# Patient Record
Sex: FEMALE | Race: BLACK OR AFRICAN AMERICAN | NOT HISPANIC OR LATINO | Employment: OTHER | URBAN - METROPOLITAN AREA
[De-identification: names, ages, dates, MRNs, and addresses within clinical notes are randomized per-mention and may not be internally consistent; named-entity substitution may affect disease eponyms.]

---

## 2020-08-10 ENCOUNTER — TELEPHONE (OUTPATIENT)
Dept: CARDIOLOGY | Facility: CLINIC | Age: 75
End: 2020-08-10

## 2020-08-10 DIAGNOSIS — Z95.810 ICD (IMPLANTABLE CARDIOVERTER-DEFIBRILLATOR), DUAL, IN SITU: Primary | ICD-10-CM

## 2020-08-10 NOTE — TELEPHONE ENCOUNTER
Returned call to Lidia, pt's daughter needs to establish EP care in [place]  S/p ICD implant in [place] with Jacqueline Conroy/ [facility] device        ---- Message from Christie Howard sent at 8/10/2020  1:42 PM CDT -----  Regarding: Appt  Lidia is requesting call back in regards to scheduling appt for AICD follow u pappt              Pls call back at [phone]

## 2020-08-17 ENCOUNTER — HOSPITAL ENCOUNTER (OUTPATIENT)
Dept: CARDIOLOGY | Facility: HOSPITAL | Age: 75
Discharge: HOME | End: 2020-08-17
Attending: INTERNAL MEDICINE
Payer: MEDICARE

## 2020-08-17 DIAGNOSIS — Z95.810 ICD (IMPLANTABLE CARDIOVERTER-DEFIBRILLATOR), DUAL, IN SITU: ICD-10-CM

## 2020-08-17 PROCEDURE — 93282 CARDIAC DEVICE CHECK - IN CLINIC & HOSPITAL: ICD-10-PCS | Mod: 26,,, | Performed by: INTERNAL MEDICINE

## 2020-08-17 PROCEDURE — 93282 PRGRMG EVAL IMPLANTABLE DFB: CPT

## 2020-08-17 PROCEDURE — 93282 PRGRMG EVAL IMPLANTABLE DFB: CPT | Mod: 26,,, | Performed by: INTERNAL MEDICINE

## 2020-08-26 ENCOUNTER — TELEPHONE (OUTPATIENT)
Dept: INTERNAL MEDICINE | Facility: CLINIC | Age: 75
End: 2020-08-26

## 2020-08-26 LAB
CHARGE TIME (SEC): 7.6 SEC
HV IMPEDANCE (OHM): 63 OHM
IMPEDANCE RA LEAD: 560 OHMS
OHS CV DC PP MS1: 0.5 MS
OHS CV DC PP V1: 2.5 V
P/R-WAVE RA LEAD: 11.3 MV
THRESHOLD MS RA LEAD: 0.5 MS
THRESHOLD V RA LEAD: 0.75 V

## 2020-08-26 NOTE — TELEPHONE ENCOUNTER
Daughter requested refill of lyrica. Informed pt daughter that we cannot fill it until she is seen. She verbalized understanding and scheduled with the next available provider.

## 2020-08-28 ENCOUNTER — OFFICE VISIT (OUTPATIENT)
Dept: CARDIOLOGY | Facility: CLINIC | Age: 75
End: 2020-08-28
Payer: MEDICARE

## 2020-08-28 ENCOUNTER — HOSPITAL ENCOUNTER (OUTPATIENT)
Dept: CARDIOLOGY | Facility: HOSPITAL | Age: 75
Discharge: HOME | End: 2020-08-28
Attending: INTERNAL MEDICINE
Payer: MEDICARE

## 2020-08-28 VITALS
BODY MASS INDEX: 30.83 KG/M2 | DIASTOLIC BLOOD PRESSURE: 100 MMHG | SYSTOLIC BLOOD PRESSURE: 192 MMHG | WEIGHT: 163.13 LBS | HEART RATE: 63 BPM | OXYGEN SATURATION: 98 %

## 2020-08-28 DIAGNOSIS — Z95.810 ICD (IMPLANTABLE CARDIOVERTER-DEFIBRILLATOR) IN PLACE: ICD-10-CM

## 2020-08-28 DIAGNOSIS — I46.9 CARDIAC ARREST: ICD-10-CM

## 2020-08-28 DIAGNOSIS — I10 ESSENTIAL HYPERTENSION: Primary | Chronic | ICD-10-CM

## 2020-08-28 DIAGNOSIS — I10 ESSENTIAL HYPERTENSION: Chronic | ICD-10-CM

## 2020-08-28 DIAGNOSIS — I46.9 CARDIAC ARREST: Primary | ICD-10-CM

## 2020-08-28 PROCEDURE — 3080F PR MOST RECENT DIASTOLIC BLOOD PRESSURE >= 90 MM HG: ICD-10-PCS | Mod: CPTII,S$GLB,, | Performed by: INTERNAL MEDICINE

## 2020-08-28 PROCEDURE — 99204 PR OFFICE/OUTPT VISIT, NEW, LEVL IV, 45-59 MIN: ICD-10-PCS | Mod: S$GLB,,, | Performed by: INTERNAL MEDICINE

## 2020-08-28 PROCEDURE — 3077F SYST BP >= 140 MM HG: CPT | Mod: CPTII,S$GLB,, | Performed by: INTERNAL MEDICINE

## 2020-08-28 PROCEDURE — 3080F DIAST BP >= 90 MM HG: CPT | Mod: CPTII,S$GLB,, | Performed by: INTERNAL MEDICINE

## 2020-08-28 PROCEDURE — 1125F AMNT PAIN NOTED PAIN PRSNT: CPT | Mod: S$GLB,,, | Performed by: INTERNAL MEDICINE

## 2020-08-28 PROCEDURE — 93010 ELECTROCARDIOGRAM REPORT: CPT | Mod: ,,, | Performed by: INTERNAL MEDICINE

## 2020-08-28 PROCEDURE — 99999 PR PBB SHADOW E&M-EST. PATIENT-LVL III: CPT | Mod: PBBFAC,,, | Performed by: INTERNAL MEDICINE

## 2020-08-28 PROCEDURE — 1125F PR PAIN SEVERITY QUANTIFIED, PAIN PRESENT: ICD-10-PCS | Mod: S$GLB,,, | Performed by: INTERNAL MEDICINE

## 2020-08-28 PROCEDURE — 99999 PR PBB SHADOW E&M-EST. PATIENT-LVL III: ICD-10-PCS | Mod: PBBFAC,,, | Performed by: INTERNAL MEDICINE

## 2020-08-28 PROCEDURE — 93005 ELECTROCARDIOGRAM TRACING: CPT

## 2020-08-28 PROCEDURE — 3077F PR MOST RECENT SYSTOLIC BLOOD PRESSURE >= 140 MM HG: ICD-10-PCS | Mod: CPTII,S$GLB,, | Performed by: INTERNAL MEDICINE

## 2020-08-28 PROCEDURE — 1159F MED LIST DOCD IN RCRD: CPT | Mod: S$GLB,,, | Performed by: INTERNAL MEDICINE

## 2020-08-28 PROCEDURE — 93010 EKG 12-LEAD: ICD-10-PCS | Mod: ,,, | Performed by: INTERNAL MEDICINE

## 2020-08-28 PROCEDURE — 1159F PR MEDICATION LIST DOCUMENTED IN MEDICAL RECORD: ICD-10-PCS | Mod: S$GLB,,, | Performed by: INTERNAL MEDICINE

## 2020-08-28 PROCEDURE — 99204 OFFICE O/P NEW MOD 45 MIN: CPT | Mod: S$GLB,,, | Performed by: INTERNAL MEDICINE

## 2020-08-28 RX ORDER — CLONIDINE HYDROCHLORIDE 0.1 MG/1
0.1 TABLET ORAL 2 TIMES DAILY PRN
Qty: 60 TABLET | Refills: 11 | Status: SHIPPED | OUTPATIENT
Start: 2020-08-28 | End: 2021-04-05

## 2020-08-28 RX ORDER — PREGABALIN 50 MG/1
50 CAPSULE ORAL 3 TIMES DAILY
COMMUNITY
End: 2021-02-01

## 2020-08-28 RX ORDER — BACLOFEN 10 MG/1
10 TABLET ORAL 3 TIMES DAILY
COMMUNITY
End: 2021-02-01

## 2020-08-28 RX ORDER — LORAZEPAM 0.5 MG/1
0.5 TABLET ORAL EVERY 6 HOURS PRN
COMMUNITY
End: 2020-08-31 | Stop reason: SDUPTHER

## 2020-08-28 RX ORDER — METOPROLOL SUCCINATE 25 MG/1
25 TABLET, EXTENDED RELEASE ORAL DAILY
COMMUNITY
End: 2020-08-28

## 2020-08-28 RX ORDER — EZETIMIBE 10 MG/1
10 TABLET ORAL DAILY
COMMUNITY
End: 2020-11-10 | Stop reason: SDUPTHER

## 2020-08-28 RX ORDER — ASPIRIN 81 MG/1
81 TABLET ORAL DAILY
COMMUNITY
End: 2021-08-17

## 2020-08-28 RX ORDER — LISINOPRIL 20 MG/1
20 TABLET ORAL DAILY
COMMUNITY
End: 2020-08-31 | Stop reason: SDUPTHER

## 2020-08-28 RX ORDER — LEVOFLOXACIN 750 MG/1
750 TABLET ORAL DAILY
COMMUNITY
End: 2020-08-31 | Stop reason: ALTCHOICE

## 2020-08-28 RX ORDER — CARVEDILOL 12.5 MG/1
12.5 TABLET ORAL 2 TIMES DAILY WITH MEALS
Qty: 60 TABLET | Refills: 5 | Status: SHIPPED | OUTPATIENT
Start: 2020-08-28 | End: 2021-01-08 | Stop reason: SDUPTHER

## 2020-08-28 NOTE — PROGRESS NOTES
Subjective:   Patient ID:  Leticia Grimes is a 75 y.o. female who presents for evaluation of No chief complaint on file.      74 yo female, [facility] HTN, OA    H/o cardiac arrest. sent to [facility] at [facility]. H/o cath normal and normal cardiac function per pt. S/P st omid ICD on 2020.  Feels dizziness when BP high  No chest pain, dyspnea, orthopnea  Occasional weakness.   No smoking/drinking/drug  EKG today NSR PAC and fusion beats V pacing  Today BP high and did not take the med in AM. Took Lisiopril 20 mg daily and ToprolXL      Past Medical History:   Diagnosis Date    GERD (gastroesophageal reflux disease)     Hypertension     Vitamin D deficiency disease        Past Surgical History:   Procedure Laterality Date     SECTION, CLASSIC      HYSTERECTOMY      INSERTION OF BIVENTRICULAR IMPLANTABLE CARDIOVERTER-DEFIBRILLATOR (ICD)      TUBAL LIGATION         Social History     Tobacco Use    Smoking status: Never Smoker   Substance Use Topics    Alcohol use: No    Drug use: No       Family History   Problem Relation Age of Onset    Hypertension Mother     Arthritis Mother     Diabetes Father     Heart disease Father     Cancer Sister        Review of Systems   Constitution: Positive for malaise/fatigue. Negative for decreased appetite, diaphoresis, fever and night sweats.   HENT: Negative for nosebleeds.    Eyes: Negative for blurred vision and double vision.   Cardiovascular: Negative for chest pain, claudication, dyspnea on exertion, irregular heartbeat, leg swelling, near-syncope, orthopnea, palpitations, paroxysmal nocturnal dyspnea and syncope.   Respiratory: Negative for cough, shortness of breath, sleep disturbances due to breathing, snoring, sputum production and wheezing.    Endocrine: Negative for cold intolerance and polyuria.   Hematologic/Lymphatic: Does not bruise/bleed easily.   Skin: Negative for rash.   Musculoskeletal: Negative for  back pain, falls, joint pain, joint swelling and neck pain.   Gastrointestinal: Negative for abdominal pain, heartburn, nausea and vomiting.   Genitourinary: Negative for dysuria, frequency and hematuria.   Neurological: Positive for headaches. Negative for difficulty with concentration, dizziness, focal weakness, light-headedness, numbness, seizures and weakness.   Psychiatric/Behavioral: Negative for depression, memory loss and substance abuse. The patient does not have insomnia.    Allergic/Immunologic: Negative for HIV exposure and hives.       Objective:   Physical Exam   Constitutional: She is oriented to person, place, and time. She appears well-nourished.   HENT:   Head: Normocephalic.   Eyes: Pupils are equal, round, and reactive to light.   Neck: Normal carotid pulses and no JVD present. Carotid bruit is not present. No thyromegaly present.   Cardiovascular: Normal rate, regular rhythm, normal heart sounds and normal pulses.  No extrasystoles are present. PMI is not displaced. Exam reveals no gallop and no S3.   No murmur heard.  Pulmonary/Chest: Breath sounds normal. No stridor. No respiratory distress.   Abdominal: Soft. Bowel sounds are normal. There is no abdominal tenderness. There is no rebound.   Musculoskeletal: Normal range of motion.   Neurological: She is alert and oriented to person, place, and time.   Skin: Skin is intact. No rash noted.   Psychiatric: Her behavior is normal.       No results found for: CHOL  No results found for: HDL  No results found for: LDLCALC  No results found for: TRIG  No results found for: CHOLHDL    Chemistry        Component Value Date/Time     10/22/2007 1005    K 3.8 10/22/2007 1005     10/22/2007 1005    CO2 26 10/22/2007 1005    BUN 11 10/22/2007 1005    CREATININE 1.3 04/30/2014 1137    GLU 98 10/22/2007 1005        Component Value Date/Time    CALCIUM 9.4 10/22/2007 1005    ALKPHOS 77 10/22/2007 1005    AST 20 10/22/2007 1005    ALT 24 10/22/2007  1005    BILITOT 0.5 10/22/2007 1005    ESTGFRAFRICA 48.7 (A) 04/30/2014 1137    EGFRNONAA 42.3 (A) 04/30/2014 1137          No results found for: LABA1C, HGBA1C  No results found for: TSH  No results found for: INR, PROTIME  No results found for: WBC, HGB, HCT, MCV, PLT  BNP  @LABRCNTIP(BNP,BNPTRIAGEBLO)@  CrCl cannot be calculated (Patient's most recent lab result is older than the maximum 7 days allowed.).  No results found in the last 24 hours.  No results found in the last 24 hours.  No results found in the last 24 hours.    Assessment:      1. Essential hypertension    2.  aborted cardiac arrest    3. ICD (implantable cardioverter-defibrillator) in place        Plan:   D/c ToprolXL  Resume coreg at 12.5 mg bid  Continue Lisinopril 20 mg  Add clonidien 0.1 mg bid prn  Check BP and pulse. Report   Old records from OSH  Counseled DASH  Check Lipid profile in 6 months  Recommend heart-healthy diet, weight control and regular exercise.  Terrie. Risk modification.   I have reviewed all pertinent labs and cardiac studies independently. Plans and recommendations have been formulated under my direct supervision. All questions answered and patient voiced understanding.   If symptoms persist go to the ED    2 months VV

## 2020-08-31 ENCOUNTER — OFFICE VISIT (OUTPATIENT)
Dept: INTERNAL MEDICINE | Facility: CLINIC | Age: 75
End: 2020-08-31
Payer: MEDICARE

## 2020-08-31 DIAGNOSIS — Z95.810 ICD (IMPLANTABLE CARDIOVERTER-DEFIBRILLATOR) IN PLACE: ICD-10-CM

## 2020-08-31 DIAGNOSIS — Z86.74 HISTORY OF SUDDEN CARDIAC ARREST SUCCESSFULLY RESUSCITATED: Primary | ICD-10-CM

## 2020-08-31 DIAGNOSIS — I10 ESSENTIAL HYPERTENSION: Chronic | ICD-10-CM

## 2020-08-31 DIAGNOSIS — F41.9 ANXIETY: ICD-10-CM

## 2020-08-31 DIAGNOSIS — F51.02 ADJUSTMENT INSOMNIA: ICD-10-CM

## 2020-08-31 DIAGNOSIS — M85.80 OSTEOPENIA, UNSPECIFIED LOCATION: ICD-10-CM

## 2020-08-31 DIAGNOSIS — E55.9 MILD VITAMIN D DEFICIENCY: Chronic | ICD-10-CM

## 2020-08-31 PROBLEM — I46.9 CARDIAC ARREST: Status: RESOLVED | Noted: 2020-08-28 | Resolved: 2020-08-31

## 2020-08-31 PROCEDURE — 1159F PR MEDICATION LIST DOCUMENTED IN MEDICAL RECORD: ICD-10-PCS | Mod: S$GLB,,, | Performed by: FAMILY MEDICINE

## 2020-08-31 PROCEDURE — 99205 OFFICE O/P NEW HI 60 MIN: CPT | Mod: S$GLB,,, | Performed by: FAMILY MEDICINE

## 2020-08-31 PROCEDURE — 1126F PR PAIN SEVERITY QUANTIFIED, NO PAIN PRESENT: ICD-10-PCS | Mod: S$GLB,,, | Performed by: FAMILY MEDICINE

## 2020-08-31 PROCEDURE — 1100F PR PT FALLS ASSESS DOC 2+ FALLS/FALL W/INJURY/YR: ICD-10-PCS | Mod: CPTII,S$GLB,, | Performed by: FAMILY MEDICINE

## 2020-08-31 PROCEDURE — 99999 PR PBB SHADOW E&M-EST. PATIENT-LVL IV: ICD-10-PCS | Mod: PBBFAC,,, | Performed by: FAMILY MEDICINE

## 2020-08-31 PROCEDURE — 3288F FALL RISK ASSESSMENT DOCD: CPT | Mod: CPTII,S$GLB,, | Performed by: FAMILY MEDICINE

## 2020-08-31 PROCEDURE — 1100F PTFALLS ASSESS-DOCD GE2>/YR: CPT | Mod: CPTII,S$GLB,, | Performed by: FAMILY MEDICINE

## 2020-08-31 PROCEDURE — 1126F AMNT PAIN NOTED NONE PRSNT: CPT | Mod: S$GLB,,, | Performed by: FAMILY MEDICINE

## 2020-08-31 PROCEDURE — 99205 PR OFFICE/OUTPT VISIT, NEW, LEVL V, 60-74 MIN: ICD-10-PCS | Mod: S$GLB,,, | Performed by: FAMILY MEDICINE

## 2020-08-31 PROCEDURE — 1159F MED LIST DOCD IN RCRD: CPT | Mod: S$GLB,,, | Performed by: FAMILY MEDICINE

## 2020-08-31 PROCEDURE — 99999 PR PBB SHADOW E&M-EST. PATIENT-LVL IV: CPT | Mod: PBBFAC,,, | Performed by: FAMILY MEDICINE

## 2020-08-31 PROCEDURE — 3288F PR FALLS RISK ASSESSMENT DOCUMENTED: ICD-10-PCS | Mod: CPTII,S$GLB,, | Performed by: FAMILY MEDICINE

## 2020-08-31 RX ORDER — ALENDRONATE SODIUM 70 MG/1
TABLET ORAL
COMMUNITY
Start: 2020-07-21 | End: 2021-01-08

## 2020-08-31 RX ORDER — LORAZEPAM 0.5 MG/1
0.5 TABLET ORAL NIGHTLY PRN
Qty: 30 TABLET | Refills: 0 | Status: SHIPPED | OUTPATIENT
Start: 2020-08-31 | End: 2020-09-22

## 2020-08-31 RX ORDER — LISINOPRIL 20 MG/1
20 TABLET ORAL DAILY
Qty: 30 TABLET | Refills: 11 | Status: SHIPPED | OUTPATIENT
Start: 2020-08-31 | End: 2021-01-08 | Stop reason: SDUPTHER

## 2020-08-31 RX ORDER — CHOLECALCIFEROL (VITAMIN D3) 25 MCG
1000 TABLET ORAL DAILY
COMMUNITY
End: 2022-02-14

## 2020-08-31 NOTE — PROGRESS NOTES
Subjective:   Patient ID:  Leticia Grimes is a 75 y.o. female.    Chief Complaint:  Establish Care    Past Medical History:   Diagnosis Date    Adjustment insomnia 2020    Anxiety 2020    GERD (gastroesophageal reflux disease)     History of sudden cardiac arrest successfully resuscitated 2020    Hypertension     ICD (implantable cardioverter-defibrillator) in place 2020    Mild vitamin D deficiency 2013    Osteopenia 2020    Vitamin D deficiency disease      Past Surgical History:   Procedure Laterality Date     SECTION, CLASSIC      HYSTERECTOMY      INSERTION OF BIVENTRICULAR IMPLANTABLE CARDIOVERTER-DEFIBRILLATOR (ICD)      TUBAL LIGATION       Family History   Problem Relation Age of Onset    Hypertension Mother     Arthritis Mother     Diabetes Father     Heart disease Father     Cancer Sister      Review of patient's allergies indicates:   Allergen Reactions    Codeine        Current Outpatient Medications:     alendronate (FOSAMAX) 70 MG tablet, TAKE 1 TABLET BY MOUTH ONCE A WEEK BEFORE MEAL(S), Disp: , Rfl:     aspirin (ECOTRIN) 81 MG EC tablet, Take 81 mg by mouth once daily., Disp: , Rfl:     baclofen (LIORESAL) 10 MG tablet, Take 10 mg by mouth 3 (three) times daily., Disp: , Rfl:     carvediloL (COREG) 12.5 MG tablet, Take 1 tablet (12.5 mg total) by mouth 2 (two) times daily with meals., Disp: 60 tablet, Rfl: 5    cloNIDine (CATAPRES) 0.1 MG tablet, Take 1 tablet (0.1 mg total) by mouth 2 (two) times daily as needed., Disp: 60 tablet, Rfl: 11    ezetimibe (ZETIA) 10 mg tablet, Take 10 mg by mouth once daily., Disp: , Rfl:     lisinopriL (PRINIVIL,ZESTRIL) 20 MG tablet, Take 1 tablet (20 mg total) by mouth once daily., Disp: 30 tablet, Rfl: 11    LORazepam (ATIVAN) 0.5 MG tablet, Take 1 tablet (0.5 mg total) by mouth nightly as needed (for Anxiety or Insomnia)., Disp: 30 tablet, Rfl: 0    pregabalin (LYRICA) 50 MG capsule, Take 50 mg by  mouth 3 (three) times daily., Disp: , Rfl:     vitamin D (VITAMIN D3) 1000 units Tab, Take 1,000 Units by mouth once daily., Disp: , Rfl:        Patient presents with artery to establish care.    Previous PCP Dr. Howell.    No previous records from provider available.      Most recently admitted and discharged from hospital and [facility]    Underwent cardiac arrest at home and was resuscitated.    Underwent full cardiology evaluation with reported normal heart catheterization and heart function and stable telemetry monitoring.    No specific etiology identified.    Was continued on Coreg 12.5 mg twice a day, lisinopril 20 mg daily, and clonidine  0.1 mg as needed for blood pressure control.    Had internal defibrillator placed with regards to her cardiac arrest.  Now on Zetia regarding lipids.  Previous Crestor and Lipitor not tolerated.  Has done well since discharge without any recurrent episodes.    Additional medical history includes  - Neuropathy with spasticity.  Sees Dr. Mondragon.  On pregabalin 50 mg 3 times a day and baclofen 10 mg 3 times a day.  Needs refills of baclofen.  - Anxiety and resultant insomnia.  Significantly worsened since the vent.  Presently taking Ativan 0.5 mg nightly to help with sleep.  Has appointment to establish with Psychiatry at the [facility].  Would like refill of medication until that evaluation.    Probable osteopenia with vitamin-D insufficiency based on patient report and med list.  States on Fosamax weekly and vitamin-D daily supplement.    - GERD with some esophageal dysmotility.  But is stable on her present PPI.    Overall states she is doing well.    Has no additional specific complaints or concerns today.      Hypertension  This is a chronic problem. The current episode started more than 1 year ago. The problem is controlled. Associated symptoms include anxiety. Pertinent negatives include no blurred vision, chest pain, headaches, malaise/fatigue, neck pain, orthopnea,  palpitations, peripheral edema, PND, shortness of breath or sweats. There are no associated agents to hypertension. Past treatments include central alpha agonists, beta blockers and ACE inhibitors. The current treatment provides significant improvement. There are no compliance problems.  There is no history of angina, kidney disease, CAD/MI, CVA, heart failure, left ventricular hypertrophy, PVD or retinopathy. There is no history of chronic renal disease, coarctation of the aorta, hyperaldosteronism, hypercortisolism, hyperparathyroidism, a hypertension causing med, pheochromocytoma, renovascular disease, sleep apnea or a thyroid problem.   Anxiety  Presents for follow-up visit. Symptoms include excessive worry, insomnia, muscle tension and nervous/anxious behavior. Patient reports no chest pain, compulsions, confusion, decreased concentration, depressed mood, dizziness, dry mouth, feeling of choking, hyperventilation, irritability, malaise, nausea, obsessions, palpitations, panic, restlessness, shortness of breath or suicidal ideas. Symptoms occur most days. The severity of symptoms is causing significant distress and interfering with daily activities. The quality of sleep is poor. Nighttime awakenings: several.     Compliance with medications is %. Treatment side effects: None.     Review of Systems   Constitutional: Negative for activity change, appetite change, fatigue, irritability and malaise/fatigue.   Eyes: Negative for blurred vision and visual disturbance.   Respiratory: Negative for cough, chest tightness, shortness of breath and wheezing.    Cardiovascular: Negative for chest pain, palpitations, orthopnea, leg swelling and PND.   Gastrointestinal: Negative for abdominal pain, constipation, diarrhea, nausea and vomiting.   Genitourinary: Negative for difficulty urinating and pelvic pain.   Musculoskeletal: Positive for myalgias. Negative for arthralgias, back pain, gait problem and neck pain.    Skin: Negative for rash.   Neurological: Positive for numbness. Negative for dizziness, tremors, seizures, syncope, facial asymmetry, speech difficulty, weakness, light-headedness and headaches.   Psychiatric/Behavioral: Positive for dysphoric mood and sleep disturbance. Negative for agitation, behavioral problems, confusion, decreased concentration, hallucinations, self-injury and suicidal ideas. The patient is nervous/anxious and has insomnia. The patient is not hyperactive.        Objective:   There were no vitals taken for this visit.    Physical Exam  Vitals signs and nursing note reviewed.   Constitutional:       Appearance: She is well-developed and normal weight.   Eyes:      General: No scleral icterus.     Conjunctiva/sclera: Conjunctivae normal.   Neck:      Thyroid: No thyroid mass or thyromegaly.      Vascular: No JVD.   Cardiovascular:      Rate and Rhythm: Normal rate and regular rhythm.      Pulses:           Radial pulses are 2+ on the right side and 2+ on the left side.      Heart sounds: Normal heart sounds. No murmur. No friction rub. No gallop.    Pulmonary:      Effort: Pulmonary effort is normal.      Breath sounds: Normal breath sounds. No wheezing, rhonchi or rales.   Abdominal:      General: There is no distension.      Palpations: Abdomen is soft.      Tenderness: There is no abdominal tenderness.   Musculoskeletal:      Right lower leg: No edema.      Left lower leg: No edema.   Skin:     General: Skin is warm and dry.      Findings: No rash.   Neurological:      Mental Status: She is alert.      Coordination: Coordination is intact.      Gait: Gait is intact.   Psychiatric:         Attention and Perception: Attention normal.         Mood and Affect: Mood normal.         Speech: Speech normal.         Behavior: Behavior normal.         Thought Content: Thought content normal.         Cognition and Memory: Cognition normal.         Judgment: Judgment normal.       Assessment:     1.  History of sudden cardiac arrest successfully resuscitated    2. ICD (implantable cardioverter-defibrillator) in place    3. Essential hypertension    4. Anxiety    5. Adjustment insomnia    6. Osteopenia, unspecified location    7. Mild vitamin D deficiency      Plan:   History of sudden cardiac arrest successfully resuscitated  ICD (implantable cardioverter-defibrillator) in place  Essential hypertension  -     lisinopriL (PRINIVIL,ZESTRIL) 20 MG tablet; Take 1 tablet (20 mg total) by mouth once daily.  Dispense: 30 tablet; Refill: 11    Clinically stable.  No recurrence of events since discharge.  Blood pressure at goal.    Continue Coreg 12.5 mg twice a day  Continue lisinopril 20 mg daily   Continue clonidine 0.1 mg daily as needed   Continue Zetia 10 mg daily    Anxiety  Adjustment insomnia  -     LORazepam (ATIVAN) 0.5 MG tablet; Take 1 tablet (0.5 mg total) by mouth nightly as needed (for Anxiety or Insomnia).  Dispense: 30 tablet; Refill: 0  Continue Ativan 0.5 mg nightly as needed.    Establish with psychiatry as scheduled.      Osteopenia, unspecified location  Mild vitamin D deficiency  Old records requested from previous PCP.    Continue Fosamax weekly  Continue Vitamin D supplement over-the-counter.    Follow-up all specialists scheduled.  Return to clinic 6 weeks   Update health maintenance as needed based on review of records.

## 2020-09-01 ENCOUNTER — NURSE TRIAGE (OUTPATIENT)
Dept: ADMINISTRATIVE | Facility: CLINIC | Age: 75
End: 2020-09-01

## 2020-09-01 ENCOUNTER — TELEPHONE (OUTPATIENT)
Dept: CARDIOLOGY | Facility: CLINIC | Age: 75
End: 2020-09-01

## 2020-09-01 PROBLEM — F51.02 ADJUSTMENT INSOMNIA: Status: ACTIVE | Noted: 2020-09-01

## 2020-09-01 PROBLEM — M85.80 OSTEOPENIA: Status: ACTIVE | Noted: 2020-09-01

## 2020-09-01 PROBLEM — M85.80 OSTEOPENIA: Chronic | Status: ACTIVE | Noted: 2020-09-01

## 2020-09-01 PROBLEM — F51.02 ADJUSTMENT INSOMNIA: Status: RESOLVED | Noted: 2020-09-01 | Resolved: 2020-09-01

## 2020-09-01 PROBLEM — F41.9 ANXIETY: Status: ACTIVE | Noted: 2020-09-01

## 2020-09-01 PROBLEM — F51.02 ADJUSTMENT INSOMNIA: Chronic | Status: ACTIVE | Noted: 2020-09-01

## 2020-09-01 PROBLEM — F41.9 ANXIETY: Chronic | Status: ACTIVE | Noted: 2020-09-01

## 2020-09-01 NOTE — TELEPHONE ENCOUNTER
Ms Kelly called saying that she's had an ICD since 8/17/20. She states that she has been having pain in her left arm for the past 3-5 days but she denies any chest pain. She wanted to know if this was normal.

## 2020-09-01 NOTE — TELEPHONE ENCOUNTER
Pt states called earlier today, ICD placed x 1 month ago, now having discomfort to left arm over elbow and shoulder x 3 days worsening yesterday. Denies recent fall or trauma. Denies chest pain or SOB. Admits some fluttering to chest but due to anxiety. Pt was seen by PCP yesterday and informed PCP about symptoms. Denies swelling, numbness/tingling to arm or discoloration or coolness to touch. Current pain is 4/10. Pt reports pain increases with movement or when lifting something. Pain only present when lifting or moving arm.     Sent secure chat to Talya Blair MA of Dr. Arthur. Requesting I ask pt if swelling to ICD site. No swelling or discoloration around ICD insertion. No change to ICD insertion site. Informed Talya, I will triage pt according to our protocol. Pt states swelling was noted to hand when she woke up this morning but that improved and her PCP was aware of that symptoms. Advised per protocol. Advised to call back for worsening s/s. VU.        Reason for Disposition   MODERATE pain (e.g. interferes with normal activities) and present > 3 days    Additional Information   Negative: Shock suspected (e.g., cold/pale/clammy skin, too weak to stand, low BP, rapid pulse)   Negative: Similar pain previously and it was from 'heart attack'   Negative: Similar pain previously from 'angina' and not relieved by nitroglycerin   Negative: Sounds like a life-threatening emergency to the triager   Negative: Difficulty breathing or unusual sweating (e.g., sweating without exertion)   Negative: Chest pain lasting longer than 5 minutes   Negative: Age > 40 and no obvious cause for pain, pain still present even when not moving the arm   Negative: Fever and red area (or area very tender to touch)   Negative: Fever and swollen joint   Negative: Entire arm is swollen   Negative: Patient sounds very sick or weak to the triager   Negative: SEVERE pain (e.g., excruciating, unable to do any normal  activities)   Negative: Red area or streak and large (> 2 in. or 5 cm)   Negative: Cast on wrist or arm and now increasing pain   Negative: Weakness (i.e., loss of strength) in hand or fingers   Negative: Arm pains with exertion (e.g., occurs with walking; goes away on resting)   Negative: Painful rash with multiple small blisters grouped together (i.e., dermatomal distribution or 'band' or 'stripe')   Negative: Looks like a boil, infected sore, deep ulcer, or other infected rash (spreading redness, pus)   Negative: Localized rash is very painful (no fever)   Negative: Numbness (i.e., loss of sensation) in hand or fingers   Negative: Localized pain, redness or hard lump along vein   Negative: Patient wants to be seen    Protocols used: ARM PAIN-A-OH

## 2020-09-02 VITALS
BODY MASS INDEX: 30.96 KG/M2 | HEIGHT: 61 IN | OXYGEN SATURATION: 98 % | TEMPERATURE: 98 F | WEIGHT: 164 LBS | HEART RATE: 66 BPM | DIASTOLIC BLOOD PRESSURE: 78 MMHG | SYSTOLIC BLOOD PRESSURE: 138 MMHG

## 2020-09-03 ENCOUNTER — OFFICE VISIT (OUTPATIENT)
Dept: CARDIOLOGY | Facility: CLINIC | Age: 75
End: 2020-09-03
Payer: MEDICARE

## 2020-09-03 DIAGNOSIS — M79.89 LEFT ARM SWELLING: Primary | ICD-10-CM

## 2020-09-03 DIAGNOSIS — Z86.74 HISTORY OF SUDDEN CARDIAC ARREST SUCCESSFULLY RESUSCITATED: ICD-10-CM

## 2020-09-03 DIAGNOSIS — I10 ESSENTIAL HYPERTENSION: Chronic | ICD-10-CM

## 2020-09-03 DIAGNOSIS — R60.0 LOCALIZED EDEMA: ICD-10-CM

## 2020-09-03 DIAGNOSIS — Z95.810 ICD (IMPLANTABLE CARDIOVERTER-DEFIBRILLATOR) IN PLACE: ICD-10-CM

## 2020-09-03 PROCEDURE — 99214 PR OFFICE/OUTPT VISIT, EST, LEVL IV, 30-39 MIN: ICD-10-PCS | Mod: 95,,, | Performed by: INTERNAL MEDICINE

## 2020-09-03 PROCEDURE — 1100F PR PT FALLS ASSESS DOC 2+ FALLS/FALL W/INJURY/YR: ICD-10-PCS | Mod: CPTII,95,, | Performed by: INTERNAL MEDICINE

## 2020-09-03 PROCEDURE — 3288F FALL RISK ASSESSMENT DOCD: CPT | Mod: CPTII,95,, | Performed by: INTERNAL MEDICINE

## 2020-09-03 PROCEDURE — 1159F MED LIST DOCD IN RCRD: CPT | Mod: 95,,, | Performed by: INTERNAL MEDICINE

## 2020-09-03 PROCEDURE — 99214 OFFICE O/P EST MOD 30 MIN: CPT | Mod: 95,,, | Performed by: INTERNAL MEDICINE

## 2020-09-03 PROCEDURE — 1100F PTFALLS ASSESS-DOCD GE2>/YR: CPT | Mod: CPTII,95,, | Performed by: INTERNAL MEDICINE

## 2020-09-03 PROCEDURE — 1159F PR MEDICATION LIST DOCUMENTED IN MEDICAL RECORD: ICD-10-PCS | Mod: 95,,, | Performed by: INTERNAL MEDICINE

## 2020-09-03 PROCEDURE — 3288F PR FALLS RISK ASSESSMENT DOCUMENTED: ICD-10-PCS | Mod: CPTII,95,, | Performed by: INTERNAL MEDICINE

## 2020-09-03 NOTE — PROGRESS NOTES
Subjective:   Patient ID:  Leticia Grimes is a 75 y.o. female who presents for follow up of No chief complaint on file.      76 yo female, left arm pain  PMH HTN, OA    H/o cardiac arrest. sent to [facility]. H/o cath normal and normal cardiac function per pt.   S/P st omid ICD on 2020.  Feels dizziness when BP high  No chest pain, dyspnea, orthopnea  Occasional weakness.   No smoking/drinking/drug  EKG today NSR PAC and fusion beats V pacing    C/o left arm pain and hand swelling for 3 days and improved today. No erythema and weakness        Past Medical History:   Diagnosis Date    Adjustment insomnia 2020    Anxiety 2020    GERD (gastroesophageal reflux disease)     History of sudden cardiac arrest successfully resuscitated 2020    Hypertension     ICD (implantable cardioverter-defibrillator) in place 2020    Mild vitamin D deficiency 2013    Osteopenia 2020    Vitamin D deficiency disease        Past Surgical History:   Procedure Laterality Date     SECTION, CLASSIC      HYSTERECTOMY      INSERTION OF BIVENTRICULAR IMPLANTABLE CARDIOVERTER-DEFIBRILLATOR (ICD)      TUBAL LIGATION         Social History     Tobacco Use    Smoking status: Never Smoker   Substance Use Topics    Alcohol use: No    Drug use: No       Family History   Problem Relation Age of Onset    Hypertension Mother     Arthritis Mother     Diabetes Father     Heart disease Father     Cancer Sister          Review of Systems   Constitution: Negative for decreased appetite, diaphoresis, fever, malaise/fatigue and night sweats.   HENT: Negative for nosebleeds.    Eyes: Negative for blurred vision and double vision.   Cardiovascular: Negative for chest pain, claudication, dyspnea on exertion, irregular heartbeat, leg swelling, near-syncope, orthopnea, palpitations, paroxysmal nocturnal dyspnea and syncope.   Respiratory: Negative for cough, shortness of  breath, sleep disturbances due to breathing, snoring, sputum production and wheezing.    Endocrine: Negative for cold intolerance and polyuria.   Hematologic/Lymphatic: Does not bruise/bleed easily.   Skin: Negative for rash.   Musculoskeletal: Negative for back pain, falls, joint pain, joint swelling and neck pain.        Left arm pain and swelling   Gastrointestinal: Negative for abdominal pain, heartburn, nausea and vomiting.   Genitourinary: Negative for dysuria, frequency and hematuria.   Neurological: Negative for difficulty with concentration, dizziness, focal weakness, headaches, light-headedness, numbness, seizures and weakness.   Psychiatric/Behavioral: Negative for depression, memory loss and substance abuse. The patient does not have insomnia.    Allergic/Immunologic: Negative for HIV exposure and hives.       Objective:   Physical Exam    No results found for: CHOL  No results found for: HDL  No results found for: LDLCALC  No results found for: TRIG  No results found for: CHOLHDL    Chemistry        Component Value Date/Time     10/22/2007 1005    K 3.8 10/22/2007 1005     10/22/2007 1005    CO2 26 10/22/2007 1005    BUN 11 10/22/2007 1005    CREATININE 1.3 04/30/2014 1137    GLU 98 10/22/2007 1005        Component Value Date/Time    CALCIUM 9.4 10/22/2007 1005    ALKPHOS 77 10/22/2007 1005    AST 20 10/22/2007 1005    ALT 24 10/22/2007 1005    BILITOT 0.5 10/22/2007 1005    ESTGFRAFRICA 48.7 (A) 04/30/2014 1137    EGFRNONAA 42.3 (A) 04/30/2014 1137          No results found for: LABA1C, HGBA1C  No results found for: TSH  No results found for: INR, PROTIME  No results found for: WBC, HGB, HCT, MCV, PLT  BMP  Sodium   Date Value Ref Range Status   10/22/2007 144 136 - 145 mMol/l Final     Potassium   Date Value Ref Range Status   10/22/2007 3.8 3.3 - 5.3 mMol/l Final     Chloride   Date Value Ref Range Status   10/22/2007 108 95 - 110 mMol/l Final     CO2   Date Value Ref Range Status    10/22/2007 26 23.0 - 29.0 mEq/L Final     BUN, Bld   Date Value Ref Range Status   10/22/2007 11 5 - 23 mg/dl Final     Creatinine   Date Value Ref Range Status   04/30/2014 1.3 0.5 - 1.4 mg/dL Final     Calcium   Date Value Ref Range Status   10/22/2007 9.4 8.7 - 10.5 mg/dl Final     eGFR if    Date Value Ref Range Status   04/30/2014 48.7 (A) >60 mL/min/1.73 m^2 Final     eGFR if non    Date Value Ref Range Status   04/30/2014 42.3 (A) >60 mL/min/1.73 m^2 Final     Comment:     Calculation used to obtain the estimated glomerular filtration  rate (eGFR) is the CKD-EPI equation. Since race is unknown   in our information system, the eGFR values for   -American and Non--American patients are given   for each creatinine result.     BNP  @LABRCNTIP(BNP,BNPTRIAGEBLO)@  @LABRCNTIP(troponini)@  CrCl cannot be calculated (Patient's most recent lab result is older than the maximum 7 days allowed.).  No results found in the last 24 hours.  No results found in the last 24 hours.  No results found in the last 24 hours.    Assessment:      1. Left arm swelling    2. ICD (implantable cardioverter-defibrillator) in place    3. Essential hypertension    4. History of sudden cardiac arrest successfully resuscitated    5. Localized edema         Plan:   LUE venous Doppler   Continue ASA coreg, clonidine zetia and Lisinopril  RTC as scheduled        The patient location is: home  The chief complaint leading to consultation is: left arm pain and swelling    Visit type: audiovisual    Face to Face time with patient: 15 minutes of total time spent on the encounter, which includes face to face time and non-face to face time preparing to see the patient (eg, review of tests), Obtaining and/or reviewing separately obtained history, Documenting clinical information in the electronic or other health record, Independently interpreting results (not separately reported) and communicating results  to the patient/family/caregiver, or Care coordination (not separately reported).         Each patient to whom he or she provides medical services by telemedicine is:  (1) informed of the relationship between the physician and patient and the respective role of any other health care provider with respect to management of the patient; and (2) notified that he or she may decline to receive medical services by telemedicine and may withdraw from such care at any time.

## 2020-09-04 ENCOUNTER — TELEPHONE (OUTPATIENT)
Dept: CARDIOLOGY | Facility: CLINIC | Age: 75
End: 2020-09-04

## 2020-09-04 NOTE — TELEPHONE ENCOUNTER
Pt was contacted and confirmed appointment without Questions or concerns.             ----- Message from Luis Arthur MD sent at 9/3/2020  3:34 PM CDT -----  Left arm venous Doppler

## 2020-09-11 DIAGNOSIS — Z12.11 COLON CANCER SCREENING: ICD-10-CM

## 2020-09-22 ENCOUNTER — OFFICE VISIT (OUTPATIENT)
Dept: PSYCHIATRY | Facility: CLINIC | Age: 75
End: 2020-09-22
Payer: COMMERCIAL

## 2020-09-22 DIAGNOSIS — G47.00 INSOMNIA, UNSPECIFIED TYPE: ICD-10-CM

## 2020-09-22 DIAGNOSIS — F44.0 DISSOCIATIVE AMNESIA: Primary | ICD-10-CM

## 2020-09-22 DIAGNOSIS — G31.84 MILD COGNITIVE IMPAIRMENT: ICD-10-CM

## 2020-09-22 PROCEDURE — 90792 PSYCH DIAG EVAL W/MED SRVCS: CPT | Mod: S$GLB,,, | Performed by: PSYCHIATRY & NEUROLOGY

## 2020-09-22 PROCEDURE — 99999 PR PBB SHADOW E&M-EST. PATIENT-LVL I: ICD-10-PCS | Mod: PBBFAC,,, | Performed by: PSYCHIATRY & NEUROLOGY

## 2020-09-22 PROCEDURE — 90792 PR PSYCHIATRIC DIAGNOSTIC EVALUATION W/MEDICAL SERVICES: ICD-10-PCS | Mod: S$GLB,,, | Performed by: PSYCHIATRY & NEUROLOGY

## 2020-09-22 PROCEDURE — 99999 PR PBB SHADOW E&M-EST. PATIENT-LVL I: CPT | Mod: PBBFAC,,, | Performed by: PSYCHIATRY & NEUROLOGY

## 2020-09-22 RX ORDER — TRAZODONE HYDROCHLORIDE 50 MG/1
TABLET ORAL
Qty: 30 TABLET | Refills: 2 | Status: SHIPPED | OUTPATIENT
Start: 2020-09-22 | End: 2020-11-20 | Stop reason: SDUPTHER
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
## 2020-09-23 ENCOUNTER — PATIENT MESSAGE (OUTPATIENT)
Dept: PSYCHIATRY | Facility: CLINIC | Age: 75
End: 2020-09-23

## 2020-09-23 RX ORDER — LORAZEPAM 0.5 MG/1
0.5 TABLET ORAL NIGHTLY PRN
Qty: 30 TABLET | Refills: 2 | Status: SHIPPED | OUTPATIENT
Start: 2020-09-23 | End: 2020-11-20 | Stop reason: SDUPTHER

## 2020-09-24 ENCOUNTER — PATIENT MESSAGE (OUTPATIENT)
Dept: PSYCHIATRY | Facility: CLINIC | Age: 75
End: 2020-09-24

## 2020-09-25 ENCOUNTER — PATIENT MESSAGE (OUTPATIENT)
Dept: CARDIOLOGY | Facility: CLINIC | Age: 75
End: 2020-09-25

## 2020-09-25 ENCOUNTER — HOSPITAL ENCOUNTER (OUTPATIENT)
Dept: CARDIOLOGY | Facility: HOSPITAL | Age: 75
Discharge: HOME | End: 2020-09-25
Attending: INTERNAL MEDICINE
Payer: MEDICARE

## 2020-09-25 DIAGNOSIS — M79.89 LEFT ARM SWELLING: ICD-10-CM

## 2020-09-25 DIAGNOSIS — R60.0 LOCALIZED EDEMA: ICD-10-CM

## 2020-09-25 DIAGNOSIS — Z95.810 ICD (IMPLANTABLE CARDIOVERTER-DEFIBRILLATOR) IN PLACE: ICD-10-CM

## 2020-09-25 PROCEDURE — 93971 EXTREMITY STUDY: CPT | Mod: LT

## 2020-09-25 PROCEDURE — 93971 EXTREMITY STUDY: CPT | Mod: 26,LT,, | Performed by: INTERNAL MEDICINE

## 2020-09-25 PROCEDURE — 93971 CV US DOPPLER VENOUS ARM LEFT (CUPID ONLY): ICD-10-PCS | Mod: 26,LT,, | Performed by: INTERNAL MEDICINE

## 2020-09-29 ENCOUNTER — TELEPHONE (OUTPATIENT)
Dept: CARDIOLOGY | Facility: CLINIC | Age: 75
End: 2020-09-29

## 2020-09-29 NOTE — TELEPHONE ENCOUNTER
Pt contacted about results, pt verbalized understanding.          ----- Message from Luis Arthur MD sent at 9/28/2020 10:00 PM CDT -----  LEFT ARM US SHOWED NO DVT

## 2020-10-02 ENCOUNTER — PATIENT MESSAGE (OUTPATIENT)
Dept: INTERNAL MEDICINE | Facility: CLINIC | Age: 75
End: 2020-10-02

## 2020-10-04 ENCOUNTER — NURSE TRIAGE (OUTPATIENT)
Dept: ADMINISTRATIVE | Facility: CLINIC | Age: 75
End: 2020-10-04

## 2020-10-04 NOTE — TELEPHONE ENCOUNTER
Spoke patient she states she has been having spikes in her B/P this week.  States she is taking B/P medication to include clonidine, lisinopril, and coreg.  States she took all meds this morning.  States over the weekend her blood pressure has not been lower than 160.   B/P was 180/95 @ 9:53 am.  Recheck B/P 177/89 @ 10:30 am. States when she woke up this morning she felt off balanced.  Reports that feeling has went away.   Denies that she feels unsteady at this time. Also denies having any chest pain, difficulty breathing, blurred vision or headache at this time.  Current B/P 151/84.  Reports that she was told to take clonidine if B/P systolic os greater than 200 and if no improvement to go to ER.  States B/P has not gotten that high.   Advised patient to see PCP within 24 hours.  Patient states she has a virtual visit set for tomorrow.  Also advised patient to call back is she develops any symptoms. Patient verbalized understanding.     Reason for Disposition   Systolic BP  >= 160 OR Diastolic >= 100    Additional Information   Negative: Difficult to awaken or acting confused (e.g., disoriented, slurred speech)   Negative: Severe difficulty breathing (e.g., struggling for each breath, speaks in single words)   Negative: [1] Weakness of the face, arm or leg on one side of the body AND [2] new onset   Negative: [1] Numbness (i.e., loss of sensation) of the face, arm or leg on one side of the body AND [2] new onset   Negative: [1] Chest pain lasts > 5 minutes AND [2] history of heart disease  (i.e., heart attack, bypass surgery, angina, angioplasty, CHF)   Negative: [1] Chest pain AND [2] took nitrogylcerin AND [3] pain was not relieved   Negative: Sounds like a life-threatening emergency to the triager   Negative: [1] Systolic BP  >= 160 OR Diastolic >= 100 AND [2] cardiac or neurologic symptoms (e.g., chest pain, difficulty breathing, unsteady gait, blurred vision)   Negative: [1] Pregnant > 20 weeks (or  postpartum < 6 weeks) AND [2] new hand or face swelling   Negative: [1] Pregnant > 20 weeks AND [2] BP Systolic BP  >= 140 OR Diastolic >= 90   Negative: [1] Systolic BP  >= 200 OR Diastolic >= 120  AND [2] having NO cardiac or neurologic symptoms   Negative: [1] Postpartum < 6 weeks AND [2] BP Systolic BP  >= 140 OR Diastolic >= 90   Negative: [1] Systolic BP  >= 180 OR Diastolic >= 110 AND [2] missed most recent dose of blood pressure medication   Negative: Systolic BP  >= 180 OR Diastolic >= 110   Negative: Ran out of BP medications    Protocols used: HIGH BLOOD PRESSURE-A-AH

## 2020-10-05 ENCOUNTER — OFFICE VISIT (OUTPATIENT)
Dept: INTERNAL MEDICINE | Facility: CLINIC | Age: 75
End: 2020-10-05
Payer: MEDICARE

## 2020-10-05 DIAGNOSIS — G89.29 CHRONIC LEFT SHOULDER PAIN: ICD-10-CM

## 2020-10-05 DIAGNOSIS — M25.512 CHRONIC LEFT SHOULDER PAIN: ICD-10-CM

## 2020-10-05 DIAGNOSIS — I10 ESSENTIAL HYPERTENSION: Primary | ICD-10-CM

## 2020-10-05 PROCEDURE — 99214 OFFICE O/P EST MOD 30 MIN: CPT | Mod: 95,,, | Performed by: FAMILY MEDICINE

## 2020-10-05 PROCEDURE — 3288F PR FALLS RISK ASSESSMENT DOCUMENTED: ICD-10-PCS | Mod: CPTII,95,, | Performed by: FAMILY MEDICINE

## 2020-10-05 PROCEDURE — 1159F PR MEDICATION LIST DOCUMENTED IN MEDICAL RECORD: ICD-10-PCS | Mod: 95,,, | Performed by: FAMILY MEDICINE

## 2020-10-05 PROCEDURE — 1100F PR PT FALLS ASSESS DOC 2+ FALLS/FALL W/INJURY/YR: ICD-10-PCS | Mod: CPTII,95,, | Performed by: FAMILY MEDICINE

## 2020-10-05 PROCEDURE — 3288F FALL RISK ASSESSMENT DOCD: CPT | Mod: CPTII,95,, | Performed by: FAMILY MEDICINE

## 2020-10-05 PROCEDURE — 99214 PR OFFICE/OUTPT VISIT, EST, LEVL IV, 30-39 MIN: ICD-10-PCS | Mod: 95,,, | Performed by: FAMILY MEDICINE

## 2020-10-05 PROCEDURE — 1159F MED LIST DOCD IN RCRD: CPT | Mod: 95,,, | Performed by: FAMILY MEDICINE

## 2020-10-05 PROCEDURE — 1100F PTFALLS ASSESS-DOCD GE2>/YR: CPT | Mod: CPTII,95,, | Performed by: FAMILY MEDICINE

## 2020-10-06 NOTE — PROGRESS NOTES
Subjective:   Patient ID: Leticia Grimes is a 75 y.o. female.  Chief Complaint:  No chief complaint on file.    The patient location is: Home  The chief complaint leading to consultation is: Blood Pressure Elveations    Visit type: audiovisual    Face to Face time with patient: 20 minutes  30 minutes of total time spent on the encounter, which includes face to face time and non-face to face time preparing to see the patient (eg, review of tests), Obtaining and/or reviewing separately obtained history, Documenting clinical information in the electronic or other health record, Independently interpreting results (not separately reported) and communicating results to the patient/family/caregiver, or Care coordination (not separately reported).     Each patient to whom he or she provides medical services by telemedicine is:  (1) informed of the relationship between the physician and patient and the respective role of any other health care provider with respect to management of the patient; and (2) notified that he or she may decline to receive medical services by telemedicine and may withdraw from such care at any time.    Patient scheduled visit to discuss recent elevated blood pressures and chronic left shoulder pain    Regarding the shoulder, [facility]    Told rotator cuff issue and eclined any surgical intervention at that time.    Pain has increased.    She already has a scheduled visit with Sports Medicine Department.      Regarding her blood pressure, previously controlled last visit.    She was seen after hospital discharge follow   Previously on maximum dose of Benicar, amlodipine, hydrochlorothiazide, and metoprolol.    During hospital stay was hypotensive and so only discharged on lisinopril 20 mg daily and Coreg 12.5 mg twice a day.    Patient reports compliance, but has blood pressures now at times elevated greater than 160/100.    She takes clonidine 0.1 mg twice a day as  needed and her blood pressure response to treatment.  States her average is 170s/ 70 to 80s.    Concerned that she needs additional medication.    She is asymptomatic with the elevated blood pressures.      She has no additional complaints concerns today      Current Outpatient Medications:     alendronate (FOSAMAX) 70 MG tablet, TAKE 1 TABLET BY MOUTH ONCE A WEEK BEFORE MEAL(S), Disp: , Rfl:     aspirin (ECOTRIN) 81 MG EC tablet, Take 81 mg by mouth once daily., Disp: , Rfl:     baclofen (LIORESAL) 10 MG tablet, Take 10 mg by mouth 3 (three) times daily., Disp: , Rfl:     carvediloL (COREG) 12.5 MG tablet, Take 1 tablet (12.5 mg total) by mouth 2 (two) times daily with meals., Disp: 60 tablet, Rfl: 5    cloNIDine (CATAPRES) 0.1 MG tablet, Take 1 tablet (0.1 mg total) by mouth 2 (two) times daily as needed., Disp: 60 tablet, Rfl: 11    ezetimibe (ZETIA) 10 mg tablet, Take 10 mg by mouth once daily., Disp: , Rfl:     lisinopriL (PRINIVIL,ZESTRIL) 20 MG tablet, Take 1 tablet (20 mg total) by mouth once daily., Disp: 30 tablet, Rfl: 11    LORazepam (ATIVAN) 0.5 MG tablet, Take 1 tablet (0.5 mg total) by mouth nightly as needed for Anxiety., Disp: 30 tablet, Rfl: 2    pregabalin (LYRICA) 50 MG capsule, Take 50 mg by mouth 3 (three) times daily., Disp: , Rfl:     traZODone (DESYREL) 50 MG tablet, Take 1/2 to 1 tablet at bedtime as needed for sleep., Disp: 30 tablet, Rfl: 2    vitamin D (VITAMIN D3) 1000 units Tab, Take 1,000 Units by mouth once daily., Disp: , Rfl:     Review of Systems   Constitutional: Negative for fatigue.   Eyes: Negative for visual disturbance.   Respiratory: Negative for cough, chest tightness, shortness of breath and wheezing.    Cardiovascular: Negative for chest pain, palpitations and leg swelling.   Gastrointestinal: Negative for abdominal pain, constipation, diarrhea, nausea and vomiting.   Genitourinary: Negative for difficulty urinating.   Musculoskeletal: Positive for  arthralgias. Negative for myalgias.   Skin: Negative for rash.   Neurological: Negative for dizziness, syncope, weakness, light-headedness and headaches.   Psychiatric/Behavioral: Negative for sleep disturbance. The patient is not nervous/anxious.      Objective:   Physical Exam  Constitutional:       General: She is not in acute distress.     Appearance: Normal appearance. She is well-developed. She is not ill-appearing or toxic-appearing.   Eyes:      General: No scleral icterus.        Right eye: No discharge.         Left eye: No discharge.      Conjunctiva/sclera: Conjunctivae normal.      Right eye: Right conjunctiva is not injected.      Left eye: Left conjunctiva is not injected.   Pulmonary:      Effort: Pulmonary effort is normal. No tachypnea, accessory muscle usage or respiratory distress.   Skin:     Findings: No rash.   Neurological:      Mental Status: She is alert and oriented to person, place, and time.   Psychiatric:         Attention and Perception: Attention and perception normal.         Mood and Affect: Mood and affect normal.         Speech: Speech normal.         Behavior: Behavior normal. Behavior is cooperative.         Thought Content: Thought content normal.         Cognition and Memory: Cognition and memory normal.         Judgment: Judgment normal.       Assessment:       ICD-10-CM ICD-9-CM   1. Essential hypertension  I10 401.9   2. Chronic left shoulder pain  M25.512 719.41    G89.29 338.29     Plan:   Essential hypertension  Uncontrolled.  BP not at goal based on patient's report.  Primary systolic based elevations.    Continue lisinopril 20 mg daily   Continue Coreg 12.5 mg twice a day  Restart amlodipine 5 mg daily.    Recheck blood pressure twice a day at home  May continue clonidine 0.1 mg twice a day if needed  Reassess during next face-to-face office visit.    Chronic left shoulder pain  Establish / follow-up with orthopedist scheduled.      Patient expresses understanding and  agreement to the above plan.  Return to clinic 3 weeks as scheduled.

## 2020-10-08 ENCOUNTER — TELEPHONE (OUTPATIENT)
Dept: INTERNAL MEDICINE | Facility: CLINIC | Age: 75
End: 2020-10-08

## 2020-10-08 NOTE — TELEPHONE ENCOUNTER
----- Message from hSani Watson sent at 10/8/2020  9:49 AM CDT -----  Contact: pt  Pt requesting a call back regarding her mammo order. Pt has called previously and not gotten a response. Please call pt back at [phone]

## 2020-10-09 ENCOUNTER — TELEPHONE (OUTPATIENT)
Dept: INTERNAL MEDICINE | Facility: CLINIC | Age: 75
End: 2020-10-09

## 2020-10-09 DIAGNOSIS — M25.512 LEFT SHOULDER PAIN, UNSPECIFIED CHRONICITY: Primary | ICD-10-CM

## 2020-10-09 DIAGNOSIS — Z12.31 ENCOUNTER FOR SCREENING MAMMOGRAM FOR MALIGNANT NEOPLASM OF BREAST: Primary | ICD-10-CM

## 2020-10-09 NOTE — TELEPHONE ENCOUNTER
----- Message from Anabel Viera sent at 10/9/2020 11:21 AM CDT -----  Type:  Patient Returning Call    Who Called:Leticia  Who Left Message for Patient:  Does the patient know what this is regarding?:no  Would the patient rather a call back or a response via [facility]? call  Best Call Back Number:[phone]    Additional Information:

## 2020-10-10 ENCOUNTER — PATIENT OUTREACH (OUTPATIENT)
Dept: ADMINISTRATIVE | Facility: OTHER | Age: 75
End: 2020-10-10

## 2020-10-12 ENCOUNTER — PATIENT MESSAGE (OUTPATIENT)
Dept: ORTHOPEDICS | Facility: CLINIC | Age: 75
End: 2020-10-12

## 2020-10-12 ENCOUNTER — HOSPITAL ENCOUNTER (OUTPATIENT)
Dept: RADIOLOGY | Facility: HOSPITAL | Age: 75
Discharge: HOME | End: 2020-10-12
Attending: FAMILY MEDICINE
Payer: MEDICARE

## 2020-10-12 ENCOUNTER — OFFICE VISIT (OUTPATIENT)
Dept: ORTHOPEDICS | Facility: CLINIC | Age: 75
End: 2020-10-12
Payer: MEDICARE

## 2020-10-12 ENCOUNTER — TELEPHONE (OUTPATIENT)
Dept: ORTHOPEDICS | Facility: CLINIC | Age: 75
End: 2020-10-12

## 2020-10-12 VITALS
SYSTOLIC BLOOD PRESSURE: 166 MMHG | DIASTOLIC BLOOD PRESSURE: 84 MMHG | BODY MASS INDEX: 30.96 KG/M2 | HEART RATE: 59 BPM | HEIGHT: 61 IN | WEIGHT: 164 LBS

## 2020-10-12 DIAGNOSIS — M25.512 LEFT SHOULDER PAIN, UNSPECIFIED CHRONICITY: ICD-10-CM

## 2020-10-12 DIAGNOSIS — I10 ESSENTIAL HYPERTENSION: Primary | Chronic | ICD-10-CM

## 2020-10-12 DIAGNOSIS — M19.012 PRIMARY OSTEOARTHRITIS OF LEFT SHOULDER: ICD-10-CM

## 2020-10-12 DIAGNOSIS — S46.012A ROTATOR CUFF STRAIN, LEFT, INITIAL ENCOUNTER: ICD-10-CM

## 2020-10-12 DIAGNOSIS — Z95.810 ICD (IMPLANTABLE CARDIOVERTER-DEFIBRILLATOR) IN PLACE: ICD-10-CM

## 2020-10-12 PROCEDURE — 1101F PR PT FALLS ASSESS DOC 0-1 FALLS W/OUT INJ PAST YR: ICD-10-PCS | Mod: CPTII,S$GLB,, | Performed by: FAMILY MEDICINE

## 2020-10-12 PROCEDURE — 1159F MED LIST DOCD IN RCRD: CPT | Mod: S$GLB,,, | Performed by: FAMILY MEDICINE

## 2020-10-12 PROCEDURE — 73030 X-RAY EXAM OF SHOULDER: CPT | Mod: 26,LT,, | Performed by: RADIOLOGY

## 2020-10-12 PROCEDURE — 73030 X-RAY EXAM OF SHOULDER: CPT | Mod: TC,LT

## 2020-10-12 PROCEDURE — 3079F DIAST BP 80-89 MM HG: CPT | Mod: CPTII,S$GLB,, | Performed by: FAMILY MEDICINE

## 2020-10-12 PROCEDURE — 1159F PR MEDICATION LIST DOCUMENTED IN MEDICAL RECORD: ICD-10-PCS | Mod: S$GLB,,, | Performed by: FAMILY MEDICINE

## 2020-10-12 PROCEDURE — 1125F PR PAIN SEVERITY QUANTIFIED, PAIN PRESENT: ICD-10-PCS | Mod: S$GLB,,, | Performed by: FAMILY MEDICINE

## 2020-10-12 PROCEDURE — 99214 PR OFFICE/OUTPT VISIT, EST, LEVL IV, 30-39 MIN: ICD-10-PCS | Mod: S$GLB,,, | Performed by: FAMILY MEDICINE

## 2020-10-12 PROCEDURE — 3077F PR MOST RECENT SYSTOLIC BLOOD PRESSURE >= 140 MM HG: ICD-10-PCS | Mod: CPTII,S$GLB,, | Performed by: FAMILY MEDICINE

## 2020-10-12 PROCEDURE — 3079F PR MOST RECENT DIASTOLIC BLOOD PRESSURE 80-89 MM HG: ICD-10-PCS | Mod: CPTII,S$GLB,, | Performed by: FAMILY MEDICINE

## 2020-10-12 PROCEDURE — 1125F AMNT PAIN NOTED PAIN PRSNT: CPT | Mod: S$GLB,,, | Performed by: FAMILY MEDICINE

## 2020-10-12 PROCEDURE — 73030 XR SHOULDER COMPLETE 2 OR MORE VIEWS LEFT: ICD-10-PCS | Mod: 26,LT,, | Performed by: RADIOLOGY

## 2020-10-12 PROCEDURE — 99214 OFFICE O/P EST MOD 30 MIN: CPT | Mod: S$GLB,,, | Performed by: FAMILY MEDICINE

## 2020-10-12 PROCEDURE — 99999 PR PBB SHADOW E&M-EST. PATIENT-LVL III: ICD-10-PCS | Mod: PBBFAC,,, | Performed by: FAMILY MEDICINE

## 2020-10-12 PROCEDURE — 99999 PR PBB SHADOW E&M-EST. PATIENT-LVL III: CPT | Mod: PBBFAC,,, | Performed by: FAMILY MEDICINE

## 2020-10-12 PROCEDURE — 3077F SYST BP >= 140 MM HG: CPT | Mod: CPTII,S$GLB,, | Performed by: FAMILY MEDICINE

## 2020-10-12 PROCEDURE — 1101F PT FALLS ASSESS-DOCD LE1/YR: CPT | Mod: CPTII,S$GLB,, | Performed by: FAMILY MEDICINE

## 2020-10-12 NOTE — PATIENT INSTRUCTIONS
Apply ice to affected area three times a day for (15) fifteen minutes for the next 48 hours, and reduce activity during that time.  Voltaren gel three times a day to affected area if recommended.  Oral medication if recommended.  Physical therapy if recommended at home or at clinic.  Keep recheck visit.      Follow-up in a few weeks with shoulder specialist     Have neck and shoulder records from other doctors with her for appointment shoulder specialist

## 2020-10-12 NOTE — TELEPHONE ENCOUNTER
----- Message from Netta Cota sent at 10/12/2020  3:12 PM CDT -----  Regarding: returnina a call  Contact: pt  Type:  Patient Returning Call    Who Called:pt  Who Left Message for Patient:nurse   Does the patient know what this is regarding?:yes  Would the patient rather a call back or a response via [facility]? Call back  Best Call Back Number:[phone] ([place])   Additional Information: none

## 2020-10-15 ENCOUNTER — OFFICE VISIT (OUTPATIENT)
Dept: INTERNAL MEDICINE | Facility: CLINIC | Age: 75
End: 2020-10-15
Payer: MEDICARE

## 2020-10-15 ENCOUNTER — OFFICE VISIT (OUTPATIENT)
Dept: CARDIOLOGY | Facility: CLINIC | Age: 75
End: 2020-10-15
Payer: MEDICARE

## 2020-10-15 VITALS
BODY MASS INDEX: 30.74 KG/M2 | HEART RATE: 65 BPM | OXYGEN SATURATION: 97 % | DIASTOLIC BLOOD PRESSURE: 78 MMHG | SYSTOLIC BLOOD PRESSURE: 138 MMHG | WEIGHT: 162.69 LBS

## 2020-10-15 VITALS
DIASTOLIC BLOOD PRESSURE: 86 MMHG | BODY MASS INDEX: 30.71 KG/M2 | WEIGHT: 162.69 LBS | SYSTOLIC BLOOD PRESSURE: 146 MMHG | OXYGEN SATURATION: 98 % | HEART RATE: 66 BPM | HEIGHT: 61 IN | TEMPERATURE: 98 F

## 2020-10-15 DIAGNOSIS — E78.2 MIXED HYPERLIPIDEMIA: ICD-10-CM

## 2020-10-15 DIAGNOSIS — R07.2 PRECORDIAL PAIN: ICD-10-CM

## 2020-10-15 DIAGNOSIS — I10 ESSENTIAL HYPERTENSION: Primary | Chronic | ICD-10-CM

## 2020-10-15 DIAGNOSIS — Z86.74 HISTORY OF SUDDEN CARDIAC ARREST SUCCESSFULLY RESUSCITATED: ICD-10-CM

## 2020-10-15 DIAGNOSIS — E55.9 MILD VITAMIN D DEFICIENCY: ICD-10-CM

## 2020-10-15 DIAGNOSIS — Z95.810 ICD (IMPLANTABLE CARDIOVERTER-DEFIBRILLATOR) IN PLACE: ICD-10-CM

## 2020-10-15 DIAGNOSIS — E78.5 HYPERLIPIDEMIA, UNSPECIFIED HYPERLIPIDEMIA TYPE: ICD-10-CM

## 2020-10-15 DIAGNOSIS — M85.80 OSTEOPENIA, UNSPECIFIED LOCATION: ICD-10-CM

## 2020-10-15 DIAGNOSIS — I10 ESSENTIAL HYPERTENSION: Primary | ICD-10-CM

## 2020-10-15 DIAGNOSIS — Z78.9 STATIN INTOLERANCE: ICD-10-CM

## 2020-10-15 PROCEDURE — 1125F PR PAIN SEVERITY QUANTIFIED, PAIN PRESENT: ICD-10-PCS | Mod: S$GLB,,, | Performed by: FAMILY MEDICINE

## 2020-10-15 PROCEDURE — 1101F PT FALLS ASSESS-DOCD LE1/YR: CPT | Mod: CPTII,S$GLB,, | Performed by: INTERNAL MEDICINE

## 2020-10-15 PROCEDURE — 99999 PR PBB SHADOW E&M-EST. PATIENT-LVL IV: CPT | Mod: PBBFAC,,, | Performed by: FAMILY MEDICINE

## 2020-10-15 PROCEDURE — 1159F MED LIST DOCD IN RCRD: CPT | Mod: S$GLB,,, | Performed by: INTERNAL MEDICINE

## 2020-10-15 PROCEDURE — 3078F PR MOST RECENT DIASTOLIC BLOOD PRESSURE < 80 MM HG: ICD-10-PCS | Mod: CPTII,S$GLB,, | Performed by: INTERNAL MEDICINE

## 2020-10-15 PROCEDURE — 99499 RISK ADDL DX/OHS AUDIT: ICD-10-PCS | Mod: S$GLB,,, | Performed by: INTERNAL MEDICINE

## 2020-10-15 PROCEDURE — 3079F PR MOST RECENT DIASTOLIC BLOOD PRESSURE 80-89 MM HG: ICD-10-PCS | Mod: CPTII,S$GLB,, | Performed by: FAMILY MEDICINE

## 2020-10-15 PROCEDURE — 99999 PR PBB SHADOW E&M-EST. PATIENT-LVL III: CPT | Mod: PBBFAC,,, | Performed by: INTERNAL MEDICINE

## 2020-10-15 PROCEDURE — 99499 UNLISTED E&M SERVICE: CPT | Mod: S$GLB,,, | Performed by: INTERNAL MEDICINE

## 2020-10-15 PROCEDURE — 3075F PR MOST RECENT SYSTOLIC BLOOD PRESS GE 130-139MM HG: ICD-10-PCS | Mod: CPTII,S$GLB,, | Performed by: INTERNAL MEDICINE

## 2020-10-15 PROCEDURE — 3078F DIAST BP <80 MM HG: CPT | Mod: CPTII,S$GLB,, | Performed by: INTERNAL MEDICINE

## 2020-10-15 PROCEDURE — 99214 OFFICE O/P EST MOD 30 MIN: CPT | Mod: S$GLB,,, | Performed by: INTERNAL MEDICINE

## 2020-10-15 PROCEDURE — 99214 PR OFFICE/OUTPT VISIT, EST, LEVL IV, 30-39 MIN: ICD-10-PCS | Mod: S$GLB,,, | Performed by: FAMILY MEDICINE

## 2020-10-15 PROCEDURE — 1101F PR PT FALLS ASSESS DOC 0-1 FALLS W/OUT INJ PAST YR: ICD-10-PCS | Mod: CPTII,S$GLB,, | Performed by: FAMILY MEDICINE

## 2020-10-15 PROCEDURE — 1159F PR MEDICATION LIST DOCUMENTED IN MEDICAL RECORD: ICD-10-PCS | Mod: S$GLB,,, | Performed by: FAMILY MEDICINE

## 2020-10-15 PROCEDURE — 1159F PR MEDICATION LIST DOCUMENTED IN MEDICAL RECORD: ICD-10-PCS | Mod: S$GLB,,, | Performed by: INTERNAL MEDICINE

## 2020-10-15 PROCEDURE — 3075F SYST BP GE 130 - 139MM HG: CPT | Mod: CPTII,S$GLB,, | Performed by: INTERNAL MEDICINE

## 2020-10-15 PROCEDURE — 1125F AMNT PAIN NOTED PAIN PRSNT: CPT | Mod: S$GLB,,, | Performed by: FAMILY MEDICINE

## 2020-10-15 PROCEDURE — 1101F PT FALLS ASSESS-DOCD LE1/YR: CPT | Mod: CPTII,S$GLB,, | Performed by: FAMILY MEDICINE

## 2020-10-15 PROCEDURE — 99214 PR OFFICE/OUTPT VISIT, EST, LEVL IV, 30-39 MIN: ICD-10-PCS | Mod: S$GLB,,, | Performed by: INTERNAL MEDICINE

## 2020-10-15 PROCEDURE — 99214 OFFICE O/P EST MOD 30 MIN: CPT | Mod: S$GLB,,, | Performed by: FAMILY MEDICINE

## 2020-10-15 PROCEDURE — 1159F MED LIST DOCD IN RCRD: CPT | Mod: S$GLB,,, | Performed by: FAMILY MEDICINE

## 2020-10-15 PROCEDURE — 3079F DIAST BP 80-89 MM HG: CPT | Mod: CPTII,S$GLB,, | Performed by: FAMILY MEDICINE

## 2020-10-15 PROCEDURE — 99999 PR PBB SHADOW E&M-EST. PATIENT-LVL IV: ICD-10-PCS | Mod: PBBFAC,,, | Performed by: FAMILY MEDICINE

## 2020-10-15 PROCEDURE — 3077F SYST BP >= 140 MM HG: CPT | Mod: CPTII,S$GLB,, | Performed by: FAMILY MEDICINE

## 2020-10-15 PROCEDURE — 3077F PR MOST RECENT SYSTOLIC BLOOD PRESSURE >= 140 MM HG: ICD-10-PCS | Mod: CPTII,S$GLB,, | Performed by: FAMILY MEDICINE

## 2020-10-15 PROCEDURE — 99999 PR PBB SHADOW E&M-EST. PATIENT-LVL III: ICD-10-PCS | Mod: PBBFAC,,, | Performed by: INTERNAL MEDICINE

## 2020-10-15 PROCEDURE — 1101F PR PT FALLS ASSESS DOC 0-1 FALLS W/OUT INJ PAST YR: ICD-10-PCS | Mod: CPTII,S$GLB,, | Performed by: INTERNAL MEDICINE

## 2020-10-15 RX ORDER — AMLODIPINE BESYLATE 10 MG/1
10 TABLET ORAL DAILY
Qty: 30 TABLET | Refills: 11 | Status: SHIPPED | OUTPATIENT
Start: 2020-10-15 | End: 2020-11-27

## 2020-10-15 NOTE — PROGRESS NOTES
Subjective:   Patient ID:  Leticia Grimes is a 75 y.o. female who presents for follow up of Numbness (in chest area) and Dizziness      76 yo female, left arm pain  PMH HTN, OA    H/o cardiac arrest. sent to [facility]. H/o cath normal and normal cardiac function per pt.   S/P st omid ICD on 2020.  Feels dizziness when BP high  No chest pain, dyspnea, orthopnea  Occasional weakness.   No smoking/drinking/drug  EKG today NSR PAC and fusion beats V pacing    C/o left arm pain and hand swelling for 3 days and improved today. No erythema and weakness        Past Medical History:   Diagnosis Date    Adjustment insomnia 2020    Anxiety 2020    GERD (gastroesophageal reflux disease)     History of sudden cardiac arrest successfully resuscitated 2020    Hypertension     ICD (implantable cardioverter-defibrillator) in place 2020    Mild vitamin D deficiency 2013    Osteopenia 2020    Vitamin D deficiency disease        Past Surgical History:   Procedure Laterality Date     SECTION, CLASSIC      HYSTERECTOMY      INSERTION OF BIVENTRICULAR IMPLANTABLE CARDIOVERTER-DEFIBRILLATOR (ICD)      TUBAL LIGATION         Social History     Tobacco Use    Smoking status: Never Smoker   Substance Use Topics    Alcohol use: No    Drug use: No       Family History   Problem Relation Age of Onset    Hypertension Mother     Arthritis Mother     Diabetes Father     Heart disease Father     Cancer Sister          Review of Systems   Constitution: Negative for decreased appetite, diaphoresis, fever, malaise/fatigue and night sweats.   HENT: Negative for nosebleeds.    Eyes: Negative for blurred vision and double vision.   Cardiovascular: Negative for chest pain, claudication, dyspnea on exertion, irregular heartbeat, leg swelling, near-syncope, orthopnea, palpitations, paroxysmal nocturnal dyspnea and syncope.   Respiratory: Negative for cough,  shortness of breath, sleep disturbances due to breathing, snoring, sputum production and wheezing.    Endocrine: Negative for cold intolerance and polyuria.   Hematologic/Lymphatic: Does not bruise/bleed easily.   Skin: Negative for rash.   Musculoskeletal: Negative for back pain, falls, joint pain, joint swelling and neck pain.        Left arm pain and swelling   Gastrointestinal: Negative for abdominal pain, heartburn, nausea and vomiting.   Genitourinary: Negative for dysuria, frequency and hematuria.   Neurological: Negative for difficulty with concentration, dizziness, focal weakness, headaches, light-headedness, numbness, seizures and weakness.   Psychiatric/Behavioral: Negative for depression, memory loss and substance abuse. The patient does not have insomnia.    Allergic/Immunologic: Negative for HIV exposure and hives.       Objective:   Physical Exam   Constitutional: She is oriented to person, place, and time. She appears well-nourished.   HENT:   Head: Normocephalic.   Eyes: Pupils are equal, round, and reactive to light.   Neck: Normal carotid pulses and no JVD present. Carotid bruit is not present. No thyromegaly present.   Cardiovascular: Normal rate, regular rhythm, normal heart sounds and normal pulses.  No extrasystoles are present. PMI is not displaced. Exam reveals no gallop and no S3.   No murmur heard.  Pulses:       Carotid pulses are 2+ on the right side and 2+ on the left side.  + left chest tenderness   Pulmonary/Chest: Breath sounds normal. No stridor. No respiratory distress.   Abdominal: Soft. Bowel sounds are normal. There is no abdominal tenderness. There is no rebound.   Musculoskeletal: Normal range of motion.   Neurological: She is alert and oriented to person, place, and time.   Skin: Skin is intact. No rash noted.   Psychiatric: Her behavior is normal.       No results found for: CHOL  No results found for: HDL  No results found for: LDLCALC  No results found for: TRIG  No  results found for: CHOLHDL    Chemistry        Component Value Date/Time     10/22/2007 1005    K 3.8 10/22/2007 1005     10/22/2007 1005    CO2 26 10/22/2007 1005    BUN 11 10/22/2007 1005    CREATININE 1.3 04/30/2014 1137    GLU 98 10/22/2007 1005        Component Value Date/Time    CALCIUM 9.4 10/22/2007 1005    ALKPHOS 77 10/22/2007 1005    AST 20 10/22/2007 1005    ALT 24 10/22/2007 1005    BILITOT 0.5 10/22/2007 1005    ESTGFRAFRICA 48.7 (A) 04/30/2014 1137    EGFRNONAA 42.3 (A) 04/30/2014 1137          No results found for: LABA1C, HGBA1C  No results found for: TSH  No results found for: INR, PROTIME  No results found for: WBC, HGB, HCT, MCV, PLT  BMP  Sodium   Date Value Ref Range Status   10/22/2007 144 136 - 145 mMol/l Final     Potassium   Date Value Ref Range Status   10/22/2007 3.8 3.3 - 5.3 mMol/l Final     Chloride   Date Value Ref Range Status   10/22/2007 108 95 - 110 mMol/l Final     CO2   Date Value Ref Range Status   10/22/2007 26 23.0 - 29.0 mEq/L Final     BUN, Bld   Date Value Ref Range Status   10/22/2007 11 5 - 23 mg/dl Final     Creatinine   Date Value Ref Range Status   04/30/2014 1.3 0.5 - 1.4 mg/dL Final     Calcium   Date Value Ref Range Status   10/22/2007 9.4 8.7 - 10.5 mg/dl Final     eGFR if    Date Value Ref Range Status   04/30/2014 48.7 (A) >60 mL/min/1.73 m^2 Final     eGFR if non    Date Value Ref Range Status   04/30/2014 42.3 (A) >60 mL/min/1.73 m^2 Final     Comment:     Calculation used to obtain the estimated glomerular filtration  rate (eGFR) is the CKD-EPI equation. Since race is unknown   in our information system, the eGFR values for   -American and Non--American patients are given   for each creatinine result.     BNP  @LABRCNTIP(BNP,BNPTRIAGEBLO)@  @LABRCNTIP(troponini)@  CrCl cannot be calculated (Patient's most recent lab result is older than the maximum 7 days allowed.).  No results found in the last 24  hours.  No results found in the last 24 hours.  No results found in the last 24 hours.    Assessment:      1. Essential hypertension    2. History of sudden cardiac arrest successfully resuscitated    3. Mixed hyperlipidemia    4. ICD (implantable cardioverter-defibrillator) in place    5. Precordial pain      Non cardiac chest pain    Plan:     Continue Amlodipine coreg, lisinopril and ASA  Clonidine as prn  Counseled DASH  Check Lipid profile in 6 months  Recommend heart-healthy diet, weight control and regular exercise.  Terrie. Risk modification.   RTC in 12 months    I have reviewed all pertinent labs and cardiac studies independently. Plans and recommendations have been formulated under my direct supervision. All questions answered and patient voiced understanding.   If symptoms persist go to the ED

## 2020-10-15 NOTE — PROGRESS NOTES
Subjective:   Patient ID: Leticia Grimes is a 75 y.o. female.  Chief Complaint:  Follow-up (BP)      Patient presents follow-up on hypertension.    Daughter accompanies her.    10/5/2020 did telemedicine visit due to blood pressures greater than 160 on a regular basis.    Continued on lisinopril 20 mg daily.    Continued on Coreg 12.5 mg twice a day  Restarted amlodipine 5 mg daily.    Reports compliance.  Denies side effects.  No orthostasis.    Blood pressure improved with average systolic less than 160, but greater than 150. Include:      Additional medical history out a date appears to be osteopenia.    On Fosamax 70 mg weekly and vitamin-D 1000 units daily  However no bone density or vitamin-D level on file    Hyperlipidemia.  Intolerant of statin.  On Zetia 10 mg daily.    No active chest pain or claudication.    No recent lipid panel on file.    Health maintenance needs include:  Colorectal screening, was given fit kit.  Did not return it.  Contemplating colonoscopy.    Hepatitis-C screening.    Tetanus, shingles, pneumonia and flu vaccines.  She has refused all in the past.  No active complaints today.          Current Outpatient Medications:     alendronate (FOSAMAX) 70 MG tablet, TAKE 1 TABLET BY MOUTH ONCE A WEEK BEFORE MEAL(S), Disp: , Rfl:     aspirin (ECOTRIN) 81 MG EC tablet, Take 81 mg by mouth once daily., Disp: , Rfl:     baclofen (LIORESAL) 10 MG tablet, Take 10 mg by mouth 3 (three) times daily., Disp: , Rfl:     carvediloL (COREG) 12.5 MG tablet, Take 1 tablet (12.5 mg total) by mouth 2 (two) times daily with meals., Disp: 60 tablet, Rfl: 5    cloNIDine (CATAPRES) 0.1 MG tablet, Take 1 tablet (0.1 mg total) by mouth 2 (two) times daily as needed., Disp: 60 tablet, Rfl: 11    ezetimibe (ZETIA) 10 mg tablet, Take 10 mg by mouth once daily., Disp: , Rfl:     lisinopriL (PRINIVIL,ZESTRIL) 20 MG tablet, Take 1 tablet (20 mg total) by mouth once daily., Disp: 30 tablet, Rfl: 11    LORazepam  "(ATIVAN) 0.5 MG tablet, Take 1 tablet (0.5 mg total) by mouth nightly as needed for Anxiety., Disp: 30 tablet, Rfl: 2    pregabalin (LYRICA) 50 MG capsule, Take 50 mg by mouth 3 (three) times daily., Disp: , Rfl:     traZODone (DESYREL) 50 MG tablet, Take 1/2 to 1 tablet at bedtime as needed for sleep., Disp: 30 tablet, Rfl: 2    vitamin D (VITAMIN D3) 1000 units Tab, Take 1,000 Units by mouth once daily., Disp: , Rfl:     amLODIPine (NORVASC) 10 MG tablet, Take 1 tablet (10 mg total) by mouth once daily., Disp: 30 tablet, Rfl: 11    Review of Systems   Constitutional: Negative for chills, fatigue and fever.   Eyes: Negative for visual disturbance.   Respiratory: Negative for cough, chest tightness, shortness of breath and wheezing.    Cardiovascular: Negative for chest pain, palpitations and leg swelling.   Gastrointestinal: Negative for abdominal distention, abdominal pain, constipation, diarrhea, nausea and vomiting.   Genitourinary: Negative for difficulty urinating.   Musculoskeletal: Positive for gait problem. Negative for myalgias.   Skin: Negative for rash.   Neurological: Negative for dizziness, syncope, weakness, light-headedness and headaches.   Psychiatric/Behavioral: Negative for sleep disturbance. The patient is not nervous/anxious.      Objective:   BP (!) 146/86 (BP Location: Left arm, Patient Position: Sitting, BP Method: Medium (Manual))   Pulse 66   Temp 97.6 °F (36.4 °C) (Tympanic)   Ht 5' 1" (1.549 m)   Wt 73.8 kg (162 lb 11.2 oz)   SpO2 98%   BMI 30.74 kg/m²     Physical Exam  Vitals signs and nursing note reviewed.   Constitutional:       Appearance: Normal appearance. She is well-developed and overweight.      Comments: Blood pressure improved, but remains elevated.   Eyes:      General: No scleral icterus.     Conjunctiva/sclera: Conjunctivae normal.   Neck:      Thyroid: No thyroid mass or thyromegaly.      Vascular: No carotid bruit or JVD.   Cardiovascular:      Rate and Rhythm: " Normal rate and regular rhythm.      Pulses:           Radial pulses are 2+ on the right side and 2+ on the left side.      Heart sounds: Normal heart sounds. No murmur. No friction rub. No gallop.    Pulmonary:      Effort: Pulmonary effort is normal.      Breath sounds: Normal breath sounds. No wheezing, rhonchi or rales.   Abdominal:      General: There is no distension.      Palpations: Abdomen is soft. There is no hepatomegaly.      Tenderness: There is no abdominal tenderness. There is no guarding or rebound.   Musculoskeletal:      Right lower leg: No edema.      Left lower leg: No edema.   Skin:     General: Skin is warm and dry.      Capillary Refill: Capillary refill takes less than 2 seconds.      Findings: No rash.   Neurological:      Mental Status: She is alert.      Coordination: Coordination is intact.      Gait: Gait is intact.   Psychiatric:         Attention and Perception: Attention normal.         Mood and Affect: Mood normal.         Speech: Speech normal.         Behavior: Behavior normal.         Thought Content: Thought content normal.         Cognition and Memory: Cognition normal.         Judgment: Judgment normal.       Assessment:       ICD-10-CM ICD-9-CM   1. Essential hypertension  I10 401.9   2. Osteopenia, unspecified location  M85.80 733.90   3. Mild vitamin D deficiency  E55.9 268.9   4. Hyperlipidemia, unspecified hyperlipidemia type  E78.5 272.4   5. Statin intolerance  Z78.9 995.27     Plan:   Essential hypertension  -     amLODIPine (NORVASC) 10 MG tablet; Take 1 tablet (10 mg total) by mouth once daily.  Dispense: 30 tablet; Refill: 11  Uncontrolled.  BP not at goal.  Systolic average greater than 150.    Continue lisinopril 20 mg daily   Continue Coreg 12.5 mg twice a day  Increase Amlodipine 10 mg daily.    Continue to check blood pressure readings daily   Return to clinic 1 month with record.      Osteopenia, unspecified location  Mild vitamin D deficiency  -     DXA Bone  Density Spine And Hip; Future; Expected date: 10/15/2020  Bone density in vitamin-D level next visit.    For now continue Fosamax 70 mg weekly  Continue vitamin-D 1000 units daily    Hyperlipidemia, unspecified hyperlipidemia type  Statin intolerance   lipid panel with next blood draw.    For now continue Zetia 10 mg daily.    Follow-up with Cardiology today as scheduled.    Declines tetanus, shingles, pneumonia, and flu vaccine.    Declines colorectal screening.  Return to clinic 1 month

## 2020-10-17 ENCOUNTER — PATIENT MESSAGE (OUTPATIENT)
Dept: INTERNAL MEDICINE | Facility: CLINIC | Age: 75
End: 2020-10-17

## 2020-10-19 ENCOUNTER — TELEPHONE (OUTPATIENT)
Dept: INTERNAL MEDICINE | Facility: CLINIC | Age: 75
End: 2020-10-19

## 2020-10-19 NOTE — TELEPHONE ENCOUNTER
----- Message from Yesenia Elliott sent at 10/19/2020 11:04 AM CDT -----  Contact: pt  The pt request a return call, no additional info given and can be reached at [phone]///[id number]

## 2020-10-19 NOTE — TELEPHONE ENCOUNTER
Patient advised message sent to provider through [employer].  Patient advised provider will respond to message.

## 2020-10-21 ENCOUNTER — OFFICE VISIT (OUTPATIENT)
Dept: ORTHOPEDICS | Facility: CLINIC | Age: 75
End: 2020-10-21
Payer: MEDICARE

## 2020-10-21 VITALS
BODY MASS INDEX: 30.58 KG/M2 | HEART RATE: 61 BPM | SYSTOLIC BLOOD PRESSURE: 132 MMHG | DIASTOLIC BLOOD PRESSURE: 71 MMHG | HEIGHT: 61 IN | WEIGHT: 162 LBS

## 2020-10-21 DIAGNOSIS — Z12.11 SPECIAL SCREENING FOR MALIGNANT NEOPLASM OF COLON: Primary | ICD-10-CM

## 2020-10-21 DIAGNOSIS — M19.012 GLENOHUMERAL ARTHRITIS, LEFT: Primary | ICD-10-CM

## 2020-10-21 PROCEDURE — 1101F PR PT FALLS ASSESS DOC 0-1 FALLS W/OUT INJ PAST YR: ICD-10-PCS | Mod: CPTII,S$GLB,, | Performed by: STUDENT IN AN ORGANIZED HEALTH CARE EDUCATION/TRAINING PROGRAM

## 2020-10-21 PROCEDURE — 3078F PR MOST RECENT DIASTOLIC BLOOD PRESSURE < 80 MM HG: ICD-10-PCS | Mod: CPTII,S$GLB,, | Performed by: STUDENT IN AN ORGANIZED HEALTH CARE EDUCATION/TRAINING PROGRAM

## 2020-10-21 PROCEDURE — 99203 OFFICE O/P NEW LOW 30 MIN: CPT | Mod: S$GLB,,, | Performed by: STUDENT IN AN ORGANIZED HEALTH CARE EDUCATION/TRAINING PROGRAM

## 2020-10-21 PROCEDURE — 3075F PR MOST RECENT SYSTOLIC BLOOD PRESS GE 130-139MM HG: ICD-10-PCS | Mod: CPTII,S$GLB,, | Performed by: STUDENT IN AN ORGANIZED HEALTH CARE EDUCATION/TRAINING PROGRAM

## 2020-10-21 PROCEDURE — 99203 PR OFFICE/OUTPT VISIT, NEW, LEVL III, 30-44 MIN: ICD-10-PCS | Mod: S$GLB,,, | Performed by: STUDENT IN AN ORGANIZED HEALTH CARE EDUCATION/TRAINING PROGRAM

## 2020-10-21 PROCEDURE — 99999 PR PBB SHADOW E&M-EST. PATIENT-LVL III: ICD-10-PCS | Mod: PBBFAC,,, | Performed by: STUDENT IN AN ORGANIZED HEALTH CARE EDUCATION/TRAINING PROGRAM

## 2020-10-21 PROCEDURE — 1159F MED LIST DOCD IN RCRD: CPT | Mod: S$GLB,,, | Performed by: STUDENT IN AN ORGANIZED HEALTH CARE EDUCATION/TRAINING PROGRAM

## 2020-10-21 PROCEDURE — 1101F PT FALLS ASSESS-DOCD LE1/YR: CPT | Mod: CPTII,S$GLB,, | Performed by: STUDENT IN AN ORGANIZED HEALTH CARE EDUCATION/TRAINING PROGRAM

## 2020-10-21 PROCEDURE — 99999 PR PBB SHADOW E&M-EST. PATIENT-LVL III: CPT | Mod: PBBFAC,,, | Performed by: STUDENT IN AN ORGANIZED HEALTH CARE EDUCATION/TRAINING PROGRAM

## 2020-10-21 PROCEDURE — 1159F PR MEDICATION LIST DOCUMENTED IN MEDICAL RECORD: ICD-10-PCS | Mod: S$GLB,,, | Performed by: STUDENT IN AN ORGANIZED HEALTH CARE EDUCATION/TRAINING PROGRAM

## 2020-10-21 PROCEDURE — 1125F AMNT PAIN NOTED PAIN PRSNT: CPT | Mod: S$GLB,,, | Performed by: STUDENT IN AN ORGANIZED HEALTH CARE EDUCATION/TRAINING PROGRAM

## 2020-10-21 PROCEDURE — 1125F PR PAIN SEVERITY QUANTIFIED, PAIN PRESENT: ICD-10-PCS | Mod: S$GLB,,, | Performed by: STUDENT IN AN ORGANIZED HEALTH CARE EDUCATION/TRAINING PROGRAM

## 2020-10-21 PROCEDURE — 3078F DIAST BP <80 MM HG: CPT | Mod: CPTII,S$GLB,, | Performed by: STUDENT IN AN ORGANIZED HEALTH CARE EDUCATION/TRAINING PROGRAM

## 2020-10-21 PROCEDURE — 3075F SYST BP GE 130 - 139MM HG: CPT | Mod: CPTII,S$GLB,, | Performed by: STUDENT IN AN ORGANIZED HEALTH CARE EDUCATION/TRAINING PROGRAM

## 2020-10-21 NOTE — PROGRESS NOTES
Orthopaedics Sports Medicine     Shoulder Initial Visit         10/21/2020  1:21 PM    Referring MD: No ref. provider found    Chief Complaint   Patient presents with    Left Shoulder - Pain         History of Present Illness:   Leticia Grimes is a 75 y.o. right-hand dominant female who presents with LEFT shoulder pain and dysfunction that developed about 1 and half years ago.  She does not report any specific injury but has had gradual progression of pain and dysfunction since then.  She reports a constant aching pain with intermittent sharp quality.  She also has reduced range of motion and difficulty with certain activities of daily living.  Pain is worst with movement especially reaching, lifting, pushing, or pulling.  She also has night pain.  Pain is focused over the anterior and lateral shoulder with some radiation down her upper brachium. She has tried rest, activity modification, NSAIDs, ice, physical therapy, and injection.  Injection works the best for her but did not last that long.  She reports that she currently is taking care of her  who has dementia and does not have much social support as her children live over an hour away from her.    Treatments to date: rest, NSAIDs, activity modification, ice, physical therapy, corticosteroid injection    Past Medical History:   Past Medical History:   Diagnosis Date    Adjustment insomnia 2020    Anxiety 2020    GERD (gastroesophageal reflux disease)     History of sudden cardiac arrest successfully resuscitated 2020    Hypertension     ICD (implantable cardioverter-defibrillator) in place 2020    Mild vitamin D deficiency 2013    Osteopenia 2020    Vitamin D deficiency disease        Past Surgical History:   Past Surgical History:   Procedure Laterality Date     SECTION, CLASSIC      HYSTERECTOMY      INSERTION OF BIVENTRICULAR IMPLANTABLE CARDIOVERTER-DEFIBRILLATOR (ICD)      TUBAL LIGATION          Medications:  Patient's Medications   New Prescriptions    No medications on file   Previous Medications    ALENDRONATE (FOSAMAX) 70 MG TABLET    TAKE 1 TABLET BY MOUTH ONCE A WEEK BEFORE MEAL(S)    AMLODIPINE (NORVASC) 10 MG TABLET    Take 1 tablet (10 mg total) by mouth once daily.    ASPIRIN (ECOTRIN) 81 MG EC TABLET    Take 81 mg by mouth once daily.    BACLOFEN (LIORESAL) 10 MG TABLET    Take 10 mg by mouth 3 (three) times daily.    CARVEDILOL (COREG) 12.5 MG TABLET    Take 1 tablet (12.5 mg total) by mouth 2 (two) times daily with meals.    CLONIDINE (CATAPRES) 0.1 MG TABLET    Take 1 tablet (0.1 mg total) by mouth 2 (two) times daily as needed.    EZETIMIBE (ZETIA) 10 MG TABLET    Take 10 mg by mouth once daily.    LISINOPRIL (PRINIVIL,ZESTRIL) 20 MG TABLET    Take 1 tablet (20 mg total) by mouth once daily.    LORAZEPAM (ATIVAN) 0.5 MG TABLET    Take 1 tablet (0.5 mg total) by mouth nightly as needed for Anxiety.    PREGABALIN (LYRICA) 50 MG CAPSULE    Take 50 mg by mouth 3 (three) times daily.    TRAZODONE (DESYREL) 50 MG TABLET    Take 1/2 to 1 tablet at bedtime as needed for sleep.    VITAMIN D (VITAMIN D3) 1000 UNITS TAB    Take 1,000 Units by mouth once daily.   Modified Medications    No medications on file   Discontinued Medications    No medications on file       Allergies:   Review of patient's allergies indicates:   Allergen Reactions    Codeine        Social History:   Home town:  [place]  Occupation:  Retired  Alcohol use: She reports no history of alcohol use.  Tobacco use: She reports that she has never smoked. She does not have any smokeless tobacco history on file.    Review of systems:  History of recent illness, fevers, shakes, or chills: no  History of cardiac problems or chest pain: YES recent ICD placement  History of pulmonary problems or asthma: no  History of diabetes: no  History of prior dvt or clotting problems: no  History of sleep apnea: no      Physical  "Examination:  Estimated body mass index is 30.61 kg/m² as calculated from the following:    Height as of this encounter: 5' 1" (1.549 m).    Weight as of this encounter: 73.5 kg (162 lb).    General  Healthy appearing female in no acute distress  Alert and oriented, normal mood, appropriate affect    Shoulder Examination:  Patient is alert and oriented, no distress. Skin is intact. Neuro is normal with no focal motor or sensory findings.    Cervical exam is unremarkable. Intact cervical ROM. Negative Spurling's test    Physical Exam:  RIGHT    LEFT    Atrophy:   (-)    (-)   Deformity   (-)    (-)  Scap Dyskinesis/Winging (-)    +    Tenderness:          Greater Tuberosity             (-)    +  Bicipital Groove  (-)    +  AC joint   (-)    (-)  Other:     ROM:  Forward Elevation 180    100  Abduction  140    70  ER (at side)  80    30  IR   T8    belt    Strength:   Supraspinatus  5/5    5/5  Infraspinatus  5/5    5/5  Subscap / IR  5/5    5/5     Special Tests:   Neer:    (-)    +   Larson:   (-)    +   SS Stress:   (-)    (-)   Belly Press:   (-)    (-)   Lift Off:    (-)    (-)   [place]'s:   (-)    +   Speeds:   (-)    (-)   Resisted Thrower's:   (-)    +   Cross Arm Abduction:  (-)    (-)    Neurovascular examination  - Motor grossly intact bilaterally to shoulder abduction, elbow flexion and extension, wrist flexion and extension, and intrinsic hand musculature  - Sensation intact to light touch bilaterally in axillary, median, radial, and ulnar distributions  - Symmetrical radial pulses      Imaging:  XR Left shoulder (10/12/2020):  No fracture or dislocation.  Prominent glenohumeral degenerative findings present including complete joint space loss and large inferior osteophyte formation.  Mild AC joint degenerative changes.  Lung parenchyma clear.    Physician Read: I agree with the above impression.      Impression:  75 y.o. female with left shoulder severe glenohumeral arthritis      Plan:  1. Discussed " her diagnosis and treatment options with her and her daughter today  2. She has severe left-sided glenohumeral arthritis.  I think she would be a good candidate for a total shoulder arthroplasty.  She has tried and failed nonoperative treatments and continues to have pain on a daily basis which interferes with her activities and diminishes her quality of life.  3. However, due to her current social situation she does not think it would be a good time to proceed with surgical treatment.  She is currently taking care of her disabled  and her daughters live over an hour away from her.  She thinks she could probably take care of herself but then the or would be around to care for her .  4. She is interested in surgical treatment in the future but would rather proceed with an Interventional Pain approach for now.  5. She was told in the past that RFA may be a good option for her and she is interested in discussing that with 1 of our interventional pain physicians  6. I think this is a reasonable approach, I will place a referral for her to see Dr. Mcgraw for consideration of radiofrequency ablation or fluoroscopy guided corticosteroid injection.  7. If she changes her mind about surgery am happy to see her back at any point for discussion of total shoulder arthroplasty  8. Follow up as needed           Tomas Taylor MD

## 2020-10-23 ENCOUNTER — PATIENT MESSAGE (OUTPATIENT)
Dept: INTERNAL MEDICINE | Facility: CLINIC | Age: 75
End: 2020-10-23

## 2020-10-27 ENCOUNTER — OFFICE VISIT (OUTPATIENT)
Dept: PSYCHIATRY | Facility: CLINIC | Age: 75
End: 2020-10-27
Payer: MEDICARE

## 2020-10-27 DIAGNOSIS — G31.84 MILD COGNITIVE IMPAIRMENT: ICD-10-CM

## 2020-10-27 DIAGNOSIS — F44.0 DISSOCIATIVE AMNESIA: Primary | ICD-10-CM

## 2020-10-27 DIAGNOSIS — G47.00 INSOMNIA, UNSPECIFIED TYPE: ICD-10-CM

## 2020-10-27 PROCEDURE — 90791 PR PSYCHIATRIC DIAGNOSTIC EVALUATION: ICD-10-PCS | Mod: S$GLB,,, | Performed by: SOCIAL WORKER

## 2020-10-27 PROCEDURE — 99999 PR PBB SHADOW E&M-EST. PATIENT-LVL I: ICD-10-PCS | Mod: PBBFAC,,, | Performed by: SOCIAL WORKER

## 2020-10-27 PROCEDURE — 99999 PR PBB SHADOW E&M-EST. PATIENT-LVL I: CPT | Mod: PBBFAC,,, | Performed by: SOCIAL WORKER

## 2020-10-27 PROCEDURE — 90791 PSYCH DIAGNOSTIC EVALUATION: CPT | Mod: S$GLB,,, | Performed by: SOCIAL WORKER

## 2020-10-30 ENCOUNTER — PATIENT MESSAGE (OUTPATIENT)
Dept: ADMINISTRATIVE | Facility: HOSPITAL | Age: 75
End: 2020-10-30

## 2020-11-05 ENCOUNTER — PATIENT MESSAGE (OUTPATIENT)
Dept: INTERNAL MEDICINE | Facility: CLINIC | Age: 75
End: 2020-11-05

## 2020-11-10 ENCOUNTER — PATIENT MESSAGE (OUTPATIENT)
Dept: INTERNAL MEDICINE | Facility: CLINIC | Age: 75
End: 2020-11-10

## 2020-11-10 DIAGNOSIS — E78.5 HYPERLIPIDEMIA, UNSPECIFIED HYPERLIPIDEMIA TYPE: Primary | ICD-10-CM

## 2020-11-10 DIAGNOSIS — Z78.9 STATIN INTOLERANCE: ICD-10-CM

## 2020-11-10 RX ORDER — EZETIMIBE 10 MG/1
10 TABLET ORAL DAILY
Qty: 90 TABLET | Refills: 3 | Status: SHIPPED | OUTPATIENT
Start: 2020-11-10 | End: 2021-11-23

## 2020-11-17 ENCOUNTER — TELEPHONE (OUTPATIENT)
Dept: INTERNAL MEDICINE | Facility: CLINIC | Age: 75
End: 2020-11-17

## 2020-11-17 NOTE — TELEPHONE ENCOUNTER
Okay to keep telemedicine visit for tomorrow.     The blood pressure and pulse rate are fine.      Will need to determine if dizziness, means a spinning sensation, or if more lightheaded like passing out to best determine additional recommendations.  If it is more dizziness, she may take some Benadryl for today to see if it helps.

## 2020-11-17 NOTE — TELEPHONE ENCOUNTER
"Patient called in with c/o dizziness and "off balance".  Patient says BP reading is 111/62 pulse 63.  Patient scheduled virtual appointment tomorrow to discuss.  Patient has taken BP medications this am.  Please advise.  "

## 2020-11-19 ENCOUNTER — TELEPHONE (OUTPATIENT)
Dept: PSYCHIATRY | Facility: CLINIC | Age: 75
End: 2020-11-19

## 2020-11-20 ENCOUNTER — OFFICE VISIT (OUTPATIENT)
Dept: PSYCHIATRY | Facility: CLINIC | Age: 75
End: 2020-11-20
Payer: COMMERCIAL

## 2020-11-20 DIAGNOSIS — F44.0 DISSOCIATIVE AMNESIA: Primary | ICD-10-CM

## 2020-11-20 DIAGNOSIS — G47.00 INSOMNIA, UNSPECIFIED TYPE: ICD-10-CM

## 2020-11-20 PROCEDURE — 99214 OFFICE O/P EST MOD 30 MIN: CPT | Mod: 95,,, | Performed by: PSYCHIATRY & NEUROLOGY

## 2020-11-20 PROCEDURE — 99214 PR OFFICE/OUTPT VISIT, EST, LEVL IV, 30-39 MIN: ICD-10-PCS | Mod: 95,,, | Performed by: PSYCHIATRY & NEUROLOGY

## 2020-11-20 PROCEDURE — 1159F PR MEDICATION LIST DOCUMENTED IN MEDICAL RECORD: ICD-10-PCS | Mod: ,,, | Performed by: PSYCHIATRY & NEUROLOGY

## 2020-11-20 PROCEDURE — 1159F MED LIST DOCD IN RCRD: CPT | Mod: ,,, | Performed by: PSYCHIATRY & NEUROLOGY

## 2020-11-20 PROCEDURE — 99499 UNLISTED E&M SERVICE: CPT | Mod: 95,,, | Performed by: PSYCHIATRY & NEUROLOGY

## 2020-11-20 PROCEDURE — 99499 RISK ADDL DX/OHS AUDIT: ICD-10-PCS | Mod: 95,,, | Performed by: PSYCHIATRY & NEUROLOGY

## 2020-11-20 RX ORDER — TRAZODONE HYDROCHLORIDE 50 MG/1
TABLET ORAL
Qty: 30 TABLET | Refills: 2 | Status: SHIPPED | OUTPATIENT
Start: 2020-11-20 | End: 2021-01-08

## 2020-11-20 RX ORDER — LORAZEPAM 0.5 MG/1
0.5 TABLET ORAL NIGHTLY PRN
Qty: 30 TABLET | Refills: 2 | Status: SHIPPED | OUTPATIENT
Start: 2020-11-20 | End: 2020-12-26 | Stop reason: SDUPTHER
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
## 2020-11-24 ENCOUNTER — PATIENT MESSAGE (OUTPATIENT)
Dept: INTERNAL MEDICINE | Facility: CLINIC | Age: 75
End: 2020-11-24

## 2020-11-24 ENCOUNTER — PATIENT OUTREACH (OUTPATIENT)
Dept: ADMINISTRATIVE | Facility: HOSPITAL | Age: 75
End: 2020-11-24

## 2020-11-24 ENCOUNTER — OFFICE VISIT (OUTPATIENT)
Dept: PSYCHIATRY | Facility: CLINIC | Age: 75
End: 2020-11-24
Payer: COMMERCIAL

## 2020-11-24 DIAGNOSIS — G31.84 MILD COGNITIVE IMPAIRMENT: ICD-10-CM

## 2020-11-24 DIAGNOSIS — G47.00 INSOMNIA, UNSPECIFIED TYPE: ICD-10-CM

## 2020-11-24 DIAGNOSIS — F44.0 DISSOCIATIVE AMNESIA: Primary | ICD-10-CM

## 2020-11-24 PROCEDURE — 99499 UNLISTED E&M SERVICE: CPT | Mod: 95,,, | Performed by: SOCIAL WORKER

## 2020-11-24 PROCEDURE — 90834 PR PSYCHOTHERAPY W/PATIENT, 45 MIN: ICD-10-PCS | Mod: 95,,, | Performed by: SOCIAL WORKER

## 2020-11-24 PROCEDURE — 99499 RISK ADDL DX/OHS AUDIT: ICD-10-PCS | Mod: 95,,, | Performed by: SOCIAL WORKER

## 2020-11-24 PROCEDURE — 90834 PSYTX W PT 45 MINUTES: CPT | Mod: 95,,, | Performed by: SOCIAL WORKER
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
## 2020-11-24 NOTE — TELEPHONE ENCOUNTER
It is probably a side effect from increasing the dose of amlodipine to better control your blood pressure.    We can discuss that face-to-face or virtually your choice.    I will schedule you for 1 a visit this Friday at 11:20 a.m.  Please contact Laura in the morning and let her know if you want telemedicine or not.

## 2020-11-27 ENCOUNTER — OFFICE VISIT (OUTPATIENT)
Dept: INTERNAL MEDICINE | Facility: CLINIC | Age: 75
End: 2020-11-27
Payer: MEDICARE

## 2020-11-27 VITALS — SYSTOLIC BLOOD PRESSURE: 127 MMHG | DIASTOLIC BLOOD PRESSURE: 63 MMHG

## 2020-11-27 DIAGNOSIS — I10 ESSENTIAL HYPERTENSION: Primary | ICD-10-CM

## 2020-11-27 DIAGNOSIS — T88.7XXA SIDE EFFECT OF MEDICATION: ICD-10-CM

## 2020-11-27 PROCEDURE — 3288F PR FALLS RISK ASSESSMENT DOCUMENTED: ICD-10-PCS | Mod: CPTII,95,, | Performed by: FAMILY MEDICINE

## 2020-11-27 PROCEDURE — 1101F PR PT FALLS ASSESS DOC 0-1 FALLS W/OUT INJ PAST YR: ICD-10-PCS | Mod: CPTII,95,, | Performed by: FAMILY MEDICINE

## 2020-11-27 PROCEDURE — 3078F DIAST BP <80 MM HG: CPT | Mod: CPTII,95,, | Performed by: FAMILY MEDICINE

## 2020-11-27 PROCEDURE — 3074F SYST BP LT 130 MM HG: CPT | Mod: CPTII,95,, | Performed by: FAMILY MEDICINE

## 2020-11-27 PROCEDURE — 1101F PT FALLS ASSESS-DOCD LE1/YR: CPT | Mod: CPTII,95,, | Performed by: FAMILY MEDICINE

## 2020-11-27 PROCEDURE — 3078F PR MOST RECENT DIASTOLIC BLOOD PRESSURE < 80 MM HG: ICD-10-PCS | Mod: CPTII,95,, | Performed by: FAMILY MEDICINE

## 2020-11-27 PROCEDURE — 1159F PR MEDICATION LIST DOCUMENTED IN MEDICAL RECORD: ICD-10-PCS | Mod: 95,,, | Performed by: FAMILY MEDICINE

## 2020-11-27 PROCEDURE — 1159F MED LIST DOCD IN RCRD: CPT | Mod: 95,,, | Performed by: FAMILY MEDICINE

## 2020-11-27 PROCEDURE — 99214 PR OFFICE/OUTPT VISIT, EST, LEVL IV, 30-39 MIN: ICD-10-PCS | Mod: 95,,, | Performed by: FAMILY MEDICINE

## 2020-11-27 PROCEDURE — 3288F FALL RISK ASSESSMENT DOCD: CPT | Mod: CPTII,95,, | Performed by: FAMILY MEDICINE

## 2020-11-27 PROCEDURE — 3074F PR MOST RECENT SYSTOLIC BLOOD PRESSURE < 130 MM HG: ICD-10-PCS | Mod: CPTII,95,, | Performed by: FAMILY MEDICINE

## 2020-11-27 PROCEDURE — 99214 OFFICE O/P EST MOD 30 MIN: CPT | Mod: 95,,, | Performed by: FAMILY MEDICINE

## 2020-11-27 NOTE — PROGRESS NOTES
Subjective:   Patient ID: Leticia Grimes is a 75 y.o. female.  Chief Complaint:  Edema    The patient location is: Home  The chief complaint leading to consultation is: Ankle Swelling    Visit type: audiovisual    Face to Face time with patient: 20 minutes  30 minutes of total time spent on the encounter, which includes face to face time and non-face to face time preparing to see the patient (eg, review of tests), Obtaining and/or reviewing separately obtained history, Documenting clinical information in the electronic or other health record, Independently interpreting results (not separately reported) and communicating results to the patient/family/caregiver, or Care coordination (not separately reported).     Each patient to whom he or she provides medical services by telemedicine is:  (1) informed of the relationship between the physician and patient and the respective role of any other health care provider with respect to management of the patient; and (2) notified that he or she may decline to receive medical services by telemedicine and may withdraw from such care at any time.    Patient presents for evaluation of bilateral ankle swelling.    Started with increased dose of amlodipine.    No pain  No redness or warmth  No skin breakdown  Patient discontinued the amlodipine for 3+ days now and swelling has improved/ resolved.    Patient continued on Coreg 12.5 mg twice a day and lisinopril 20 mg d daily for blood pressure.    Reports has remained controlled with all readings less than 140/90   Reading today is 127/63.    She has persistent neck and shoulder pain.  Seeing orthopedist. Being considered for an injection.      She has no additional complaints or concerns today      Current Outpatient Medications:     alendronate (FOSAMAX) 70 MG tablet, TAKE 1 TABLET BY MOUTH ONCE A WEEK BEFORE MEAL(S), Disp: , Rfl:     aspirin (ECOTRIN) 81 MG EC tablet, Take 81 mg by mouth once daily., Disp: , Rfl:     baclofen  (LIORESAL) 10 MG tablet, Take 10 mg by mouth 3 (three) times daily., Disp: , Rfl:     carvediloL (COREG) 12.5 MG tablet, Take 1 tablet (12.5 mg total) by mouth 2 (two) times daily with meals., Disp: 60 tablet, Rfl: 5    cloNIDine (CATAPRES) 0.1 MG tablet, Take 1 tablet (0.1 mg total) by mouth 2 (two) times daily as needed., Disp: 60 tablet, Rfl: 11    ezetimibe (ZETIA) 10 mg tablet, Take 1 tablet (10 mg total) by mouth once daily., Disp: 90 tablet, Rfl: 3    lisinopriL (PRINIVIL,ZESTRIL) 20 MG tablet, Take 1 tablet (20 mg total) by mouth once daily., Disp: 30 tablet, Rfl: 11    LORazepam (ATIVAN) 0.5 MG tablet, Take 1 tablet (0.5 mg total) by mouth nightly as needed for Anxiety., Disp: 30 tablet, Rfl: 2    pregabalin (LYRICA) 50 MG capsule, Take 50 mg by mouth 3 (three) times daily., Disp: , Rfl:     traZODone (DESYREL) 50 MG tablet, Take 1/2 to 1 tablet at bedtime as needed for sleep., Disp: 30 tablet, Rfl: 2    vitamin D (VITAMIN D3) 1000 units Tab, Take 1,000 Units by mouth once daily., Disp: , Rfl:     Review of Systems   Constitutional: Negative for activity change, fatigue and unexpected weight change.   HENT: Positive for hearing loss. Negative for rhinorrhea and trouble swallowing.    Eyes: Negative for discharge and visual disturbance.   Respiratory: Negative for cough, chest tightness, shortness of breath and wheezing.    Cardiovascular: Negative for chest pain, palpitations and leg swelling.   Gastrointestinal: Negative for abdominal pain, blood in stool, constipation, diarrhea, nausea and vomiting.   Endocrine: Negative for polydipsia and polyuria.   Genitourinary: Negative for difficulty urinating, dysuria, hematuria and menstrual problem.   Musculoskeletal: Positive for arthralgias, joint swelling and neck pain. Negative for myalgias.   Skin: Negative for rash.   Neurological: Positive for weakness. Negative for dizziness, syncope, light-headedness and headaches.   Psychiatric/Behavioral:  Positive for dysphoric mood. Negative for confusion and sleep disturbance. The patient is not nervous/anxious.      Objective:   /63     Physical Exam  Constitutional:       General: She is not in acute distress.     Appearance: Normal appearance. She is well-developed. She is not ill-appearing or toxic-appearing.   Eyes:      General: No scleral icterus.        Right eye: No discharge.         Left eye: No discharge.      Conjunctiva/sclera: Conjunctivae normal.      Right eye: Right conjunctiva is not injected.      Left eye: Left conjunctiva is not injected.   Pulmonary:      Effort: Pulmonary effort is normal. No tachypnea, accessory muscle usage or respiratory distress.   Musculoskeletal:      Right lower leg: No edema.      Left lower leg: No edema.   Skin:     Findings: No rash.   Neurological:      Mental Status: She is alert and oriented to person, place, and time.   Psychiatric:         Attention and Perception: Attention and perception normal.         Mood and Affect: Mood and affect normal.         Speech: Speech normal.         Behavior: Behavior normal. Behavior is cooperative.         Thought Content: Thought content normal.         Cognition and Memory: Cognition and memory normal.         Judgment: Judgment normal.       Assessment:       ICD-10-CM ICD-9-CM   1. Essential hypertension  I10 401.9   2. Side effect of medication  T88.7XXA 995.20     Plan:   Essential hypertension  Controlled.  BP at goal despite discontinuation of amlodipine.    Continue Coreg 12.5 mg twice a day  Continue lisinopril 20 mg daily   Continue to check blood pressure daily.    If readings become greater than 140/90 call for additional treatment.    Consider increasing lisinopril dose or adding hydrochlorothiazide.    Side effect of medication    Swelling has resolved/ improved with discontinuation of amlodipine.    Follow up with all specialists as scheduled.  Return to clinic as needed

## 2020-12-09 ENCOUNTER — PATIENT MESSAGE (OUTPATIENT)
Dept: INTERNAL MEDICINE | Facility: CLINIC | Age: 75
End: 2020-12-09

## 2020-12-11 ENCOUNTER — TELEPHONE (OUTPATIENT)
Dept: PAIN MEDICINE | Facility: CLINIC | Age: 75
End: 2020-12-11

## 2020-12-11 NOTE — TELEPHONE ENCOUNTER
----- Message from Apurva Gonsalves sent at 12/11/2020  1:46 PM CST -----  Regarding: NP  Contact: PATIENT  CALLING TO RESCHEDULE HER APPOINTMENT. PLEASE CALL PATIENT @ [phone]. THANKS

## 2020-12-13 ENCOUNTER — PATIENT OUTREACH (OUTPATIENT)
Dept: ADMINISTRATIVE | Facility: OTHER | Age: 75
End: 2020-12-13

## 2020-12-14 ENCOUNTER — OFFICE VISIT (OUTPATIENT)
Dept: CARDIOLOGY | Facility: CLINIC | Age: 75
End: 2020-12-14
Payer: MEDICARE

## 2020-12-14 ENCOUNTER — PATIENT MESSAGE (OUTPATIENT)
Dept: INTERNAL MEDICINE | Facility: CLINIC | Age: 75
End: 2020-12-14

## 2020-12-14 ENCOUNTER — CLINICAL SUPPORT (OUTPATIENT)
Dept: CARDIOLOGY | Facility: HOSPITAL | Age: 75
End: 2020-12-14
Attending: INTERNAL MEDICINE
Payer: MEDICARE

## 2020-12-14 VITALS
OXYGEN SATURATION: 95 % | WEIGHT: 161.38 LBS | BODY MASS INDEX: 30.47 KG/M2 | HEART RATE: 55 BPM | DIASTOLIC BLOOD PRESSURE: 84 MMHG | HEIGHT: 61 IN | RESPIRATION RATE: 14 BRPM | SYSTOLIC BLOOD PRESSURE: 176 MMHG

## 2020-12-14 DIAGNOSIS — K21.9 GASTROESOPHAGEAL REFLUX DISEASE, UNSPECIFIED WHETHER ESOPHAGITIS PRESENT: Chronic | ICD-10-CM

## 2020-12-14 DIAGNOSIS — Z95.810 ICD (IMPLANTABLE CARDIOVERTER-DEFIBRILLATOR) IN PLACE: Primary | ICD-10-CM

## 2020-12-14 DIAGNOSIS — Z86.74 HISTORY OF SUDDEN CARDIAC ARREST SUCCESSFULLY RESUSCITATED: Primary | ICD-10-CM

## 2020-12-14 DIAGNOSIS — Z95.810 ICD (IMPLANTABLE CARDIOVERTER-DEFIBRILLATOR), DUAL, IN SITU: ICD-10-CM

## 2020-12-14 DIAGNOSIS — Z95.810 ICD (IMPLANTABLE CARDIOVERTER-DEFIBRILLATOR) IN PLACE: ICD-10-CM

## 2020-12-14 DIAGNOSIS — E78.2 MIXED HYPERLIPIDEMIA: ICD-10-CM

## 2020-12-14 DIAGNOSIS — I10 ESSENTIAL HYPERTENSION: Chronic | ICD-10-CM

## 2020-12-14 PROCEDURE — 99999 PR PBB SHADOW E&M-EST. PATIENT-LVL III: CPT | Mod: PBBFAC,,, | Performed by: INTERNAL MEDICINE

## 2020-12-14 PROCEDURE — 1126F AMNT PAIN NOTED NONE PRSNT: CPT | Mod: S$GLB,,, | Performed by: INTERNAL MEDICINE

## 2020-12-14 PROCEDURE — 99999 PR PBB SHADOW E&M-EST. PATIENT-LVL III: ICD-10-PCS | Mod: PBBFAC,,, | Performed by: INTERNAL MEDICINE

## 2020-12-14 PROCEDURE — 99205 PR OFFICE/OUTPT VISIT, NEW, LEVL V, 60-74 MIN: ICD-10-PCS | Mod: S$GLB,,, | Performed by: INTERNAL MEDICINE

## 2020-12-14 PROCEDURE — 93282 PRGRMG EVAL IMPLANTABLE DFB: CPT

## 2020-12-14 PROCEDURE — 3077F SYST BP >= 140 MM HG: CPT | Mod: CPTII,S$GLB,, | Performed by: INTERNAL MEDICINE

## 2020-12-14 PROCEDURE — 3079F PR MOST RECENT DIASTOLIC BLOOD PRESSURE 80-89 MM HG: ICD-10-PCS | Mod: CPTII,S$GLB,, | Performed by: INTERNAL MEDICINE

## 2020-12-14 PROCEDURE — 99499 UNLISTED E&M SERVICE: CPT | Mod: S$GLB,,, | Performed by: INTERNAL MEDICINE

## 2020-12-14 PROCEDURE — 1159F MED LIST DOCD IN RCRD: CPT | Mod: S$GLB,,, | Performed by: INTERNAL MEDICINE

## 2020-12-14 PROCEDURE — 99205 OFFICE O/P NEW HI 60 MIN: CPT | Mod: S$GLB,,, | Performed by: INTERNAL MEDICINE

## 2020-12-14 PROCEDURE — 3079F DIAST BP 80-89 MM HG: CPT | Mod: CPTII,S$GLB,, | Performed by: INTERNAL MEDICINE

## 2020-12-14 PROCEDURE — 1126F PR PAIN SEVERITY QUANTIFIED, NO PAIN PRESENT: ICD-10-PCS | Mod: S$GLB,,, | Performed by: INTERNAL MEDICINE

## 2020-12-14 PROCEDURE — 99499 RISK ADDL DX/OHS AUDIT: ICD-10-PCS | Mod: S$GLB,,, | Performed by: INTERNAL MEDICINE

## 2020-12-14 PROCEDURE — 3077F PR MOST RECENT SYSTOLIC BLOOD PRESSURE >= 140 MM HG: ICD-10-PCS | Mod: CPTII,S$GLB,, | Performed by: INTERNAL MEDICINE

## 2020-12-14 PROCEDURE — 1159F PR MEDICATION LIST DOCUMENTED IN MEDICAL RECORD: ICD-10-PCS | Mod: S$GLB,,, | Performed by: INTERNAL MEDICINE

## 2020-12-14 NOTE — PROGRESS NOTES
Subjective:   Patient ID:  Leticia Grimes is a 75 y.o. female     Chief complaint:Follow-up and Edema      HPI   New patient to me - referred by dr Arthur for EP w/up  Background:  76 yo female, left arm pain  PMH HTN, OA    H/o cardiac arrest. sent to [facility]. H/o cath normal and normal cardiac function per pt.   S/P st omid ICD on 08/05/2020.  Feels dizziness when BP high  No chest pain, dyspnea, orthopnea  Occasional weakness.   No smoking/drinking/drug  EKG today NSR PAC and fusion beats V pacing     C/o left arm pain and hand swelling for 3 days and improved today. No erythema and weakness    Reported to have no CAD and a normal heart   ECG here shows normal QT   ICD in good repair      Current Outpatient Medications   Medication Sig    alendronate (FOSAMAX) 70 MG tablet TAKE 1 TABLET BY MOUTH ONCE A WEEK BEFORE MEAL(S)    aspirin (ECOTRIN) 81 MG EC tablet Take 81 mg by mouth once daily.    baclofen (LIORESAL) 10 MG tablet Take 10 mg by mouth 3 (three) times daily.    carvediloL (COREG) 12.5 MG tablet Take 1 tablet (12.5 mg total) by mouth 2 (two) times daily with meals.    cloNIDine (CATAPRES) 0.1 MG tablet Take 1 tablet (0.1 mg total) by mouth 2 (two) times daily as needed.    ezetimibe (ZETIA) 10 mg tablet Take 1 tablet (10 mg total) by mouth once daily.    lisinopriL (PRINIVIL,ZESTRIL) 20 MG tablet Take 1 tablet (20 mg total) by mouth once daily.    LORazepam (ATIVAN) 0.5 MG tablet Take 1 tablet (0.5 mg total) by mouth nightly as needed for Anxiety.    pregabalin (LYRICA) 50 MG capsule Take 50 mg by mouth 3 (three) times daily.    traZODone (DESYREL) 50 MG tablet Take 1/2 to 1 tablet at bedtime as needed for sleep.    vitamin D (VITAMIN D3) 1000 units Tab Take 1,000 Units by mouth once daily.     No current facility-administered medications for this visit.      Review of Systems   Constitution: Positive for malaise/fatigue. Negative for decreased appetite,  weight gain and weight loss.   HENT: Negative for nosebleeds.    Eyes: Negative for blurred vision and visual disturbance.   Cardiovascular: Negative for chest pain, claudication, cyanosis, dyspnea on exertion, irregular heartbeat, leg swelling, near-syncope, orthopnea, palpitations, paroxysmal nocturnal dyspnea and syncope.   Respiratory: Negative for cough, shortness of breath and wheezing.    Endocrine: Negative for heat intolerance.   Skin: Negative for rash.   Musculoskeletal: Negative for muscle weakness and myalgias.   Gastrointestinal: Negative for abdominal pain, anorexia, melena, nausea and vomiting.   Genitourinary: Negative for menorrhagia.   Neurological: Positive for loss of balance, numbness and paresthesias. Negative for excessive daytime sleepiness, dizziness, headaches, seizures, vertigo and weakness.   Psychiatric/Behavioral: Positive for depression. Negative for altered mental status. The patient is nervous/anxious.        Objective:   Physical Exam   Constitutional: She is oriented to person, place, and time. She appears well-developed and well-nourished.   HENT:   Head: Normocephalic and atraumatic.   Right Ear: External ear normal.   Left Ear: External ear normal.   Eyes: Pupils are equal, round, and reactive to light. Conjunctivae are normal. Left eye exhibits no discharge. No scleral icterus.   Neck: Normal range of motion. Neck supple. No thyromegaly present.   Cardiovascular: Normal rate, regular rhythm, normal heart sounds and intact distal pulses. Exam reveals no gallop, no S3, no S4, no friction rub, no midsystolic click and no opening snap.   No murmur heard.  Pulses:       Carotid pulses are 2+ on the right side and 2+ on the left side.       Radial pulses are 2+ on the right side and 2+ on the left side.        Dorsalis pedis pulses are 2+ on the right side and 2+ on the left side.        Posterior tibial pulses are 2+ on the right side and 2+ on the left side.   Pulmonary/Chest:  "Effort normal and breath sounds normal.   Device pocket is in excellent repair   Abdominal: Soft. She exhibits no distension. There is no hepatomegaly. There is no abdominal tenderness. There is no guarding.   Musculoskeletal:      Right ankle: She exhibits no swelling.      Left ankle: She exhibits no swelling.      Right lower leg: She exhibits no swelling.      Left lower leg: She exhibits no swelling.   Neurological: She is alert and oriented to person, place, and time. She has normal strength. No cranial nerve deficit. Gait normal.   Skin: Skin is warm, dry and intact. No rash noted. No cyanosis. Nails show no clubbing.   Psychiatric: She has a normal mood and affect. Her speech is normal and behavior is normal. Thought content normal. Cognition and memory are normal.   Nursing note and vitals reviewed.    BP (!) 176/84 (BP Location: Right arm, Patient Position: Sitting, BP Method: Medium (Manual))   Pulse (!) 55   Resp 14   Ht 5' 1" (1.549 m)   Wt 73.2 kg (161 lb 6 oz)   SpO2 95%   BMI 30.49 kg/m²      Assessment:    BP to o high - working with PCP  1. History of sudden cardiac arrest successfully resuscitated    2. ICD (implantable cardioverter-defibrillator) in place    3. Essential hypertension    4. Mixed hyperlipidemia    5. Gastroesophageal reflux disease, unspecified whether esophagitis present        Plan:    Continue as is   RTC 1 year  CXR PA and lateral  Decrease V pacing rate to 35 bpm  Suggest digital BP clinic - will refer to dr Marie  No orders of the defined types were placed in this encounter.    No follow-ups on file.  There are no discontinued medications.     Medication List with Changes/Refills   Current Medications    ALENDRONATE (FOSAMAX) 70 MG TABLET    TAKE 1 TABLET BY MOUTH ONCE A WEEK BEFORE MEAL(S)    ASPIRIN (ECOTRIN) 81 MG EC TABLET    Take 81 mg by mouth once daily.    BACLOFEN (LIORESAL) 10 MG TABLET    Take 10 mg by mouth 3 (three) times daily.    CARVEDILOL (COREG) " 12.5 MG TABLET    Take 1 tablet (12.5 mg total) by mouth 2 (two) times daily with meals.    CLONIDINE (CATAPRES) 0.1 MG TABLET    Take 1 tablet (0.1 mg total) by mouth 2 (two) times daily as needed.    EZETIMIBE (ZETIA) 10 MG TABLET    Take 1 tablet (10 mg total) by mouth once daily.    LISINOPRIL (PRINIVIL,ZESTRIL) 20 MG TABLET    Take 1 tablet (20 mg total) by mouth once daily.    LORAZEPAM (ATIVAN) 0.5 MG TABLET    Take 1 tablet (0.5 mg total) by mouth nightly as needed for Anxiety.    PREGABALIN (LYRICA) 50 MG CAPSULE    Take 50 mg by mouth 3 (three) times daily.    TRAZODONE (DESYREL) 50 MG TABLET    Take 1/2 to 1 tablet at bedtime as needed for sleep.    VITAMIN D (VITAMIN D3) 1000 UNITS TAB    Take 1,000 Units by mouth once daily.

## 2020-12-15 ENCOUNTER — PATIENT MESSAGE (OUTPATIENT)
Dept: INTERNAL MEDICINE | Facility: CLINIC | Age: 75
End: 2020-12-15

## 2020-12-16 DIAGNOSIS — I10 ESSENTIAL HYPERTENSION: Primary | ICD-10-CM

## 2020-12-21 ENCOUNTER — HOSPITAL ENCOUNTER (OUTPATIENT)
Dept: RADIOLOGY | Facility: HOSPITAL | Age: 75
Discharge: HOME | End: 2020-12-21
Attending: INTERNAL MEDICINE
Payer: MEDICARE

## 2020-12-21 DIAGNOSIS — Z95.810 ICD (IMPLANTABLE CARDIOVERTER-DEFIBRILLATOR) IN PLACE: ICD-10-CM

## 2020-12-21 PROCEDURE — 71046 X-RAY EXAM CHEST 2 VIEWS: CPT | Mod: 26,,, | Performed by: RADIOLOGY

## 2020-12-21 PROCEDURE — 71046 XR CHEST PA AND LATERAL: ICD-10-PCS | Mod: 26,,, | Performed by: RADIOLOGY

## 2020-12-21 PROCEDURE — 71046 X-RAY EXAM CHEST 2 VIEWS: CPT | Mod: TC

## 2020-12-22 ENCOUNTER — CLINICAL SUPPORT (OUTPATIENT)
Dept: CARDIOLOGY | Facility: HOSPITAL | Age: 75
End: 2020-12-22
Payer: MEDICARE

## 2020-12-22 ENCOUNTER — PATIENT MESSAGE (OUTPATIENT)
Dept: CARDIOLOGY | Facility: CLINIC | Age: 75
End: 2020-12-22

## 2020-12-22 DIAGNOSIS — Z95.810 PRESENCE OF AUTOMATIC (IMPLANTABLE) CARDIAC DEFIBRILLATOR: ICD-10-CM

## 2020-12-22 PROCEDURE — 93296 REM INTERROG EVL PM/IDS: CPT | Performed by: INTERNAL MEDICINE

## 2020-12-22 PROCEDURE — 93295 CARDIAC DEVICE CHECK - REMOTE: ICD-10-PCS | Mod: S$GLB,,, | Performed by: INTERNAL MEDICINE

## 2020-12-22 PROCEDURE — 93295 DEV INTERROG REMOTE 1/2/MLT: CPT | Mod: S$GLB,,, | Performed by: INTERNAL MEDICINE

## 2020-12-23 ENCOUNTER — PATIENT MESSAGE (OUTPATIENT)
Dept: INTERNAL MEDICINE | Facility: CLINIC | Age: 75
End: 2020-12-23

## 2020-12-26 ENCOUNTER — PATIENT MESSAGE (OUTPATIENT)
Dept: ADMINISTRATIVE | Facility: OTHER | Age: 75
End: 2020-12-26

## 2020-12-28 ENCOUNTER — PATIENT MESSAGE (OUTPATIENT)
Dept: ADMINISTRATIVE | Facility: OTHER | Age: 75
End: 2020-12-28

## 2020-12-28 RX ORDER — LORAZEPAM 0.5 MG/1
0.5 TABLET ORAL NIGHTLY PRN
Qty: 30 TABLET | Refills: 0 | Status: SHIPPED | OUTPATIENT
Start: 2020-12-28 | End: 2021-02-08 | Stop reason: SDUPTHER

## 2020-12-29 ENCOUNTER — PATIENT MESSAGE (OUTPATIENT)
Dept: ADMINISTRATIVE | Facility: OTHER | Age: 75
End: 2020-12-29

## 2020-12-29 ENCOUNTER — PATIENT MESSAGE (OUTPATIENT)
Dept: PSYCHIATRY | Facility: CLINIC | Age: 75
End: 2020-12-29

## 2020-12-30 ENCOUNTER — TELEPHONE (OUTPATIENT)
Dept: PSYCHIATRY | Facility: CLINIC | Age: 75
End: 2020-12-30

## 2020-12-30 NOTE — TELEPHONE ENCOUNTER
"This nurse spoke with the patient in regards to appointments the patient stated that" I spoke to someone earlier from your clinic and my appointments have been rescheduled thanks for calling".   "

## 2021-01-04 ENCOUNTER — TELEPHONE (OUTPATIENT)
Dept: ADMINISTRATIVE | Facility: HOSPITAL | Age: 76
End: 2021-01-04

## 2021-01-08 ENCOUNTER — OFFICE VISIT (OUTPATIENT)
Dept: INTERNAL MEDICINE | Facility: CLINIC | Age: 76
End: 2021-01-08
Payer: MEDICARE

## 2021-01-08 VITALS — DIASTOLIC BLOOD PRESSURE: 63 MMHG | SYSTOLIC BLOOD PRESSURE: 116 MMHG

## 2021-01-08 DIAGNOSIS — I10 ESSENTIAL HYPERTENSION: Primary | ICD-10-CM

## 2021-01-08 PROCEDURE — 3078F DIAST BP <80 MM HG: CPT | Mod: CPTII,95,, | Performed by: FAMILY MEDICINE

## 2021-01-08 PROCEDURE — 3074F PR MOST RECENT SYSTOLIC BLOOD PRESSURE < 130 MM HG: ICD-10-PCS | Mod: CPTII,95,, | Performed by: FAMILY MEDICINE

## 2021-01-08 PROCEDURE — 3074F SYST BP LT 130 MM HG: CPT | Mod: CPTII,95,, | Performed by: FAMILY MEDICINE

## 2021-01-08 PROCEDURE — 1159F PR MEDICATION LIST DOCUMENTED IN MEDICAL RECORD: ICD-10-PCS | Mod: 95,,, | Performed by: FAMILY MEDICINE

## 2021-01-08 PROCEDURE — 99214 OFFICE O/P EST MOD 30 MIN: CPT | Mod: 95,,, | Performed by: FAMILY MEDICINE

## 2021-01-08 PROCEDURE — 99214 PR OFFICE/OUTPT VISIT, EST, LEVL IV, 30-39 MIN: ICD-10-PCS | Mod: 95,,, | Performed by: FAMILY MEDICINE

## 2021-01-08 PROCEDURE — 3078F PR MOST RECENT DIASTOLIC BLOOD PRESSURE < 80 MM HG: ICD-10-PCS | Mod: CPTII,95,, | Performed by: FAMILY MEDICINE

## 2021-01-08 PROCEDURE — 1159F MED LIST DOCD IN RCRD: CPT | Mod: 95,,, | Performed by: FAMILY MEDICINE

## 2021-01-08 RX ORDER — CARVEDILOL 12.5 MG/1
12.5 TABLET ORAL 2 TIMES DAILY WITH MEALS
Qty: 180 TABLET | Refills: 3 | Status: SHIPPED | OUTPATIENT
Start: 2021-01-08 | End: 2022-02-14

## 2021-01-08 RX ORDER — AMLODIPINE BESYLATE 5 MG/1
5 TABLET ORAL DAILY
COMMUNITY
Start: 2021-01-01 | End: 2021-01-08 | Stop reason: SDUPTHER

## 2021-01-08 RX ORDER — AMLODIPINE BESYLATE 5 MG/1
5 TABLET ORAL DAILY
Qty: 90 TABLET | Refills: 3 | Status: SHIPPED | OUTPATIENT
Start: 2021-01-08 | End: 2021-11-19

## 2021-01-08 RX ORDER — LISINOPRIL 20 MG/1
20 TABLET ORAL DAILY
Qty: 90 TABLET | Refills: 3 | Status: SHIPPED | OUTPATIENT
Start: 2021-01-08 | End: 2021-11-09 | Stop reason: SDUPTHER

## 2021-01-14 ENCOUNTER — PATIENT MESSAGE (OUTPATIENT)
Dept: PSYCHIATRY | Facility: CLINIC | Age: 76
End: 2021-01-14

## 2021-01-16 ENCOUNTER — PATIENT MESSAGE (OUTPATIENT)
Dept: INTERNAL MEDICINE | Facility: CLINIC | Age: 76
End: 2021-01-16

## 2021-01-19 ENCOUNTER — PATIENT MESSAGE (OUTPATIENT)
Dept: PSYCHIATRY | Facility: CLINIC | Age: 76
End: 2021-01-19

## 2021-01-19 ENCOUNTER — PATIENT MESSAGE (OUTPATIENT)
Dept: INTERNAL MEDICINE | Facility: CLINIC | Age: 76
End: 2021-01-19

## 2021-01-22 ENCOUNTER — TELEPHONE (OUTPATIENT)
Dept: CARDIOLOGY | Facility: HOSPITAL | Age: 76
End: 2021-01-22

## 2021-01-25 ENCOUNTER — OFFICE VISIT (OUTPATIENT)
Dept: PSYCHIATRY | Facility: CLINIC | Age: 76
End: 2021-01-25
Payer: COMMERCIAL

## 2021-01-25 DIAGNOSIS — F44.0 DISSOCIATIVE AMNESIA: Primary | ICD-10-CM

## 2021-01-25 DIAGNOSIS — G31.84 MILD COGNITIVE IMPAIRMENT: ICD-10-CM

## 2021-01-25 DIAGNOSIS — G47.00 INSOMNIA, UNSPECIFIED TYPE: ICD-10-CM

## 2021-01-25 PROCEDURE — 90834 PSYTX W PT 45 MINUTES: CPT | Mod: 95,,, | Performed by: SOCIAL WORKER

## 2021-01-25 PROCEDURE — 90834 PR PSYCHOTHERAPY W/PATIENT, 45 MIN: ICD-10-PCS | Mod: 95,,, | Performed by: SOCIAL WORKER

## 2021-01-26 ENCOUNTER — TELEPHONE (OUTPATIENT)
Dept: PAIN MEDICINE | Facility: CLINIC | Age: 76
End: 2021-01-26

## 2021-01-26 ENCOUNTER — NURSE TRIAGE (OUTPATIENT)
Dept: ADMINISTRATIVE | Facility: CLINIC | Age: 76
End: 2021-01-26

## 2021-01-26 ENCOUNTER — TELEPHONE (OUTPATIENT)
Dept: INTERNAL MEDICINE | Facility: CLINIC | Age: 76
End: 2021-01-26

## 2021-01-27 ENCOUNTER — TELEPHONE (OUTPATIENT)
Dept: PAIN MEDICINE | Facility: CLINIC | Age: 76
End: 2021-01-27

## 2021-01-29 ENCOUNTER — TELEPHONE (OUTPATIENT)
Dept: PAIN MEDICINE | Facility: CLINIC | Age: 76
End: 2021-01-29

## 2021-02-01 ENCOUNTER — OFFICE VISIT (OUTPATIENT)
Dept: PAIN MEDICINE | Facility: CLINIC | Age: 76
End: 2021-02-01
Payer: MEDICARE

## 2021-02-01 ENCOUNTER — PATIENT OUTREACH (OUTPATIENT)
Dept: ADMINISTRATIVE | Facility: OTHER | Age: 76
End: 2021-02-01

## 2021-02-01 ENCOUNTER — TELEPHONE (OUTPATIENT)
Dept: PAIN MEDICINE | Facility: CLINIC | Age: 76
End: 2021-02-01

## 2021-02-01 ENCOUNTER — PATIENT MESSAGE (OUTPATIENT)
Dept: INTERNAL MEDICINE | Facility: CLINIC | Age: 76
End: 2021-02-01

## 2021-02-01 VITALS
BODY MASS INDEX: 30.22 KG/M2 | SYSTOLIC BLOOD PRESSURE: 153 MMHG | WEIGHT: 160.06 LBS | HEART RATE: 53 BPM | DIASTOLIC BLOOD PRESSURE: 73 MMHG | HEIGHT: 61 IN

## 2021-02-01 DIAGNOSIS — G89.29 CHRONIC LEFT SHOULDER PAIN: Primary | ICD-10-CM

## 2021-02-01 DIAGNOSIS — M25.512 CHRONIC LEFT SHOULDER PAIN: Primary | ICD-10-CM

## 2021-02-01 DIAGNOSIS — M19.012 GLENOHUMERAL ARTHRITIS, LEFT: ICD-10-CM

## 2021-02-01 PROCEDURE — 1125F AMNT PAIN NOTED PAIN PRSNT: CPT | Mod: S$GLB,,, | Performed by: PHYSICAL MEDICINE & REHABILITATION

## 2021-02-01 PROCEDURE — 3077F PR MOST RECENT SYSTOLIC BLOOD PRESSURE >= 140 MM HG: ICD-10-PCS | Mod: CPTII,S$GLB,, | Performed by: PHYSICAL MEDICINE & REHABILITATION

## 2021-02-01 PROCEDURE — 3078F DIAST BP <80 MM HG: CPT | Mod: CPTII,S$GLB,, | Performed by: PHYSICAL MEDICINE & REHABILITATION

## 2021-02-01 PROCEDURE — 99204 PR OFFICE/OUTPT VISIT, NEW, LEVL IV, 45-59 MIN: ICD-10-PCS | Mod: S$GLB,,, | Performed by: PHYSICAL MEDICINE & REHABILITATION

## 2021-02-01 PROCEDURE — 3077F SYST BP >= 140 MM HG: CPT | Mod: CPTII,S$GLB,, | Performed by: PHYSICAL MEDICINE & REHABILITATION

## 2021-02-01 PROCEDURE — 1159F PR MEDICATION LIST DOCUMENTED IN MEDICAL RECORD: ICD-10-PCS | Mod: S$GLB,,, | Performed by: PHYSICAL MEDICINE & REHABILITATION

## 2021-02-01 PROCEDURE — 99999 PR PBB SHADOW E&M-EST. PATIENT-LVL III: CPT | Mod: PBBFAC,,, | Performed by: PHYSICAL MEDICINE & REHABILITATION

## 2021-02-01 PROCEDURE — 3288F PR FALLS RISK ASSESSMENT DOCUMENTED: ICD-10-PCS | Mod: CPTII,S$GLB,, | Performed by: PHYSICAL MEDICINE & REHABILITATION

## 2021-02-01 PROCEDURE — 1101F PT FALLS ASSESS-DOCD LE1/YR: CPT | Mod: CPTII,S$GLB,, | Performed by: PHYSICAL MEDICINE & REHABILITATION

## 2021-02-01 PROCEDURE — 3078F PR MOST RECENT DIASTOLIC BLOOD PRESSURE < 80 MM HG: ICD-10-PCS | Mod: CPTII,S$GLB,, | Performed by: PHYSICAL MEDICINE & REHABILITATION

## 2021-02-01 PROCEDURE — 1159F MED LIST DOCD IN RCRD: CPT | Mod: S$GLB,,, | Performed by: PHYSICAL MEDICINE & REHABILITATION

## 2021-02-01 PROCEDURE — 99999 PR PBB SHADOW E&M-EST. PATIENT-LVL III: ICD-10-PCS | Mod: PBBFAC,,, | Performed by: PHYSICAL MEDICINE & REHABILITATION

## 2021-02-01 PROCEDURE — 1101F PR PT FALLS ASSESS DOC 0-1 FALLS W/OUT INJ PAST YR: ICD-10-PCS | Mod: CPTII,S$GLB,, | Performed by: PHYSICAL MEDICINE & REHABILITATION

## 2021-02-01 PROCEDURE — 3288F FALL RISK ASSESSMENT DOCD: CPT | Mod: CPTII,S$GLB,, | Performed by: PHYSICAL MEDICINE & REHABILITATION

## 2021-02-01 PROCEDURE — 1125F PR PAIN SEVERITY QUANTIFIED, PAIN PRESENT: ICD-10-PCS | Mod: S$GLB,,, | Performed by: PHYSICAL MEDICINE & REHABILITATION

## 2021-02-01 PROCEDURE — 99204 OFFICE O/P NEW MOD 45 MIN: CPT | Mod: S$GLB,,, | Performed by: PHYSICAL MEDICINE & REHABILITATION

## 2021-02-01 RX ORDER — METHOCARBAMOL 750 MG/1
750 TABLET, FILM COATED ORAL 2 TIMES DAILY PRN
Qty: 60 TABLET | Refills: 0 | Status: SHIPPED | OUTPATIENT
Start: 2021-02-01 | End: 2021-02-02

## 2021-02-02 RX ORDER — BACLOFEN 5 MG/1
5 TABLET ORAL 3 TIMES DAILY PRN
Qty: 90 TABLET | Refills: 2 | Status: SHIPPED | OUTPATIENT
Start: 2021-02-02 | End: 2021-02-03 | Stop reason: ALTCHOICE

## 2021-02-03 ENCOUNTER — TELEPHONE (OUTPATIENT)
Dept: PAIN MEDICINE | Facility: CLINIC | Age: 76
End: 2021-02-03

## 2021-02-03 ENCOUNTER — PATIENT MESSAGE (OUTPATIENT)
Dept: PAIN MEDICINE | Facility: CLINIC | Age: 76
End: 2021-02-03

## 2021-02-03 ENCOUNTER — PATIENT OUTREACH (OUTPATIENT)
Dept: ADMINISTRATIVE | Facility: HOSPITAL | Age: 76
End: 2021-02-03

## 2021-02-03 RX ORDER — TIZANIDINE 2 MG/1
2 TABLET ORAL 2 TIMES DAILY PRN
Qty: 60 TABLET | Refills: 1 | Status: SHIPPED | OUTPATIENT
Start: 2021-02-03 | End: 2021-03-05

## 2021-02-08 ENCOUNTER — OFFICE VISIT (OUTPATIENT)
Dept: PSYCHIATRY | Facility: CLINIC | Age: 76
End: 2021-02-08
Payer: MEDICARE

## 2021-02-08 DIAGNOSIS — F41.9 ANXIETY: Primary | ICD-10-CM

## 2021-02-08 DIAGNOSIS — F44.9 DISSOCIATION: ICD-10-CM

## 2021-02-08 DIAGNOSIS — F51.5 NIGHTMARES: ICD-10-CM

## 2021-02-08 PROCEDURE — 1159F MED LIST DOCD IN RCRD: CPT | Mod: 95,,, | Performed by: PSYCHIATRY & NEUROLOGY

## 2021-02-08 PROCEDURE — 99214 PR OFFICE/OUTPT VISIT, EST, LEVL IV, 30-39 MIN: ICD-10-PCS | Mod: 95,,, | Performed by: PSYCHIATRY & NEUROLOGY

## 2021-02-08 PROCEDURE — 99214 OFFICE O/P EST MOD 30 MIN: CPT | Mod: 95,,, | Performed by: PSYCHIATRY & NEUROLOGY

## 2021-02-08 PROCEDURE — 99499 UNLISTED E&M SERVICE: CPT | Mod: 95,,, | Performed by: PSYCHIATRY & NEUROLOGY

## 2021-02-08 PROCEDURE — 99499 RISK ADDL DX/OHS AUDIT: ICD-10-PCS | Mod: 95,,, | Performed by: PSYCHIATRY & NEUROLOGY

## 2021-02-08 PROCEDURE — 1159F PR MEDICATION LIST DOCUMENTED IN MEDICAL RECORD: ICD-10-PCS | Mod: 95,,, | Performed by: PSYCHIATRY & NEUROLOGY

## 2021-02-08 RX ORDER — LORAZEPAM 0.5 MG/1
0.5 TABLET ORAL NIGHTLY PRN
Qty: 30 TABLET | Refills: 1 | Status: SHIPPED | OUTPATIENT
Start: 2021-02-08 | End: 2021-04-23 | Stop reason: SDUPTHER

## 2021-02-23 ENCOUNTER — PATIENT MESSAGE (OUTPATIENT)
Dept: INTERNAL MEDICINE | Facility: CLINIC | Age: 76
End: 2021-02-23

## 2021-02-23 ENCOUNTER — PATIENT MESSAGE (OUTPATIENT)
Dept: PAIN MEDICINE | Facility: HOSPITAL | Age: 76
End: 2021-02-23

## 2021-02-24 ENCOUNTER — PATIENT MESSAGE (OUTPATIENT)
Dept: PAIN MEDICINE | Facility: HOSPITAL | Age: 76
End: 2021-02-24

## 2021-03-01 ENCOUNTER — TELEPHONE (OUTPATIENT)
Dept: PAIN MEDICINE | Facility: CLINIC | Age: 76
End: 2021-03-01

## 2021-03-02 ENCOUNTER — PATIENT MESSAGE (OUTPATIENT)
Dept: INTERNAL MEDICINE | Facility: CLINIC | Age: 76
End: 2021-03-02

## 2021-03-02 ENCOUNTER — HOSPITAL ENCOUNTER (OUTPATIENT)
Facility: HOSPITAL | Age: 76
Discharge: HOME | End: 2021-03-02
Attending: PHYSICAL MEDICINE & REHABILITATION | Admitting: PHYSICAL MEDICINE & REHABILITATION
Payer: MEDICARE

## 2021-03-02 VITALS
TEMPERATURE: 98 F | SYSTOLIC BLOOD PRESSURE: 154 MMHG | DIASTOLIC BLOOD PRESSURE: 79 MMHG | RESPIRATION RATE: 15 BRPM | HEART RATE: 58 BPM | OXYGEN SATURATION: 99 %

## 2021-03-02 DIAGNOSIS — R25.2 SPASTICITY: ICD-10-CM

## 2021-03-02 DIAGNOSIS — G62.9 NEUROPATHY: Primary | ICD-10-CM

## 2021-03-02 DIAGNOSIS — M25.519 CHRONIC SHOULDER PAIN: ICD-10-CM

## 2021-03-02 DIAGNOSIS — M25.519 CHRONIC SHOULDER PAIN, UNSPECIFIED LATERALITY: Primary | ICD-10-CM

## 2021-03-02 DIAGNOSIS — G89.29 CHRONIC SHOULDER PAIN, UNSPECIFIED LATERALITY: Primary | ICD-10-CM

## 2021-03-02 DIAGNOSIS — G89.29 CHRONIC SHOULDER PAIN: ICD-10-CM

## 2021-03-02 PROCEDURE — 77002 NEEDLE LOCALIZATION BY XRAY: CPT | Mod: 26,,, | Performed by: PHYSICAL MEDICINE & REHABILITATION

## 2021-03-02 PROCEDURE — 77002 PR FLUOROSCOPIC GUIDANCE NEEDLE PLACEMENT: ICD-10-PCS | Mod: 26,,, | Performed by: PHYSICAL MEDICINE & REHABILITATION

## 2021-03-02 PROCEDURE — 25500020 PHARM REV CODE 255: Performed by: PHYSICAL MEDICINE & REHABILITATION

## 2021-03-02 PROCEDURE — 63600175 PHARM REV CODE 636 W HCPCS: Performed by: PHYSICAL MEDICINE & REHABILITATION

## 2021-03-02 PROCEDURE — 20610 DRAIN/INJ JOINT/BURSA W/O US: CPT | Mod: LT,,, | Performed by: PHYSICAL MEDICINE & REHABILITATION

## 2021-03-02 PROCEDURE — 25000003 PHARM REV CODE 250: Performed by: PHYSICAL MEDICINE & REHABILITATION

## 2021-03-02 PROCEDURE — 20610 PR DRAIN/INJECT LARGE JOINT/BURSA: ICD-10-PCS | Mod: LT,,, | Performed by: PHYSICAL MEDICINE & REHABILITATION

## 2021-03-02 PROCEDURE — 20610 DRAIN/INJ JOINT/BURSA W/O US: CPT | Performed by: PHYSICAL MEDICINE & REHABILITATION

## 2021-03-02 RX ORDER — METHYLPREDNISOLONE ACETATE 40 MG/ML
INJECTION, SUSPENSION INTRA-ARTICULAR; INTRALESIONAL; INTRAMUSCULAR; SOFT TISSUE
Status: DISCONTINUED | OUTPATIENT
Start: 2021-03-02 | End: 2021-03-02 | Stop reason: HOSPADM

## 2021-03-02 RX ORDER — ONDANSETRON 2 MG/ML
4 INJECTION INTRAMUSCULAR; INTRAVENOUS ONCE AS NEEDED
Status: DISCONTINUED | OUTPATIENT
Start: 2021-03-02 | End: 2021-03-02 | Stop reason: HOSPADM

## 2021-03-02 RX ORDER — BUPIVACAINE HYDROCHLORIDE 2.5 MG/ML
INJECTION, SOLUTION EPIDURAL; INFILTRATION; INTRACAUDAL
Status: DISCONTINUED | OUTPATIENT
Start: 2021-03-02 | End: 2021-03-02 | Stop reason: HOSPADM

## 2021-03-07 ENCOUNTER — PATIENT MESSAGE (OUTPATIENT)
Dept: PAIN MEDICINE | Facility: CLINIC | Age: 76
End: 2021-03-07

## 2021-03-08 ENCOUNTER — TELEPHONE (OUTPATIENT)
Dept: PAIN MEDICINE | Facility: CLINIC | Age: 76
End: 2021-03-08

## 2021-03-09 RX ORDER — METHOCARBAMOL 500 MG/1
500 TABLET, FILM COATED ORAL 3 TIMES DAILY PRN
Qty: 90 TABLET | Refills: 0 | Status: SHIPPED | OUTPATIENT
Start: 2021-03-09 | End: 2021-03-10 | Stop reason: ALTCHOICE

## 2021-03-10 RX ORDER — CYCLOBENZAPRINE HCL 10 MG
TABLET ORAL
Qty: 60 TABLET | Refills: 0 | Status: SHIPPED | OUTPATIENT
Start: 2021-03-10 | End: 2021-04-15 | Stop reason: SDUPTHER

## 2021-03-22 ENCOUNTER — CLINICAL SUPPORT (OUTPATIENT)
Dept: CARDIOLOGY | Facility: HOSPITAL | Age: 76
End: 2021-03-22
Payer: MEDICARE

## 2021-03-22 DIAGNOSIS — Z95.810 PRESENCE OF AUTOMATIC (IMPLANTABLE) CARDIAC DEFIBRILLATOR: ICD-10-CM

## 2021-03-22 PROCEDURE — 93295 DEV INTERROG REMOTE 1/2/MLT: CPT | Mod: S$GLB,,, | Performed by: INTERNAL MEDICINE

## 2021-03-22 PROCEDURE — 93296 REM INTERROG EVL PM/IDS: CPT | Performed by: INTERNAL MEDICINE

## 2021-03-22 PROCEDURE — 93295 CARDIAC DEVICE CHECK - REMOTE: ICD-10-PCS | Mod: S$GLB,,, | Performed by: INTERNAL MEDICINE

## 2021-03-23 ENCOUNTER — OFFICE VISIT (OUTPATIENT)
Dept: PSYCHIATRY | Facility: CLINIC | Age: 76
End: 2021-03-23
Payer: COMMERCIAL

## 2021-03-23 DIAGNOSIS — F41.9 ANXIETY: ICD-10-CM

## 2021-03-23 DIAGNOSIS — F44.0 DISSOCIATIVE AMNESIA: Primary | ICD-10-CM

## 2021-03-23 DIAGNOSIS — G47.00 INSOMNIA, UNSPECIFIED TYPE: ICD-10-CM

## 2021-03-23 PROCEDURE — 90834 PR PSYCHOTHERAPY W/PATIENT, 45 MIN: ICD-10-PCS | Mod: 95,,, | Performed by: SOCIAL WORKER

## 2021-03-23 PROCEDURE — 99499 RISK ADDL DX/OHS AUDIT: ICD-10-PCS | Mod: 95,,, | Performed by: SOCIAL WORKER

## 2021-03-23 PROCEDURE — 90834 PSYTX W PT 45 MINUTES: CPT | Mod: 95,,, | Performed by: SOCIAL WORKER

## 2021-03-23 PROCEDURE — 99499 UNLISTED E&M SERVICE: CPT | Mod: 95,,, | Performed by: SOCIAL WORKER

## 2021-03-26 ENCOUNTER — OFFICE VISIT (OUTPATIENT)
Dept: NEUROLOGY | Facility: CLINIC | Age: 76
End: 2021-03-26
Payer: MEDICARE

## 2021-03-26 ENCOUNTER — PATIENT MESSAGE (OUTPATIENT)
Dept: INTERNAL MEDICINE | Facility: CLINIC | Age: 76
End: 2021-03-26

## 2021-03-26 DIAGNOSIS — G62.9 NEUROPATHY: ICD-10-CM

## 2021-03-26 DIAGNOSIS — R25.2 SPASTICITY: ICD-10-CM

## 2021-03-26 PROCEDURE — 99499 NO LOS: ICD-10-PCS | Mod: 95,,, | Performed by: PSYCHIATRY & NEUROLOGY

## 2021-03-26 PROCEDURE — 99499 UNLISTED E&M SERVICE: CPT | Mod: 95,,, | Performed by: PSYCHIATRY & NEUROLOGY

## 2021-03-29 ENCOUNTER — TELEPHONE (OUTPATIENT)
Dept: NEUROLOGY | Facility: CLINIC | Age: 76
End: 2021-03-29

## 2021-03-29 DIAGNOSIS — R25.2 SPASTICITY: ICD-10-CM

## 2021-03-29 DIAGNOSIS — G62.9 NEUROPATHY: Primary | ICD-10-CM

## 2021-04-05 ENCOUNTER — OFFICE VISIT (OUTPATIENT)
Dept: INTERNAL MEDICINE | Facility: CLINIC | Age: 76
End: 2021-04-05
Payer: MEDICARE

## 2021-04-05 VITALS — SYSTOLIC BLOOD PRESSURE: 115 MMHG | DIASTOLIC BLOOD PRESSURE: 67 MMHG | HEART RATE: 84 BPM

## 2021-04-05 DIAGNOSIS — I10 ESSENTIAL HYPERTENSION: Primary | ICD-10-CM

## 2021-04-05 DIAGNOSIS — Z12.11 SCREENING FOR COLON CANCER: ICD-10-CM

## 2021-04-05 DIAGNOSIS — Z11.59 NEED FOR HEPATITIS C SCREENING TEST: ICD-10-CM

## 2021-04-05 DIAGNOSIS — Z78.0 ASYMPTOMATIC MENOPAUSAL STATE: ICD-10-CM

## 2021-04-05 DIAGNOSIS — E78.2 MIXED HYPERLIPIDEMIA: ICD-10-CM

## 2021-04-05 DIAGNOSIS — Z13.820 SCREENING FOR OSTEOPOROSIS: ICD-10-CM

## 2021-04-05 DIAGNOSIS — Z78.9 STATIN INTOLERANCE: ICD-10-CM

## 2021-04-05 PROBLEM — E78.5 HYPERLIPIDEMIA: Chronic | Status: ACTIVE | Noted: 2020-10-15

## 2021-04-05 PROCEDURE — 1159F PR MEDICATION LIST DOCUMENTED IN MEDICAL RECORD: ICD-10-PCS | Mod: 95,,, | Performed by: FAMILY MEDICINE

## 2021-04-05 PROCEDURE — 3078F PR MOST RECENT DIASTOLIC BLOOD PRESSURE < 80 MM HG: ICD-10-PCS | Mod: CPTII,95,, | Performed by: FAMILY MEDICINE

## 2021-04-05 PROCEDURE — 3074F SYST BP LT 130 MM HG: CPT | Mod: CPTII,95,, | Performed by: FAMILY MEDICINE

## 2021-04-05 PROCEDURE — 99214 OFFICE O/P EST MOD 30 MIN: CPT | Mod: 95,,, | Performed by: FAMILY MEDICINE

## 2021-04-05 PROCEDURE — 99214 PR OFFICE/OUTPT VISIT, EST, LEVL IV, 30-39 MIN: ICD-10-PCS | Mod: 95,,, | Performed by: FAMILY MEDICINE

## 2021-04-05 PROCEDURE — 3078F DIAST BP <80 MM HG: CPT | Mod: CPTII,95,, | Performed by: FAMILY MEDICINE

## 2021-04-05 PROCEDURE — 1159F MED LIST DOCD IN RCRD: CPT | Mod: 95,,, | Performed by: FAMILY MEDICINE

## 2021-04-05 PROCEDURE — 3074F PR MOST RECENT SYSTOLIC BLOOD PRESSURE < 130 MM HG: ICD-10-PCS | Mod: CPTII,95,, | Performed by: FAMILY MEDICINE

## 2021-04-07 ENCOUNTER — PATIENT OUTREACH (OUTPATIENT)
Dept: ADMINISTRATIVE | Facility: OTHER | Age: 76
End: 2021-04-07

## 2021-04-09 ENCOUNTER — OFFICE VISIT (OUTPATIENT)
Dept: PAIN MEDICINE | Facility: CLINIC | Age: 76
End: 2021-04-09
Payer: MEDICARE

## 2021-04-09 DIAGNOSIS — M19.012 GLENOHUMERAL ARTHRITIS, LEFT: ICD-10-CM

## 2021-04-09 DIAGNOSIS — G89.29 CHRONIC LEFT SHOULDER PAIN: Primary | ICD-10-CM

## 2021-04-09 DIAGNOSIS — M25.512 CHRONIC LEFT SHOULDER PAIN: Primary | ICD-10-CM

## 2021-04-09 PROCEDURE — 1159F PR MEDICATION LIST DOCUMENTED IN MEDICAL RECORD: ICD-10-PCS | Mod: 95,,, | Performed by: PHYSICAL MEDICINE & REHABILITATION

## 2021-04-09 PROCEDURE — 99212 PR OFFICE/OUTPT VISIT, EST, LEVL II, 10-19 MIN: ICD-10-PCS | Mod: 95,,, | Performed by: PHYSICAL MEDICINE & REHABILITATION

## 2021-04-09 PROCEDURE — 1159F MED LIST DOCD IN RCRD: CPT | Mod: 95,,, | Performed by: PHYSICAL MEDICINE & REHABILITATION

## 2021-04-09 PROCEDURE — 99212 OFFICE O/P EST SF 10 MIN: CPT | Mod: 95,,, | Performed by: PHYSICAL MEDICINE & REHABILITATION

## 2021-04-15 ENCOUNTER — TELEPHONE (OUTPATIENT)
Dept: PAIN MEDICINE | Facility: CLINIC | Age: 76
End: 2021-04-15

## 2021-04-15 ENCOUNTER — OFFICE VISIT (OUTPATIENT)
Dept: PAIN MEDICINE | Facility: CLINIC | Age: 76
End: 2021-04-15
Payer: MEDICARE

## 2021-04-15 DIAGNOSIS — M54.12 CERVICAL RADICULOPATHY: Primary | ICD-10-CM

## 2021-04-15 PROCEDURE — 1159F MED LIST DOCD IN RCRD: CPT | Mod: 95,,, | Performed by: PHYSICAL MEDICINE & REHABILITATION

## 2021-04-15 PROCEDURE — 99213 PR OFFICE/OUTPT VISIT, EST, LEVL III, 20-29 MIN: ICD-10-PCS | Mod: 95,,, | Performed by: PHYSICAL MEDICINE & REHABILITATION

## 2021-04-15 PROCEDURE — 99213 OFFICE O/P EST LOW 20 MIN: CPT | Mod: 95,,, | Performed by: PHYSICAL MEDICINE & REHABILITATION

## 2021-04-15 PROCEDURE — 1159F PR MEDICATION LIST DOCUMENTED IN MEDICAL RECORD: ICD-10-PCS | Mod: 95,,, | Performed by: PHYSICAL MEDICINE & REHABILITATION

## 2021-04-15 RX ORDER — CYCLOBENZAPRINE HCL 10 MG
TABLET ORAL
Qty: 60 TABLET | Refills: 0 | Status: SHIPPED | OUTPATIENT
Start: 2021-04-15 | End: 2021-05-13

## 2021-04-17 LAB — NONINV COLON CA DNA+OCC BLD SCRN STL QL: NEGATIVE

## 2021-04-23 ENCOUNTER — OFFICE VISIT (OUTPATIENT)
Dept: PSYCHIATRY | Facility: CLINIC | Age: 76
End: 2021-04-23
Payer: MEDICARE

## 2021-04-23 DIAGNOSIS — F41.9 ANXIETY: Primary | ICD-10-CM

## 2021-04-23 PROCEDURE — 1159F MED LIST DOCD IN RCRD: CPT | Mod: 95,,, | Performed by: PSYCHIATRY & NEUROLOGY

## 2021-04-23 PROCEDURE — 99213 PR OFFICE/OUTPT VISIT, EST, LEVL III, 20-29 MIN: ICD-10-PCS | Mod: 95,,, | Performed by: PSYCHIATRY & NEUROLOGY

## 2021-04-23 PROCEDURE — 99499 UNLISTED E&M SERVICE: CPT | Mod: 95,,, | Performed by: PSYCHIATRY & NEUROLOGY

## 2021-04-23 PROCEDURE — 99499 RISK ADDL DX/OHS AUDIT: ICD-10-PCS | Mod: 95,,, | Performed by: PSYCHIATRY & NEUROLOGY

## 2021-04-23 PROCEDURE — 99213 OFFICE O/P EST LOW 20 MIN: CPT | Mod: 95,,, | Performed by: PSYCHIATRY & NEUROLOGY

## 2021-04-23 PROCEDURE — 1159F PR MEDICATION LIST DOCUMENTED IN MEDICAL RECORD: ICD-10-PCS | Mod: 95,,, | Performed by: PSYCHIATRY & NEUROLOGY

## 2021-04-23 RX ORDER — LORAZEPAM 0.5 MG/1
0.5 TABLET ORAL NIGHTLY PRN
Qty: 30 TABLET | Refills: 2 | Status: SHIPPED | OUTPATIENT
Start: 2021-04-23 | End: 2021-09-27

## 2021-04-26 ENCOUNTER — PATIENT MESSAGE (OUTPATIENT)
Dept: INTERNAL MEDICINE | Facility: CLINIC | Age: 76
End: 2021-04-26

## 2021-04-30 ENCOUNTER — OFFICE VISIT (OUTPATIENT)
Dept: PSYCHIATRY | Facility: CLINIC | Age: 76
End: 2021-04-30
Payer: MEDICARE

## 2021-04-30 DIAGNOSIS — F41.1 ANXIETY STATES: Primary | ICD-10-CM

## 2021-04-30 DIAGNOSIS — G47.00 INSOMNIA, UNSPECIFIED TYPE: ICD-10-CM

## 2021-04-30 DIAGNOSIS — F44.0 DISSOCIATIVE AMNESIA: ICD-10-CM

## 2021-04-30 PROCEDURE — 90834 PR PSYCHOTHERAPY W/PATIENT, 45 MIN: ICD-10-PCS | Mod: 95,,, | Performed by: SOCIAL WORKER

## 2021-04-30 PROCEDURE — 99499 RISK ADDL DX/OHS AUDIT: ICD-10-PCS | Mod: 95,,, | Performed by: SOCIAL WORKER

## 2021-04-30 PROCEDURE — 99499 UNLISTED E&M SERVICE: CPT | Mod: 95,,, | Performed by: SOCIAL WORKER

## 2021-04-30 PROCEDURE — 90834 PSYTX W PT 45 MINUTES: CPT | Mod: 95,,, | Performed by: SOCIAL WORKER

## 2021-05-13 ENCOUNTER — PATIENT MESSAGE (OUTPATIENT)
Dept: PAIN MEDICINE | Facility: CLINIC | Age: 76
End: 2021-05-13

## 2021-05-25 ENCOUNTER — PATIENT MESSAGE (OUTPATIENT)
Dept: PAIN MEDICINE | Facility: CLINIC | Age: 76
End: 2021-05-25

## 2021-06-10 ENCOUNTER — OFFICE VISIT (OUTPATIENT)
Dept: PSYCHIATRY | Facility: CLINIC | Age: 76
End: 2021-06-10
Payer: COMMERCIAL

## 2021-06-10 DIAGNOSIS — F44.0 DISSOCIATIVE AMNESIA: ICD-10-CM

## 2021-06-10 DIAGNOSIS — F51.5 NIGHTMARES: ICD-10-CM

## 2021-06-10 DIAGNOSIS — F41.1 ANXIETY STATES: Primary | ICD-10-CM

## 2021-06-10 PROCEDURE — 90834 PR PSYCHOTHERAPY W/PATIENT, 45 MIN: ICD-10-PCS | Mod: 95,,, | Performed by: SOCIAL WORKER

## 2021-06-10 PROCEDURE — 90834 PSYTX W PT 45 MINUTES: CPT | Mod: 95,,, | Performed by: SOCIAL WORKER

## 2021-06-11 NOTE — PROGRESS NOTES
Subjective:     Patient ID: Leticia Grimes is a 75 y.o. female.    Chief Complaint: Pain of the Left Shoulder      HPI:  Long history of chronic neck and left shoulder pain and arthritis.  Not a good candidate for surgical procedure.  Recently had implantation cardiac device.  Says she had an MRI of her shoulder or neck recently but we do not have the report yet.  Problem - left shoulder pain and movement restriction  Duration - chronic, many months    Severity -    Pain - 7/10   Limitations - adversely affects ADLs and causes pain and disruption of sleep    Previous Treatment - no recent physical therapy.  Was scheduled to have some type of nerve burn for the neck or shoulder with Dr. Delong neurology at [facility].  However patient and her family member states that she has decided to have all of her medical visits and treatments here in the [facility].    Other Information - patient is on Lyrica Tylenol and other medication which she says helps with her pain.  She will sign medical release is today so that when she sees a orthopedic shoulder specialist they will have her records available.  She complains of some secondary pains down her left hip and leg which she calls sciatica but consistently says that her left shoulder problems are the most important issue at this time.      Past Medical History:   Diagnosis Date    Adjustment insomnia 2020    Anxiety 2020    GERD (gastroesophageal reflux disease)     History of sudden cardiac arrest successfully resuscitated 2020    Hypertension     ICD (implantable cardioverter-defibrillator) in place 2020    Mild vitamin D deficiency 2013    Osteopenia 2020    Vitamin D deficiency disease      Past Surgical History:   Procedure Laterality Date     SECTION, CLASSIC      HYSTERECTOMY      INSERTION OF BIVENTRICULAR IMPLANTABLE CARDIOVERTER-DEFIBRILLATOR (ICD)      TUBAL LIGATION       Family History   Problem  Relation Age of Onset    Hypertension Mother     Arthritis Mother     Diabetes Father     Heart disease Father     Cancer Sister      Social History     Socioeconomic History    Marital status:      Spouse name: Not on file    Number of children: Not on file    Years of education: Not on file    Highest education level: Not on file   Occupational History    Not on file   Social Needs    Financial resource strain: Not on file    Food insecurity     Worry: Not on file     Inability: Not on file    Transportation needs     Medical: Not on file     Non-medical: Not on file   Tobacco Use    Smoking status: Never Smoker   Substance and Sexual Activity    Alcohol use: No    Drug use: No    Sexual activity: Yes   Lifestyle    Physical activity     Days per week: Not on file     Minutes per session: Not on file    Stress: Not on file   Relationships    Social connections     Talks on phone: Not on file     Gets together: Not on file     Attends Judaism service: Not on file     Active member of club or organization: Not on file     Attends meetings of clubs or organizations: Not on file     Relationship status: Not on file   Other Topics Concern    Not on file   Social History Narrative    Not on file       Current Outpatient Medications:     alendronate (FOSAMAX) 70 MG tablet, TAKE 1 TABLET BY MOUTH ONCE A WEEK BEFORE MEAL(S), Disp: , Rfl:     aspirin (ECOTRIN) 81 MG EC tablet, Take 81 mg by mouth once daily., Disp: , Rfl:     baclofen (LIORESAL) 10 MG tablet, Take 10 mg by mouth 3 (three) times daily., Disp: , Rfl:     carvediloL (COREG) 12.5 MG tablet, Take 1 tablet (12.5 mg total) by mouth 2 (two) times daily with meals., Disp: 60 tablet, Rfl: 5    cloNIDine (CATAPRES) 0.1 MG tablet, Take 1 tablet (0.1 mg total) by mouth 2 (two) times daily as needed., Disp: 60 tablet, Rfl: 11    ezetimibe (ZETIA) 10 mg tablet, Take 10 mg by mouth once daily., Disp: , Rfl:     lisinopriL  (PRINIVIL,ZESTRIL) 20 MG tablet, Take 1 tablet (20 mg total) by mouth once daily., Disp: 30 tablet, Rfl: 11    LORazepam (ATIVAN) 0.5 MG tablet, Take 1 tablet (0.5 mg total) by mouth nightly as needed for Anxiety., Disp: 30 tablet, Rfl: 2    pregabalin (LYRICA) 50 MG capsule, Take 50 mg by mouth 3 (three) times daily., Disp: , Rfl:     traZODone (DESYREL) 50 MG tablet, Take 1/2 to 1 tablet at bedtime as needed for sleep., Disp: 30 tablet, Rfl: 2    vitamin D (VITAMIN D3) 1000 units Tab, Take 1,000 Units by mouth once daily., Disp: , Rfl:   Review of patient's allergies indicates:   Allergen Reactions    Codeine      Review of Systems   Constitutional: Negative for chills, fever and weight loss.   Respiratory: Negative for shortness of breath.    Cardiovascular: Negative for chest pain and palpitations.       Objective:   Body mass index is 30.99 kg/m².  Vitals:    10/12/20 1021   BP: (!) 166/84   Pulse: (!) 59           Ortho/SPM Exam  General - A&O, NAD.     Respiratory Effort - Normal    Extremity (Body Part) -      -No acute deformity/swelling    ROM - abduction and flexion to 80° and patient states she was told not to exceed these range of motion because of her recent implantation device so we cannot do a full for evaluation of her rotator cuff today.    TTP - unable to assess today    Stability -unable to assess    Strength - unable to assess    Neurovascular Intact - the patient has motor movement of the left shoulder and feels normal sensory to palpation of the shoulder and upper arm.    Other -     Psychiatric - Affect & Cognition WNL      Plan for Improvement - will refer to shoulder specialist since this is a somewhat complex shoulder case and the patient is not able to have atypical procedure at this time but may need special injections and other treatments.    I recommended to her that after her shoulder is improving that further evaluation can be done for her left leg or her neck if needed and  referral to pain management her spine specialist can be done as required.  She and her family member agree with this plan.    I decided to forego cortisone injection today so that we would be sure and not interfere with  any treatment plan which Dr. Taylor may wish to proceed with...      IMAGING: X-Ray Shoulder 2 or More Views Left  Narrative: EXAMINATION:  XR SHOULDER COMPLETE 2 OR MORE VIEWS LEFT    CLINICAL HISTORY:  Pain in left shoulder    TECHNIQUE:  Two or three views of the left shoulder were performed.    COMPARISON:  None    FINDINGS:  No fracture or dislocation.  Prominent glenohumeral degenerative findings present including complete joint space loss and large inferior osteophyte formation.  Mild AC joint degenerative changes.  Lung parenchyma clear.  Impression: As above    Electronically signed by: Luke Funes MD  Date:    10/12/2020  Time:    10:07       Radiographs / Imaging : Relevant imaging results reviewed by me and interpreted by me, discussed with the patient and / or family -I reviewed the radiographs today and showed the patient and her family member and reviewed the severe degenerative changes in osteophyte formation.  No acute fracture dislocation      Assessment:     Encounter Diagnoses   Name Primary?    Rotator cuff strain, left, initial encounter     Primary osteoarthritis of left shoulder     Essential hypertension Yes    ICD (implantable cardioverter-defibrillator) in place         Plan:   Essential hypertension    Rotator cuff strain, left, initial encounter    Primary osteoarthritis of left shoulder    ICD (implantable cardioverter-defibrillator) in place        The patient verbalized good understanding of the medical issues discussed today and expressed appreciation for the care provided.  Patient was given the opportunity to ask questions and be an active participant in their medical care. Patient had no further questions or concerns at this time.     The patient was  encouraged to participate in appropriate physical activity.      Adolfo Phoenix M.D.  [facility]        This note was dictated using voice recognition software and may have sound a like errors.     Negative

## 2021-06-20 ENCOUNTER — CLINICAL SUPPORT (OUTPATIENT)
Dept: CARDIOLOGY | Facility: HOSPITAL | Age: 76
End: 2021-06-20
Payer: MEDICARE

## 2021-06-20 DIAGNOSIS — Z95.810 PRESENCE OF AUTOMATIC (IMPLANTABLE) CARDIAC DEFIBRILLATOR: ICD-10-CM

## 2021-06-20 PROCEDURE — 93295 DEV INTERROG REMOTE 1/2/MLT: CPT | Mod: S$GLB,,, | Performed by: INTERNAL MEDICINE

## 2021-06-20 PROCEDURE — 93296 REM INTERROG EVL PM/IDS: CPT | Performed by: INTERNAL MEDICINE

## 2021-06-20 PROCEDURE — 93295 CARDIAC DEVICE CHECK - REMOTE: ICD-10-PCS | Mod: S$GLB,,, | Performed by: INTERNAL MEDICINE

## 2021-06-29 ENCOUNTER — TELEPHONE (OUTPATIENT)
Dept: CARDIOLOGY | Facility: HOSPITAL | Age: 76
End: 2021-06-29

## 2021-07-01 ENCOUNTER — OFFICE VISIT (OUTPATIENT)
Dept: PSYCHIATRY | Facility: CLINIC | Age: 76
End: 2021-07-01
Payer: MEDICARE

## 2021-07-01 ENCOUNTER — PATIENT MESSAGE (OUTPATIENT)
Dept: PSYCHIATRY | Facility: CLINIC | Age: 76
End: 2021-07-01

## 2021-07-01 DIAGNOSIS — F44.0 DISSOCIATIVE AMNESIA: ICD-10-CM

## 2021-07-01 DIAGNOSIS — F41.1 ANXIETY STATES: Primary | ICD-10-CM

## 2021-07-01 DIAGNOSIS — F51.5 NIGHTMARES: ICD-10-CM

## 2021-07-01 PROCEDURE — 99499 UNLISTED E&M SERVICE: CPT | Mod: 95,,, | Performed by: SOCIAL WORKER

## 2021-07-01 PROCEDURE — 99499 RISK ADDL DX/OHS AUDIT: ICD-10-PCS | Mod: 95,,, | Performed by: SOCIAL WORKER

## 2021-07-01 PROCEDURE — 90834 PSYTX W PT 45 MINUTES: CPT | Mod: 95,,, | Performed by: SOCIAL WORKER

## 2021-07-01 PROCEDURE — 90834 PR PSYCHOTHERAPY W/PATIENT, 45 MIN: ICD-10-PCS | Mod: 95,,, | Performed by: SOCIAL WORKER

## 2021-07-08 ENCOUNTER — PATIENT MESSAGE (OUTPATIENT)
Dept: CARDIOLOGY | Facility: CLINIC | Age: 76
End: 2021-07-08

## 2021-07-09 DIAGNOSIS — Z86.74 HISTORY OF SUDDEN CARDIAC ARREST SUCCESSFULLY RESUSCITATED: ICD-10-CM

## 2021-07-09 DIAGNOSIS — Z95.810 PRESENCE OF AUTOMATIC (IMPLANTABLE) CARDIAC DEFIBRILLATOR: Primary | ICD-10-CM

## 2021-07-09 DIAGNOSIS — Z95.810 ICD (IMPLANTABLE CARDIOVERTER-DEFIBRILLATOR) IN PLACE: ICD-10-CM

## 2021-07-25 ENCOUNTER — PATIENT MESSAGE (OUTPATIENT)
Dept: CARDIOLOGY | Facility: CLINIC | Age: 76
End: 2021-07-25

## 2021-07-27 ENCOUNTER — OFFICE VISIT (OUTPATIENT)
Dept: INTERNAL MEDICINE | Facility: CLINIC | Age: 76
End: 2021-07-27
Payer: MEDICARE

## 2021-07-27 VITALS — SYSTOLIC BLOOD PRESSURE: 137 MMHG | DIASTOLIC BLOOD PRESSURE: 82 MMHG | HEART RATE: 77 BPM

## 2021-07-27 DIAGNOSIS — Z78.9 STATIN INTOLERANCE: ICD-10-CM

## 2021-07-27 DIAGNOSIS — E78.2 MIXED HYPERLIPIDEMIA: ICD-10-CM

## 2021-07-27 DIAGNOSIS — I10 ESSENTIAL HYPERTENSION: Primary | ICD-10-CM

## 2021-07-27 PROCEDURE — 3075F PR MOST RECENT SYSTOLIC BLOOD PRESS GE 130-139MM HG: ICD-10-PCS | Mod: CPTII,95,, | Performed by: FAMILY MEDICINE

## 2021-07-27 PROCEDURE — 3079F PR MOST RECENT DIASTOLIC BLOOD PRESSURE 80-89 MM HG: ICD-10-PCS | Mod: CPTII,95,, | Performed by: FAMILY MEDICINE

## 2021-07-27 PROCEDURE — 1160F RVW MEDS BY RX/DR IN RCRD: CPT | Mod: CPTII,95,, | Performed by: FAMILY MEDICINE

## 2021-07-27 PROCEDURE — 1159F MED LIST DOCD IN RCRD: CPT | Mod: CPTII,95,, | Performed by: FAMILY MEDICINE

## 2021-07-27 PROCEDURE — 99214 OFFICE O/P EST MOD 30 MIN: CPT | Mod: 95,,, | Performed by: FAMILY MEDICINE

## 2021-07-27 PROCEDURE — 3075F SYST BP GE 130 - 139MM HG: CPT | Mod: CPTII,95,, | Performed by: FAMILY MEDICINE

## 2021-07-27 PROCEDURE — 3079F DIAST BP 80-89 MM HG: CPT | Mod: CPTII,95,, | Performed by: FAMILY MEDICINE

## 2021-07-27 PROCEDURE — 1160F PR REVIEW ALL MEDS BY PRESCRIBER/CLIN PHARMACIST DOCUMENTED: ICD-10-PCS | Mod: CPTII,95,, | Performed by: FAMILY MEDICINE

## 2021-07-27 PROCEDURE — 99214 PR OFFICE/OUTPT VISIT, EST, LEVL IV, 30-39 MIN: ICD-10-PCS | Mod: 95,,, | Performed by: FAMILY MEDICINE

## 2021-07-27 PROCEDURE — 1159F PR MEDICATION LIST DOCUMENTED IN MEDICAL RECORD: ICD-10-PCS | Mod: CPTII,95,, | Performed by: FAMILY MEDICINE

## 2021-08-04 ENCOUNTER — TELEPHONE (OUTPATIENT)
Dept: INTERNAL MEDICINE | Facility: CLINIC | Age: 76
End: 2021-08-04

## 2021-08-05 ENCOUNTER — TELEPHONE (OUTPATIENT)
Dept: PSYCHIATRY | Facility: CLINIC | Age: 76
End: 2021-08-05

## 2021-08-11 ENCOUNTER — TELEPHONE (OUTPATIENT)
Dept: INTERNAL MEDICINE | Facility: CLINIC | Age: 76
End: 2021-08-11

## 2021-08-13 ENCOUNTER — PATIENT MESSAGE (OUTPATIENT)
Dept: INTERNAL MEDICINE | Facility: CLINIC | Age: 76
End: 2021-08-13

## 2021-08-17 ENCOUNTER — OFFICE VISIT (OUTPATIENT)
Dept: INTERNAL MEDICINE | Facility: CLINIC | Age: 76
End: 2021-08-17
Payer: MEDICARE

## 2021-08-17 DIAGNOSIS — R73.9 ELEVATED SERUM GLUCOSE: Primary | ICD-10-CM

## 2021-08-17 DIAGNOSIS — F43.21 GRIEF REACTION: ICD-10-CM

## 2021-08-17 PROCEDURE — 99214 PR OFFICE/OUTPT VISIT, EST, LEVL IV, 30-39 MIN: ICD-10-PCS | Mod: 95,,, | Performed by: FAMILY MEDICINE

## 2021-08-17 PROCEDURE — 1159F PR MEDICATION LIST DOCUMENTED IN MEDICAL RECORD: ICD-10-PCS | Mod: CPTII,95,, | Performed by: FAMILY MEDICINE

## 2021-08-17 PROCEDURE — 1160F RVW MEDS BY RX/DR IN RCRD: CPT | Mod: CPTII,95,, | Performed by: FAMILY MEDICINE

## 2021-08-17 PROCEDURE — 1159F MED LIST DOCD IN RCRD: CPT | Mod: CPTII,95,, | Performed by: FAMILY MEDICINE

## 2021-08-17 PROCEDURE — 99214 OFFICE O/P EST MOD 30 MIN: CPT | Mod: 95,,, | Performed by: FAMILY MEDICINE

## 2021-08-17 PROCEDURE — 3074F PR MOST RECENT SYSTOLIC BLOOD PRESSURE < 130 MM HG: ICD-10-PCS | Mod: CPTII,95,, | Performed by: FAMILY MEDICINE

## 2021-08-17 PROCEDURE — 3078F DIAST BP <80 MM HG: CPT | Mod: CPTII,95,, | Performed by: FAMILY MEDICINE

## 2021-08-17 PROCEDURE — 3074F SYST BP LT 130 MM HG: CPT | Mod: CPTII,95,, | Performed by: FAMILY MEDICINE

## 2021-08-17 PROCEDURE — 3078F PR MOST RECENT DIASTOLIC BLOOD PRESSURE < 80 MM HG: ICD-10-PCS | Mod: CPTII,95,, | Performed by: FAMILY MEDICINE

## 2021-08-17 PROCEDURE — 1160F PR REVIEW ALL MEDS BY PRESCRIBER/CLIN PHARMACIST DOCUMENTED: ICD-10-PCS | Mod: CPTII,95,, | Performed by: FAMILY MEDICINE

## 2021-08-18 VITALS — DIASTOLIC BLOOD PRESSURE: 60 MMHG | HEART RATE: 70 BPM | SYSTOLIC BLOOD PRESSURE: 108 MMHG

## 2021-08-31 ENCOUNTER — TELEPHONE (OUTPATIENT)
Dept: PSYCHIATRY | Facility: CLINIC | Age: 76
End: 2021-08-31

## 2021-09-03 ENCOUNTER — PATIENT MESSAGE (OUTPATIENT)
Dept: NEUROLOGY | Facility: CLINIC | Age: 76
End: 2021-09-03

## 2021-09-06 ENCOUNTER — PATIENT MESSAGE (OUTPATIENT)
Dept: INTERNAL MEDICINE | Facility: CLINIC | Age: 76
End: 2021-09-06

## 2021-09-06 DIAGNOSIS — G62.9 NEUROPATHY: Primary | ICD-10-CM

## 2021-09-10 ENCOUNTER — TELEPHONE (OUTPATIENT)
Dept: NEUROLOGY | Facility: CLINIC | Age: 76
End: 2021-09-10

## 2021-09-10 ENCOUNTER — PATIENT MESSAGE (OUTPATIENT)
Dept: PAIN MEDICINE | Facility: CLINIC | Age: 76
End: 2021-09-10

## 2021-09-14 RX ORDER — PERPHENAZINE 16 MG
600 TABLET ORAL DAILY
Qty: 90 EACH | Refills: 0 | Status: SHIPPED | OUTPATIENT
Start: 2021-09-14 | End: 2021-12-13

## 2021-09-16 ENCOUNTER — PATIENT MESSAGE (OUTPATIENT)
Dept: PSYCHIATRY | Facility: CLINIC | Age: 76
End: 2021-09-16

## 2021-09-17 ENCOUNTER — OFFICE VISIT (OUTPATIENT)
Dept: PSYCHIATRY | Facility: CLINIC | Age: 76
End: 2021-09-17
Payer: MEDICARE

## 2021-09-17 DIAGNOSIS — F51.5 NIGHTMARES: ICD-10-CM

## 2021-09-17 DIAGNOSIS — F44.0 DISSOCIATIVE AMNESIA: ICD-10-CM

## 2021-09-17 DIAGNOSIS — G47.00 INSOMNIA, UNSPECIFIED TYPE: ICD-10-CM

## 2021-09-17 DIAGNOSIS — F41.1 ANXIETY STATES: Primary | ICD-10-CM

## 2021-09-17 PROCEDURE — 90834 PR PSYCHOTHERAPY W/PATIENT, 45 MIN: ICD-10-PCS | Mod: 95,,, | Performed by: SOCIAL WORKER

## 2021-09-17 PROCEDURE — 90834 PSYTX W PT 45 MINUTES: CPT | Mod: 95,,, | Performed by: SOCIAL WORKER

## 2021-09-18 ENCOUNTER — CLINICAL SUPPORT (OUTPATIENT)
Dept: CARDIOLOGY | Facility: HOSPITAL | Age: 76
End: 2021-09-18
Payer: MEDICARE

## 2021-09-18 DIAGNOSIS — Z95.810 PRESENCE OF AUTOMATIC (IMPLANTABLE) CARDIAC DEFIBRILLATOR: ICD-10-CM

## 2021-09-18 PROCEDURE — 93295 CARDIAC DEVICE CHECK - REMOTE: ICD-10-PCS | Mod: S$GLB,,, | Performed by: INTERNAL MEDICINE

## 2021-09-18 PROCEDURE — 93296 REM INTERROG EVL PM/IDS: CPT | Performed by: INTERNAL MEDICINE

## 2021-09-18 PROCEDURE — 93295 DEV INTERROG REMOTE 1/2/MLT: CPT | Mod: S$GLB,,, | Performed by: INTERNAL MEDICINE

## 2021-09-28 ENCOUNTER — TELEPHONE (OUTPATIENT)
Dept: CARDIOLOGY | Facility: CLINIC | Age: 76
End: 2021-09-28

## 2021-09-28 ENCOUNTER — PATIENT MESSAGE (OUTPATIENT)
Dept: CARDIOLOGY | Facility: CLINIC | Age: 76
End: 2021-09-28

## 2021-10-05 ENCOUNTER — TELEPHONE (OUTPATIENT)
Dept: CARDIOLOGY | Facility: CLINIC | Age: 76
End: 2021-10-05

## 2021-10-05 ENCOUNTER — TELEPHONE (OUTPATIENT)
Dept: INTERNAL MEDICINE | Facility: CLINIC | Age: 76
End: 2021-10-05

## 2021-10-05 ENCOUNTER — OFFICE VISIT (OUTPATIENT)
Dept: CARDIOLOGY | Facility: CLINIC | Age: 76
End: 2021-10-05
Payer: MEDICARE

## 2021-10-05 ENCOUNTER — HOSPITAL ENCOUNTER (OUTPATIENT)
Dept: CARDIOLOGY | Facility: HOSPITAL | Age: 76
Discharge: HOME | End: 2021-10-05
Attending: INTERNAL MEDICINE
Payer: MEDICARE

## 2021-10-05 VITALS
WEIGHT: 170.44 LBS | HEART RATE: 79 BPM | SYSTOLIC BLOOD PRESSURE: 178 MMHG | HEIGHT: 61 IN | DIASTOLIC BLOOD PRESSURE: 68 MMHG | BODY MASS INDEX: 32.18 KG/M2

## 2021-10-05 DIAGNOSIS — E78.2 MIXED HYPERLIPIDEMIA: Chronic | ICD-10-CM

## 2021-10-05 DIAGNOSIS — F41.9 ANXIETY: Chronic | ICD-10-CM

## 2021-10-05 DIAGNOSIS — Z86.74 HISTORY OF SUDDEN CARDIAC ARREST SUCCESSFULLY RESUSCITATED: Primary | ICD-10-CM

## 2021-10-05 DIAGNOSIS — I10 ESSENTIAL HYPERTENSION: Chronic | ICD-10-CM

## 2021-10-05 DIAGNOSIS — Z78.9 STATIN INTOLERANCE: Chronic | ICD-10-CM

## 2021-10-05 DIAGNOSIS — Z86.74 HISTORY OF SUDDEN CARDIAC ARREST SUCCESSFULLY RESUSCITATED: ICD-10-CM

## 2021-10-05 DIAGNOSIS — Z95.810 PRESENCE OF AUTOMATIC (IMPLANTABLE) CARDIAC DEFIBRILLATOR: ICD-10-CM

## 2021-10-05 DIAGNOSIS — M25.512 CHRONIC LEFT SHOULDER PAIN: ICD-10-CM

## 2021-10-05 DIAGNOSIS — G89.29 CHRONIC LEFT SHOULDER PAIN: ICD-10-CM

## 2021-10-05 DIAGNOSIS — Z95.810 ICD (IMPLANTABLE CARDIOVERTER-DEFIBRILLATOR) IN PLACE: ICD-10-CM

## 2021-10-05 DIAGNOSIS — K21.9 GASTROESOPHAGEAL REFLUX DISEASE, UNSPECIFIED WHETHER ESOPHAGITIS PRESENT: Chronic | ICD-10-CM

## 2021-10-05 PROCEDURE — 1160F RVW MEDS BY RX/DR IN RCRD: CPT | Mod: CPTII,95,, | Performed by: INTERNAL MEDICINE

## 2021-10-05 PROCEDURE — 3078F DIAST BP <80 MM HG: CPT | Mod: CPTII,95,, | Performed by: INTERNAL MEDICINE

## 2021-10-05 PROCEDURE — 1159F MED LIST DOCD IN RCRD: CPT | Mod: CPTII,95,, | Performed by: INTERNAL MEDICINE

## 2021-10-05 PROCEDURE — 99499 UNLISTED E&M SERVICE: CPT | Mod: 95,,, | Performed by: INTERNAL MEDICINE

## 2021-10-05 PROCEDURE — 3078F PR MOST RECENT DIASTOLIC BLOOD PRESSURE < 80 MM HG: ICD-10-PCS | Mod: CPTII,95,, | Performed by: INTERNAL MEDICINE

## 2021-10-05 PROCEDURE — 99215 OFFICE O/P EST HI 40 MIN: CPT | Mod: 95,,, | Performed by: INTERNAL MEDICINE

## 2021-10-05 PROCEDURE — 3077F PR MOST RECENT SYSTOLIC BLOOD PRESSURE >= 140 MM HG: ICD-10-PCS | Mod: CPTII,95,, | Performed by: INTERNAL MEDICINE

## 2021-10-05 PROCEDURE — 99215 PR OFFICE/OUTPT VISIT, EST, LEVL V, 40-54 MIN: ICD-10-PCS | Mod: 95,,, | Performed by: INTERNAL MEDICINE

## 2021-10-05 PROCEDURE — 99499 RISK ADDL DX/OHS AUDIT: ICD-10-PCS | Mod: 95,,, | Performed by: INTERNAL MEDICINE

## 2021-10-05 PROCEDURE — 3077F SYST BP >= 140 MM HG: CPT | Mod: CPTII,95,, | Performed by: INTERNAL MEDICINE

## 2021-10-05 PROCEDURE — 93282 CARDIAC DEVICE CHECK - IN CLINIC & HOSPITAL: ICD-10-PCS | Mod: 26,,, | Performed by: INTERNAL MEDICINE

## 2021-10-05 PROCEDURE — 93282 PRGRMG EVAL IMPLANTABLE DFB: CPT | Mod: 26,,, | Performed by: INTERNAL MEDICINE

## 2021-10-05 PROCEDURE — 93282 PRGRMG EVAL IMPLANTABLE DFB: CPT

## 2021-10-05 PROCEDURE — 1160F PR REVIEW ALL MEDS BY PRESCRIBER/CLIN PHARMACIST DOCUMENTED: ICD-10-PCS | Mod: CPTII,95,, | Performed by: INTERNAL MEDICINE

## 2021-10-05 PROCEDURE — 1159F PR MEDICATION LIST DOCUMENTED IN MEDICAL RECORD: ICD-10-PCS | Mod: CPTII,95,, | Performed by: INTERNAL MEDICINE

## 2021-10-06 LAB
CHARGE TIME (SEC): 8 SEC
HV IMPEDANCE (OHM): 86 OHM
IMPEDANCE RA LEAD: 700 OHMS
OHS CV DC PP MS1: 0.5 MS
OHS CV DC PP V1: 2 V
P/R-WAVE RA LEAD: 11.3 MV
THRESHOLD MS RA LEAD: 0.5 MS
THRESHOLD V RA LEAD: 0.75 V

## 2021-10-18 ENCOUNTER — NURSE TRIAGE (OUTPATIENT)
Dept: ADMINISTRATIVE | Facility: CLINIC | Age: 76
End: 2021-10-18

## 2021-10-18 ENCOUNTER — OFFICE VISIT (OUTPATIENT)
Dept: FAMILY MEDICINE | Facility: CLINIC | Age: 76
End: 2021-10-18
Payer: MEDICARE

## 2021-10-18 ENCOUNTER — TELEPHONE (OUTPATIENT)
Dept: PAIN MEDICINE | Facility: CLINIC | Age: 76
End: 2021-10-18

## 2021-10-18 DIAGNOSIS — M19.212 SECONDARY OSTEOARTHRITIS OF LEFT SHOULDER DUE TO ROTATOR CUFF ARTHROPATHY: Primary | ICD-10-CM

## 2021-10-18 DIAGNOSIS — F32.A DEPRESSION, UNSPECIFIED DEPRESSION TYPE: ICD-10-CM

## 2021-10-18 DIAGNOSIS — F41.9 ANXIETY: ICD-10-CM

## 2021-10-18 DIAGNOSIS — M54.12 CERVICAL RADICULAR PAIN: ICD-10-CM

## 2021-10-18 PROCEDURE — 1159F PR MEDICATION LIST DOCUMENTED IN MEDICAL RECORD: ICD-10-PCS | Mod: CPTII,95,, | Performed by: FAMILY MEDICINE

## 2021-10-18 PROCEDURE — 99214 OFFICE O/P EST MOD 30 MIN: CPT | Mod: 95,,, | Performed by: FAMILY MEDICINE

## 2021-10-18 PROCEDURE — 1160F PR REVIEW ALL MEDS BY PRESCRIBER/CLIN PHARMACIST DOCUMENTED: ICD-10-PCS | Mod: CPTII,95,, | Performed by: FAMILY MEDICINE

## 2021-10-18 PROCEDURE — 99214 PR OFFICE/OUTPT VISIT, EST, LEVL IV, 30-39 MIN: ICD-10-PCS | Mod: 95,,, | Performed by: FAMILY MEDICINE

## 2021-10-18 PROCEDURE — 1159F MED LIST DOCD IN RCRD: CPT | Mod: CPTII,95,, | Performed by: FAMILY MEDICINE

## 2021-10-18 PROCEDURE — 1160F RVW MEDS BY RX/DR IN RCRD: CPT | Mod: CPTII,95,, | Performed by: FAMILY MEDICINE

## 2021-10-18 RX ORDER — DULOXETIN HYDROCHLORIDE 30 MG/1
30 CAPSULE, DELAYED RELEASE ORAL DAILY
Qty: 90 CAPSULE | Refills: 0 | Status: SHIPPED | OUTPATIENT
Start: 2021-10-18 | End: 2021-11-19

## 2021-10-23 ENCOUNTER — PATIENT MESSAGE (OUTPATIENT)
Dept: FAMILY MEDICINE | Facility: CLINIC | Age: 76
End: 2021-10-23
Payer: MEDICARE

## 2021-10-23 DIAGNOSIS — I10 ESSENTIAL HYPERTENSION: ICD-10-CM

## 2021-10-25 RX ORDER — DULOXETIN HYDROCHLORIDE 20 MG/1
20 CAPSULE, DELAYED RELEASE ORAL DAILY
Qty: 30 CAPSULE | Refills: 0 | Status: SHIPPED | OUTPATIENT
Start: 2021-10-25 | End: 2022-02-14

## 2021-10-26 ENCOUNTER — TELEPHONE (OUTPATIENT)
Dept: ADMINISTRATIVE | Facility: HOSPITAL | Age: 76
End: 2021-10-26
Payer: MEDICARE

## 2021-10-27 ENCOUNTER — OFFICE VISIT (OUTPATIENT)
Dept: PSYCHIATRY | Facility: CLINIC | Age: 76
End: 2021-10-27
Payer: COMMERCIAL

## 2021-10-27 ENCOUNTER — PATIENT MESSAGE (OUTPATIENT)
Dept: INTERNAL MEDICINE | Facility: CLINIC | Age: 76
End: 2021-10-27
Payer: MEDICARE

## 2021-10-27 DIAGNOSIS — F41.9 ANXIETY: Primary | ICD-10-CM

## 2021-10-27 DIAGNOSIS — F43.21 GRIEF: ICD-10-CM

## 2021-10-27 PROCEDURE — 99214 OFFICE O/P EST MOD 30 MIN: CPT | Mod: 95,,, | Performed by: PSYCHIATRY & NEUROLOGY

## 2021-10-27 PROCEDURE — 99214 PR OFFICE/OUTPT VISIT, EST, LEVL IV, 30-39 MIN: ICD-10-PCS | Mod: 95,,, | Performed by: PSYCHIATRY & NEUROLOGY

## 2021-10-27 PROCEDURE — 99499 RISK ADDL DX/OHS AUDIT: ICD-10-PCS | Mod: 95,,, | Performed by: PSYCHIATRY & NEUROLOGY

## 2021-10-27 PROCEDURE — 99499 UNLISTED E&M SERVICE: CPT | Mod: 95,,, | Performed by: PSYCHIATRY & NEUROLOGY

## 2021-10-27 RX ORDER — LORAZEPAM 0.5 MG/1
TABLET ORAL
Qty: 30 TABLET | Refills: 3 | Status: SHIPPED | OUTPATIENT
Start: 2021-10-27 | End: 2022-02-22 | Stop reason: SDUPTHER

## 2021-10-28 RX ORDER — DULOXETIN HYDROCHLORIDE 20 MG/1
20 CAPSULE, DELAYED RELEASE ORAL DAILY
COMMUNITY
End: 2021-10-28 | Stop reason: SDUPTHER

## 2021-11-03 ENCOUNTER — PATIENT MESSAGE (OUTPATIENT)
Dept: PSYCHIATRY | Facility: CLINIC | Age: 76
End: 2021-11-03
Payer: MEDICARE

## 2021-11-06 ENCOUNTER — PATIENT MESSAGE (OUTPATIENT)
Dept: CARDIOLOGY | Facility: CLINIC | Age: 76
End: 2021-11-06
Payer: MEDICARE

## 2021-11-08 ENCOUNTER — PATIENT MESSAGE (OUTPATIENT)
Dept: INTERNAL MEDICINE | Facility: CLINIC | Age: 76
End: 2021-11-08
Payer: MEDICARE

## 2021-11-09 DIAGNOSIS — I10 ESSENTIAL HYPERTENSION: ICD-10-CM

## 2021-11-10 RX ORDER — LISINOPRIL 20 MG/1
20 TABLET ORAL DAILY
Qty: 90 TABLET | Refills: 3 | Status: SHIPPED | OUTPATIENT
Start: 2021-11-10 | End: 2022-02-14

## 2021-11-18 ENCOUNTER — OFFICE VISIT (OUTPATIENT)
Dept: PSYCHIATRY | Facility: CLINIC | Age: 76
End: 2021-11-18
Payer: MEDICARE

## 2021-11-18 ENCOUNTER — PATIENT MESSAGE (OUTPATIENT)
Dept: PSYCHIATRY | Facility: CLINIC | Age: 76
End: 2021-11-18
Payer: MEDICARE

## 2021-11-18 DIAGNOSIS — F43.21 GRIEF: ICD-10-CM

## 2021-11-18 DIAGNOSIS — F41.1 ANXIETY STATES: Primary | ICD-10-CM

## 2021-11-18 DIAGNOSIS — F44.0 DISSOCIATIVE AMNESIA: ICD-10-CM

## 2021-11-18 PROCEDURE — 90834 PSYTX W PT 45 MINUTES: CPT | Mod: 95,,, | Performed by: SOCIAL WORKER

## 2021-11-18 PROCEDURE — 90834 PR PSYCHOTHERAPY W/PATIENT, 45 MIN: ICD-10-PCS | Mod: 95,,, | Performed by: SOCIAL WORKER

## 2021-11-19 ENCOUNTER — PATIENT MESSAGE (OUTPATIENT)
Dept: INTERNAL MEDICINE | Facility: CLINIC | Age: 76
End: 2021-11-19
Payer: MEDICARE

## 2021-11-22 DIAGNOSIS — Z12.31 ENCOUNTER FOR SCREENING MAMMOGRAM FOR MALIGNANT NEOPLASM OF BREAST: Primary | ICD-10-CM

## 2021-11-24 ENCOUNTER — PES CALL (OUTPATIENT)
Dept: ADMINISTRATIVE | Facility: CLINIC | Age: 76
End: 2021-11-24
Payer: MEDICARE

## 2021-11-30 ENCOUNTER — TELEPHONE (OUTPATIENT)
Dept: RHEUMATOLOGY | Facility: CLINIC | Age: 76
End: 2021-11-30
Payer: MEDICARE

## 2021-12-02 ENCOUNTER — PATIENT MESSAGE (OUTPATIENT)
Dept: PAIN MEDICINE | Facility: CLINIC | Age: 76
End: 2021-12-02
Payer: MEDICARE

## 2021-12-06 ENCOUNTER — PATIENT MESSAGE (OUTPATIENT)
Dept: INTERNAL MEDICINE | Facility: CLINIC | Age: 76
End: 2021-12-06
Payer: MEDICARE

## 2021-12-07 DIAGNOSIS — H91.90 HEARING LOSS, UNSPECIFIED HEARING LOSS TYPE, UNSPECIFIED LATERALITY: Primary | ICD-10-CM

## 2021-12-17 ENCOUNTER — CLINICAL SUPPORT (OUTPATIENT)
Dept: CARDIOLOGY | Facility: HOSPITAL | Age: 76
End: 2021-12-17
Payer: MEDICARE

## 2021-12-17 DIAGNOSIS — Z95.810 PRESENCE OF AUTOMATIC (IMPLANTABLE) CARDIAC DEFIBRILLATOR: ICD-10-CM

## 2021-12-17 PROCEDURE — 93295 DEV INTERROG REMOTE 1/2/MLT: CPT | Mod: S$GLB,,, | Performed by: INTERNAL MEDICINE

## 2021-12-17 PROCEDURE — 93295 CARDIAC DEVICE CHECK - REMOTE: ICD-10-PCS | Mod: S$GLB,,, | Performed by: INTERNAL MEDICINE

## 2021-12-17 PROCEDURE — 93296 REM INTERROG EVL PM/IDS: CPT | Performed by: INTERNAL MEDICINE

## 2021-12-23 DIAGNOSIS — M25.512 LEFT SHOULDER PAIN: ICD-10-CM

## 2021-12-23 DIAGNOSIS — M19.012 ARTHRITIS OF LEFT SHOULDER REGION: Primary | ICD-10-CM

## 2022-01-05 ENCOUNTER — PATIENT MESSAGE (OUTPATIENT)
Dept: INTERNAL MEDICINE | Facility: CLINIC | Age: 77
End: 2022-01-05
Payer: MEDICARE

## 2022-01-05 DIAGNOSIS — I10 ESSENTIAL HYPERTENSION: ICD-10-CM

## 2022-01-05 RX ORDER — AMLODIPINE BESYLATE 5 MG/1
5 TABLET ORAL DAILY
Qty: 90 TABLET | Refills: 3 | Status: CANCELLED | OUTPATIENT
Start: 2022-01-05 | End: 2023-01-05

## 2022-01-05 NOTE — TELEPHONE ENCOUNTER
This Rx Request does not qualify for assessment with the OR.    Please review protocol details and the Care Due Message for extra clinical information    Reasons Rx Request may be deferred:  Labs/Vitals overdue  Labs/Vitals abnormal  Patient has been seen in the ED/Hospital since the last PCP visit  Medication is non-delegated  Provider is a non-participating provider    Detail Level: Zone Otc Regimen: zyrtec  1-2 by mouth daily

## 2022-01-05 NOTE — TELEPHONE ENCOUNTER
----- Message from Chetna Owens sent at 1/5/2022  1:23 PM CST -----  Pt is calling in regards to amLODIPine (NORVASC) 5 MG tablet. Pt stated that this medication has not been sent in to the pharmacy yet and she is currently out of medicine. Please call [phone] (home)       [facility] [address], [place] - [address]  [address] [address]  Phone: [phone] Fax: [phone]

## 2022-01-05 NOTE — TELEPHONE ENCOUNTER
No new care gaps identified.  Powered by [employer]. Reference number: [id number].   1/05/2022 1:14:45 PM CST

## 2022-01-05 NOTE — TELEPHONE ENCOUNTER
No new care gaps identified.  Powered by [employer]. Reference number: [id number].   1/05/2022 1:19:35 PM CST

## 2022-01-10 RX ORDER — AMLODIPINE BESYLATE 5 MG/1
TABLET ORAL
Qty: 90 TABLET | Refills: 0 | OUTPATIENT
Start: 2022-01-10

## 2022-01-10 NOTE — TELEPHONE ENCOUNTER
Provider Staff:     Action required for this patient.    Please note Refusal of medication.            Requested Prescriptions     Refused Prescriptions Disp Refills    amLODIPine (NORVASC) 5 MG tablet [Pharmacy Med Name: amLODIPine Besylate 5 MG Oral Tablet] 90 tablet 0     Sig: Take 1 tablet by mouth once daily     Refused By: ZAYNAB NAILS     Reason for Refusal: Duplicate      Thanks!  [facility]   Note composed: 01/10/2022 10:16 AM

## 2022-01-12 ENCOUNTER — CLINICAL SUPPORT (OUTPATIENT)
Dept: AUDIOLOGY | Facility: CLINIC | Age: 77
End: 2022-01-12
Payer: MEDICARE

## 2022-01-12 DIAGNOSIS — H90.5 HEARING LOSS, SENSORINEURAL, COMBINED TYPES: ICD-10-CM

## 2022-01-12 PROCEDURE — 92567 PR TYMPA2METRY: ICD-10-PCS | Mod: S$GLB,,, | Performed by: AUDIOLOGIST

## 2022-01-12 PROCEDURE — 92557 PR COMPREHENSIVE HEARING TEST: ICD-10-PCS | Mod: S$GLB,,, | Performed by: AUDIOLOGIST

## 2022-01-12 PROCEDURE — 92567 TYMPANOMETRY: CPT | Mod: S$GLB,,, | Performed by: AUDIOLOGIST

## 2022-01-12 PROCEDURE — 99999 PR PBB SHADOW E&M-EST. PATIENT-LVL I: ICD-10-PCS | Mod: PBBFAC,,, | Performed by: AUDIOLOGIST

## 2022-01-12 PROCEDURE — 99999 PR PBB SHADOW E&M-EST. PATIENT-LVL I: CPT | Mod: PBBFAC,,, | Performed by: AUDIOLOGIST

## 2022-01-12 PROCEDURE — 92557 COMPREHENSIVE HEARING TEST: CPT | Mod: S$GLB,,, | Performed by: AUDIOLOGIST

## 2022-01-19 ENCOUNTER — OFFICE VISIT (OUTPATIENT)
Dept: DERMATOLOGY | Facility: CLINIC | Age: 77
End: 2022-01-19
Payer: MEDICARE

## 2022-01-19 DIAGNOSIS — L91.8 SKIN TAG: Primary | ICD-10-CM

## 2022-01-19 PROCEDURE — 99999 PR PBB SHADOW E&M-EST. PATIENT-LVL III: CPT | Mod: PBBFAC,,, | Performed by: STUDENT IN AN ORGANIZED HEALTH CARE EDUCATION/TRAINING PROGRAM

## 2022-01-19 PROCEDURE — 99999 PR PBB SHADOW E&M-EST. PATIENT-LVL III: ICD-10-PCS | Mod: PBBFAC,,, | Performed by: STUDENT IN AN ORGANIZED HEALTH CARE EDUCATION/TRAINING PROGRAM

## 2022-01-19 PROCEDURE — 99202 PR OFFICE/OUTPT VISIT, NEW, LEVL II, 15-29 MIN: ICD-10-PCS | Mod: S$GLB,,, | Performed by: STUDENT IN AN ORGANIZED HEALTH CARE EDUCATION/TRAINING PROGRAM

## 2022-01-19 PROCEDURE — 99202 OFFICE O/P NEW SF 15 MIN: CPT | Mod: S$GLB,,, | Performed by: STUDENT IN AN ORGANIZED HEALTH CARE EDUCATION/TRAINING PROGRAM

## 2022-01-19 NOTE — PROGRESS NOTES
Subjective:       Patient ID:  Leticia Grimes is a 76 y.o. female who presents for   Chief Complaint   Patient presents with    Skin Tags     Back     History of Present Illness: The patient presents with chief complaint of a skin lesion.  Location: mid back  Duration: noticed several weeks ago  Signs/Symptoms: hanging flesh colored lesion. Denies any growth, pain, or other symptoms.   Prior treatments: none      Skin Tags        Review of Systems   Constitutional: Negative for fever and chills.   Skin: Negative for itching, rash and dry skin.        Objective:    Physical Exam   Constitutional: She appears well-developed and well-nourished. No distress.   Neurological: She is alert and oriented to person, place, and time. She is not disoriented.   Psychiatric: She has a normal mood and affect.   Skin:   Areas Examined (abnormalities noted in diagram):   Back Inspection Performed              Diagram Legend     Erythematous scaling macule/papule c/w actinic keratosis       Vascular papule c/w angioma      Pigmented verrucoid papule/plaque c/w seborrheic keratosis      Yellow umbilicated papule c/w sebaceous hyperplasia      Irregularly shaped tan macule c/w lentigo     1-2 mm smooth white papules consistent with Milia      Movable subcutaneous cyst with punctum c/w epidermal inclusion cyst      Subcutaneous movable cyst c/w pilar cyst      Firm pink to brown papule c/w dermatofibroma      Pedunculated fleshy papule(s) c/w skin tag(s)      Evenly pigmented macule c/w junctional nevus     Mildly variegated pigmented, slightly irregular-bordered macule c/w mildly atypical nevus      Flesh colored to evenly pigmented papule c/w intradermal nevus       Pink pearly papule/plaque c/w basal cell carcinoma      Erythematous hyperkeratotic cursted plaque c/w SCC      Surgical scar with no sign of skin cancer recurrence      Open and closed comedones      Inflammatory papules and pustules      Verrucoid papule consistent  consistent with wart     Erythematous eczematous patches and plaques     Dystrophic onycholytic nail with subungual debris c/w onychomycosis     Umbilicated papule    Erythematous-base heme-crusted tan verrucoid plaque consistent with inflamed seborrheic keratosis     Erythematous Silvery Scaling Plaque c/w Psoriasis     See annotation      Assessment / Plan:        Skin tag  These are benign inherited growths without a malignant potential. Reassurance given to patient. No treatment is necessary.              Follow up if symptoms worsen or fail to improve.

## 2022-01-20 ENCOUNTER — TELEPHONE (OUTPATIENT)
Dept: INTERNAL MEDICINE | Facility: CLINIC | Age: 77
End: 2022-01-20
Payer: MEDICARE

## 2022-01-20 ENCOUNTER — PATIENT MESSAGE (OUTPATIENT)
Dept: ADMINISTRATIVE | Facility: HOSPITAL | Age: 77
End: 2022-01-20
Payer: MEDICARE

## 2022-01-20 ENCOUNTER — PATIENT OUTREACH (OUTPATIENT)
Dept: ADMINISTRATIVE | Facility: HOSPITAL | Age: 77
End: 2022-01-20
Payer: MEDICARE

## 2022-01-20 ENCOUNTER — OFFICE VISIT (OUTPATIENT)
Dept: PSYCHIATRY | Facility: CLINIC | Age: 77
End: 2022-01-20
Payer: MEDICARE

## 2022-01-20 DIAGNOSIS — F41.1 ANXIETY STATES: Primary | ICD-10-CM

## 2022-01-20 DIAGNOSIS — E78.2 MIXED HYPERLIPIDEMIA: ICD-10-CM

## 2022-01-20 DIAGNOSIS — I10 ESSENTIAL HYPERTENSION: Primary | ICD-10-CM

## 2022-01-20 DIAGNOSIS — F43.21 GRIEF: ICD-10-CM

## 2022-01-20 DIAGNOSIS — F44.0 DISSOCIATIVE AMNESIA: ICD-10-CM

## 2022-01-20 PROCEDURE — 90834 PR PSYCHOTHERAPY W/PATIENT, 45 MIN: ICD-10-PCS | Mod: 95,,, | Performed by: SOCIAL WORKER

## 2022-01-20 PROCEDURE — 90834 PSYTX W PT 45 MINUTES: CPT | Mod: 95,,, | Performed by: SOCIAL WORKER

## 2022-01-20 NOTE — PROGRESS NOTES
Individual Psychotherapy (PhD/LCSW)    1/20/2022    Site:  [facility]          --Via virtual visit with synchronous audio and video.  Patient presented at home, in the state [facility].  (Telephone audio-only connection was used to complete a portion of the session, due to technical difficulties with the virtual visit audio link.)  Each patient to whom medical services by telemedicine is provided is:  (1) informed of the relationship between the physician and patient and the respective role of any other health care provider with respect to management of the patient; and (2) notified that he or she may decline to receive medical services by telemedicine and may withdraw from such care at any time.    Therapeutic Intervention: Met with patient.  Outpatient - Insight oriented psychotherapy 45 min - CPT code 31678 and Outpatient - Supportive psychotherapy 45 min - CPT Code 26827    Chief complaint/reason for encounter: anxiety, sleep, interpersonal and memory gap from July 27th.      Interval history and content of current session: 75 year old female patient returned for follow up via virtual visit at home.  Previously seen virtually 11/18/21.  Patient log-in encountered difficulty with poor audio.  We were able to supplement the video with telephone audio, but the video connection repeatedly was interrupted, and audio along with it.  After twenty minutes we gave up and resorted to telephone contact only.  She reported grief continues, but she is tolerating it.  Went to the grandson's for Rhett and spent the night at her daughter's home.  She endorsed awareness of an expected progression of the grief experience, mellowing somewhat over time, though still present.  She indicated she does spend much of her time alone, as some social connections are simply no longer accessible to her and a couple have become undesirable, negative in some way.  Talked about options to enhance her social options without having to risk  much.  Stated she enjoys walks but is impacted by changeable weather.  She also talked about possibly getting a dog, as she enjoyed that in the past and would be further motivated to walk the dog and thereby obtain exercise.  She stated no further memories from her traumatic precipitating event.  Stated family has talked of the prospect of initiating some family therapy.  Stated her daughter, Lidia, is going to check through her work-based therapy first, and they will go from there.  Patient denied any si/hi, mood swings, psychosis, or substance abuse.  Supportive therapy provided.  Plan is to follow up in approximately one month, --2/18/22.      Treatment plan:  · Target symptoms: anxiety , adjustment, dissociative amnesia memory gap.  · Why chosen therapy is appropriate versus another modality: relevant to diagnosis, patient responds to this modality  · Outcome monitoring methods: self-report, observation  · Therapeutic intervention type: insight oriented psychotherapy, supportive psychotherapy    Risk parameters:  Patient reports no suicidal ideation  Patient reports no homicidal ideation  Patient reports no self-injurious behavior  Patient reports no violent behavior    Verbal deficits: None    Patient's response to intervention:  The patient's response to intervention is accepting.    Progress toward goals and other mental status changes:  The patient's progress toward goals is mixed.    Diagnosis:     ICD-10-CM ICD-9-CM   1. Anxiety states  F41.1 300.00   2. Grief  F43.21 309.0   3. Dissociative amnesia  F44.0 300.12       Plan:  individual psychotherapy and medication management by physician    Return to clinic:  As scheduled  Length of Service (minutes): 45

## 2022-01-20 NOTE — PROGRESS NOTES
Bulk Order sent for HTN. Pt responded requesting HDMP d/t elevated home readings. Sent to PCP to review.

## 2022-01-20 NOTE — TELEPHONE ENCOUNTER
Remote /72 entered. Pt took home reading. She states she has been getting elevated readings and interested in the HDMP. Request sent to PCP and staff to review.

## 2022-01-23 ENCOUNTER — PATIENT MESSAGE (OUTPATIENT)
Dept: PSYCHIATRY | Facility: CLINIC | Age: 77
End: 2022-01-23
Payer: MEDICARE

## 2022-01-26 ENCOUNTER — CLINICAL SUPPORT (OUTPATIENT)
Dept: REHABILITATION | Facility: HOSPITAL | Age: 77
End: 2022-01-26
Payer: MEDICARE

## 2022-01-26 DIAGNOSIS — R53.1 DECREASED STRENGTH, ENDURANCE, AND MOBILITY: ICD-10-CM

## 2022-01-26 DIAGNOSIS — Z74.09 DECREASED STRENGTH, ENDURANCE, AND MOBILITY: ICD-10-CM

## 2022-01-26 DIAGNOSIS — R68.89 DECREASED STRENGTH, ENDURANCE, AND MOBILITY: ICD-10-CM

## 2022-01-26 PROCEDURE — 97161 PT EVAL LOW COMPLEX 20 MIN: CPT

## 2022-01-26 PROCEDURE — 97110 THERAPEUTIC EXERCISES: CPT

## 2022-01-26 NOTE — PLAN OF CARE
[facility]  Physical Therapy Initial Evaluation    Name: Leticia Grimes  Clinic Number: [id number]    Therapy Diagnosis:  Encounter Diagnosis   Name Primary?    Decreased strength, endurance, and mobility       Physician: Madan Montano MD    Physician Orders: PT Eval and Treat  Medical Diagnosis from Referral: Left shoulder arthritis   Evaluation Date: 1/26/2022  Authorization Period Expiration:   Plan of Care Expiration: 03/23/2022    Progress Update: 02/24//2022    Visit # / Visits authorized: 1 / 1    FOTO: Visit #1 - Scored : 1 / 3     PRECAUTIONS: Standard Precautions         Time In: 1200  Time Out: 1240  Total Appointment Time (timed & untimed codes): 30 minutes    SUBJECTIVE     Date of onset:     History of current condition - Leticia is a 76 y.o. female whom reports shoulder pain in left shoulder. The patient believes its arthritis. 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   Leticia's currently does not have an exercise regiment.     SANTHOSH: Unknown   Falls: No   Imaging: No updated imaging for this episode of care     Prior Therapy: N/A  Social History: Pt lives with their family   Living Environment: House    ADLs unable to complete: Dressing, cleaning, self hygiene    Gym/Home Equipment: No   Occupation: Pt is retired   Prior Level of Function: Independent with all ADLs  Current Level of Function: 70% of PLOF    Pain:  Current 4 /10, worst 8 /10, best 3 /10   Location: Right shoulder   Description: Aching, Burning, Throbbing and Tight  Aggravating Factors: Overhead activities and dressing   Easing Factors: Resting on armchair     Dominant Extremity: Right    Pts goals: Pt reported goals are to be able to perform ADLs without pain.     _______________________________________________________  Medical History:   Past Medical History:   Diagnosis Date    Adjustment insomnia 9/1/2020    Anxiety 9/1/2020    GERD (gastroesophageal reflux  disease)     History of sudden cardiac arrest successfully resuscitated 2020    Hypertension     ICD (implantable cardioverter-defibrillator) in place 2020    Mild vitamin D deficiency 2013    Osteopenia 2020    Vitamin D deficiency disease        Surgical History:    has a past surgical history that includes Hysterectomy; Tubal ligation;  section, classic; Insertion of biventricular implantable cardioverter-defibrillator (ICD); and Injection of joint (Left, 3/2/2021).    Medications:   has a current medication list which includes the following prescription(s): amlodipine, carvedilol, cyclobenzaprine, duloxetine, ezetimibe, lisinopril, lorazepam, and vitamin d.    Allergies:   Review of patient's allergies indicates:   Allergen Reactions    Codeine     Morphine Other (See Comments)        OBJECTIVE     RANGE OF MOTION:    * Cervical region cleared*     Shoulder AROM/PROM Right   Left   Goal   Forward Flexion (180) 170 90 180  Initial:    ER at 90 degrees (90) 90 70 90  Initial:    ER at 0 degrees (45)  40 30 45  Initial:    Functional ER (C7) C7 Earloop C7  Initial:    Functional IR (T10) T11 S1 T10  Initial:      STRENGTH:    U/E MMT Right   Goal   Shoulder Flexion 5/5 5/5 B   Shoulder Abduction 5/5 5/5 B   Shoulder IR 4/5 5/5 B   Shoulder ER 4/5 5/5 B   Elbow Flexion  5/5 5/5 B   Elbow Extension 5/5 5/5 B     U/E MMT Left   Goal   Shoulder Flexion 3+/5 5/5 B   Shoulder Abduction 3+/5 5/5 B   Shoulder IR 3-/5 5/5 B   Shoulder ER 3/5 5/5 B   Elbow Flexion  4/5 5/5 B   Elbow Extension 4/5 5/5 B       SPECIAL TESTS:     Right   Left    Goal   Larson Lonnie Negative Positive Negative B    Neers Negative Positive Negative B    Empty Can Negative Positive Negative B    Full Can Negative Positive Negative B    Sulcus Sign   Negative B        Sensation:  Sensation is intact to light touch    Posture:  Pt presents with postural abnormalities which include: forward head and rounded  shoulders    Gait: N/A    Movement Analysis: N/A    Balance: N/A      FUNCTION:     CMS Impairment/Limitation/Restriction for FOTO Shoulder Survey    Therapist reviewed FOTO scores for Leticia Grimes on 1/26/2022.   FOTO documents entered into EPIC - see Media section.    Limitation Score: N/A%         TREATMENT     Total Treatment time separate from Evaluation: (20) minutes    Leticia received therapeutic exercises to develop strength, endurance, ROM, flexibility, and posture for (10) minutes including: x = exercises performed     TherEx 1/26/2022    UBE                      Plan for Next Visit: Address shoulder mobility        PATIENT EDUCATION AND HOME EXERCISES       Education/Self-Care provided: (included in treatment) minutes    Patient educated on the impairments noted above and the effects of physical therapy intervention to improve overall condition and QOL.    Patient was educated on all the above exercise prior/during/after for proper posture, positioning, and execution for safe performance with home exercise program.   Exercise Prescription:   o 1/26/2022: EVAL: Low     Written Home Exercises Provided: yes. Prefers: Electronic Copies  Exercises were reviewed and Leticia was able to demonstrate them prior to the end of the session.  Leticia demonstrated good understanding of the education provided.     See EMR under Patient Instructions for exercises provided during therapy sessions.    ASSESSMENT     Leticia is a 76 y.o. female referred to outpatient Physical Therapy with a medical diagnosis of shoulder arthritis.  Leticia presents with clinical signs and symptoms that support this diagnosis with decreased shoulder girdle ROM, decreased scapular and shoulder strength, Glenohumeral joint hypomobility, and impaired functional mobility.    The above impairments will be addressed through manual therapy techniques, therapeutic exercises, functional training, and modalities as necessary. Patient was treated and  "educated on exercises for home program, progression of therapy, and benefits of therapy to achieve full functional mobility.     Pt prognosis is Good.   Pt will benefit from skilled outpatient Physical Therapy to address the deficits stated above and in the chart below, provide pt/family education, and to maximize pt's level of independence.     Plan of care discussed with patient: Yes  Pt's spiritual, cultural and educational needs considered and patient is agreeable to the plan of care and goals as stated below:     Anticipated Barriers for therapy: co-morbidities and sedentary lifestyle    Medical Necessity is demonstrated by the following  History  Co-morbidities and personal factors that may impact the plan of care Co-morbidities:   anxiety, diabetes and HTN  Past Medical History:   Diagnosis Date    Adjustment insomnia 9/1/2020    Anxiety 9/1/2020    GERD (gastroesophageal reflux disease)     History of sudden cardiac arrest successfully resuscitated 8/31/2020    Hypertension     ICD (implantable cardioverter-defibrillator) in place 8/28/2020    Mild vitamin D deficiency 6/14/2013    Osteopenia 9/1/2020    Vitamin D deficiency disease        Personal Factors:   lifestyle     low   Examination  Body Structures and Functions, activity limitations and participation restrictions that may impact the plan of care Body Regions:   upper extremities    Body Systems:    ROM  strength  gross coordinated movement  motor control    Participation Restrictions:   See above in "Current Level of Function"     Activity limitations:   Learning and applying knowledge  no deficits    General Tasks and Commands  no deficits    Communication  no deficits    Mobility  lifting and carrying objects    Self care  washing oneself (bathing, drying, washing hands)  dressing  looking after one's health    Domestic Life  shopping  doing house work (cleaning house, washing dishes, laundry)  assisting " others    Interactions/Relationships  no deficits    Life Areas  no deficits    Community and Social Life  community life  recreation and leisure         low   Clinical Presentation stable and uncomplicated low   Decision Making/ Complexity Score: low       GOALS:  SHORT TERM GOALS: 4 weeks, (02/23/22) Progress   1. Recent signs and systems trend is improving in order to progress towards LTG's.    2. Patient will be independent with HEP in order to further progress and return to maximal function.    3. Pain rating at Worst: 5/10 in order to progress towards increased independence with activity.    4. Patient will be able to correct postural deviations in sitting and standing, to decrease pain and promote postural awareness for injury prevention.       LONG TERM GOALS: 8 weeks, (03/23/22) Progress   1. Patient will return to normal ADL, recreational, and work related activities with less pain and limitation.     2. Patient will improve AROM to stated goals in order to return to maximal functional potential.     3. Patient will improve Strength to stated goals of appropriate musculature in order to improve functional independence.     4. Pain Rating at Best: 1/10 to improve Quality of Life.     5. Patient will meet predicted functional outcome (FOTO) score: N/A% to increase self-worth & perceived functional ability.    6. Patient will have met/partially met personal goal of: being able to perform overhead activities > 2hrs without pain.         PLAN   Plan of care Certification: 1/26/2022 to 03/23/2022    Outpatient Physical Therapy 1 times weekly for 8 weeks to include any combination of the following interventions: virtual visits, dry needling, modalities, electrical stimulation (IFC, Pre-Mod, Attended with Functional Dry Needling), Manual Therapy, Moist Heat/ Ice, Neuromuscular Re-ed, Patient Education, Self Care, Therapeutic Exercise, Functional Training and Therapeutic Activites     Thank you for this  referral.    Nick Da Silva, PT DPT      I CERTIFY THE NEED FOR THESE SERVICES FURNISHED UNDER THIS PLAN OF TREATMENT AND WHILE UNDER MY CARE   Physician's comments:     Physician's Signature: ___________________________________________________

## 2022-01-27 ENCOUNTER — PATIENT MESSAGE (OUTPATIENT)
Dept: PSYCHIATRY | Facility: CLINIC | Age: 77
End: 2022-01-27
Payer: MEDICARE

## 2022-01-28 ENCOUNTER — PATIENT MESSAGE (OUTPATIENT)
Dept: ADMINISTRATIVE | Facility: OTHER | Age: 77
End: 2022-01-28
Payer: MEDICARE

## 2022-02-02 ENCOUNTER — TELEPHONE (OUTPATIENT)
Dept: SPORTS MEDICINE | Facility: CLINIC | Age: 77
End: 2022-02-02
Payer: MEDICARE

## 2022-02-02 DIAGNOSIS — M25.512 LEFT SHOULDER PAIN, UNSPECIFIED CHRONICITY: Primary | ICD-10-CM

## 2022-02-02 NOTE — TELEPHONE ENCOUNTER
I explained to the pt that her appt on 2/7 is the soonest available among all of our providers. Also explained that Dr. Lofton does not treat neck/back pain, and will focus on one issue per visit. We also discussed the reasoning behind getting new x-rays every year and that other clinics may not get the same views that our providers prefer. Pt communicated verbal understanding and stated that she would go to urgent care for the pain in the meantime.     ----- Message from Nichole Alarcon sent at 2/2/2022  8:56 AM CST -----  Contact: self  Type:  Same Day Appointment Request    Caller is requesting a same day appointment.  Caller declined first available appointment listed below.    Name of Caller: Leticia Grimes   When is the first available appointment? 02/07/22  Symptoms: shoulder, leg, and neck pain   Best Call Back Number: [phone]   Additional Information:

## 2022-02-04 ENCOUNTER — TELEPHONE (OUTPATIENT)
Dept: SPORTS MEDICINE | Facility: CLINIC | Age: 77
End: 2022-02-04
Payer: MEDICARE

## 2022-02-04 NOTE — TELEPHONE ENCOUNTER
Contacted patient and offered her a 9:30 appointment on 2/9.  Patient declined, didn't think it would be enough time between appts.  Elected to remain on 2/7  ----- Message from Yaneli Perdomo sent at 2/3/2022  2:52 PM CST -----  Contact: Leticia burdick [phone]  Patient would like to get medical advice.  Symptoms (please be specific):    How long have you had these symptoms:   Would you like a call back, or a response through your [facility] portal?:call back  Pharmacy name and phone # (copy from chart):    Comments:  Pt is requesting a call back from the nurse to see if she can reschedule her appt on 2/7 to 2/9 so that she can make one trip because she has an appt schedule on that day also

## 2022-02-08 DIAGNOSIS — M54.2 CERVICALGIA: ICD-10-CM

## 2022-02-08 DIAGNOSIS — M51.36 LUMBAR DEGENERATIVE DISC DISEASE: ICD-10-CM

## 2022-02-08 DIAGNOSIS — M19.012 OSTEOARTHRITIS OF LEFT SHOULDER, UNSPECIFIED OSTEOARTHRITIS TYPE: Primary | ICD-10-CM

## 2022-02-09 ENCOUNTER — CLINICAL SUPPORT (OUTPATIENT)
Dept: REHABILITATION | Facility: HOSPITAL | Age: 77
End: 2022-02-09
Payer: MEDICARE

## 2022-02-09 DIAGNOSIS — M25.612 DECREASED RANGE OF MOTION OF LEFT SHOULDER: ICD-10-CM

## 2022-02-09 DIAGNOSIS — R53.1 DECREASED STRENGTH, ENDURANCE, AND MOBILITY: ICD-10-CM

## 2022-02-09 DIAGNOSIS — R68.89 DECREASED STRENGTH, ENDURANCE, AND MOBILITY: ICD-10-CM

## 2022-02-09 DIAGNOSIS — M25.512 PAIN IN JOINT OF LEFT SHOULDER: ICD-10-CM

## 2022-02-09 DIAGNOSIS — Z74.09 DECREASED STRENGTH, ENDURANCE, AND MOBILITY: ICD-10-CM

## 2022-02-09 PROBLEM — M25.619 DECREASED RANGE OF MOTION (ROM) OF SHOULDER: Status: ACTIVE | Noted: 2022-02-09

## 2022-02-09 PROCEDURE — 97112 NEUROMUSCULAR REEDUCATION: CPT

## 2022-02-09 PROCEDURE — 97140 MANUAL THERAPY 1/> REGIONS: CPT

## 2022-02-09 NOTE — PROGRESS NOTES
[facility]  Physical Therapy Treatment Note     Name: Leticia Grimes  Clinic Number: [id number]    Therapy Diagnosis:   Encounter Diagnoses   Name Primary?    Decreased strength, endurance, and mobility     Pain in joint of left shoulder     Decreased range of motion of left shoulder      Physician: Madan Montano MD    Visit Date: 2/9/2022  Medical Diagnosis from Referral: Left shoulder arthritis   Evaluation Date: 1/26/2022  Authorization Period Expiration:   Plan of Care Expiration: 03/23/2022                          Progress Update: 02/24//2022                       Visit # / Visits authorized: 1 / 20 (1+ eval)                     FOTO: Visit #1 - Scored : 1 / 3     Time In: 1000  Time Out: 1045  Total Billable Time: 40 minutes    SUBJECTIVE     Pt reports: increase pain with left shoulder AROM. Patient attended Urgent Care due to low back pain throughout the weekend. Patient was given a steroid shot in the left buttock.   Compliance with Hep: Not performed  Response to previous treatment: no adverse reactions to treatment/updated HEP  Functional change: No Change    Pain: 8/10   Worst: 8/10  Location: Left shoulder and low back.   Pain Control: oral medication and lying on her back.   Aggravating factors: overhead activities and standing > 15 minutes.       OBJECTIVE     Objective Measures updated at progress report unless specified otherwise.    Treatment     Gym/Equipment Access: No   Time to Complete Exercises: increase due to verbal/tactile cue requirements       Leticia received therapeutic exercises to develop strength, endurance, ROM, flexibility, posture and core stabilization for 3 minutes including:  Interventions  Today  Comment    UBE     Serratus Punch  x 2x10 MMR   Standing Rows                  Leticia received the following manual therapy techniques: Joint mobilizations, Manual traction, Soft tissue Mobilization and Friction Massage were applied to the: Left  "shoulder for 25 minutes, including:  Interventions  Today  Comment   Soft Tissue  x Upper trap, scalenes, supraspinatus, infraspinatus, and rhomboids    Joint mobs  x Posterior, inferior and anterior directions    Mulligan's technique  X  Shoulder ER/flexion/ ABD               Leticia participated in neuromuscular re-education activities to improve: Balance, Coordination, Kinesthetic, Proprioception and Posture for 12 minutes. The following activities were included:  Interventions  Today  Comment    Codman's Wheel  X  2 mins clockwise/ 2 mins counter clockwise    Supine scapula stabilization  X  3x30"   Pulley's  X  5 minutes                  Education/Self-Care provided: (included in treatment) minutes   · Patient educated on the impairments noted above and the effects of physical therapy intervention to improve overall condition and QOL.   · Patient was educated on all the above exercise prior/during/after for proper posture, positioning, and execution for safe performance with home exercise program.  · Exercise Prescription:   ? 1/26/2022: EVAL: Low      Written Home Exercises Provided: yes. Prefers: Electronic Copies  Exercises were reviewed and Leticia was able to demonstrate them prior to the end of the session.  Leticia demonstrated good understanding of the education provided.      See EMR under Patient Instructions for exercises provided during therapy sessions.    ASSESSMENT     Leticia Grimes tolerated PT session well with moderate  complaints of pain or discomfort, secondary to poor motor control and muscle guarding. Objective findings show no change with measurements of ROM and functional mobility.  Therapy exercises were reviewed by revisiting exercises given from previous home exercise program while adding Codmans.  Handouts were not issued during today's visit. Leticia demonstrated good understanding of new exercises and will continue to progress at home until next follow-up.       Leticia Is progressing " well towards her goals.   Pt prognosis is Good.     Pt will continue to benefit from skilled outpatient physical therapy to address the deficits listed in the problem list box on initial evaluation, provide pt/family education and to maximize pt's level of independence in the home and community environment.     Pt's spiritual, cultural and educational needs considered and pt agreeable to plan of care and goals.    Anticipated barriers to physical therapy: Lifestyle, chronic condition     GOALS:  SHORT TERM GOALS: 4 weeks, (02/23/22) Progress   1. Recent signs and systems trend is improving in order to progress towards LTG's.     2. Patient will be independent with HEP in order to further progress and return to maximal function.     3. Pain rating at Worst: 5/10 in order to progress towards increased independence with activity.     4. Patient will be able to correct postural deviations in sitting and standing, to decrease pain and promote postural awareness for injury prevention.         LONG TERM GOALS: 8 weeks, (03/23/22) Progress   1. Patient will return to normal ADL, recreational, and work related activities with less pain and limitation.      2. Patient will improve AROM to stated goals in order to return to maximal functional potential.      3. Patient will improve Strength to stated goals of appropriate musculature in order to improve functional independence.      4. Pain Rating at Best: 1/10 to improve Quality of Life.      5. Patient will meet predicted functional outcome (FOTO) score: N/A% to increase self-worth & perceived functional ability.     6. Patient will have met/partially met personal goal of: being able to perform overhead activities > 2hrs without pain.           PC = progressing/continue  PM= partially met  DC= discontinue    PLAN     Continue Plan of Care (POC) and progress per patient tolerance. See Treatment section for exercise progression.    Nick Da Silva, PT, DPT

## 2022-02-10 ENCOUNTER — PATIENT MESSAGE (OUTPATIENT)
Dept: PSYCHIATRY | Facility: CLINIC | Age: 77
End: 2022-02-10
Payer: MEDICARE

## 2022-02-10 ENCOUNTER — TELEPHONE (OUTPATIENT)
Dept: ADMINISTRATIVE | Facility: HOSPITAL | Age: 77
End: 2022-02-10
Payer: MEDICARE

## 2022-02-14 DIAGNOSIS — I10 ESSENTIAL HYPERTENSION: ICD-10-CM

## 2022-02-14 RX ORDER — CARVEDILOL 12.5 MG/1
12.5 TABLET ORAL 2 TIMES DAILY WITH MEALS
Qty: 180 TABLET | Refills: 3 | Status: SHIPPED | OUTPATIENT
Start: 2022-02-14 | End: 2023-02-06

## 2022-02-14 RX ORDER — LISINOPRIL 20 MG/1
20 TABLET ORAL DAILY
Qty: 90 TABLET | Refills: 3 | Status: SHIPPED | OUTPATIENT
Start: 2022-02-14 | End: 2023-01-18

## 2022-02-14 NOTE — TELEPHONE ENCOUNTER
No new care gaps identified.  Powered by [employer]. Reference number: [id number].   2/14/2022 9:41:46 AM CST

## 2022-02-16 ENCOUNTER — CLINICAL SUPPORT (OUTPATIENT)
Dept: REHABILITATION | Facility: HOSPITAL | Age: 77
End: 2022-02-16
Payer: MEDICARE

## 2022-02-16 DIAGNOSIS — R53.1 DECREASED STRENGTH, ENDURANCE, AND MOBILITY: ICD-10-CM

## 2022-02-16 DIAGNOSIS — M25.619 DECREASED RANGE OF MOTION OF SHOULDER, UNSPECIFIED LATERALITY: ICD-10-CM

## 2022-02-16 DIAGNOSIS — Z74.09 DECREASED STRENGTH, ENDURANCE, AND MOBILITY: ICD-10-CM

## 2022-02-16 DIAGNOSIS — R68.89 DECREASED STRENGTH, ENDURANCE, AND MOBILITY: ICD-10-CM

## 2022-02-16 DIAGNOSIS — M25.512 PAIN IN JOINT OF LEFT SHOULDER: ICD-10-CM

## 2022-02-16 DIAGNOSIS — M54.2 CERVICALGIA: ICD-10-CM

## 2022-02-16 DIAGNOSIS — I10 ESSENTIAL HYPERTENSION: ICD-10-CM

## 2022-02-16 DIAGNOSIS — M19.012 OSTEOARTHRITIS OF LEFT SHOULDER, UNSPECIFIED OSTEOARTHRITIS TYPE: ICD-10-CM

## 2022-02-16 DIAGNOSIS — M51.36 LUMBAR DEGENERATIVE DISC DISEASE: ICD-10-CM

## 2022-02-16 PROCEDURE — 97110 THERAPEUTIC EXERCISES: CPT

## 2022-02-16 PROCEDURE — 97112 NEUROMUSCULAR REEDUCATION: CPT

## 2022-02-16 PROCEDURE — 97140 MANUAL THERAPY 1/> REGIONS: CPT

## 2022-02-16 RX ORDER — LISINOPRIL 20 MG/1
TABLET ORAL
Qty: 90 TABLET | Refills: 0 | OUTPATIENT
Start: 2022-02-16

## 2022-02-16 NOTE — PROGRESS NOTES
[facility]  Physical Therapy Treatment Note     Name: Leticia Grimes  Clinic Number: [id number]    Therapy Diagnosis:   Encounter Diagnoses   Name Primary?    Osteoarthritis of left shoulder, unspecified osteoarthritis type     Cervicalgia     Lumbar degenerative disc disease     Pain in joint of left shoulder     Decreased range of motion of shoulder, unspecified laterality     Decreased strength, endurance, and mobility      Physician: Madan Montano MD    Visit Date: 2/16/2022  Medical Diagnosis from Referral: Left shoulder arthritis   Evaluation Date: 1/26/2022  Authorization Period Expiration:   Plan of Care Expiration: 03/23/2022                          Progress Update: 02/24//2022                       Visit # / Visits authorized: 2/ 20 (1+ eval)                     FOTO: Visit #1 - Scored : 1 / 3     Time In: 1000  Time Out: 1045  Total Billable Time: 45 minutes    SUBJECTIVE     Pt reports: decrease pain in shoulder secondary to HEP. Still having extreme difficulty with overhead movements.   Compliance with Hep: Not performed  Response to previous treatment: no adverse reactions to treatment/updated HEP  Functional change: No Change    Pain: 8/10   Worst: 8/10  Location: Left shoulder and low back.   Pain Control: oral medication and lying on her back.   Aggravating factors: overhead activities and standing > 15 minutes.       OBJECTIVE     Objective Measures updated at progress report unless specified otherwise.    Treatment     Gym/Equipment Access: No   Time to Complete Exercises: increase due to verbal/tactile cue requirements       Leticia received therapeutic exercises to develop strength, endurance, ROM, flexibility, posture and core stabilization for 10 minutes including:  Interventions  Today  Comment    UBE x 3 min forward/ 3 min backward   Serratus Punch  x 2x10 MMR   Standing Rows  x 2x10 yellow band *increase 4th visit*               Leticia received the  "following manual therapy techniques: Joint mobilizations, Manual traction, Soft tissue Mobilization and Friction Massage were applied to the: Left shoulder for 25 minutes, including:  Interventions  Today  Comment   Soft Tissue  x Upper trap, scalenes, supraspinatus, infraspinatus, and rhomboids    Joint mobs  x Posterior, inferior and anterior directions    Mulligan's technique  X  Shoulder ER/flexion/ ABD               Leticia participated in neuromuscular re-education activities to improve: Balance, Coordination, Kinesthetic, Proprioception and Posture for 10 minutes. The following activities were included:  Interventions  Today  Comment    Codman's Wheel  X  2 mins clockwise/ 2 mins counter clockwise    Supine scapula stabilization  X  3x30"   Pulley's  X  5 minutes                  Education/Self-Care provided: (included in treatment) minutes   · Patient educated on the impairments noted above and the effects of physical therapy intervention to improve overall condition and QOL.   · Patient was educated on all the above exercise prior/during/after for proper posture, positioning, and execution for safe performance with home exercise program.  · Exercise Prescription:   ? 1/26/2022: EVAL: Low      Written Home Exercises Provided: yes. Prefers: Electronic Copies  Exercises were reviewed and Leticia was able to demonstrate them prior to the end of the session.  Leticia demonstrated good understanding of the education provided.      See EMR under Patient Instructions for exercises provided during therapy sessions.    ASSESSMENT     Leticia Grimes tolerated PT session well with moderate complaints of pain or discomfort, secondary to poor motor control and muscle guarding. Objective findings show no change with measurements of ROM and functional mobility.  Therapy exercises were reviewed by revisiting exercises given from previous home exercise program while adding Codmans.  Handouts were not issued during today's visit. " Leticia demonstrated good understanding of new exercises and will continue to progress at home until next follow-up.       Leticia Is progressing well towards her goals.   Pt prognosis is Good.     Pt will continue to benefit from skilled outpatient physical therapy to address the deficits listed in the problem list box on initial evaluation, provide pt/family education and to maximize pt's level of independence in the home and community environment.     Pt's spiritual, cultural and educational needs considered and pt agreeable to plan of care and goals.    Anticipated barriers to physical therapy: Lifestyle, chronic condition     GOALS:  SHORT TERM GOALS: 4 weeks, (02/23/22) Progress   1. Recent signs and systems trend is improving in order to progress towards LTG's.     2. Patient will be independent with HEP in order to further progress and return to maximal function.     3. Pain rating at Worst: 5/10 in order to progress towards increased independence with activity.     4. Patient will be able to correct postural deviations in sitting and standing, to decrease pain and promote postural awareness for injury prevention.         LONG TERM GOALS: 8 weeks, (03/23/22) Progress   1. Patient will return to normal ADL, recreational, and work related activities with less pain and limitation.      2. Patient will improve AROM to stated goals in order to return to maximal functional potential.      3. Patient will improve Strength to stated goals of appropriate musculature in order to improve functional independence.      4. Pain Rating at Best: 1/10 to improve Quality of Life.      5. Patient will meet predicted functional outcome (FOTO) score: N/A% to increase self-worth & perceived functional ability.     6. Patient will have met/partially met personal goal of: being able to perform overhead activities > 2hrs without pain.           PC = progressing/continue  PM= partially met  DC= discontinue    PLAN     Continue Plan of  Care (POC) and progress per patient tolerance. See Treatment section for exercise progression.    Nick Da Silva, PT, DPT

## 2022-02-16 NOTE — TELEPHONE ENCOUNTER
Terri DC. Request already responded to by other means (e.g. phone or fax)   Refill Authorization Note   Leticia Grimes  is requesting a refill authorization.  Brief Assessment and Rationale for Refill:  Quick Discontinue  Medication Therapy Plan:       Medication Reconciliation Completed:  No      Comments:   Pended Medication(s)       Requested Prescriptions     Refused Prescriptions Disp Refills    lisinopriL (PRINIVIL,ZESTRIL) 20 MG tablet [Pharmacy Med Name: Lisinopril 20 MG Oral Tablet] 90 tablet 0     Sig: Take 1 tablet by mouth once daily     Refused By: JEFFERSON ELMORE     Reason for Refusal: Request already responded to by other means (e.g. phone or fax)        Duplicate Pended Encounter(s)/ Last Prescribed Details: (includes pharmacy & prescriber details)   Ordering Encounter Report    Associated Reports   View Encounter                Note composed:1:28 PM 02/16/2022

## 2022-02-16 NOTE — TELEPHONE ENCOUNTER
No new care gaps identified.  Powered by [employer]. Reference number: [id number].   2/16/2022 12:58:25 PM CST

## 2022-02-18 ENCOUNTER — PATIENT MESSAGE (OUTPATIENT)
Dept: PSYCHIATRY | Facility: CLINIC | Age: 77
End: 2022-02-18

## 2022-02-18 ENCOUNTER — OFFICE VISIT (OUTPATIENT)
Dept: PSYCHIATRY | Facility: CLINIC | Age: 77
End: 2022-02-18
Payer: MEDICARE

## 2022-02-18 DIAGNOSIS — F43.21 GRIEF: ICD-10-CM

## 2022-02-18 DIAGNOSIS — F44.0 DISSOCIATIVE AMNESIA: ICD-10-CM

## 2022-02-18 DIAGNOSIS — F41.1 ANXIETY STATES: Primary | ICD-10-CM

## 2022-02-18 DIAGNOSIS — F51.5 NIGHTMARES: ICD-10-CM

## 2022-02-18 DIAGNOSIS — Z63.8 RELATIONSHIP PROBLEM WITH FAMILY MEMBER: ICD-10-CM

## 2022-02-18 PROCEDURE — 90834 PR PSYCHOTHERAPY W/PATIENT, 45 MIN: ICD-10-PCS | Mod: 95,,, | Performed by: SOCIAL WORKER

## 2022-02-18 PROCEDURE — 90834 PSYTX W PT 45 MINUTES: CPT | Mod: 95,,, | Performed by: SOCIAL WORKER

## 2022-02-18 SDOH — SOCIAL DETERMINANTS OF HEALTH (SDOH): OTHER SPECIFIED PROBLEMS RELATED TO PRIMARY SUPPORT GROUP: Z63.8

## 2022-02-18 NOTE — PROGRESS NOTES
Individual Psychotherapy (PhD/LCSW)    2/18/2022    Site:  [place]          --Via virtual visit with synchronous audio and video.  Patient presented at home, in the state of [place].  (Telephone audio-only connection was used to complete a portion of the session, due to technical difficulties with the virtual visit audio link.)  Each patient to whom medical services by telemedicine is provided is:  (1) informed of the relationship between the physician and patient and the respective role of any other health care provider with respect to management of the patient; and (2) notified that he or she may decline to receive medical services by telemedicine and may withdraw from such care at any time.    Therapeutic Intervention: Met with patient.  Outpatient - Insight oriented psychotherapy 45 min - CPT code 57346 and Outpatient - Supportive psychotherapy 45 min - CPT Code 82761    Chief complaint/reason for encounter: anxiety, sleep, interpersonal and memory gap from July 27th.      Interval history and content of current session: 75 year old female patient returned for follow up via virtual visit at home.  The patient received a call from the prosecutor, informing her her grandson has a court appointment March 15th and asking the patient for her preferences on some of the details.  She stated she mostly wants to ensure he receive counseling, which she does not know if he is complying with, as he is in [place], [place] with his mother.  She is feeling somewhat anxious about planned visit with her grandson mid-March.  Patient is inquiring as to the following session appointment of 3/21/22, in which she wants her grandson to participate.  He, however, will be in [place], so the question is whether he can join via virtual visit or if she needs to come in and call him on speaker phone. She continues to grieve the death of her .  She said Dias's Day was a painful reminder of his absence.  She expressed steady and  thoughtful steps at processing her grief; still endorsing some up and down days emotionally.    She stated her daughter appears to be calming down, now that it does not appear the legal system is trying to send her young son to do hard time.  He has started college classes.  The patient does not want him punished.  She very much does want him to have some treatment.  Stated she   Patient denied any si/hi, mood swings, psychosis, or substance abuse.  Supportive therapy provided.  Plan is to follow up on 3/21/22.      Treatment plan:  · Target symptoms: anxiety , adjustment, dissociative amnesia memory gap.  · Why chosen therapy is appropriate versus another modality: relevant to diagnosis, patient responds to this modality  · Outcome monitoring methods: self-report, observation  · Therapeutic intervention type: insight oriented psychotherapy, supportive psychotherapy    Risk parameters:  Patient reports no suicidal ideation  Patient reports no homicidal ideation  Patient reports no self-injurious behavior  Patient reports no violent behavior    Verbal deficits: None    Patient's response to intervention:  The patient's response to intervention is accepting.    Progress toward goals and other mental status changes:  The patient's progress toward goals is fair.    Diagnosis:     ICD-10-CM ICD-9-CM   1. Anxiety states  F41.1 300.00   2. Grief  F43.21 309.0   3. 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 Nightmares  F51.5 307.47   5. Relationship problem with family member  Z63.8 V61.8       Plan:  individual psychotherapy and medication management by physician    Return to clinic:  As scheduled  Length of Service (minutes): 45

## 2022-02-21 ENCOUNTER — PATIENT MESSAGE (OUTPATIENT)
Dept: ORTHOPEDICS | Facility: CLINIC | Age: 77
End: 2022-02-21
Payer: MEDICARE

## 2022-02-21 ENCOUNTER — PATIENT MESSAGE (OUTPATIENT)
Dept: NEUROLOGY | Facility: CLINIC | Age: 77
End: 2022-02-21
Payer: MEDICARE

## 2022-02-21 ENCOUNTER — TELEPHONE (OUTPATIENT)
Dept: SPORTS MEDICINE | Facility: CLINIC | Age: 77
End: 2022-02-21
Payer: MEDICARE

## 2022-02-21 DIAGNOSIS — M25.512 LEFT SHOULDER PAIN, UNSPECIFIED CHRONICITY: Primary | ICD-10-CM

## 2022-02-21 NOTE — TELEPHONE ENCOUNTER
Spoke w patient and informed her that due to us not taking care of insurance concerns she would need to contact her ins company to find out if we are network with her particular ins-DD      ----- Message from Vilma Liu sent at 2/21/2022  1:25 PM CST -----  Contact: pt [phone]  Requesting to speak with you regarding an appt.    Please call and advise.    Thank You

## 2022-02-22 ENCOUNTER — OFFICE VISIT (OUTPATIENT)
Dept: PSYCHIATRY | Facility: CLINIC | Age: 77
End: 2022-02-22
Payer: MEDICARE

## 2022-02-22 ENCOUNTER — PATIENT OUTREACH (OUTPATIENT)
Dept: ADMINISTRATIVE | Facility: OTHER | Age: 77
End: 2022-02-22
Payer: MEDICARE

## 2022-02-22 DIAGNOSIS — Z63.8 RELATIONSHIP PROBLEM WITH FAMILY MEMBER: ICD-10-CM

## 2022-02-22 DIAGNOSIS — F44.0 DISSOCIATIVE AMNESIA: ICD-10-CM

## 2022-02-22 DIAGNOSIS — F43.21 GRIEF: Primary | ICD-10-CM

## 2022-02-22 PROCEDURE — 90833 PSYTX W PT W E/M 30 MIN: CPT | Mod: 95,,, | Performed by: PSYCHIATRY & NEUROLOGY

## 2022-02-22 PROCEDURE — 90833 PR PSYCHOTHERAPY W/PATIENT W/E&M, 30 MIN (ADD ON): ICD-10-PCS | Mod: 95,,, | Performed by: PSYCHIATRY & NEUROLOGY

## 2022-02-22 PROCEDURE — 99213 PR OFFICE/OUTPT VISIT, EST, LEVL III, 20-29 MIN: ICD-10-PCS | Mod: 95,,, | Performed by: PSYCHIATRY & NEUROLOGY

## 2022-02-22 PROCEDURE — 99213 OFFICE O/P EST LOW 20 MIN: CPT | Mod: 95,,, | Performed by: PSYCHIATRY & NEUROLOGY

## 2022-02-22 RX ORDER — LORAZEPAM 0.5 MG/1
TABLET ORAL
Qty: 30 TABLET | Refills: 3 | Status: SHIPPED | OUTPATIENT
Start: 2022-02-22 | End: 2022-09-08

## 2022-02-22 SDOH — SOCIAL DETERMINANTS OF HEALTH (SDOH): OTHER SPECIFIED PROBLEMS RELATED TO PRIMARY SUPPORT GROUP: Z63.8
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
## 2022-02-23 ENCOUNTER — OFFICE VISIT (OUTPATIENT)
Dept: OTOLARYNGOLOGY | Facility: CLINIC | Age: 77
End: 2022-02-23
Payer: MEDICARE

## 2022-02-23 ENCOUNTER — TELEPHONE (OUTPATIENT)
Dept: INTERNAL MEDICINE | Facility: CLINIC | Age: 77
End: 2022-02-23
Payer: MEDICARE

## 2022-02-23 ENCOUNTER — CLINICAL SUPPORT (OUTPATIENT)
Dept: REHABILITATION | Facility: HOSPITAL | Age: 77
End: 2022-02-23
Payer: MEDICARE

## 2022-02-23 VITALS
TEMPERATURE: 98 F | HEART RATE: 63 BPM | DIASTOLIC BLOOD PRESSURE: 76 MMHG | BODY MASS INDEX: 30.99 KG/M2 | SYSTOLIC BLOOD PRESSURE: 160 MMHG | WEIGHT: 164 LBS

## 2022-02-23 DIAGNOSIS — M54.2 CERVICALGIA: Primary | ICD-10-CM

## 2022-02-23 DIAGNOSIS — R68.89 DECREASED STRENGTH, ENDURANCE, AND MOBILITY: ICD-10-CM

## 2022-02-23 DIAGNOSIS — M25.619 DECREASED RANGE OF MOTION OF SHOULDER, UNSPECIFIED LATERALITY: ICD-10-CM

## 2022-02-23 DIAGNOSIS — Z74.09 DECREASED STRENGTH, ENDURANCE, AND MOBILITY: ICD-10-CM

## 2022-02-23 DIAGNOSIS — M25.512 PAIN IN JOINT OF LEFT SHOULDER: Primary | ICD-10-CM

## 2022-02-23 DIAGNOSIS — Z78.9 STATIN INTOLERANCE: ICD-10-CM

## 2022-02-23 DIAGNOSIS — E78.5 HYPERLIPIDEMIA, UNSPECIFIED HYPERLIPIDEMIA TYPE: ICD-10-CM

## 2022-02-23 DIAGNOSIS — R59.0 LEFT CERVICAL LYMPHADENOPATHY: ICD-10-CM

## 2022-02-23 DIAGNOSIS — R53.1 DECREASED STRENGTH, ENDURANCE, AND MOBILITY: ICD-10-CM

## 2022-02-23 PROCEDURE — 97110 THERAPEUTIC EXERCISES: CPT

## 2022-02-23 PROCEDURE — 99999 PR PBB SHADOW E&M-EST. PATIENT-LVL III: CPT | Mod: PBBFAC,,, | Performed by: OTOLARYNGOLOGY

## 2022-02-23 PROCEDURE — 3078F DIAST BP <80 MM HG: CPT | Mod: CPTII,S$GLB,, | Performed by: OTOLARYNGOLOGY

## 2022-02-23 PROCEDURE — 1160F PR REVIEW ALL MEDS BY PRESCRIBER/CLIN PHARMACIST DOCUMENTED: ICD-10-PCS | Mod: CPTII,S$GLB,, | Performed by: OTOLARYNGOLOGY

## 2022-02-23 PROCEDURE — 99999 PR PBB SHADOW E&M-EST. PATIENT-LVL III: ICD-10-PCS | Mod: PBBFAC,,, | Performed by: OTOLARYNGOLOGY

## 2022-02-23 PROCEDURE — 3288F PR FALLS RISK ASSESSMENT DOCUMENTED: ICD-10-PCS | Mod: CPTII,S$GLB,, | Performed by: OTOLARYNGOLOGY

## 2022-02-23 PROCEDURE — 3077F PR MOST RECENT SYSTOLIC BLOOD PRESSURE >= 140 MM HG: ICD-10-PCS | Mod: CPTII,S$GLB,, | Performed by: OTOLARYNGOLOGY

## 2022-02-23 PROCEDURE — 3288F FALL RISK ASSESSMENT DOCD: CPT | Mod: CPTII,S$GLB,, | Performed by: OTOLARYNGOLOGY

## 2022-02-23 PROCEDURE — 1160F RVW MEDS BY RX/DR IN RCRD: CPT | Mod: CPTII,S$GLB,, | Performed by: OTOLARYNGOLOGY

## 2022-02-23 PROCEDURE — 1159F MED LIST DOCD IN RCRD: CPT | Mod: CPTII,S$GLB,, | Performed by: OTOLARYNGOLOGY

## 2022-02-23 PROCEDURE — 99204 OFFICE O/P NEW MOD 45 MIN: CPT | Mod: S$GLB,,, | Performed by: OTOLARYNGOLOGY

## 2022-02-23 PROCEDURE — 97140 MANUAL THERAPY 1/> REGIONS: CPT

## 2022-02-23 PROCEDURE — 99204 PR OFFICE/OUTPT VISIT, NEW, LEVL IV, 45-59 MIN: ICD-10-PCS | Mod: S$GLB,,, | Performed by: OTOLARYNGOLOGY

## 2022-02-23 PROCEDURE — 1101F PT FALLS ASSESS-DOCD LE1/YR: CPT | Mod: CPTII,S$GLB,, | Performed by: OTOLARYNGOLOGY

## 2022-02-23 PROCEDURE — 1159F PR MEDICATION LIST DOCUMENTED IN MEDICAL RECORD: ICD-10-PCS | Mod: CPTII,S$GLB,, | Performed by: OTOLARYNGOLOGY

## 2022-02-23 PROCEDURE — 3078F PR MOST RECENT DIASTOLIC BLOOD PRESSURE < 80 MM HG: ICD-10-PCS | Mod: CPTII,S$GLB,, | Performed by: OTOLARYNGOLOGY

## 2022-02-23 PROCEDURE — 1101F PR PT FALLS ASSESS DOC 0-1 FALLS W/OUT INJ PAST YR: ICD-10-PCS | Mod: CPTII,S$GLB,, | Performed by: OTOLARYNGOLOGY

## 2022-02-23 PROCEDURE — 3077F SYST BP >= 140 MM HG: CPT | Mod: CPTII,S$GLB,, | Performed by: OTOLARYNGOLOGY

## 2022-02-23 PROCEDURE — 97112 NEUROMUSCULAR REEDUCATION: CPT

## 2022-02-23 RX ORDER — AMOXICILLIN AND CLAVULANATE POTASSIUM 875; 125 MG/1; MG/1
1 TABLET, FILM COATED ORAL 2 TIMES DAILY
Qty: 20 TABLET | Refills: 0 | Status: SHIPPED | OUTPATIENT
Start: 2022-02-23 | End: 2022-03-05

## 2022-02-23 RX ORDER — EZETIMIBE 10 MG/1
10 TABLET ORAL DAILY
Qty: 90 TABLET | Refills: 3 | Status: SHIPPED | OUTPATIENT
Start: 2022-02-23 | End: 2022-03-28

## 2022-02-23 NOTE — PROGRESS NOTES
Health Maintenance Due   Topic Date Due    TETANUS VACCINE  Never done    DEXA Scan  Never done    Shingles Vaccine (1 of 2) Never done    Pneumococcal Vaccines (Age 65+) (1 of 1 - PPSV23) Never done    Influenza Vaccine (1) 09/01/2021     Updates were requested from care everywhere.  Chart was reviewed for overdue Proactive [facility] Encounters ([facility]) topics (CRS, Breast Cancer Screening, Eye exam)  Health Maintenance has been updated.  LINKS immunization registry triggered.  Immunizations were reconciled.

## 2022-02-23 NOTE — TELEPHONE ENCOUNTER
----- Message from Connie Evans sent at 2/23/2022  4:10 PM CST -----  Contact: Leticia Kelly called regarding getting a 30 day supply for her ezetimibe (ZETIA) 10 mg tablet to be sent to   [facility] [address] [place] [address]  [address] [address]  Phone: [phone] Fax: [phone]    She indicated that Jimtrinityliza has to be called for a pre authorization.  She is out of her medication and has called before and not received a reply.  pLease send her a message on [employer].      Thanks  kb

## 2022-02-23 NOTE — PROGRESS NOTES
[facility]  Physical Therapy Treatment Note     Name: Leticia Grimes  Clinic Number: [id number]    Therapy Diagnosis:   No diagnosis found.  Physician: Madan Montano MD    Visit Date: 2/23/2022  Medical Diagnosis from Referral: Left shoulder arthritis   Evaluation Date: 1/26/2022  Authorization Period Expiration:   Plan of Care Expiration: 03/23/2022                          Progress Update: 02/24//2022                       Visit # / Visits authorized: 3/ 20 (1+ eval)                     FOTO: Visit #1 - Scored : 1 / 3     Time In: 1000  Time Out: 1045  Total Billable Time: 45 minutes    SUBJECTIVE     Pt reports: decrease pain in shoulder secondary to HEP. Still having extreme difficulty with overhead movements.   Compliance with Hep: Not performed  Response to previous treatment: no adverse reactions to treatment/updated HEP  Functional change: No Change    Pain: 5/10   Worst: 8/10  Location: Left shoulder and low back.   Pain Control: oral medication and lying on her back.   Aggravating factors: overhead activities and standing > 15 minutes.       OBJECTIVE     Objective Measures updated at progress report unless specified otherwise.    Treatment     Gym/Equipment Access: No   Time to Complete Exercises: increase due to verbal/tactile cue requirements       Leticia received therapeutic exercises to develop strength, endurance, ROM, flexibility, posture and core stabilization for 10 minutes including:  Interventions  Today  Comment    UBE x 3 min forward/ 3 min backward   Serratus Punch  x 2x10 MMR   Standing Rows  x 2x10 yellow band *increase 4th visit*               Leticia received the following manual therapy techniques: Joint mobilizations, Manual traction, Soft tissue Mobilization and Friction Massage were applied to the: Left shoulder for 25 minutes, including:  Interventions  Today  Comment   Soft Tissue  x Upper trap, scalenes, supraspinatus, infraspinatus, and rhomboids   "  Joint mobs  x Posterior, inferior and anterior directions    Carol's technique  X  Shoulder ER/flexion/ ABD on wall with overpressure                Leticia participated in neuromuscular re-education activities to improve: Balance, Coordination, Kinesthetic, Proprioception and Posture for 10 minutes. The following activities were included:  Interventions  Today  Comment    Codman's Wheel  X  2 mins clockwise/ 2 mins counter clockwise    Supine scapula stabilization  X  3x30"   Pulley's  X  5 minutes                  Education/Self-Care provided: (included in treatment) minutes   · Patient educated on the impairments noted above and the effects of physical therapy intervention to improve overall condition and QOL.   · Patient was educated on all the above exercise prior/during/after for proper posture, positioning, and execution for safe performance with home exercise program.  · Exercise Prescription:   ? 1/26/2022: EVAL: Low      Written Home Exercises Provided: yes. Prefers: Electronic Copies  Exercises were reviewed and Leticia was able to demonstrate them prior to the end of the session.  Leticia demonstrated good understanding of the education provided.      See EMR under Patient Instructions for exercises provided during therapy sessions.    ASSESSMENT     Leticia Grimes tolerated PT session well with minimal complaints of pain or discomfort, secondary to poor motor control and muscle guarding. Objective findings show  Slight increase in change with measurements of ROM and functional mobility.  Therapy exercises were reviewed by revisiting exercises given from previous home exercise program while adding arm flexion up wall.  Handouts were not issued during today's visit. Leticia demonstrated good understanding of new exercises and will continue to progress at home until next follow-up.       Leticia Is progressing well towards her goals.   Pt prognosis is Good.     Pt will continue to benefit from skilled " outpatient physical therapy to address the deficits listed in the problem list box on initial evaluation, provide pt/family education and to maximize pt's level of independence in the home and community environment.     Pt's spiritual, cultural and educational needs considered and pt agreeable to plan of care and goals.    Anticipated barriers to physical therapy: Lifestyle, chronic condition     GOALS:  SHORT TERM GOALS: 4 weeks, (02/23/22) Progress   1. Recent signs and systems trend is improving in order to progress towards LTG's.     2. Patient will be independent with HEP in order to further progress and return to maximal function.     3. Pain rating at Worst: 5/10 in order to progress towards increased independence with activity.     4. Patient will be able to correct postural deviations in sitting and standing, to decrease pain and promote postural awareness for injury prevention.         LONG TERM GOALS: 8 weeks, (03/23/22) Progress   1. Patient will return to normal ADL, recreational, and work related activities with less pain and limitation.      2. Patient will improve AROM to stated goals in order to return to maximal functional potential.      3. Patient will improve Strength to stated goals of appropriate musculature in order to improve functional independence.      4. Pain Rating at Best: 1/10 to improve Quality of Life.      5. Patient will meet predicted functional outcome (FOTO) score: N/A% to increase self-worth & perceived functional ability.     6. Patient will have met/partially met personal goal of: being able to perform overhead activities > 2hrs without pain.           PC = progressing/continue  PM= partially met  DC= discontinue    PLAN     Continue Plan of Care (POC) and progress per patient tolerance. See Treatment section for exercise progression.    Nick Da Silva, PT, DPT

## 2022-02-23 NOTE — TELEPHONE ENCOUNTER
Regarding any prior approval, patient with statin intolerance and continuation of current effective therapy

## 2022-02-23 NOTE — PROGRESS NOTES
"Referring Provider:    No referring provider defined for this encounter.  Subjective:   Patient: Leticia Grimes [id number], :1945   Visit date:2022 2:33 PM    Chief Complaint:  Consult (Left side of neck hurting.  Had swollen gland, is not as swollen or as painful today)    HPI:    Prior notes reviewed by myself.  Clinical documentation obtained by nursing staff reviewed.     77 y/o female with complaints of 1 week of left sided neck/shoulder pain as well as an enlarged "gland" on the left side.  She has a long history of severe neck and left shoulder arthritis.  Initially, she attributed this pain to her arthritis but then she noticed that this neck mass had enlarged in size and was tender.  She has had no fever, unintentional weight loss, night sweats.  She did have a workup for this neck mass in  as well as a fine-needle aspiration biopsy which was read as benign.      Objective:     Physical Exam:  Vitals:  BP (!) 160/76   Pulse 63   Temp 97.9 °F (36.6 °C) (Temporal)   Wt 74.4 kg (164 lb 0.4 oz)   BMI 30.99 kg/m²   General appearance:  Well developed, well nourished    Ears:  Otoscopy of external auditory canals and tympanic membranes was normal, clinical speech reception thresholds grossly intact, no mass/lesion of auricle.    Nose:  No masses/lesions of external nose, nasal mucosa, septum, and turbinates were within normal limits.    Mouth:  No mass/lesion of lips, teeth, gums, hard/soft palate, tongue, tonsils, or oropharynx.    Neck & Lymphatics:  No cervical lymphadenopathy, trachea is midline, no thyroid enlargement/tenderness/mass. Enlarged lymph node/mass anterior to left submandibular gland as well as a separate enlarged lymph node submental area - tender to palpation.          [x]  Data Reviewed:    Lab Results   Component Value Date    WBC 5.91 10/05/2021    HGB 13.3 10/05/2021    HCT 38.9 10/05/2021    MCV 83 10/05/2021       CT Soft Tissue Neck With Contrast  Order: " [id number]   Status: Final result     Visible to patient: Yes (seen)     Next appt: 03/01/2022 at 03:00 PM in Internal Medicine (Sylvester Mercedes NP)     Dx: Mass of left side of neck     0 Result Notes    Details    Reading Physician Reading Date Result Priority   Lissette Ramos MD  [phone] 5/21/2014      Narrative & Impression  Findings: No prior CT scans for comparison. 60 cc of Visipaque 270 was used for IV contrast. Limited evaluation of the lung apices is unremarkable.  There are a couple of subcentimeter hypodense nodules within the lower pole of the left lobe of the   thyroid gland.  No abnormalities seen pertaining to the vocal cords.  Mild asymmetry in the left supraglottic space.  Correlate with laryngoscopic findings.  There is some shotty sub-centimeter bilateral supraclavicular adenopathy.  Additional bilateral   level 2 subcentimeter nodes are present.  The submandibular glands are normal in appearance.  There is a 20 x 8 millimeter right submandibular node.  Additional 18-mm left submandibular node and a 12-mm right submandibular nodes are present.  The parotid   glands are normal in appearance.  No abnormality seen in the oropharynx or nasopharynx.  Visualized paranasal sinuses and master air cells appear to be normally aerated.  IMPRESSION:    Hypodense nodules in the left lobe of the thyroid gland.  Cervical, supraclavicular and submandibular adenopathy as detailed above.  Mild asymmetry in the left supraglottic region.  Please correlate clinically.        Electronically signed by: LISSETTE RAMOS MD  Date:                                            05/21/14  Time:                                           10:10              Assessment & Plan:   Cervicalgia    Left cervical lymphadenopathy  -     amoxicillin-clavulanate 875-125mg (AUGMENTIN) 875-125 mg per tablet; Take 1 tablet by mouth 2 (two) times daily. for 10 days  Dispense: 20 tablet; Refill: 0        We reviewed her symptoms and exam in  detail.  She does have a a left submandibular and a submental enlarged lymph node/neck mass.  These are tender to palpation.  She also has tenderness along her posterior cervical musculature and her shoulder area the left side.  I explained that her pain very well may be coming from her existing arthritis and muscular inflammation, but, given the enlargement in her left submandibular lymph node, I think we should treat it with antibiotics.  She did undergo workup for this enlarged lymph node in 2014 including a needle biopsy which was benign.  There is no mention of this submental lymph node at that time.  We will re-evaluate in 2 weeks and discuss further management at that point.

## 2022-02-28 ENCOUNTER — TELEPHONE (OUTPATIENT)
Dept: ADMINISTRATIVE | Facility: CLINIC | Age: 77
End: 2022-02-28
Payer: MEDICARE

## 2022-03-01 ENCOUNTER — OFFICE VISIT (OUTPATIENT)
Dept: INTERNAL MEDICINE | Facility: CLINIC | Age: 77
End: 2022-03-01
Payer: MEDICARE

## 2022-03-01 VITALS
BODY MASS INDEX: 29.44 KG/M2 | WEIGHT: 160 LBS | DIASTOLIC BLOOD PRESSURE: 80 MMHG | SYSTOLIC BLOOD PRESSURE: 114 MMHG | HEIGHT: 62 IN

## 2022-03-01 DIAGNOSIS — F44.0 DISSOCIATIVE AMNESIA: ICD-10-CM

## 2022-03-01 DIAGNOSIS — F41.9 ANXIETY: Chronic | ICD-10-CM

## 2022-03-01 DIAGNOSIS — R53.1 DECREASED STRENGTH, ENDURANCE, AND MOBILITY: ICD-10-CM

## 2022-03-01 DIAGNOSIS — K21.9 GASTROESOPHAGEAL REFLUX DISEASE, UNSPECIFIED WHETHER ESOPHAGITIS PRESENT: Chronic | ICD-10-CM

## 2022-03-01 DIAGNOSIS — E78.2 MIXED HYPERLIPIDEMIA: Chronic | ICD-10-CM

## 2022-03-01 DIAGNOSIS — Z00.00 ENCOUNTER FOR PREVENTIVE HEALTH EXAMINATION: Primary | ICD-10-CM

## 2022-03-01 DIAGNOSIS — Z95.810 ICD (IMPLANTABLE CARDIOVERTER-DEFIBRILLATOR) IN PLACE: ICD-10-CM

## 2022-03-01 DIAGNOSIS — M85.80 OSTEOPENIA, UNSPECIFIED LOCATION: Chronic | ICD-10-CM

## 2022-03-01 DIAGNOSIS — Z74.09 DECREASED STRENGTH, ENDURANCE, AND MOBILITY: ICD-10-CM

## 2022-03-01 DIAGNOSIS — R68.89 DECREASED STRENGTH, ENDURANCE, AND MOBILITY: ICD-10-CM

## 2022-03-01 DIAGNOSIS — E55.9 MILD VITAMIN D DEFICIENCY: Chronic | ICD-10-CM

## 2022-03-01 DIAGNOSIS — I10 ESSENTIAL HYPERTENSION: Chronic | ICD-10-CM

## 2022-03-01 PROCEDURE — 3079F DIAST BP 80-89 MM HG: CPT | Mod: CPTII,S$GLB,, | Performed by: NURSE PRACTITIONER

## 2022-03-01 PROCEDURE — 1159F PR MEDICATION LIST DOCUMENTED IN MEDICAL RECORD: ICD-10-PCS | Mod: CPTII,S$GLB,, | Performed by: NURSE PRACTITIONER

## 2022-03-01 PROCEDURE — 1170F PR FUNCTIONAL STATUS ASSESSED: ICD-10-PCS | Mod: CPTII,S$GLB,, | Performed by: NURSE PRACTITIONER

## 2022-03-01 PROCEDURE — 3288F PR FALLS RISK ASSESSMENT DOCUMENTED: ICD-10-PCS | Mod: CPTII,S$GLB,, | Performed by: NURSE PRACTITIONER

## 2022-03-01 PROCEDURE — 1125F PR PAIN SEVERITY QUANTIFIED, PAIN PRESENT: ICD-10-PCS | Mod: CPTII,S$GLB,, | Performed by: NURSE PRACTITIONER

## 2022-03-01 PROCEDURE — 1101F PR PT FALLS ASSESS DOC 0-1 FALLS W/OUT INJ PAST YR: ICD-10-PCS | Mod: CPTII,S$GLB,, | Performed by: NURSE PRACTITIONER

## 2022-03-01 PROCEDURE — 3288F FALL RISK ASSESSMENT DOCD: CPT | Mod: CPTII,S$GLB,, | Performed by: NURSE PRACTITIONER

## 2022-03-01 PROCEDURE — 1160F RVW MEDS BY RX/DR IN RCRD: CPT | Mod: CPTII,S$GLB,, | Performed by: NURSE PRACTITIONER

## 2022-03-01 PROCEDURE — 1101F PT FALLS ASSESS-DOCD LE1/YR: CPT | Mod: CPTII,S$GLB,, | Performed by: NURSE PRACTITIONER

## 2022-03-01 PROCEDURE — 3074F SYST BP LT 130 MM HG: CPT | Mod: CPTII,S$GLB,, | Performed by: NURSE PRACTITIONER

## 2022-03-01 PROCEDURE — 1170F FXNL STATUS ASSESSED: CPT | Mod: CPTII,S$GLB,, | Performed by: NURSE PRACTITIONER

## 2022-03-01 PROCEDURE — G0439 PR MEDICARE ANNUAL WELLNESS SUBSEQUENT VISIT: ICD-10-PCS | Mod: 95,,, | Performed by: NURSE PRACTITIONER

## 2022-03-01 PROCEDURE — 1125F AMNT PAIN NOTED PAIN PRSNT: CPT | Mod: CPTII,S$GLB,, | Performed by: NURSE PRACTITIONER

## 2022-03-01 PROCEDURE — 1159F MED LIST DOCD IN RCRD: CPT | Mod: CPTII,S$GLB,, | Performed by: NURSE PRACTITIONER

## 2022-03-01 PROCEDURE — G0439 PPPS, SUBSEQ VISIT: HCPCS | Mod: 95,,, | Performed by: NURSE PRACTITIONER

## 2022-03-01 PROCEDURE — 3079F PR MOST RECENT DIASTOLIC BLOOD PRESSURE 80-89 MM HG: ICD-10-PCS | Mod: CPTII,S$GLB,, | Performed by: NURSE PRACTITIONER

## 2022-03-01 PROCEDURE — 1160F PR REVIEW ALL MEDS BY PRESCRIBER/CLIN PHARMACIST DOCUMENTED: ICD-10-PCS | Mod: CPTII,S$GLB,, | Performed by: NURSE PRACTITIONER

## 2022-03-01 PROCEDURE — 3074F PR MOST RECENT SYSTOLIC BLOOD PRESSURE < 130 MM HG: ICD-10-PCS | Mod: CPTII,S$GLB,, | Performed by: NURSE PRACTITIONER

## 2022-03-02 ENCOUNTER — LAB VISIT (OUTPATIENT)
Dept: LAB | Facility: HOSPITAL | Age: 77
End: 2022-03-02
Attending: FAMILY MEDICINE
Payer: MEDICARE

## 2022-03-02 ENCOUNTER — CLINICAL SUPPORT (OUTPATIENT)
Dept: REHABILITATION | Facility: HOSPITAL | Age: 77
End: 2022-03-02
Payer: MEDICARE

## 2022-03-02 DIAGNOSIS — E78.2 MIXED HYPERLIPIDEMIA: ICD-10-CM

## 2022-03-02 DIAGNOSIS — R68.89 DECREASED STRENGTH, ENDURANCE, AND MOBILITY: ICD-10-CM

## 2022-03-02 DIAGNOSIS — Z74.09 DECREASED STRENGTH, ENDURANCE, AND MOBILITY: ICD-10-CM

## 2022-03-02 DIAGNOSIS — M25.619 DECREASED RANGE OF MOTION OF SHOULDER, UNSPECIFIED LATERALITY: ICD-10-CM

## 2022-03-02 DIAGNOSIS — M25.512 PAIN IN JOINT OF LEFT SHOULDER: Primary | ICD-10-CM

## 2022-03-02 DIAGNOSIS — R53.1 DECREASED STRENGTH, ENDURANCE, AND MOBILITY: ICD-10-CM

## 2022-03-02 PROBLEM — F44.0 DISSOCIATIVE AMNESIA: Status: ACTIVE | Noted: 2022-03-02

## 2022-03-02 PROBLEM — M79.89 LEFT ARM SWELLING: Status: RESOLVED | Noted: 2020-09-03 | Resolved: 2022-03-02

## 2022-03-02 PROBLEM — Z86.74 HISTORY OF SUDDEN CARDIAC ARREST SUCCESSFULLY RESUSCITATED: Status: RESOLVED | Noted: 2020-08-31 | Resolved: 2022-03-02

## 2022-03-02 LAB
CHOLEST SERPL-MCNC: 212 MG/DL (ref 120–199)
CHOLEST/HDLC SERPL: 4.3 {RATIO} (ref 2–5)
HDLC SERPL-MCNC: 49 MG/DL (ref 40–75)
HDLC SERPL: 23.1 % (ref 20–50)
LDLC SERPL CALC-MCNC: 126.2 MG/DL (ref 63–159)
NONHDLC SERPL-MCNC: 163 MG/DL
TRIGL SERPL-MCNC: 184 MG/DL (ref 30–150)

## 2022-03-02 PROCEDURE — 97140 MANUAL THERAPY 1/> REGIONS: CPT

## 2022-03-02 PROCEDURE — 36415 COLL VENOUS BLD VENIPUNCTURE: CPT | Performed by: FAMILY MEDICINE

## 2022-03-02 PROCEDURE — 80061 LIPID PANEL: CPT | Performed by: FAMILY MEDICINE

## 2022-03-02 PROCEDURE — 97112 NEUROMUSCULAR REEDUCATION: CPT

## 2022-03-02 NOTE — PROGRESS NOTES
[facility]  Physical Therapy Treatment Note     Name: Leticia Grimes  Clinic Number: [id number]    Therapy Diagnosis:   Encounter Diagnoses   Name Primary?    Pain in joint of left shoulder Yes    Decreased strength, endurance, and mobility     Decreased range of motion of shoulder, unspecified laterality      Physician: Madan Montano MD    Visit Date: 3/2/2022  Medical Diagnosis from Referral: Left shoulder arthritis   Evaluation Date: 1/26/2022  Authorization Period Expiration:   Plan of Care Expiration: 03/23/2022                          Progress Update: 02/24//2022                       Visit # / Visits authorized: 4/ 20 (1+ eval)                     FOTO: Visit #1 - Scored : 1 / 3     Time In: 1245  Time Out: 1330  Total Billable Time: 45 minutes    SUBJECTIVE     Pt reports: decrease pain in shoulder secondary to HEP. Still having extreme difficulty with overhead movements.   Compliance with Hep: Not performed  Response to previous treatment: no adverse reactions to treatment/updated HEP  Functional change: No Change    Pain: 5/10   Worst: 8/10  Location: Left shoulder and low back.   Pain Control: oral medication and lying on her back.   Aggravating factors: overhead activities and standing > 15 minutes.       OBJECTIVE     Objective Measures updated at progress report unless specified otherwise.    Treatment     Gym/Equipment Access: No   Time to Complete Exercises: increase due to verbal/tactile cue requirements       Leticia received therapeutic exercises to develop strength, endurance, ROM, flexibility, posture and core stabilization for 10 minutes including:  Interventions  Today  Comment    UBE x 3 min forward/ 3 min backward   Serratus Punch  x 2x10 MMR   Standing Rows   2x10 yellow band *increase 4th visit*               Leticia received the following manual therapy techniques: Joint mobilizations, Manual traction, Soft tissue Mobilization and Friction Massage were  "applied to the: Left shoulder for 25 minutes, including:  Interventions  Today  Comment   Soft Tissue  x Upper trap, scalenes, supraspinatus, infraspinatus, and rhomboids    Joint mobs  x Posterior, inferior and anterior directions    Cecelialipam's technique  X  Shoulder ER/flexion/ ABD on wall with overpressure                Leticia participated in neuromuscular re-education activities to improve: Balance, Coordination, Kinesthetic, Proprioception and Posture for 10 minutes. The following activities were included:  Interventions  Today  Comment    Codman's Wheel  X  2 mins clockwise/ 2 mins counter clockwise    Supine scapula stabilization  X  3x30"   Lexie's   5 minutes                  Education/Self-Care provided: (included in treatment) minutes   · Patient educated on the impairments noted above and the effects of physical therapy intervention to improve overall condition and QOL.   · Patient was educated on all the above exercise prior/during/after for proper posture, positioning, and execution for safe performance with home exercise program.  · Exercise Prescription:   ? 1/26/2022: EVAL: Low      Written Home Exercises Provided: yes. Prefers: Electronic Copies  Exercises were reviewed and Leticia was able to demonstrate them prior to the end of the session.  Leticia demonstrated good understanding of the education provided.      See EMR under Patient Instructions for exercises provided during therapy sessions.    ASSESSMENT     Leticia Grimes tolerated PT session well with moderate complaints of pain or discomfort, secondary to poor motor control and muscle guarding. Objective findings show  Slight increase in change with measurements of ROM and functional mobility.  Therapy exercises were reviewed by revisiting exercises given from previous home exercise program while adding arm flexion up wall.  Handouts were not issued during today's visit. Leticia demonstrated good understanding of new exercises and will " continue to progress at home until next follow-up.       Leticia Is progressing well towards her goals.   Pt prognosis is Good.     Pt will continue to benefit from skilled outpatient physical therapy to address the deficits listed in the problem list box on initial evaluation, provide pt/family education and to maximize pt's level of independence in the home and community environment.     Pt's spiritual, cultural and educational needs considered and pt agreeable to plan of care and goals.    Anticipated barriers to physical therapy: Lifestyle, chronic condition     GOALS:  SHORT TERM GOALS: 4 weeks, (02/23/22) Progress   1. Recent signs and systems trend is improving in order to progress towards LTG's.     2. Patient will be independent with HEP in order to further progress and return to maximal function.     3. Pain rating at Worst: 5/10 in order to progress towards increased independence with activity.     4. Patient will be able to correct postural deviations in sitting and standing, to decrease pain and promote postural awareness for injury prevention.         LONG TERM GOALS: 8 weeks, (03/23/22) Progress   1. Patient will return to normal ADL, recreational, and work related activities with less pain and limitation.      2. Patient will improve AROM to stated goals in order to return to maximal functional potential.      3. Patient will improve Strength to stated goals of appropriate musculature in order to improve functional independence.      4. Pain Rating at Best: 1/10 to improve Quality of Life.      5. Patient will meet predicted functional outcome (FOTO) score: N/A% to increase self-worth & perceived functional ability.     6. Patient will have met/partially met personal goal of: being able to perform overhead activities > 2hrs without pain.           PC = progressing/continue  PM= partially met  DC= discontinue    PLAN     Continue Plan of Care (POC) and progress per patient tolerance. See Treatment  section for exercise progression.    Nick Da Silva, PT, DPT

## 2022-03-02 NOTE — PATIENT INSTRUCTIONS
Counseling and Referral of Other Preventative  (Italic type indicates deductible and co-insurance are waived)    Patient Name: Leticia Grimes  Today's Date: 3/2/2022    Health Maintenance       Date Due Completion Date    TETANUS VACCINE Never done ---    DEXA Scan Never done ---    Shingles Vaccine (1 of 2) Never done ---    Pneumococcal Vaccines (Age 65+) (1 of 1 - PPSV23) Never done ---    Influenza Vaccine (1) 09/01/2021 10/21/2020    Lipid Panel 10/05/2026 10/5/2021        No orders of the defined types were placed in this encounter.    The following information is provided to all patients.  This information is to help you find resources for any of the problems found today that may be affecting your health:                Living healthy guide: www.[facility].gov      Understanding Diabetes: www.diabetes.org      Eating healthy: www.cdc.gov/healthyweight      CDC home safety checklist: www.cdc.gov/steadi/patient.html      Agency on Aging: www.goea.[place].[facility]      Alcoholics anonymous (AA): www.aa.org      Physical Activity: www.saeid.nih.gov/xi4clrw      Tobacco use: www.quitwithusla.org

## 2022-03-02 NOTE — PROGRESS NOTES
"The patient location is: [place]  The chief complaint leading to consultation is:  Medicare AWV    Visit type: audiovisual    Face to Face time with patient:  40 min  60  minutes of total time spent on the encounter, which includes face to face time and non-face to face time preparing to see the patient (eg, review of tests), Obtaining and/or reviewing separately obtained history, Documenting clinical information in the electronic or other health record, Independently interpreting results (not separately reported) and communicating results to the patient/family/caregiver, or Care coordination (not separately reported).         Each patient to whom he or she provides medical services by telemedicine is:  (1) informed of the relationship between the physician and patient and the respective role of any other health care provider with respect to management of the patient; and (2) notified that he or she may decline to receive medical services by telemedicine and may withdraw from such care at any time.    Notes:       Leticia Grimes presented for a  Medicare AWV and comprehensive Health Risk Assessment today. The following components were reviewed and updated:    · Medical history  · Family History  · Social history  · Allergies and Current Medications  · Health Risk Assessment  · Health Maintenance  · Care Team         ** See Completed Assessments for Annual Wellness Visit within the encounter summary.**         The following assessments were completed:  · Living Situation  · CAGE  · Depression Screening  · Fall Risk Assessment (MACH 10)  · Hearing Assessment(HHI)  · Cognitive Function Screening  · Nutrition Screening  · ADL Screening  · PAQ Screening      Vitals:    03/01/22 1502   BP: 114/80   Weight: 72.6 kg (160 lb)   Height: 5' 1.5" (1.562 m)     Body mass index is 29.74 kg/m².  Physical Exam  Constitutional:       General: She is not in acute distress.     Appearance: Normal appearance. She is not ill-appearing, " toxic-appearing or diaphoretic.   Pulmonary:      Effort: Pulmonary effort is normal.   Neurological:      Mental Status: She is alert and oriented to person, place, and time.   Psychiatric:         Mood and Affect: Mood normal.         Behavior: Behavior normal.               Diagnoses and health risks identified today and associated recommendations/orders:    1. Encounter for preventive health examination  Screenings performed, as noted above.  Personal preventative testing needs reviewed.     2. Dissociative amnesia  Monitored/treated on meds, continue the same tx, stable, follow up with pcp, in counseling    3. Essential hypertension  Monitored/treated on meds, continue the same tx, stable, follow up with pcp    4. Mixed hyperlipidemia  Monitored/treated on meds, continue the same tx, stable, follow up with pcp    5. ICD (implantable cardioverter-defibrillator) in place  Monitored/treated on meds, continue the same tx, stable, follow up with pcp, and cardiology    6. Anxiety  Monitored/treated on meds, continue the same tx, stable, follow up with pcp, in counseling    7. Gastroesophageal reflux disease, unspecified whether esophagitis present  Monitored/treated on meds, continue the same tx, stable, follow up with pcp    8. Mild vitamin D deficiency  Monitored/treated on meds, continue the same tx, stable, follow up with pcp    9. Osteopenia, unspecified location  Monitored/treated on meds, continue the same tx, stable, follow up with pcp, will discuss DEXA with her pcp next visit    10. Decreased strength, endurance, and mobility  Monitored/treated on meds, continue the same tx, stable, follow up with pcp, going to PT      Provided Leticia with a 5-10 year written screening schedule and personal prevention plan. Recommendations were developed using the USPSTF age appropriate recommendations. Education, counseling, and referrals were provided as needed. After Visit Summary printed and given to patient which  includes a list of additional screenings\tests needed.    No follow-ups on file.    Sylvester Mercedes, NP  I offered to discuss advanced care planning, including how to pick a person who would make decisions for you if you were unable to make them for yourself, called a health care power of , and what kind of decisions you might make such as use of life sustaining treatments such as ventilators and tube feeding when faced with a life limiting illness recorded on a living will that they will need to know. (How you want to be cared for as you near the end of your natural life)     X Patient is interested in learning more about how to make advanced directives.  I provided them paperwork and offered to discuss this with them.

## 2022-03-03 ENCOUNTER — TELEPHONE (OUTPATIENT)
Dept: INTERNAL MEDICINE | Facility: CLINIC | Age: 77
End: 2022-03-03
Payer: MEDICARE

## 2022-03-03 NOTE — TELEPHONE ENCOUNTER
----- Message from Pilar Ricketts sent at 3/3/2022  9:42 AM CST -----  Contact: [phone]  Patient would like to get medical advice.  Symptoms (please be specific):  back and shoulder pain   How long have you had these symptoms: few days   Would you like a call back, or a response through your [facility] portal?:  Call back   Pharmacy name and phone # (copy from chart):    [facility] [address] [facility] [address] [address]  Phone: [phone] Fax: [phone]  [place]:   Pt states her pain in severe and would like to get advise on what specialist she should see. Please call and advise

## 2022-03-03 NOTE — TELEPHONE ENCOUNTER
Patient is calling with back and neck pain for a few days.  Patient goes to therapy every Wednesday.  She has an appointment with ortho on 03/08/2022.  She states the pain has been unbearable.  She wants to know what can she do until her appointment with the ortho.

## 2022-03-08 ENCOUNTER — OFFICE VISIT (OUTPATIENT)
Dept: ORTHOPEDICS | Facility: CLINIC | Age: 77
End: 2022-03-08
Payer: MEDICARE

## 2022-03-08 ENCOUNTER — HOSPITAL ENCOUNTER (OUTPATIENT)
Dept: RADIOLOGY | Facility: HOSPITAL | Age: 77
Discharge: HOME | End: 2022-03-08
Attending: STUDENT IN AN ORGANIZED HEALTH CARE EDUCATION/TRAINING PROGRAM
Payer: MEDICARE

## 2022-03-08 VITALS — HEIGHT: 62 IN | BODY MASS INDEX: 29.44 KG/M2 | WEIGHT: 160 LBS

## 2022-03-08 DIAGNOSIS — M19.012 GLENOHUMERAL ARTHRITIS, LEFT: Primary | ICD-10-CM

## 2022-03-08 DIAGNOSIS — M25.512 LEFT SHOULDER PAIN, UNSPECIFIED CHRONICITY: ICD-10-CM

## 2022-03-08 PROCEDURE — 73030 X-RAY EXAM OF SHOULDER: CPT | Mod: 26,LT,, | Performed by: RADIOLOGY

## 2022-03-08 PROCEDURE — 1160F RVW MEDS BY RX/DR IN RCRD: CPT | Mod: CPTII,S$GLB,, | Performed by: STUDENT IN AN ORGANIZED HEALTH CARE EDUCATION/TRAINING PROGRAM

## 2022-03-08 PROCEDURE — 99213 OFFICE O/P EST LOW 20 MIN: CPT | Mod: S$GLB,,, | Performed by: STUDENT IN AN ORGANIZED HEALTH CARE EDUCATION/TRAINING PROGRAM

## 2022-03-08 PROCEDURE — 1101F PR PT FALLS ASSESS DOC 0-1 FALLS W/OUT INJ PAST YR: ICD-10-PCS | Mod: CPTII,S$GLB,, | Performed by: STUDENT IN AN ORGANIZED HEALTH CARE EDUCATION/TRAINING PROGRAM

## 2022-03-08 PROCEDURE — 1159F MED LIST DOCD IN RCRD: CPT | Mod: CPTII,S$GLB,, | Performed by: STUDENT IN AN ORGANIZED HEALTH CARE EDUCATION/TRAINING PROGRAM

## 2022-03-08 PROCEDURE — 99213 PR OFFICE/OUTPT VISIT, EST, LEVL III, 20-29 MIN: ICD-10-PCS | Mod: S$GLB,,, | Performed by: STUDENT IN AN ORGANIZED HEALTH CARE EDUCATION/TRAINING PROGRAM

## 2022-03-08 PROCEDURE — 1125F PR PAIN SEVERITY QUANTIFIED, PAIN PRESENT: ICD-10-PCS | Mod: CPTII,S$GLB,, | Performed by: STUDENT IN AN ORGANIZED HEALTH CARE EDUCATION/TRAINING PROGRAM

## 2022-03-08 PROCEDURE — 73030 X-RAY EXAM OF SHOULDER: CPT | Mod: TC,LT

## 2022-03-08 PROCEDURE — 1159F PR MEDICATION LIST DOCUMENTED IN MEDICAL RECORD: ICD-10-PCS | Mod: CPTII,S$GLB,, | Performed by: STUDENT IN AN ORGANIZED HEALTH CARE EDUCATION/TRAINING PROGRAM

## 2022-03-08 PROCEDURE — 99999 PR PBB SHADOW E&M-EST. PATIENT-LVL III: ICD-10-PCS | Mod: PBBFAC,,, | Performed by: STUDENT IN AN ORGANIZED HEALTH CARE EDUCATION/TRAINING PROGRAM

## 2022-03-08 PROCEDURE — 99999 PR PBB SHADOW E&M-EST. PATIENT-LVL III: CPT | Mod: PBBFAC,,, | Performed by: STUDENT IN AN ORGANIZED HEALTH CARE EDUCATION/TRAINING PROGRAM

## 2022-03-08 PROCEDURE — 3288F FALL RISK ASSESSMENT DOCD: CPT | Mod: CPTII,S$GLB,, | Performed by: STUDENT IN AN ORGANIZED HEALTH CARE EDUCATION/TRAINING PROGRAM

## 2022-03-08 PROCEDURE — 1101F PT FALLS ASSESS-DOCD LE1/YR: CPT | Mod: CPTII,S$GLB,, | Performed by: STUDENT IN AN ORGANIZED HEALTH CARE EDUCATION/TRAINING PROGRAM

## 2022-03-08 PROCEDURE — 3288F PR FALLS RISK ASSESSMENT DOCUMENTED: ICD-10-PCS | Mod: CPTII,S$GLB,, | Performed by: STUDENT IN AN ORGANIZED HEALTH CARE EDUCATION/TRAINING PROGRAM

## 2022-03-08 PROCEDURE — 1160F PR REVIEW ALL MEDS BY PRESCRIBER/CLIN PHARMACIST DOCUMENTED: ICD-10-PCS | Mod: CPTII,S$GLB,, | Performed by: STUDENT IN AN ORGANIZED HEALTH CARE EDUCATION/TRAINING PROGRAM

## 2022-03-08 PROCEDURE — 1125F AMNT PAIN NOTED PAIN PRSNT: CPT | Mod: CPTII,S$GLB,, | Performed by: STUDENT IN AN ORGANIZED HEALTH CARE EDUCATION/TRAINING PROGRAM

## 2022-03-08 PROCEDURE — 73030 XR SHOULDER COMPLETE 2 OR MORE VIEWS LEFT: ICD-10-PCS | Mod: 26,LT,, | Performed by: RADIOLOGY

## 2022-03-08 NOTE — PROGRESS NOTES
Orthopaedics Sports Medicine     Shoulder Initial Visit         3/8/2022    Referring MD: No ref. provider found    Chief Complaint   Patient presents with    Left Shoulder - Pain         History of Present Illness:   Leticia Grimes is a 76 y.o. right-hand dominant female who presents with LEFT shoulder pain and dysfunction.    Onset of the symptoms was several years ago, around      Inciting event: caretaking for her  who needed assistance     Current symptoms include pain in the shoulder, localized laterally and throughout upper arm     Pain is aggravated by activity, especially lifting, reaching      Evaluation to date: X-Ray     Treatment to date: Rest, activity modification, OTC medications     Past Medical History:   Past Medical History:   Diagnosis Date    Adjustment insomnia 2020    Anxiety 2020    GERD (gastroesophageal reflux disease)     History of sudden cardiac arrest successfully resuscitated 2020    Hypertension     ICD (implantable cardioverter-defibrillator) in place 2020    Left arm swelling 9/3/2020    Mild vitamin D deficiency 2013    Osteopenia 2020    Vitamin D deficiency disease        Past Surgical History:   Past Surgical History:   Procedure Laterality Date     SECTION, CLASSIC      HYSTERECTOMY      INJECTION OF JOINT Left 3/2/2021    Procedure: Left shoulder Joint injection;  Surgeon: Main Mcgraw MD;  [facility];  Service: Pain Management;  Laterality: Left;    INSERTION OF BIVENTRICULAR IMPLANTABLE CARDIOVERTER-DEFIBRILLATOR (ICD)      TUBAL LIGATION         Medications:  Patient's Medications   New Prescriptions    No medications on file   Previous Medications    ALPHA LIPOIC ACID 200 MG CAP    Take 1 capsule by mouth once daily.    AMLODIPINE (NORVASC) 5 MG TABLET    Take 1 tablet (5 mg total) by mouth once daily.    CARVEDILOL (COREG) 12.5 MG TABLET    Take 1 tablet (12.5 mg total) by mouth 2 (two)  "times daily with meals.    CLONIDINE (CATAPRES) 0.1 MG TABLET    Take 0.1 mg by mouth as needed. If SBP > 200 mmHg    EZETIMIBE (ZETIA) 10 MG TABLET    Take 1 tablet (10 mg total) by mouth once daily.    LISINOPRIL (PRINIVIL,ZESTRIL) 20 MG TABLET    Take 1 tablet (20 mg total) by mouth once daily.    LORAZEPAM (ATIVAN) 0.5 MG TABLET    TAKE 1 TABLET BY MOUTH AT BEDTIME FOR ANXIETY    MV-MN/FOLIC/LUTEIN/HERBAL 293 (ALIVE WOMEN'S 50 PLUS ORAL)    Take 1 tablet by mouth once daily.   Modified Medications    No medications on file   Discontinued Medications    No medications on file       Allergies:   Review of patient's allergies indicates:   Allergen Reactions    Codeine     Morphine Other (See Comments)       Social History:   Home town: [place]  Occupation: retired  Alcohol use: She reports no history of alcohol use.  Tobacco use: She reports that she has never smoked. She has never used smokeless tobacco.    Review of systems:  History of recent illness, fevers, shakes, or chills: no  History of cardiac problems or chest pain: HTN  History of pulmonary problems or asthma: no  History of diabetes: no  History of prior dvt or clotting problems: no  History of sleep apnea: no      Physical Examination:  Estimated body mass index is 29.74 kg/m² as calculated from the following:    Height as of this encounter: 5' 1.5" (1.562 m).    Weight as of this encounter: 72.6 kg (160 lb).    General  Healthy appearing female in no acute distress  Alert and oriented, normal mood, appropriate affect    Shoulder Examination:  Patient is alert and oriented, no distress. Skin is intact. Neuro is normal with no focal motor or sensory findings.    Cervical exam is unremarkable. Intact cervical ROM. Negative Spurling's test    Physical Exam:  RIGHT    LEFT    Scap Dyskinesis/Winging (-)    (-)    Tenderness:          Greater Tuberosity             (-)    (-)  Bicipital Groove  (-)    +  AC joint   (-)    (-)  Other:     ROM:  Forward " Elevation 160    70  Abduction  120    50  ER (at side)  80    60  IR   T8    L5    Strength:   Supraspinatus  5/5    5/5  Infraspinatus  5/5    5/5  Subscap / IR  5/5    5/5     Special Tests:   Neer:    (-)    +   Larson:   (-)    +   SS Stress:   (-)    (-)   Bear Hug:   (-)    (-)   Desha's:   (-)    +   Resisted Thrower's:   (-)    +   Cross Arm Abduction:  (-)    (-)    Neurovascular examination  - Motor grossly intact bilaterally to shoulder abduction, elbow flexion and extension, wrist flexion and extension, and intrinsic hand musculature  - Sensation intact to light touch bilaterally in axillary, median, radial, and ulnar distributions  - Symmetrical radial pulses      Imaging:  X-Ray Shoulder 2 or More Views Left  Narrative: EXAMINATION:  XR SHOULDER COMPLETE 2 OR MORE VIEWS LEFT    CLINICAL HISTORY:  Pain in left shoulder    TECHNIQUE:  Two or three views of the left shoulder were performed.    COMPARISON:  10/12/2020    FINDINGS:  No acute osseous abnormality or significant change with severe glenohumeral joint degenerative findings.  AC joint stable with mild degenerative changes.  Lung parenchyma clear.  Impression: As above    Electronically signed by: Luke Funes MD  Date:    03/08/2022  Time:    10:45       Physician Read: I agree with the above impression.      Impression:  76 y.o. female with left shoulder glenohumeral arthritis      Plan:  1. Discussed diagnosis and treatment options with patient and her daughter today.  She has advanced left shoulder glenohumeral arthritis.  2. She has tried multiple nonoperative treatments including rest, activity modification, oral medications, and corticosteroid injection into the joint.  Despite these interventions, she continues to have symptoms that limit her activities.  3. I think she would benefit from left total shoulder arthroplasty.  We discussed the surgery today as well as postoperative expectations.  4. She is interested in proceeding  with surgery but has a trip planned in May of this year and would like to wait until after that.  5. In the meantime, I would like her to schedule appointment with her primary care physician, Dr. Marie, for preop evaluation and clearance.  6. Follow up with me in late April for preop visit.  We will sign consents at this time.  We will also place an order for CT scan for preoperative planning.           Tomas Taylor MD

## 2022-03-09 ENCOUNTER — CLINICAL SUPPORT (OUTPATIENT)
Dept: AUDIOLOGY | Facility: CLINIC | Age: 77
End: 2022-03-09
Payer: MEDICARE

## 2022-03-09 DIAGNOSIS — Z71.89 HEARING AID CONSULTATION: Primary | ICD-10-CM

## 2022-03-09 NOTE — PROGRESS NOTES
Leticia Grimes was seen 03/09/2022 for a hearing aid consult to discuss hearing aids.     The following aids will be ordered:    A pair of Phonak Aude[employer] P Tech TBD R  Color: P8, black  Speaker Units:  0 (M) R/L  Domes:  Small    She has [employer] and they use [employer] for their customers. We called [employer] and they were unsure what her exact benefit is or where she can go to use it. The rep told her that she would call her back. She can also go outside of [employer] and obtain $500.00 total.     Mary or Ms. Kelly will call me if she decides for me to order her hearing aids and she will pay deposit of $250.00 over the phone at that time.    Pricing was given for advanced and performance technology and she will investigate [employer] and let me know.

## 2022-03-11 ENCOUNTER — PATIENT MESSAGE (OUTPATIENT)
Dept: ORTHOPEDICS | Facility: CLINIC | Age: 77
End: 2022-03-11
Payer: MEDICARE

## 2022-03-15 ENCOUNTER — PATIENT MESSAGE (OUTPATIENT)
Dept: PSYCHIATRY | Facility: CLINIC | Age: 77
End: 2022-03-15
Payer: MEDICARE

## 2022-03-16 ENCOUNTER — CLINICAL SUPPORT (OUTPATIENT)
Dept: REHABILITATION | Facility: HOSPITAL | Age: 77
End: 2022-03-16
Payer: MEDICARE

## 2022-03-16 ENCOUNTER — CLINICAL SUPPORT (OUTPATIENT)
Dept: AUDIOLOGY | Facility: CLINIC | Age: 77
End: 2022-03-16
Payer: MEDICARE

## 2022-03-16 ENCOUNTER — OFFICE VISIT (OUTPATIENT)
Dept: OTOLARYNGOLOGY | Facility: CLINIC | Age: 77
End: 2022-03-16
Payer: MEDICARE

## 2022-03-16 VITALS
WEIGHT: 163.81 LBS | HEART RATE: 68 BPM | BODY MASS INDEX: 30.45 KG/M2 | DIASTOLIC BLOOD PRESSURE: 80 MMHG | TEMPERATURE: 98 F | SYSTOLIC BLOOD PRESSURE: 150 MMHG

## 2022-03-16 DIAGNOSIS — R59.0 LEFT CERVICAL LYMPHADENOPATHY: Primary | ICD-10-CM

## 2022-03-16 DIAGNOSIS — Z74.09 DECREASED STRENGTH, ENDURANCE, AND MOBILITY: ICD-10-CM

## 2022-03-16 DIAGNOSIS — M25.612 DECREASED RANGE OF MOTION OF LEFT SHOULDER: ICD-10-CM

## 2022-03-16 DIAGNOSIS — R68.89 DECREASED STRENGTH, ENDURANCE, AND MOBILITY: ICD-10-CM

## 2022-03-16 DIAGNOSIS — R53.1 DECREASED STRENGTH, ENDURANCE, AND MOBILITY: ICD-10-CM

## 2022-03-16 DIAGNOSIS — M25.512 PAIN IN JOINT OF LEFT SHOULDER: Primary | ICD-10-CM

## 2022-03-16 DIAGNOSIS — Z71.89 HEARING AID CONSULTATION: Primary | ICD-10-CM

## 2022-03-16 DIAGNOSIS — M54.2 CERVICALGIA: ICD-10-CM

## 2022-03-16 PROCEDURE — 1101F PT FALLS ASSESS-DOCD LE1/YR: CPT | Mod: CPTII,S$GLB,, | Performed by: OTOLARYNGOLOGY

## 2022-03-16 PROCEDURE — 99999 PR PBB SHADOW E&M-EST. PATIENT-LVL III: ICD-10-PCS | Mod: PBBFAC,,, | Performed by: OTOLARYNGOLOGY

## 2022-03-16 PROCEDURE — 1101F PR PT FALLS ASSESS DOC 0-1 FALLS W/OUT INJ PAST YR: ICD-10-PCS | Mod: CPTII,S$GLB,, | Performed by: OTOLARYNGOLOGY

## 2022-03-16 PROCEDURE — 99999 PR PBB SHADOW E&M-EST. PATIENT-LVL III: CPT | Mod: PBBFAC,,, | Performed by: OTOLARYNGOLOGY

## 2022-03-16 PROCEDURE — 3079F PR MOST RECENT DIASTOLIC BLOOD PRESSURE 80-89 MM HG: ICD-10-PCS | Mod: CPTII,S$GLB,, | Performed by: OTOLARYNGOLOGY

## 2022-03-16 PROCEDURE — 1160F PR REVIEW ALL MEDS BY PRESCRIBER/CLIN PHARMACIST DOCUMENTED: ICD-10-PCS | Mod: CPTII,S$GLB,, | Performed by: OTOLARYNGOLOGY

## 2022-03-16 PROCEDURE — 92590 PR HEARING AID EXAM, ONE EAR: CPT | Mod: S$GLB,,, | Performed by: AUDIOLOGIST

## 2022-03-16 PROCEDURE — 99214 PR OFFICE/OUTPT VISIT, EST, LEVL IV, 30-39 MIN: ICD-10-PCS | Mod: S$GLB,,, | Performed by: OTOLARYNGOLOGY

## 2022-03-16 PROCEDURE — 92590 PR HEARING AID EXAM, ONE EAR: ICD-10-PCS | Mod: S$GLB,,, | Performed by: AUDIOLOGIST

## 2022-03-16 PROCEDURE — 99214 OFFICE O/P EST MOD 30 MIN: CPT | Mod: S$GLB,,, | Performed by: OTOLARYNGOLOGY

## 2022-03-16 PROCEDURE — 1160F RVW MEDS BY RX/DR IN RCRD: CPT | Mod: CPTII,S$GLB,, | Performed by: OTOLARYNGOLOGY

## 2022-03-16 PROCEDURE — 1159F PR MEDICATION LIST DOCUMENTED IN MEDICAL RECORD: ICD-10-PCS | Mod: CPTII,S$GLB,, | Performed by: OTOLARYNGOLOGY

## 2022-03-16 PROCEDURE — 3288F FALL RISK ASSESSMENT DOCD: CPT | Mod: CPTII,S$GLB,, | Performed by: OTOLARYNGOLOGY

## 2022-03-16 PROCEDURE — 3288F PR FALLS RISK ASSESSMENT DOCUMENTED: ICD-10-PCS | Mod: CPTII,S$GLB,, | Performed by: OTOLARYNGOLOGY

## 2022-03-16 PROCEDURE — 97112 NEUROMUSCULAR REEDUCATION: CPT

## 2022-03-16 PROCEDURE — 97140 MANUAL THERAPY 1/> REGIONS: CPT

## 2022-03-16 PROCEDURE — 3077F SYST BP >= 140 MM HG: CPT | Mod: CPTII,S$GLB,, | Performed by: OTOLARYNGOLOGY

## 2022-03-16 PROCEDURE — 97110 THERAPEUTIC EXERCISES: CPT

## 2022-03-16 PROCEDURE — 1159F MED LIST DOCD IN RCRD: CPT | Mod: CPTII,S$GLB,, | Performed by: OTOLARYNGOLOGY

## 2022-03-16 PROCEDURE — 1126F PR PAIN SEVERITY QUANTIFIED, NO PAIN PRESENT: ICD-10-PCS | Mod: CPTII,S$GLB,, | Performed by: OTOLARYNGOLOGY

## 2022-03-16 PROCEDURE — 1126F AMNT PAIN NOTED NONE PRSNT: CPT | Mod: CPTII,S$GLB,, | Performed by: OTOLARYNGOLOGY

## 2022-03-16 PROCEDURE — 3077F PR MOST RECENT SYSTOLIC BLOOD PRESSURE >= 140 MM HG: ICD-10-PCS | Mod: CPTII,S$GLB,, | Performed by: OTOLARYNGOLOGY

## 2022-03-16 PROCEDURE — 3079F DIAST BP 80-89 MM HG: CPT | Mod: CPTII,S$GLB,, | Performed by: OTOLARYNGOLOGY

## 2022-03-16 NOTE — PROGRESS NOTES
[facility]  Physical Therapy Treatment Note     Name: Leticia Grimes  Clinic Number: [id number]    Therapy Diagnosis:   Encounter Diagnoses   Name Primary?    Pain in joint of left shoulder Yes    Decreased strength, endurance, and mobility     Decreased range of motion of left shoulder      Physician: Madan Montano MD    Visit Date: 3/16/2022  Medical Diagnosis from Referral: Left shoulder arthritis   Evaluation Date: 1/26/2022  Authorization Period Expiration:   Plan of Care Expiration: 03/23/2022                          Progress Update: 02/24//2022                       Visit # / Visits authorized: 5/ 20 (1+ eval)                     FOTO: Visit #1 - Scored : 1 / 3     Time In: 1245  Time Out: 1330  Total Billable Time: 45 minutes    SUBJECTIVE     Pt reports: decrease pain in shoulder secondary to HEP. Still having extreme difficulty with overhead movements.   Compliance with Hep: Not performed  Response to previous treatment: no adverse reactions to treatment/updated HEP  Functional change: No Change    Pain: 5/10   Worst: 8/10  Location: Left shoulder and low back.   Pain Control: oral medication and lying on her back.   Aggravating factors: overhead activities and standing > 15 minutes.       OBJECTIVE     Objective Measures updated at progress report unless specified otherwise.    Treatment     Gym/Equipment Access: No   Time to Complete Exercises: increase due to verbal/tactile cue requirements       Leticia received therapeutic exercises to develop strength, endurance, ROM, flexibility, posture and core stabilization for 10 minutes including:  Interventions  Today  Comment    UBE x 3 min forward/ 3 min backward   Serratus Punch  x 2x10 MMR   Standing Rows   2x10 yellow band *increase 4th visit*   Sidelying ER X  3x10 1lb           Leticia received the following manual therapy techniques: Joint mobilizations, Manual traction, Soft tissue Mobilization and Friction Massage  "were applied to the: Left shoulder for 25 minutes, including:  Interventions  Today  Comment   Soft Tissue  x Upper trap, scalenes, supraspinatus, infraspinatus, and rhomboids    Joint mobs  x Posterior, inferior and anterior directions    Mulligan's technique  X  Shoulder ER/flexion/ ABD on wall with overpressure                Leticia participated in neuromuscular re-education activities to improve: Balance, Coordination, Kinesthetic, Proprioception and Posture for 10 minutes. The following activities were included:  Interventions  Today  Comment    Codman's Wheel  X  2 mins clockwise/ 2 mins counter clockwise    Supine scapula stabilization  X  3x30"   Pulley's  x 5 minutes                  Education/Self-Care provided: (included in treatment) minutes   · Patient educated on the impairments noted above and the effects of physical therapy intervention to improve overall condition and QOL.   · Patient was educated on all the above exercise prior/during/after for proper posture, positioning, and execution for safe performance with home exercise program.  · Exercise Prescription:   ? 1/26/2022: EVAL: Low      Written Home Exercises Provided: yes. Prefers: Electronic Copies  Exercises were reviewed and Leticia was able to demonstrate them prior to the end of the session.  Leticia demonstrated good understanding of the education provided.      See EMR under Patient Instructions for exercises provided during therapy sessions.    ASSESSMENT     Leticia Grimes tolerated PT session well with moderate complaints of pain or discomfort, secondary to poor motor control and muscle guarding. Objective findings show  Slight increase in change with measurements of ROM and functional mobility.  Therapy exercises were reviewed by revisiting exercises given from previous home exercise program while adding arm flexion up wall.  Handouts were not issued during today's visit. Leticia demonstrated good understanding of new exercises and will " continue to progress at home until next follow-up.       Leticia Is progressing well towards her goals.   Pt prognosis is Good.     Pt will continue to benefit from skilled outpatient physical therapy to address the deficits listed in the problem list box on initial evaluation, provide pt/family education and to maximize pt's level of independence in the home and community environment.     Pt's spiritual, cultural and educational needs considered and pt agreeable to plan of care and goals.    Anticipated barriers to physical therapy: Lifestyle, chronic condition     GOALS:  SHORT TERM GOALS: 4 weeks, (02/23/22) Progress   1. Recent signs and systems trend is improving in order to progress towards LTG's.     2. Patient will be independent with HEP in order to further progress and return to maximal function.     3. Pain rating at Worst: 5/10 in order to progress towards increased independence with activity.     4. Patient will be able to correct postural deviations in sitting and standing, to decrease pain and promote postural awareness for injury prevention.         LONG TERM GOALS: 8 weeks, (03/23/22) Progress   1. Patient will return to normal ADL, recreational, and work related activities with less pain and limitation.      2. Patient will improve AROM to stated goals in order to return to maximal functional potential.      3. Patient will improve Strength to stated goals of appropriate musculature in order to improve functional independence.      4. Pain Rating at Best: 1/10 to improve Quality of Life.      5. Patient will meet predicted functional outcome (FOTO) score: N/A% to increase self-worth & perceived functional ability.     6. Patient will have met/partially met personal goal of: being able to perform overhead activities > 2hrs without pain.           PC = progressing/continue  PM= partially met  DC= discontinue    PLAN     Continue Plan of Care (POC) and progress per patient tolerance. See Treatment  section for exercise progression.    Nick Da Silva, PT, DPT

## 2022-03-16 NOTE — PROGRESS NOTES
"Referring Provider:    No referring provider defined for this encounter.  Subjective:   Patient: Leticia Grimes [id number], :1945   Visit date:3/16/2022 2:33 PM    Chief Complaint:  Follow-up    HPI:    Prior notes reviewed by myself.  Clinical documentation obtained by nursing staff reviewed.     77 y/o female with complaints of 1 week of left sided neck/shoulder pain as well as an enlarged "gland" on the left side.  She has a long history of severe neck and left shoulder arthritis.  Initially, she attributed this pain to her arthritis but then she noticed that this neck mass had enlarged in size and was tender.  She has had no fever, unintentional weight loss, night sweats.  She did have a workup for this neck mass in  as well as a fine-needle aspiration biopsy which was read as benign.    3/16/22 update:  Possible decrease in size, no other changes.        Objective:     Physical Exam:  Vitals:  BP (!) 150/80   Pulse 68   Temp 97.9 °F (36.6 °C) (Temporal)   Wt 74.3 kg (163 lb 12.8 oz)   BMI 30.45 kg/m²   General appearance:  Well developed, well nourished    Ears:  Otoscopy of external auditory canals and tympanic membranes was normal, clinical speech reception thresholds grossly intact, no mass/lesion of auricle.    Nose:  No masses/lesions of external nose, nasal mucosa, septum, and turbinates were within normal limits.    Mouth:  No mass/lesion of lips, teeth, gums, hard/soft palate, tongue, tonsils, or oropharynx.    Neck & Lymphatics:  No cervical lymphadenopathy, trachea is midline, no thyroid enlargement/tenderness/mass. Enlarged lymph node/mass anterior to left submandibular gland as well as a separate enlarged lymph node submental area - tender to palpation.          [x]  Data Reviewed:    Lab Results   Component Value Date    WBC 5.91 10/05/2021    HGB 13.3 10/05/2021    HCT 38.9 10/05/2021    MCV 83 10/05/2021       CT Soft Tissue Neck With Contrast  Order: [id number]   Status: Final " result     Visible to patient: Yes (seen)     Next appt: 03/01/2022 at 03:00 PM in Internal Medicine (Sylvester Mercedes NP)     Dx: Mass of left side of neck     0 Result Notes    Details    Reading Physician Reading Date Result Priority   Lissette Ramos MD  [phone] 5/21/2014      Narrative & Impression  Findings: No prior CT scans for comparison. 60 cc of Visipaque 270 was used for IV contrast. Limited evaluation of the lung apices is unremarkable.  There are a couple of subcentimeter hypodense nodules within the lower pole of the left lobe of the   thyroid gland.  No abnormalities seen pertaining to the vocal cords.  Mild asymmetry in the left supraglottic space.  Correlate with laryngoscopic findings.  There is some shotty sub-centimeter bilateral supraclavicular adenopathy.  Additional bilateral   level 2 subcentimeter nodes are present.  The submandibular glands are normal in appearance.  There is a 20 x 8 millimeter right submandibular node.  Additional 18-mm left submandibular node and a 12-mm right submandibular nodes are present.  The parotid   glands are normal in appearance.  No abnormality seen in the oropharynx or nasopharynx.  Visualized paranasal sinuses and master air cells appear to be normally aerated.  IMPRESSION:    Hypodense nodules in the left lobe of the thyroid gland.  Cervical, supraclavicular and submandibular adenopathy as detailed above.  Mild asymmetry in the left supraglottic region.  Please correlate clinically.        Electronically signed by: LISSETTE RAMOS MD  Date:                                            05/21/14  Time:                                           10:10              Assessment & Plan:   Left cervical lymphadenopathy    Cervicalgia        We reviewed her symptoms and exam in detail.  She does have a a left submandibular and a submental enlarged lymph node/neck mass.  These are tender to palpation.  She also has tenderness along her posterior cervical  musculature and her shoulder area the left side.  I explained that her pain very well may be coming from her existing arthritis and muscular inflammation, but, given the enlargement in her left submandibular lymph node, I think we should treat it with antibiotics.  She did undergo workup for this enlarged lymph node in 2014 including a needle biopsy which was benign.  There is no mention of this submental lymph node at that time.  We will re-evaluate in 2 weeks and discuss further management at that point.    3/16/22 update:  Possible interval decrease in size but both lymph nodes/neck masses are still palpable.  Discussed options including excisional biopsy versus FNA versus continued observation.  We agreed to observe for another 4 weeks and revisit this again at that point.  She understands that there is always a risk of missed malignancy with an enlarged lymph node that does not resolve with antibiotics

## 2022-03-16 NOTE — PROGRESS NOTES
Leticia Carolina Grimes was seen 03/16/2022 for a hearing aid follow-up appointment.  She will pay $250.00 deposit for hearing aid order. I will place order today for Performance level technology per her request.       Patient will be called to schedule hearing aid fitting as soon as the aids arrive.

## 2022-03-17 ENCOUNTER — CLINICAL SUPPORT (OUTPATIENT)
Dept: CARDIOLOGY | Facility: HOSPITAL | Age: 77
End: 2022-03-17
Payer: MEDICARE

## 2022-03-17 DIAGNOSIS — Z95.810 PRESENCE OF AUTOMATIC (IMPLANTABLE) CARDIAC DEFIBRILLATOR: ICD-10-CM

## 2022-03-17 PROCEDURE — 93296 REM INTERROG EVL PM/IDS: CPT | Performed by: INTERNAL MEDICINE

## 2022-03-21 ENCOUNTER — OFFICE VISIT (OUTPATIENT)
Dept: PSYCHIATRY | Facility: CLINIC | Age: 77
End: 2022-03-21
Payer: MEDICARE

## 2022-03-21 DIAGNOSIS — F43.21 GRIEF: ICD-10-CM

## 2022-03-21 DIAGNOSIS — Z63.8 RELATIONSHIP PROBLEM WITH FAMILY MEMBER: ICD-10-CM

## 2022-03-21 DIAGNOSIS — F44.0 DISSOCIATIVE AMNESIA: ICD-10-CM

## 2022-03-21 DIAGNOSIS — F41.1 ANXIETY STATES: Primary | ICD-10-CM

## 2022-03-21 PROCEDURE — 99999 PR PBB SHADOW E&M-EST. PATIENT-LVL II: CPT | Mod: PBBFAC,,, | Performed by: SOCIAL WORKER

## 2022-03-21 PROCEDURE — 99999 PR PBB SHADOW E&M-EST. PATIENT-LVL II: ICD-10-PCS | Mod: PBBFAC,,, | Performed by: SOCIAL WORKER

## 2022-03-21 PROCEDURE — 90834 PR PSYCHOTHERAPY W/PATIENT, 45 MIN: ICD-10-PCS | Mod: S$GLB,,, | Performed by: SOCIAL WORKER

## 2022-03-21 PROCEDURE — 90834 PSYTX W PT 45 MINUTES: CPT | Mod: S$GLB,,, | Performed by: SOCIAL WORKER

## 2022-03-21 SDOH — SOCIAL DETERMINANTS OF HEALTH (SDOH): OTHER SPECIFIED PROBLEMS RELATED TO PRIMARY SUPPORT GROUP: Z63.8

## 2022-03-23 ENCOUNTER — CLINICAL SUPPORT (OUTPATIENT)
Dept: REHABILITATION | Facility: HOSPITAL | Age: 77
End: 2022-03-23
Payer: MEDICARE

## 2022-03-23 DIAGNOSIS — M25.612 DECREASED RANGE OF MOTION OF LEFT SHOULDER: ICD-10-CM

## 2022-03-23 DIAGNOSIS — R68.89 DECREASED STRENGTH, ENDURANCE, AND MOBILITY: ICD-10-CM

## 2022-03-23 DIAGNOSIS — Z74.09 DECREASED STRENGTH, ENDURANCE, AND MOBILITY: ICD-10-CM

## 2022-03-23 DIAGNOSIS — M25.512 PAIN IN JOINT OF LEFT SHOULDER: Primary | ICD-10-CM

## 2022-03-23 DIAGNOSIS — R53.1 DECREASED STRENGTH, ENDURANCE, AND MOBILITY: ICD-10-CM

## 2022-03-23 PROCEDURE — 97110 THERAPEUTIC EXERCISES: CPT

## 2022-03-23 PROCEDURE — 97140 MANUAL THERAPY 1/> REGIONS: CPT

## 2022-03-23 PROCEDURE — 97112 NEUROMUSCULAR REEDUCATION: CPT

## 2022-03-23 NOTE — PROGRESS NOTES
[facility]  Physical Therapy Treatment Note     Name: Leticia Grimes  Clinic Number: [id number]    Therapy Diagnosis:   Encounter Diagnoses   Name Primary?    Pain in joint of left shoulder Yes    Decreased strength, endurance, and mobility     Decreased range of motion of left shoulder      Physician: Madan Montano MD    Visit Date: 3/23/2022  Medical Diagnosis from Referral: Left shoulder arthritis   Evaluation Date: 1/26/2022  Authorization Period Expiration:   Plan of Care Expiration: 03/23/2022                          Progress Update: 02/24//2022                       Visit # / Visits authorized: 6/ 20 (1+ eval)                     FOTO: Visit #1 - Scored : 1 / 3     Time In: 1200  Time Out: 1245  Total Billable Time: 45 minutes    SUBJECTIVE     Pt reports: decrease pain in shoulder secondary to HEP. Still having extreme difficulty with overhead movements.   Compliance with Hep: Not performed  Response to previous treatment: no adverse reactions to treatment/updated HEP  Functional change: No Change    Pain: 5/10   Worst: 8/10  Location: Left shoulder and low back.   Pain Control: oral medication and lying on her back.   Aggravating factors: overhead activities and standing > 15 minutes.       OBJECTIVE     Objective Measures updated at progress report unless specified otherwise.    Treatment     Gym/Equipment Access: No   Time to Complete Exercises: increase due to verbal/tactile cue requirements       Leticia received therapeutic exercises to develop strength, endurance, ROM, flexibility, posture and core stabilization for 15 minutes including:  Interventions  Today  Comment    UBE x 3 min forward/ 3 min backward   Serratus Punch  x 2x10 MMR   Standing Rows  x 2x10 red band    Sidelying ER X  3x10 1lb    Standing ER x 3x10 red band      Leticia received the following manual therapy techniques: Joint mobilizations, Manual traction, Soft tissue Mobilization and Friction  "Massage were applied to the: Left shoulder for 15 minutes, including:  Interventions  Today  Comment   Soft Tissue  x Upper trap, scalenes, supraspinatus, infraspinatus, and rhomboids    Joint mobs  x Posterior, inferior and anterior directions    Mulligan's technique  X  Shoulder ER/flexion/ ABD on wall with overpressure                Leticia participated in neuromuscular re-education activities to improve: Balance, Coordination, Kinesthetic, Proprioception and Posture for 10 minutes. The following activities were included:  Interventions  Today  Comment    Codman's Wheel  X  2 mins clockwise/ 2 mins counter clockwise    Supine scapula stabilization  X  3x30"   Pulley's  x 5 minutes    Arm flexion on wall x 25x (B)            Education/Self-Care provided: (included in treatment) minutes   · Patient educated on the impairments noted above and the effects of physical therapy intervention to improve overall condition and QOL.   · Patient was educated on all the above exercise prior/during/after for proper posture, positioning, and execution for safe performance with home exercise program.  · Exercise Prescription:   ? 1/26/2022: EVAL: Low      Written Home Exercises Provided: yes. Prefers: Electronic Copies  Exercises were reviewed and Leticia was able to demonstrate them prior to the end of the session.  Leticia demonstrated good understanding of the education provided.      See EMR under Patient Instructions for exercises provided during therapy sessions.    ASSESSMENT     Leticia Grimes tolerated PT session well with moderate complaints of pain or discomfort, secondary to poor motor control and muscle guarding. Objective findings show  Slight increase in change with measurements of ROM and functional mobility.  Therapy exercises were reviewed by revisiting exercises given from previous home exercise program while adding arm flexion up wall.  Handouts were not issued during today's visit. Leticia demonstrated good " understanding of new exercises and will continue to progress at home until next follow-up.       Leticia Is progressing well towards her goals.   Pt prognosis is Good.     Pt will continue to benefit from skilled outpatient physical therapy to address the deficits listed in the problem list box on initial evaluation, provide pt/family education and to maximize pt's level of independence in the home and community environment.     Pt's spiritual, cultural and educational needs considered and pt agreeable to plan of care and goals.    Anticipated barriers to physical therapy: Lifestyle, chronic condition     GOALS:  SHORT TERM GOALS: 4 weeks, (02/23/22) Progress   1. Recent signs and systems trend is improving in order to progress towards LTG's.     2. Patient will be independent with HEP in order to further progress and return to maximal function.     3. Pain rating at Worst: 5/10 in order to progress towards increased independence with activity.     4. Patient will be able to correct postural deviations in sitting and standing, to decrease pain and promote postural awareness for injury prevention.         LONG TERM GOALS: 8 weeks, (03/23/22) Progress   1. Patient will return to normal ADL, recreational, and work related activities with less pain and limitation.      2. Patient will improve AROM to stated goals in order to return to maximal functional potential.      3. Patient will improve Strength to stated goals of appropriate musculature in order to improve functional independence.      4. Pain Rating at Best: 1/10 to improve Quality of Life.      5. Patient will meet predicted functional outcome (FOTO) score: N/A% to increase self-worth & perceived functional ability.     6. Patient will have met/partially met personal goal of: being able to perform overhead activities > 2hrs without pain.           PC = progressing/continue  PM= partially met  DC= discontinue    PLAN     Continue Plan of Care (POC) and progress  per patient tolerance. See Treatment section for exercise progression.    Nick Da Silva, PT, DPT

## 2022-03-31 ENCOUNTER — OFFICE VISIT (OUTPATIENT)
Dept: FAMILY MEDICINE | Facility: CLINIC | Age: 77
End: 2022-03-31
Payer: MEDICARE

## 2022-03-31 DIAGNOSIS — R59.0 CERVICAL LYMPHADENOPATHY: Primary | ICD-10-CM

## 2022-03-31 DIAGNOSIS — R09.82 POST-NASAL DRIP: ICD-10-CM

## 2022-03-31 PROCEDURE — 1160F PR REVIEW ALL MEDS BY PRESCRIBER/CLIN PHARMACIST DOCUMENTED: ICD-10-PCS | Mod: CPTII,95,, | Performed by: FAMILY MEDICINE

## 2022-03-31 PROCEDURE — 1159F PR MEDICATION LIST DOCUMENTED IN MEDICAL RECORD: ICD-10-PCS | Mod: CPTII,95,, | Performed by: FAMILY MEDICINE

## 2022-03-31 PROCEDURE — 1160F RVW MEDS BY RX/DR IN RCRD: CPT | Mod: CPTII,95,, | Performed by: FAMILY MEDICINE

## 2022-03-31 PROCEDURE — 1159F MED LIST DOCD IN RCRD: CPT | Mod: CPTII,95,, | Performed by: FAMILY MEDICINE

## 2022-03-31 PROCEDURE — 99213 OFFICE O/P EST LOW 20 MIN: CPT | Mod: 95,,, | Performed by: FAMILY MEDICINE

## 2022-03-31 PROCEDURE — 99213 PR OFFICE/OUTPT VISIT, EST, LEVL III, 20-29 MIN: ICD-10-PCS | Mod: 95,,, | Performed by: FAMILY MEDICINE

## 2022-03-31 RX ORDER — METHYLPREDNISOLONE 4 MG/1
TABLET ORAL
Qty: 1 EACH | Refills: 0 | Status: SHIPPED | OUTPATIENT
Start: 2022-03-31 | End: 2022-04-20

## 2022-03-31 NOTE — PROGRESS NOTES
The patient location is: At home  The chief complaint leading to consultation is:  Swollen glands    Visit type:  Audiovisual    Face to Face time with patient:  12 minutes  20 minutes of total time spent on the encounter, which includes face to face time and non-face to face time preparing to see the patient (eg, review of tests), Obtaining and/or reviewing separately obtained history, Documenting clinical information in the electronic or other health record, Independently interpreting results (not separately reported) and communicating results to the patient/family/caregiver, or Care coordination (not separately reported).     Each patient to whom he or she provides medical services by telemedicine is:  (1) informed of the relationship between the physician and patient and the respective role of any other health care provider with respect to management of the patient; and (2) notified that he or she may decline to receive medical services by telemedicine and may withdraw from such care at any time.      CHIEF COMPLAINT:  This is a 76-year-old female complaining of swollen glands in neck.    SUBJECTIVE:  Patient was seen by ENT specialist on March 16 complaining of enlarged gland of left side of neck for 1 week.  She was given a round of antibiotics with slight decrease in size but no other changes.  She was told by ENT not to be concerned and to follow-up in 2 weeks.  Patient has the long history of severe neck and left shoulder arthritis.  She had biopsy of nodule in neck in 2014 which was benign.    Patient complains today of swollen nodules on both sides of her neck associated with postnasal drip.  She states that she is uncomfortable with the swelling.  She rubs her hand over her neck and feels the nodules.  She denies fever, chills, sore throat, ear pain, nasal congestion, cough or chest congestion.    ROS:  GENERAL: Patient denies fever, chills, night sweats.  Patient denies weight gain or loss. Patient  denies anorexia, fatigue, weakness or swollen glands.  SKIN: Patient denies rash.  HEENT: Patient denies sore throat, ear pain, nasal congestion, or runny nose. Patient denies visual disturbance, eye irritation or discharge.  Positive for  postnasal drip and hearing loss.  LUNGS: Patient denies cough, wheeze or hemoptysis.  CARDIOVASCULAR: Patient denies chest pain, shortness of breath, palpitations, syncope or lower extremity edema.  GI: Patient denies abdominal pain, nausea, vomiting, diarrhea, constipation, blood in stool or melena.  GENITOURINARY: Patient denies pelvic pain, vaginal discharge, itch or odor. Patient denies irregular vaginal bleeding.  Patient denies dysuria, frequency, hematuria, nocturia, urgency or incontinence.  MUSCULOSKELETAL: Patient denies joint swelling, redness or warmth.  Positive for joint pain and neck pain.  NEUROLOGIC: Patient denies headache, vertigo, paresthesias, weakness in limb, dysarthria, dysphagia or abnormality of gait.  PSYCHIATRIC: Patient denies depression, anxiety or memory loss.    OBJECTIVE:   GENERAL: Well-developed well-nourished, black female alert and oriented x3 in no acute distress. Memory, judgment and cognition without deficit.  Normal affect.  SKIN: Clear without rash. Normal color and tone.  HEENT: Eyes: Clear conjunctivae. No scleral icterus.  Nose:  Not congested-sounding.    NECK: Supple, normal range of motion.  Patient attempts to show me nodules in neck but no swelling is noted visually.  LUNGS:  Normal respiratory effort.  No audible wheezing.  EXTREMITIES:  Normal range of motion in all extremities.  NEUROLOGIC:  Gait without abnormality.  No tremor.    ASSESSMENT:  1. Cervical lymphadenopathy    2. Post-nasal drip      PLAN:   1. Reassurance.  2. Medrol Dosepak.  3. Follow-up with ENT.  4. Seek medical attention for worsening condition.      This note is generated with speech recognition software and is subject to transcription error and sound alike  phrases that may be missed by proofreading.

## 2022-04-01 ENCOUNTER — NURSE TRIAGE (OUTPATIENT)
Dept: ADMINISTRATIVE | Facility: CLINIC | Age: 77
End: 2022-04-01
Payer: MEDICARE

## 2022-04-01 NOTE — TELEPHONE ENCOUNTER
Reason for Disposition   Systolic BP >= 180 OR Diastolic >= 110    Additional Information   Negative: Sounds like a life-threatening emergency to the triager   Negative: Pregnant > 20 weeks or postpartum (< 6 weeks after delivery) and new hand or face swelling   Negative: Pregnant > 20 weeks and BP > 140/90   Negative: Systolic BP >= 160 OR Diastolic >= 100, and any cardiac or neurologic symptoms (e.g., chest pain, difficulty breathing, unsteady gait, blurred vision)   Negative: Patient sounds very sick or weak to the triager   Negative: BP Systolic BP >= 140 OR Diastolic >= 90 and postpartum (from 0 to 6 weeks after delivery)   Negative: Systolic BP >= 180 OR Diastolic >= 110, and missed most recent dose of blood pressure medication    Protocols used: HIGH BLOOD PRESSURE-A-OH    Pt stated her blood pressure has been high. Stated she takes her blood pressure medication as prescribed. BP now is 144/132. Per triage protocol advised to see a MD in the office today. Instructed to go to Urgent Care/ED if no appointments are available. Pt verbalized understanding. Karley transferred to Sam in scheduling.

## 2022-04-06 ENCOUNTER — CLINICAL SUPPORT (OUTPATIENT)
Dept: AUDIOLOGY | Facility: CLINIC | Age: 77
End: 2022-04-06
Payer: MEDICARE

## 2022-04-06 DIAGNOSIS — Z46.1 HEARING AID FITTING OR ADJUSTMENT: Primary | ICD-10-CM

## 2022-04-06 PROCEDURE — V5261 HEARING AID, DIGIT, BIN, BTE: HCPCS | Mod: S$GLB,,, | Performed by: AUDIOLOGIST

## 2022-04-06 PROCEDURE — EBRTAX-P BATON ROUGE PRESCRIBED 3.0%: ICD-10-PCS | Mod: S$GLB,,, | Performed by: AUDIOLOGIST

## 2022-04-06 PROCEDURE — EBRTAX-P BATON ROUGE PRESCRIBED 3.0%: Mod: S$GLB,,, | Performed by: AUDIOLOGIST

## 2022-04-06 PROCEDURE — V5261 PR HEARING AID, DIGIT, BIN, BTE: ICD-10-PCS | Mod: S$GLB,,, | Performed by: AUDIOLOGIST

## 2022-04-06 NOTE — PROGRESS NOTES
Leticia Caity Grimes was seen on 04/06/2022 for a hearing aid fitting.  Settings were set to prescribed level 80% based on current audiogram.  Patient was counseled on insertion, cleaning and maintenance. Patient was given a copy of the hearing aid purchase agreement and will pay in full today.  Patient's one month trail period will begin today. Patient has been scheduled for a hearing aid follow up in two weeks.      :  [employer]  Model:  [employer] In[employer] P50-R  Color: black  Speaker link: 0 M L/R  Right aid sn#: [id number]  Left aid sn#: [id number]  Dome size: small vented  Micromold sn#:na  Repair warranty; 6/14/25  L/D warranty:6/14/25

## 2022-04-07 ENCOUNTER — PATIENT OUTREACH (OUTPATIENT)
Dept: ADMINISTRATIVE | Facility: OTHER | Age: 77
End: 2022-04-07
Payer: MEDICARE

## 2022-04-08 ENCOUNTER — OFFICE VISIT (OUTPATIENT)
Dept: ORTHOPEDICS | Facility: CLINIC | Age: 77
End: 2022-04-08
Payer: MEDICARE

## 2022-04-08 VITALS — WEIGHT: 163 LBS | HEIGHT: 62 IN | BODY MASS INDEX: 30 KG/M2

## 2022-04-08 DIAGNOSIS — M19.012 GLENOHUMERAL ARTHRITIS, LEFT: Primary | ICD-10-CM

## 2022-04-08 PROCEDURE — 1159F MED LIST DOCD IN RCRD: CPT | Mod: CPTII,S$GLB,, | Performed by: STUDENT IN AN ORGANIZED HEALTH CARE EDUCATION/TRAINING PROGRAM

## 2022-04-08 PROCEDURE — 1160F RVW MEDS BY RX/DR IN RCRD: CPT | Mod: CPTII,S$GLB,, | Performed by: STUDENT IN AN ORGANIZED HEALTH CARE EDUCATION/TRAINING PROGRAM

## 2022-04-08 PROCEDURE — 1101F PR PT FALLS ASSESS DOC 0-1 FALLS W/OUT INJ PAST YR: ICD-10-PCS | Mod: CPTII,S$GLB,, | Performed by: STUDENT IN AN ORGANIZED HEALTH CARE EDUCATION/TRAINING PROGRAM

## 2022-04-08 PROCEDURE — 99999 PR PBB SHADOW E&M-EST. PATIENT-LVL IV: ICD-10-PCS | Mod: PBBFAC,,, | Performed by: STUDENT IN AN ORGANIZED HEALTH CARE EDUCATION/TRAINING PROGRAM

## 2022-04-08 PROCEDURE — 3288F PR FALLS RISK ASSESSMENT DOCUMENTED: ICD-10-PCS | Mod: CPTII,S$GLB,, | Performed by: STUDENT IN AN ORGANIZED HEALTH CARE EDUCATION/TRAINING PROGRAM

## 2022-04-08 PROCEDURE — 99999 PR PBB SHADOW E&M-EST. PATIENT-LVL IV: CPT | Mod: PBBFAC,,, | Performed by: STUDENT IN AN ORGANIZED HEALTH CARE EDUCATION/TRAINING PROGRAM

## 2022-04-08 PROCEDURE — 1160F PR REVIEW ALL MEDS BY PRESCRIBER/CLIN PHARMACIST DOCUMENTED: ICD-10-PCS | Mod: CPTII,S$GLB,, | Performed by: STUDENT IN AN ORGANIZED HEALTH CARE EDUCATION/TRAINING PROGRAM

## 2022-04-08 PROCEDURE — 1159F PR MEDICATION LIST DOCUMENTED IN MEDICAL RECORD: ICD-10-PCS | Mod: CPTII,S$GLB,, | Performed by: STUDENT IN AN ORGANIZED HEALTH CARE EDUCATION/TRAINING PROGRAM

## 2022-04-08 PROCEDURE — 1101F PT FALLS ASSESS-DOCD LE1/YR: CPT | Mod: CPTII,S$GLB,, | Performed by: STUDENT IN AN ORGANIZED HEALTH CARE EDUCATION/TRAINING PROGRAM

## 2022-04-08 PROCEDURE — 3288F FALL RISK ASSESSMENT DOCD: CPT | Mod: CPTII,S$GLB,, | Performed by: STUDENT IN AN ORGANIZED HEALTH CARE EDUCATION/TRAINING PROGRAM

## 2022-04-08 PROCEDURE — 1125F PR PAIN SEVERITY QUANTIFIED, PAIN PRESENT: ICD-10-PCS | Mod: CPTII,S$GLB,, | Performed by: STUDENT IN AN ORGANIZED HEALTH CARE EDUCATION/TRAINING PROGRAM

## 2022-04-08 PROCEDURE — 99214 PR OFFICE/OUTPT VISIT, EST, LEVL IV, 30-39 MIN: ICD-10-PCS | Mod: S$GLB,,, | Performed by: STUDENT IN AN ORGANIZED HEALTH CARE EDUCATION/TRAINING PROGRAM

## 2022-04-08 PROCEDURE — 1125F AMNT PAIN NOTED PAIN PRSNT: CPT | Mod: CPTII,S$GLB,, | Performed by: STUDENT IN AN ORGANIZED HEALTH CARE EDUCATION/TRAINING PROGRAM

## 2022-04-08 PROCEDURE — 99214 OFFICE O/P EST MOD 30 MIN: CPT | Mod: S$GLB,,, | Performed by: STUDENT IN AN ORGANIZED HEALTH CARE EDUCATION/TRAINING PROGRAM

## 2022-04-08 NOTE — PROGRESS NOTES
Orthopaedic Follow-Up Visit    Last Appointment: 2022  Diagnosis: Glenohumeral arthritis, left shoulder  Prior Procedure: CSI    Leticia Grimes is a 76 y.o. female who is here for f/u evaluation of the left shoulder. The patient was last seen here by me on 2022 at which point we decided to wait until the summer for operative treatment. The patient returns today reporting that the symptoms are unchanged and is interested in proceeding with further options.     To review her history, Leticia Grimes is a 76 y.o. right-hand dominant female who presents with LEFT shoulder pain and dysfunction. Onset of the symptoms was several years ago, around . Inciting event: caretaking for her  who needed assistance. Current symptoms include pain in the shoulder, localized laterally and throughout upper arm. Pain is aggravated by activity, especially lifting, reaching      Patient's medications, allergies, past medical, surgical, social and family histories were reviewed and updated as appropriate.    Review of Systems   All systems reviewed were negative.  Specifically, the patient denies fever, chills, weight loss, chest pain, shortness of breath, or dyspnea on exertion.      Past Medical History:   Diagnosis Date    Adjustment insomnia 2020    Anxiety 2020    GERD (gastroesophageal reflux disease)     History of sudden cardiac arrest successfully resuscitated 2020    Hypertension     ICD (implantable cardioverter-defibrillator) in place 2020    Left arm swelling 9/3/2020    Mild vitamin D deficiency 2013    Osteopenia 2020    Vitamin D deficiency disease        Past Surgical History:   Procedure Laterality Date     SECTION, CLASSIC      HYSTERECTOMY      INJECTION OF JOINT Left 3/2/2021    Procedure: Left shoulder Joint injection;  Surgeon: Main Mcgraw MD;  Location: [facility];  Service: Pain Management;  Laterality: Left;    INSERTION OF  BIVENTRICULAR IMPLANTABLE CARDIOVERTER-DEFIBRILLATOR (ICD)      TUBAL LIGATION         Patient's Medications   New Prescriptions    No medications on file   Previous Medications    ALPHA LIPOIC ACID 200 MG CAP    Take 1 capsule by mouth once daily.    AMLODIPINE (NORVASC) 5 MG TABLET    Take 1 tablet (5 mg total) by mouth once daily.    CARVEDILOL (COREG) 12.5 MG TABLET    Take 1 tablet (12.5 mg total) by mouth 2 (two) times daily with meals.    CLONIDINE (CATAPRES) 0.1 MG TABLET    Take 0.1 mg by mouth as needed. If SBP > 200 mmHg    LISINOPRIL (PRINIVIL,ZESTRIL) 20 MG TABLET    Take 1 tablet (20 mg total) by mouth once daily.    LORAZEPAM (ATIVAN) 0.5 MG TABLET    TAKE 1 TABLET BY MOUTH AT BEDTIME FOR ANXIETY    METHYLPREDNISOLONE (MEDROL DOSEPACK) 4 MG TABLET    Use as directed.    MV-MN/FOLIC/LUTEIN/HERBAL 293 (ALIVE WOMEN'S 50 PLUS ORAL)    Take 1 tablet by mouth once daily.    OMEGA 3-DHA-EPA-FISH OIL (FISH OIL) 1,000 MG (120 MG-180 MG) CAP    Take 1 capsule by mouth 2 (two) times daily with meals.   Modified Medications    No medications on file   Discontinued Medications    No medications on file       Family History   Problem Relation Age of Onset    Hypertension Mother     Arthritis Mother     Diabetes Father     Heart disease Father     Cancer Sister        Review of patient's allergies indicates:   Allergen Reactions    Codeine     Morphine Other (See Comments)         Objective:      Physical Exam  Patient is alert and oriented, no distress. Skin is intact. Neuro is normal with no focal motor or sensory findings.    Cervical exam is unremarkable. Intact cervical ROM. Negative Spurling's test    Physical Exam:  RIGHT    LEFT    Scap Dyskinesis/Winging (-)    (-)    Tenderness:          Greater Tuberosity             (-)    (-)  Bicipital Groove  (-)    +  AC joint   (-)    (-)  Other:     ROM:  Forward Elevation 160    70  Abduction  120    50  ER (at side)  80    60  IR   T8    L5    Strength:    Supraspinatus  5/5    5/5  Infraspinatus  5/5    5/5  Subscap / IR  5/5    5/5     Special Tests:   Neer:    (-)    +   [place]arson:   (-)    +   SS Stress:   (-)    (-)   Bear Hug:   (-)    (-)   [place]'s:   (-)    +   Resisted Thrower's:   (-)    +   Cross Arm Abduction:  (-)    (-)      Imaging:    XR SHOULDER COMPLETE 2 OR MORE VIEWS LEFT     CLINICAL HISTORY:  Pain in left shoulder     TECHNIQUE:  Two or three views of the left shoulder were performed.     COMPARISON:  10/12/2020     FINDINGS:  No acute osseous abnormality or significant change with severe glenohumeral joint degenerative findings.  AC joint stable with mild degenerative changes.  Lung parenchyma clear.     Impression:     As above        Electronically signed by: Luke Funes MD  Date:                                            03/08/2022     Physician read: I agree with the above impression.    Assessment/Plan:   Leticia Grimes is a 76 y.o. female with left shoulder severe glenohumeral arthritis    Plan:    1. Discussed diagnosis and treatment options with her and her daughter today.  She has severe left shoulder glenohumeral arthritis.  2. She has exhausted nonoperative treatments including rest, activity modification, oral medications, and physical therapy.  Despite these interventions, she continues to have symptoms on a daily basis that limit her activities and diminished her quality of life.  3. I recommend a left reverse total shoulder arthroplasty.  4. We discussed the risks, benefits, limitations, and alternatives of surgery today.  We discussed the postop rehab and recovery protocol in detail.  Despite the risks, she elected to proceed for with the surgery the consent was freely signed.  5. I would like her to get a CT scan of left shoulder for preoperative planning prior to the surgical date.  6. Follow up with me 10-14 days after surgery          Tomas Taylor MD

## 2022-04-08 NOTE — H&P (VIEW-ONLY)
Orthopaedic Follow-Up Visit    Last Appointment: 2022  Diagnosis: Glenohumeral arthritis, left shoulder  Prior Procedure: CSI    Leticia Grimes is a 76 y.o. female who is here for f/u evaluation of the left shoulder. The patient was last seen here by me on 2022 at which point we decided to wait until the summer for operative treatment. The patient returns today reporting that the symptoms are unchanged and is interested in proceeding with further options.     To review her history, Leticia Grimes is a 76 y.o. right-hand dominant female who presents with LEFT shoulder pain and dysfunction. Onset of the symptoms was several years ago, around . Inciting event: caretaking for her  who needed assistance. Current symptoms include pain in the shoulder, localized laterally and throughout upper arm. Pain is aggravated by activity, especially lifting, reaching      Patient's medications, allergies, past medical, surgical, social and family histories were reviewed and updated as appropriate.    Review of Systems   All systems reviewed were negative.  Specifically, the patient denies fever, chills, weight loss, chest pain, shortness of breath, or dyspnea on exertion.      Past Medical History:   Diagnosis Date    Adjustment insomnia 2020    Anxiety 2020    GERD (gastroesophageal reflux disease)     History of sudden cardiac arrest successfully resuscitated 2020    Hypertension     ICD (implantable cardioverter-defibrillator) in place 2020    Left arm swelling 9/3/2020    Mild vitamin D deficiency 2013    Osteopenia 2020    Vitamin D deficiency disease        Past Surgical History:   Procedure Laterality Date     SECTION, CLASSIC      HYSTERECTOMY      INJECTION OF JOINT Left 3/2/2021    Procedure: Left shoulder Joint injection;  Surgeon: Main Mcgraw MD;  Location: [facility];  Service: Pain Management;  Laterality: Left;    INSERTION OF  BIVENTRICULAR IMPLANTABLE CARDIOVERTER-DEFIBRILLATOR (ICD)      TUBAL LIGATION         Patient's Medications   New Prescriptions    No medications on file   Previous Medications    ALPHA LIPOIC ACID 200 MG CAP    Take 1 capsule by mouth once daily.    AMLODIPINE (NORVASC) 5 MG TABLET    Take 1 tablet (5 mg total) by mouth once daily.    CARVEDILOL (COREG) 12.5 MG TABLET    Take 1 tablet (12.5 mg total) by mouth 2 (two) times daily with meals.    CLONIDINE (CATAPRES) 0.1 MG TABLET    Take 0.1 mg by mouth as needed. If SBP > 200 mmHg    LISINOPRIL (PRINIVIL,ZESTRIL) 20 MG TABLET    Take 1 tablet (20 mg total) by mouth once daily.    LORAZEPAM (ATIVAN) 0.5 MG TABLET    TAKE 1 TABLET BY MOUTH AT BEDTIME FOR ANXIETY    METHYLPREDNISOLONE (MEDROL DOSEPACK) 4 MG TABLET    Use as directed.    MV-MN/FOLIC/LUTEIN/HERBAL 293 (ALIVE WOMEN'S 50 PLUS ORAL)    Take 1 tablet by mouth once daily.    OMEGA 3-DHA-EPA-FISH OIL (FISH OIL) 1,000 MG (120 MG-180 MG) CAP    Take 1 capsule by mouth 2 (two) times daily with meals.   Modified Medications    No medications on file   Discontinued Medications    No medications on file       Family History   Problem Relation Age of Onset    Hypertension Mother     Arthritis Mother     Diabetes Father     Heart disease Father     Cancer Sister        Review of patient's allergies indicates:   Allergen Reactions    Codeine     Morphine Other (See Comments)         Objective:      Physical Exam  Patient is alert and oriented, no distress. Skin is intact. Neuro is normal with no focal motor or sensory findings.    Cervical exam is unremarkable. Intact cervical ROM. Negative Spurling's test    Physical Exam:  RIGHT    LEFT    Scap Dyskinesis/Winging (-)    (-)    Tenderness:          Greater Tuberosity             (-)    (-)  Bicipital Groove  (-)    +  AC joint   (-)    (-)  Other:     ROM:  Forward Elevation 160    70  Abduction  120    50  ER (at side)  80    60  IR   T8    L5    Strength:    Supraspinatus  5/5    5/5  Infraspinatus  5/5    5/5  Subscap / IR  5/5    5/5     Special Tests:   Neer:    (-)    +   Larson:   (-)    +   SS Stress:   (-)    (-)   Bear Hug:   (-)    (-)   [place]'s:   (-)    +   Resisted Thrower's:   (-)    +   Cross Arm Abduction:  (-)    (-)      Imaging:    XR SHOULDER COMPLETE 2 OR MORE VIEWS LEFT     CLINICAL HISTORY:  Pain in left shoulder     TECHNIQUE:  Two or three views of the left shoulder were performed.     COMPARISON:  10/12/2020     FINDINGS:  No acute osseous abnormality or significant change with severe glenohumeral joint degenerative findings.  AC joint stable with mild degenerative changes.  Lung parenchyma clear.     Impression:     As above        Electronically signed by: Luke Funes MD  Date:                                            03/08/2022     Physician read: I agree with the above impression.    Assessment/Plan:   Leticia Grimes is a 76 y.o. female with left shoulder severe glenohumeral arthritis    Plan:    1. Discussed diagnosis and treatment options with her and her daughter today.  She has severe left shoulder glenohumeral arthritis.  2. She has exhausted nonoperative treatments including rest, activity modification, oral medications, and physical therapy.  Despite these interventions, she continues to have symptoms on a daily basis that limit her activities and diminished her quality of life.  3. I recommend a left reverse total shoulder arthroplasty.  4. We discussed the risks, benefits, limitations, and alternatives of surgery today.  We discussed the postop rehab and recovery protocol in detail.  Despite the risks, she elected to proceed for with the surgery the consent was freely signed.  5. I would like her to get a CT scan of left shoulder for preoperative planning prior to the surgical date.  6. Follow up with me 10-14 days after surgery          Tomas Taylor MD

## 2022-04-13 ENCOUNTER — CLINICAL SUPPORT (OUTPATIENT)
Dept: AUDIOLOGY | Facility: CLINIC | Age: 77
End: 2022-04-13
Payer: MEDICARE

## 2022-04-13 ENCOUNTER — OFFICE VISIT (OUTPATIENT)
Dept: OPHTHALMOLOGY | Facility: CLINIC | Age: 77
End: 2022-04-13
Payer: MEDICARE

## 2022-04-13 DIAGNOSIS — H52.4 HYPEROPIA OF BOTH EYES WITH REGULAR ASTIGMATISM AND PRESBYOPIA: ICD-10-CM

## 2022-04-13 DIAGNOSIS — I10 ESSENTIAL HYPERTENSION: ICD-10-CM

## 2022-04-13 DIAGNOSIS — H04.123 DRY EYE SYNDROME, BILATERAL: ICD-10-CM

## 2022-04-13 DIAGNOSIS — H52.223 HYPEROPIA OF BOTH EYES WITH REGULAR ASTIGMATISM AND PRESBYOPIA: ICD-10-CM

## 2022-04-13 DIAGNOSIS — Z46.1 HEARING AID FITTING OR ADJUSTMENT: Primary | ICD-10-CM

## 2022-04-13 DIAGNOSIS — H52.03 HYPEROPIA OF BOTH EYES WITH REGULAR ASTIGMATISM AND PRESBYOPIA: ICD-10-CM

## 2022-04-13 DIAGNOSIS — H25.13 NUCLEAR SCLEROTIC CATARACT OF BOTH EYES: Primary | ICD-10-CM

## 2022-04-13 PROCEDURE — 99999 PR PBB SHADOW E&M-EST. PATIENT-LVL II: ICD-10-PCS | Mod: PBBFAC,,, | Performed by: OPTOMETRIST

## 2022-04-13 PROCEDURE — 1126F PR PAIN SEVERITY QUANTIFIED, NO PAIN PRESENT: ICD-10-PCS | Mod: CPTII,S$GLB,, | Performed by: OPTOMETRIST

## 2022-04-13 PROCEDURE — 92015 PR REFRACTION: ICD-10-PCS | Mod: S$GLB,,, | Performed by: OPTOMETRIST

## 2022-04-13 PROCEDURE — 1126F AMNT PAIN NOTED NONE PRSNT: CPT | Mod: CPTII,S$GLB,, | Performed by: OPTOMETRIST

## 2022-04-13 PROCEDURE — 99999 PR PBB SHADOW E&M-EST. PATIENT-LVL II: CPT | Mod: PBBFAC,,, | Performed by: OPTOMETRIST

## 2022-04-13 PROCEDURE — 92015 DETERMINE REFRACTIVE STATE: CPT | Mod: S$GLB,,, | Performed by: OPTOMETRIST

## 2022-04-13 PROCEDURE — 92004 PR EYE EXAM, NEW PATIENT,COMPREHESV: ICD-10-PCS | Mod: S$GLB,,, | Performed by: OPTOMETRIST

## 2022-04-13 PROCEDURE — 1159F PR MEDICATION LIST DOCUMENTED IN MEDICAL RECORD: ICD-10-PCS | Mod: CPTII,S$GLB,, | Performed by: OPTOMETRIST

## 2022-04-13 PROCEDURE — 1159F MED LIST DOCD IN RCRD: CPT | Mod: CPTII,S$GLB,, | Performed by: OPTOMETRIST

## 2022-04-13 PROCEDURE — 92004 COMPRE OPH EXAM NEW PT 1/>: CPT | Mod: S$GLB,,, | Performed by: OPTOMETRIST

## 2022-04-13 NOTE — PROGRESS NOTES
Leticia Grimes was seen 04/13/2022 for a hearing aid follow-up appointment.  She is doing great with her new hearing aids. Aids set to 90% of target. Retention wire for the left aid was replaced. It was not staying in her mala bowl very well, possibly because the aid was not inserted into the ear canal fully.     We worked with ear level switch for accepting phone calls and also for adjusting volume. She did great with practice.     Patient will return next week for follow-up.

## 2022-04-14 NOTE — PROGRESS NOTES
HPI     New patient to [facility]   Last eye exam was 1 year ago    Vision changes since last eye exam?: No     Any eye pain today: No    Other ocular symptoms: No    Interested in contact lens fitting today? No       Last edited by Farhana Arce MA on 4/13/2022  1:52 PM. (History)            Assessment /Plan     For exam results, see Encounter Report.    Nuclear sclerotic cataract of both eyes  Cataracts not significantly affecting activities of daily living and therefore surgery is not indicated at this time.   Will continue to monitor over the next 12 months. Pt to call or RTC with any significant change in vision prior to next visit.     Hyperopia of both eyes with regular astigmatism and presbyopia  Eyeglass Final Rx     Eyeglass Final Rx       Sphere Cylinder Axis Add    Right +2.00 +1.00 165 +2.50    Left +2.00 +0.75 006 +2.50    Type: PAL                Dry eye syndrome, bilateral  Conservative measures for now, discussed warm compress/lid hygiene, preservative free artificial tears four times a day. RTC if no improvement.     Essential hypertension  No retinopathy, continue per Dr Marie.     RTC 1 year for CEE with DFE sooner if any changes to vision or worsening symptoms.

## 2022-04-19 ENCOUNTER — TELEPHONE (OUTPATIENT)
Dept: ORTHOPEDICS | Facility: CLINIC | Age: 77
End: 2022-04-19
Payer: MEDICARE

## 2022-04-19 NOTE — TELEPHONE ENCOUNTER
Spoke with patient in regards to her procedure. Informed her that everything has been submitted. -NS    ----- Message from Cammie Aggarwal sent at 4/19/2022 12:33 PM CDT -----  Contact: [phone]  Patient would like to consult with a nurse in regards to her scheduled procedure. Please call back at [phone]. Thanks r/s

## 2022-04-20 ENCOUNTER — HOSPITAL ENCOUNTER (OUTPATIENT)
Dept: RADIOLOGY | Facility: HOSPITAL | Age: 77
Discharge: HOME | End: 2022-04-20
Attending: STUDENT IN AN ORGANIZED HEALTH CARE EDUCATION/TRAINING PROGRAM
Payer: MEDICARE

## 2022-04-20 ENCOUNTER — OFFICE VISIT (OUTPATIENT)
Dept: INTERNAL MEDICINE | Facility: CLINIC | Age: 77
End: 2022-04-20
Payer: MEDICARE

## 2022-04-20 ENCOUNTER — CLINICAL SUPPORT (OUTPATIENT)
Dept: AUDIOLOGY | Facility: CLINIC | Age: 77
End: 2022-04-20
Payer: MEDICARE

## 2022-04-20 VITALS
WEIGHT: 164.44 LBS | DIASTOLIC BLOOD PRESSURE: 82 MMHG | OXYGEN SATURATION: 99 % | BODY MASS INDEX: 30.26 KG/M2 | HEART RATE: 63 BPM | TEMPERATURE: 98 F | HEIGHT: 62 IN | SYSTOLIC BLOOD PRESSURE: 130 MMHG

## 2022-04-20 DIAGNOSIS — I10 ESSENTIAL HYPERTENSION: Chronic | ICD-10-CM

## 2022-04-20 DIAGNOSIS — E55.9 MILD VITAMIN D DEFICIENCY: Chronic | ICD-10-CM

## 2022-04-20 DIAGNOSIS — Z01.818 PREOPERATIVE EXAMINATION: ICD-10-CM

## 2022-04-20 DIAGNOSIS — Z95.810 ICD (IMPLANTABLE CARDIOVERTER-DEFIBRILLATOR) IN PLACE: Chronic | ICD-10-CM

## 2022-04-20 DIAGNOSIS — Z86.74 HISTORY OF SUDDEN CARDIAC ARREST SUCCESSFULLY RESUSCITATED: ICD-10-CM

## 2022-04-20 DIAGNOSIS — E78.2 MIXED HYPERLIPIDEMIA: Chronic | ICD-10-CM

## 2022-04-20 DIAGNOSIS — K21.9 GASTROESOPHAGEAL REFLUX DISEASE WITHOUT ESOPHAGITIS: ICD-10-CM

## 2022-04-20 DIAGNOSIS — Z46.1 HEARING AID FITTING OR ADJUSTMENT: Primary | ICD-10-CM

## 2022-04-20 DIAGNOSIS — G89.29 CHRONIC LEFT SHOULDER PAIN: Primary | Chronic | ICD-10-CM

## 2022-04-20 DIAGNOSIS — M19.012 GLENOHUMERAL ARTHRITIS, LEFT: ICD-10-CM

## 2022-04-20 DIAGNOSIS — I10 ESSENTIAL HYPERTENSION: Primary | ICD-10-CM

## 2022-04-20 DIAGNOSIS — M19.012 PRIMARY OSTEOARTHRITIS OF LEFT SHOULDER: ICD-10-CM

## 2022-04-20 DIAGNOSIS — F44.0 DISSOCIATIVE AMNESIA: Chronic | ICD-10-CM

## 2022-04-20 DIAGNOSIS — F51.02 ADJUSTMENT INSOMNIA: Chronic | ICD-10-CM

## 2022-04-20 DIAGNOSIS — Z78.9 STATIN INTOLERANCE: Chronic | ICD-10-CM

## 2022-04-20 DIAGNOSIS — M85.80 OSTEOPENIA, UNSPECIFIED LOCATION: Chronic | ICD-10-CM

## 2022-04-20 DIAGNOSIS — M25.512 CHRONIC LEFT SHOULDER PAIN: Primary | Chronic | ICD-10-CM

## 2022-04-20 DIAGNOSIS — F41.9 ANXIETY: Chronic | ICD-10-CM

## 2022-04-20 PROBLEM — R07.2 PRECORDIAL PAIN: Status: RESOLVED | Noted: 2020-10-15 | Resolved: 2022-04-20

## 2022-04-20 PROBLEM — R53.1 DECREASED STRENGTH, ENDURANCE, AND MOBILITY: Status: RESOLVED | Noted: 2022-01-26 | Resolved: 2022-04-20

## 2022-04-20 PROBLEM — R68.89 DECREASED STRENGTH, ENDURANCE, AND MOBILITY: Status: RESOLVED | Noted: 2022-01-26 | Resolved: 2022-04-20

## 2022-04-20 PROBLEM — Z74.09 DECREASED STRENGTH, ENDURANCE, AND MOBILITY: Status: RESOLVED | Noted: 2022-01-26 | Resolved: 2022-04-20

## 2022-04-20 PROBLEM — M25.619 DECREASED RANGE OF MOTION (ROM) OF SHOULDER: Status: RESOLVED | Noted: 2022-02-09 | Resolved: 2022-04-20

## 2022-04-20 PROBLEM — M25.519 CHRONIC SHOULDER PAIN: Chronic | Status: ACTIVE | Noted: 2021-03-02

## 2022-04-20 PROCEDURE — 99999 PR PBB SHADOW E&M-EST. PATIENT-LVL IV: CPT | Mod: PBBFAC,,, | Performed by: FAMILY MEDICINE

## 2022-04-20 PROCEDURE — 99215 PR OFFICE/OUTPT VISIT, EST, LEVL V, 40-54 MIN: ICD-10-PCS | Mod: S$GLB,,, | Performed by: FAMILY MEDICINE

## 2022-04-20 PROCEDURE — 3075F SYST BP GE 130 - 139MM HG: CPT | Mod: CPTII,S$GLB,, | Performed by: FAMILY MEDICINE

## 2022-04-20 PROCEDURE — 3079F DIAST BP 80-89 MM HG: CPT | Mod: CPTII,S$GLB,, | Performed by: FAMILY MEDICINE

## 2022-04-20 PROCEDURE — 3288F FALL RISK ASSESSMENT DOCD: CPT | Mod: CPTII,S$GLB,, | Performed by: FAMILY MEDICINE

## 2022-04-20 PROCEDURE — 1159F MED LIST DOCD IN RCRD: CPT | Mod: CPTII,S$GLB,, | Performed by: FAMILY MEDICINE

## 2022-04-20 PROCEDURE — 1159F PR MEDICATION LIST DOCUMENTED IN MEDICAL RECORD: ICD-10-PCS | Mod: CPTII,S$GLB,, | Performed by: FAMILY MEDICINE

## 2022-04-20 PROCEDURE — 1101F PT FALLS ASSESS-DOCD LE1/YR: CPT | Mod: CPTII,S$GLB,, | Performed by: FAMILY MEDICINE

## 2022-04-20 PROCEDURE — 1101F PR PT FALLS ASSESS DOC 0-1 FALLS W/OUT INJ PAST YR: ICD-10-PCS | Mod: CPTII,S$GLB,, | Performed by: FAMILY MEDICINE

## 2022-04-20 PROCEDURE — 73200 CT SHOULDER WITHOUT CONTRAST LEFT: ICD-10-PCS | Mod: 26,LT,, | Performed by: RADIOLOGY

## 2022-04-20 PROCEDURE — 73200 CT UPPER EXTREMITY W/O DYE: CPT | Mod: 26,LT,, | Performed by: RADIOLOGY

## 2022-04-20 PROCEDURE — 99215 OFFICE O/P EST HI 40 MIN: CPT | Mod: S$GLB,,, | Performed by: FAMILY MEDICINE

## 2022-04-20 PROCEDURE — 73200 CT UPPER EXTREMITY W/O DYE: CPT | Mod: TC,LT

## 2022-04-20 PROCEDURE — 1125F AMNT PAIN NOTED PAIN PRSNT: CPT | Mod: CPTII,S$GLB,, | Performed by: FAMILY MEDICINE

## 2022-04-20 PROCEDURE — 99999 PR PBB SHADOW E&M-EST. PATIENT-LVL IV: ICD-10-PCS | Mod: PBBFAC,,, | Performed by: FAMILY MEDICINE

## 2022-04-20 PROCEDURE — 3075F PR MOST RECENT SYSTOLIC BLOOD PRESS GE 130-139MM HG: ICD-10-PCS | Mod: CPTII,S$GLB,, | Performed by: FAMILY MEDICINE

## 2022-04-20 PROCEDURE — 1125F PR PAIN SEVERITY QUANTIFIED, PAIN PRESENT: ICD-10-PCS | Mod: CPTII,S$GLB,, | Performed by: FAMILY MEDICINE

## 2022-04-20 PROCEDURE — 3288F PR FALLS RISK ASSESSMENT DOCUMENTED: ICD-10-PCS | Mod: CPTII,S$GLB,, | Performed by: FAMILY MEDICINE

## 2022-04-20 PROCEDURE — 3079F PR MOST RECENT DIASTOLIC BLOOD PRESSURE 80-89 MM HG: ICD-10-PCS | Mod: CPTII,S$GLB,, | Performed by: FAMILY MEDICINE

## 2022-04-20 NOTE — PROGRESS NOTES
Subjective:   Patient ID: Leticia Grimes is a 76 y.o. female.  Chief Complaint:  Pre-op Exam    Presents for preoperative evaluation  Left glenohumeral joint osteoarthritis  Left shoulder arthroplasty  Dr. Taylor  5/5/2022  General anesthesia    Medical history:  - Hypertension.  Controlled/stable on amlodipine 5 mg daily, lisinopril 20 mg daily, Coreg 12.5 mg twice a day, and clonidine 0.1 mg as needed.  Reports compliance.  Denies side effects.  Denies shortness breath swelling.  - Hyperlipidemia.  Intolerant of statin.  On fish oil supplementation.  Denies chest pain claudication.  - History resuscitation cardiac arrest with ICD.  Episode greater than 12 months ago.  No recurrence.  Previous interrogation of ICD without any firing/abnormality.  - Osteopenia vitamin-D insufficiency.  Low fracture risk.  On over-the-counter vitamin-D supplement.  - Anxiety, dissociative amnesia, adjustment insomnia.  Followed by Psychiatry.  On Ativan 0.5 mg nightly as needed for anxiety.    Currently without any active cardiac complaints  No active pulmonary complaints or significant chronic pulmonary history  Last labs October 21 with colon  Blood Counts are normal. No significant anemia.  Sugar, Kidney, Liver, and Electrolyte tests are all normal.  Cholesterol tests are acceptable.  A1c is in a normal range. No Prediabtes or Diabetes  No signs or symptoms any underlying infection      Current Outpatient Medications:     alpha lipoic acid 200 mg Cap, Take 1 capsule by mouth once daily., Disp: , Rfl:     amLODIPine (NORVASC) 5 MG tablet, Take 1 tablet (5 mg total) by mouth once daily., Disp: 90 tablet, Rfl: 3    carvediloL (COREG) 12.5 MG tablet, Take 1 tablet (12.5 mg total) by mouth 2 (two) times daily with meals., Disp: 180 tablet, Rfl: 3    cloNIDine (CATAPRES) 0.1 MG tablet, Take 0.1 mg by mouth as needed. If SBP > 200 mmHg, Disp: , Rfl:     lisinopriL (PRINIVIL,ZESTRIL) 20 MG tablet, Take 1 tablet (20 mg total) by  "mouth once daily., Disp: 90 tablet, Rfl: 3    LORazepam (ATIVAN) 0.5 MG tablet, TAKE 1 TABLET BY MOUTH AT BEDTIME FOR ANXIETY, Disp: 30 tablet, Rfl: 3    mv-mn/folic/lutein/herbal 293 (ALIVE WOMEN'S 50 PLUS ORAL), Take 1 tablet by mouth once daily., Disp: , Rfl:     omega 3-dha-epa-fish oil (FISH OIL) 1,000 mg (120 mg-180 mg) Cap, Take 1 capsule by mouth 2 (two) times daily with meals., Disp: 180 capsule, Rfl: 3    Review of Systems   Constitutional: Negative for activity change, appetite change, chills, fatigue and fever.   HENT: Negative for congestion, dental problem, ear pain, postnasal drip, rhinorrhea, sinus pressure and sore throat.    Eyes: Negative for visual disturbance.   Respiratory: Negative for cough, chest tightness, shortness of breath and wheezing.    Cardiovascular: Negative for chest pain, palpitations and leg swelling.   Gastrointestinal: Negative for abdominal distention, abdominal pain, blood in stool, constipation, diarrhea, nausea and vomiting.   Endocrine: Negative for polydipsia, polyphagia and polyuria.   Genitourinary: Negative for difficulty urinating, dysuria, flank pain, hematuria and pelvic pain.   Musculoskeletal: Positive for arthralgias, joint swelling and myalgias. Negative for back pain and neck pain.   Skin: Negative for rash.   Neurological: Negative for dizziness, tremors, syncope, weakness, light-headedness, numbness and headaches.   Hematological: Negative for adenopathy.   Psychiatric/Behavioral: Negative for agitation, behavioral problems, confusion, decreased concentration, dysphoric mood, hallucinations, self-injury, sleep disturbance and suicidal ideas. The patient is not nervous/anxious and is not hyperactive.      Objective:   /82   Pulse 63   Temp 97.7 °F (36.5 °C)   Ht 5' 1.5" (1.562 m)   Wt 74.6 kg (164 lb 7.4 oz)   SpO2 99%   BMI 30.57 kg/m²     Physical Exam  Vitals and nursing note reviewed.   Constitutional:       Appearance: She is " well-developed and normal weight.   HENT:      Head: Normocephalic and atraumatic.      Nose: Nose normal. No congestion or rhinorrhea.      Mouth/Throat:      Dentition: Has dentures.      Pharynx: Oropharynx is clear. No oropharyngeal exudate or posterior oropharyngeal erythema.   Eyes:      General: No scleral icterus.        Right eye: No discharge.         Left eye: No discharge.      Conjunctiva/sclera: Conjunctivae normal.   Neck:      Thyroid: No thyroid mass, thyromegaly or thyroid tenderness.      Vascular: Normal carotid pulses. No JVD.   Cardiovascular:      Rate and Rhythm: Normal rate and regular rhythm.      Pulses:           Radial pulses are 2+ on the right side and 2+ on the left side.      Heart sounds: Normal heart sounds. No murmur heard.    No friction rub. No gallop.   Pulmonary:      Effort: Pulmonary effort is normal.      Breath sounds: Normal breath sounds. No wheezing, rhonchi or rales.   Abdominal:      General: There is no distension.      Palpations: Abdomen is soft.      Tenderness: There is no abdominal tenderness.   Musculoskeletal:      Right lower leg: No edema.      Left lower leg: No edema.   Skin:     General: Skin is dry.      Findings: No rash.   Neurological:      Mental Status: She is alert.   Psychiatric:         Attention and Perception: Attention normal.         Mood and Affect: Mood normal.       Assessment:       ICD-10-CM ICD-9-CM   1. Chronic left shoulder pain  M25.512 719.41    G89.29 338.29   2. Primary osteoarthritis of left shoulder  M19.012 715.11   3. Preoperative examination  Z01.818 V72.84   4. Essential hypertension  I10 401.9   5. History of sudden cardiac arrest successfully resuscitated  Z86.74 V12.53   6. ICD (implantable cardioverter-defibrillator) in place  Z95.810 V45.02   7. Mixed hyperlipidemia  E78.2 272.2   8. Statin intolerance  Z78.9 995.27   9. Gastroesophageal reflux disease without esophagitis  K21.9 530.81   10. Osteopenia, unspecified  location  M85.80 733.90   11. Mild vitamin D deficiency  E55.9 268.9   12. Anxiety  F41.9 300.00   13. Dissociative amnesia  F44.0 300.12   14. Adjustment insomnia  F51.02 307.41     Plan:   Chronic left shoulder pain  Primary osteoarthritis of left shoulder  Preoperative examination  Based on the history and physical exam I performed and review of the data presently available to me, patient needs cardiac clearance prior to procedure plan under general anesthesia.  Once cleared by Cardiology, she may proceed without further evaluation or delay.    Essential hypertension  Controlled.  Stable.  Asymptomatic.  BP at goal.  Continue current medications.    History of sudden cardiac arrest successfully resuscitated  ICD (implantable cardioverter-defibrillator) in place  Needs cardiac clearance prior to surgery    Mixed hyperlipidemia  Statin intolerance  Stable.  Asymptomatic.  Okay to hold/discontinue fish oil prior to procedure    Gastroesophageal reflux disease without esophagitis  Stable off medications    Osteopenia, unspecified location  Mild vitamin D deficiency  Okay to hold vitamin-D supplement prior to procedure  Al due for repeat bone density, but deferred until after procedure.    Anxiety  Dissociative amnesia  Adjustment insomnia  Stable, no side effects, mood and symptoms well controlled on present medication .  Continue Ativan nightly as needed.    Follow up with any/all specialists scheduled    Return to clinic as needed     40+ minutes of total time spent on the encounter, which includes face to face time and non-face to face time preparing to see the patient (eg, review of tests), Obtaining and/or reviewing separately obtained history, documenting clinical information in the electronic or other health record, independently interpreting results (not separately reported) and communicating results to the patient/family/caregiver, or Care coordination (not separately reported).

## 2022-04-20 NOTE — LETTER
April 20, 2022      The [address]  [address] [address] [employer]  [place] [address]  Phone: [phone]  Fax: [phone]       Patient: Leticia Grimes   YOB: 1945  Date of Visit: 04/20/2022    Dear Dr. Taylor,    I saw Mrs. Grimes on April 20, 2022 as requested prior to her planned left shoulder arthroplasty.    Based on the history and physical exam I performed and review of the data presently available to me, she needs additional cardiac clearance prior to her procedure. Once cardiac clearance is obtained, she may proceed with the operation any without additional evaluation or delay.    Please call me with any questions or if I can be of any further service.    Sincerely,    Ben Marie MD

## 2022-04-20 NOTE — PROGRESS NOTES
Leticia Caity Grimes was seen 04/20/2022 for a hearing aid follow-up appointment.  She is doing great with her new hearing aids. Aids set to 100% of target today. She had a few questions about her phone vs.car's bluetooth. She had a call earlier and it went to her car speakers, but she wanted it in her hearing aids. She pressed the 'audio' button but did not see 'Leticia's hearing aids' a choice. I am not sure why not. We practiced in the office and it was there. She will keep me posted on this.     Patient will return in 2 weeks for follow-up.

## 2022-04-26 ENCOUNTER — HOSPITAL ENCOUNTER (OUTPATIENT)
Dept: CARDIOLOGY | Facility: HOSPITAL | Age: 77
Discharge: HOME | End: 2022-04-26
Attending: INTERNAL MEDICINE
Payer: MEDICARE

## 2022-04-26 ENCOUNTER — OFFICE VISIT (OUTPATIENT)
Dept: CARDIOLOGY | Facility: CLINIC | Age: 77
End: 2022-04-26
Payer: MEDICARE

## 2022-04-26 VITALS
DIASTOLIC BLOOD PRESSURE: 70 MMHG | HEIGHT: 62 IN | WEIGHT: 163 LBS | BODY MASS INDEX: 30 KG/M2 | OXYGEN SATURATION: 99 % | HEART RATE: 61 BPM | SYSTOLIC BLOOD PRESSURE: 130 MMHG

## 2022-04-26 DIAGNOSIS — E78.2 MIXED HYPERLIPIDEMIA: ICD-10-CM

## 2022-04-26 DIAGNOSIS — I10 ESSENTIAL HYPERTENSION: ICD-10-CM

## 2022-04-26 DIAGNOSIS — Z01.810 PREOP CARDIOVASCULAR EXAM: Primary | ICD-10-CM

## 2022-04-26 DIAGNOSIS — Z95.810 ICD (IMPLANTABLE CARDIOVERTER-DEFIBRILLATOR) IN PLACE: Chronic | ICD-10-CM

## 2022-04-26 DIAGNOSIS — Z78.9 STATIN INTOLERANCE: Chronic | ICD-10-CM

## 2022-04-26 DIAGNOSIS — I10 ESSENTIAL HYPERTENSION: Chronic | ICD-10-CM

## 2022-04-26 DIAGNOSIS — Z86.74 HISTORY OF SUDDEN CARDIAC ARREST SUCCESSFULLY RESUSCITATED: Chronic | ICD-10-CM

## 2022-04-26 PROCEDURE — 3075F PR MOST RECENT SYSTOLIC BLOOD PRESS GE 130-139MM HG: ICD-10-PCS | Mod: CPTII,S$GLB,, | Performed by: INTERNAL MEDICINE

## 2022-04-26 PROCEDURE — 1125F AMNT PAIN NOTED PAIN PRSNT: CPT | Mod: CPTII,S$GLB,, | Performed by: INTERNAL MEDICINE

## 2022-04-26 PROCEDURE — 1101F PT FALLS ASSESS-DOCD LE1/YR: CPT | Mod: CPTII,S$GLB,, | Performed by: INTERNAL MEDICINE

## 2022-04-26 PROCEDURE — 93005 ELECTROCARDIOGRAM TRACING: CPT

## 2022-04-26 PROCEDURE — 3078F DIAST BP <80 MM HG: CPT | Mod: CPTII,S$GLB,, | Performed by: INTERNAL MEDICINE

## 2022-04-26 PROCEDURE — 93010 ELECTROCARDIOGRAM REPORT: CPT | Mod: ,,, | Performed by: INTERNAL MEDICINE

## 2022-04-26 PROCEDURE — 3288F PR FALLS RISK ASSESSMENT DOCUMENTED: ICD-10-PCS | Mod: CPTII,S$GLB,, | Performed by: INTERNAL MEDICINE

## 2022-04-26 PROCEDURE — 93010 EKG 12-LEAD: ICD-10-PCS | Mod: ,,, | Performed by: INTERNAL MEDICINE

## 2022-04-26 PROCEDURE — 99999 PR PBB SHADOW E&M-EST. PATIENT-LVL IV: ICD-10-PCS | Mod: PBBFAC,,, | Performed by: INTERNAL MEDICINE

## 2022-04-26 PROCEDURE — 3078F PR MOST RECENT DIASTOLIC BLOOD PRESSURE < 80 MM HG: ICD-10-PCS | Mod: CPTII,S$GLB,, | Performed by: INTERNAL MEDICINE

## 2022-04-26 PROCEDURE — 99999 PR PBB SHADOW E&M-EST. PATIENT-LVL IV: CPT | Mod: PBBFAC,,, | Performed by: INTERNAL MEDICINE

## 2022-04-26 PROCEDURE — 1160F RVW MEDS BY RX/DR IN RCRD: CPT | Mod: CPTII,S$GLB,, | Performed by: INTERNAL MEDICINE

## 2022-04-26 PROCEDURE — 99214 PR OFFICE/OUTPT VISIT, EST, LEVL IV, 30-39 MIN: ICD-10-PCS | Mod: S$GLB,,, | Performed by: INTERNAL MEDICINE

## 2022-04-26 PROCEDURE — 1159F MED LIST DOCD IN RCRD: CPT | Mod: CPTII,S$GLB,, | Performed by: INTERNAL MEDICINE

## 2022-04-26 PROCEDURE — 1125F PR PAIN SEVERITY QUANTIFIED, PAIN PRESENT: ICD-10-PCS | Mod: CPTII,S$GLB,, | Performed by: INTERNAL MEDICINE

## 2022-04-26 PROCEDURE — 99214 OFFICE O/P EST MOD 30 MIN: CPT | Mod: S$GLB,,, | Performed by: INTERNAL MEDICINE

## 2022-04-26 PROCEDURE — 1101F PR PT FALLS ASSESS DOC 0-1 FALLS W/OUT INJ PAST YR: ICD-10-PCS | Mod: CPTII,S$GLB,, | Performed by: INTERNAL MEDICINE

## 2022-04-26 PROCEDURE — 1160F PR REVIEW ALL MEDS BY PRESCRIBER/CLIN PHARMACIST DOCUMENTED: ICD-10-PCS | Mod: CPTII,S$GLB,, | Performed by: INTERNAL MEDICINE

## 2022-04-26 PROCEDURE — 3288F FALL RISK ASSESSMENT DOCD: CPT | Mod: CPTII,S$GLB,, | Performed by: INTERNAL MEDICINE

## 2022-04-26 PROCEDURE — 3075F SYST BP GE 130 - 139MM HG: CPT | Mod: CPTII,S$GLB,, | Performed by: INTERNAL MEDICINE

## 2022-04-26 PROCEDURE — 1159F PR MEDICATION LIST DOCUMENTED IN MEDICAL RECORD: ICD-10-PCS | Mod: CPTII,S$GLB,, | Performed by: INTERNAL MEDICINE
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
## 2022-04-28 ENCOUNTER — NURSE TRIAGE (OUTPATIENT)
Dept: ADMINISTRATIVE | Facility: CLINIC | Age: 77
End: 2022-04-28
Payer: MEDICARE

## 2022-04-28 DIAGNOSIS — I10 ESSENTIAL HYPERTENSION: ICD-10-CM

## 2022-04-29 ENCOUNTER — TELEPHONE (OUTPATIENT)
Dept: CARDIOLOGY | Facility: CLINIC | Age: 77
End: 2022-04-29
Payer: MEDICARE

## 2022-04-29 DIAGNOSIS — Z01.818 PREOP TESTING: Primary | ICD-10-CM

## 2022-04-29 RX ORDER — AMLODIPINE BESYLATE 10 MG/1
10 TABLET ORAL DAILY
Qty: 90 TABLET | Refills: 3 | Status: SHIPPED | OUTPATIENT
Start: 2022-04-29 | End: 2022-06-01 | Stop reason: SDUPTHER

## 2022-04-29 NOTE — TELEPHONE ENCOUNTER
Pt called in stating she did get the Amlodipine 10 mg. She would like Dr. Arthur to be aware last year her PCP decreased her dosage to 5mg daily due to ankle swelling.  She states she will monitor the ankles and follow up with us next week.      ----- Message from Christina Horton sent at 4/29/2022  2:04 PM CDT -----  Regarding: list  Contact: pt  Please call pt again. [phone]

## 2022-04-29 NOTE — TELEPHONE ENCOUNTER
Pt reports she is in the digital medicine program, but hasn't received a call after having an elevated BP of 150/82, pt states she thought someone was to call her if her BP goes out of range. Pt triaged and advised to follow up with her MD within 2 weeks per protocol and that a message will be routed to MD office and [facility] about her concerns. Pt encouraged to call back with any worsening symptoms or questions and verbalized understanding.    Reason for Disposition   [1] Systolic BP  >= 130 OR Diastolic >= 80 AND [2] taking BP medications    Additional Information   Negative: Difficult to awaken or acting confused (e.g., disoriented, slurred speech)   Negative: SEVERE difficulty breathing (e.g., struggling for each breath, speaks in single words)   Negative: [1] Weakness of the face, arm or leg on one side of the body AND [2] new-onset   Negative: [1] Numbness (i.e., loss of sensation) of the face, arm or leg on one side of the body AND [2] new-onset   Negative: [1] Chest pain lasts > 5 minutes AND [2] history of heart disease  (i.e., heart attack, bypass surgery, angina, angioplasty, CHF)   Negative: [1] Chest pain AND [2] took nitrogylcerin AND [3] pain was not relieved   Negative: Sounds like a life-threatening emergency to the triager   Negative: [1] Systolic BP  >= 160 OR Diastolic >= 100 AND [2] cardiac or neurologic symptoms (e.g., chest pain, difficulty breathing, unsteady gait, blurred vision)   Negative: [1] Pregnant 20 or more weeks (or postpartum < 6 weeks) AND [2] new hand or face swelling   Negative: [1] Pregnant 20 or more weeks AND [2] Systolic BP  >= 140 OR Diastolic >= 90   Negative: [1] Systolic BP  >= 200 OR Diastolic >= 120  AND [2] having NO cardiac or neurologic symptoms   Negative: [1] Postpartum < 6 weeks AND [2] Systolic BP  >= 140 OR Diastolic >= 90   Negative: [1] Systolic BP  >= 180 OR Diastolic >= 110 AND [2] missed most recent dose of blood pressure medication    Negative: Systolic BP  >= 180 OR Diastolic >= 110   Negative: Ran out of BP medications   Negative: Systolic BP  >= 160 OR Diastolic >= 100   Negative: [1] Taking BP medications AND [2] feels is having side effects (e.g., impotence, cough, dizzy upon standing)   Negative: [1] Systolic BP  >= 130 OR Diastolic >= 80 AND [2] pregnant    Protocols used: BLOOD PRESSURE - HIGH-A-AH

## 2022-05-01 ENCOUNTER — PATIENT MESSAGE (OUTPATIENT)
Dept: SURGERY | Facility: HOSPITAL | Age: 77
End: 2022-05-01
Payer: MEDICARE

## 2022-05-02 ENCOUNTER — NURSE TRIAGE (OUTPATIENT)
Dept: ADMINISTRATIVE | Facility: CLINIC | Age: 77
End: 2022-05-02
Payer: MEDICARE

## 2022-05-02 ENCOUNTER — PATIENT MESSAGE (OUTPATIENT)
Dept: SURGERY | Facility: HOSPITAL | Age: 77
End: 2022-05-02
Payer: MEDICARE

## 2022-05-02 NOTE — PRE ADMISSION SCREENING
Pre op instructions reviewed with pt per phone: Spoke about pre op process and surgery instructions, verbalized understanding.    Surgery is scheduled on 5/5/22. Please arrive at 0515am. We will call you the afternoon prior to surgery to confirm arrival time, as it is subject to change due to cancellations & emergencies.    Please report to the [facility] (1st Floor) at [facility] located off of [facility] (2nd building on the left, in front of the [facility] pole).  Address: [address] [place] [address] [place] [id number]        INSTRUCTIONS IMPORTANT!!!  Do Not Eat, Drink, or Smoke after 12 midnight! NO WATER after midnight! OK to brush teeth, no gum, candy or mints!      *Take only these medicines with a small swallow of water-morning of surgery.  Amlodipine  Carvedilol    ____  NO Acrylic/fake nails or nail polish worn day of surgery (specifically hand/arm & foot surgeries).  ____  NO powder, lotions, deodorants, oils or creams on body.  ____  Please Remove All jewelry & piercings prior to surgery.  ____  Please Remove Dentures, Hearing Aids & Contact Lens prior to the start of surgery.  ____  Please bring photo ID and insurance information to hospital (Leave Valuables at Home).  ____  If going home the same day, arrange for a ride home. You will not be able to drive 24 hrs if Anesthesia was used.   ____  Females (ages 11-60) may need to give a urine sample the morning of surgery; please see Pre op Nurse prior to voiding.  ____  Wear clean, loose fitting clothing. Allow for dressings, bandages.  ____  Stop all Aspirin products, Ibuprofen, Advil, Motrin & Aleve at least 5-7 days before surgery, unless otherwise instructed by your doctor, or the nurse.   ____  Blood Thinners are stopped based on your Provider's recommendation; Call Surgeon's Office to inquire when to stop/hold.  ____  Stop taking any Fish Oil supplements or Vitamins at least 5 days prior to surgery, unless instructed otherwise by your Doctor.             Diabetic Patients: If you take diabetic medication, do NOT take morning of surgery unless instructed by             Doctor. Metformin to be stopped 24 hrs prior to surgery time. DO NOT take long-acting insulin the evening before surgery. Blood sugars will be checked in pre-op morning of.    Bathing Instructions:    -Do not shave your face or body the day before or the day of surgery.  -Do not shave pubic hair 7 days prior to surgery (gyn pt's).   -Shower & Rinse your body as usual with anti-bacterial Soap (Dial, Lever 2000, or Hibiclens)   -Do not use Hibiclens on your head, face, or genitals.   -Do not wash with anti-bacterial soap after you use the Hibiclens.   -Rinse your body thoroughly.      [facility] Visitor/Ride Policy:  Only 2 adults allowed (over the age of 18) to accompany you to the Hospital. You Must have a ride home from a responsible adult that you know and trust. Medical Transport, Uber or Lyft can only be used if patient has a responsible adult to accompany them during ride home.    Post-Op Instructions: You will receive Post-op/Discharge instructions by your Discharge Nurse prior to going home. Please call your Surgeon's office with any post-surgery questions/concerns.    *Call [facility] Pre-Admissions Department with surgery instruction questions @ [phone] or [phone] (Mon-Fri 7:30a to 3:45p)  *If you are running late or have questions the morning of surgery, please call the Surgery Dept @ [phone]  *Insurance/ Financial Questions, please call [phone].

## 2022-05-03 ENCOUNTER — TELEPHONE (OUTPATIENT)
Dept: ORTHOPEDICS | Facility: CLINIC | Age: 77
End: 2022-05-03
Payer: MEDICARE

## 2022-05-03 DIAGNOSIS — Z96.612 S/P REVERSE TOTAL SHOULDER ARTHROPLASTY, LEFT: ICD-10-CM

## 2022-05-03 DIAGNOSIS — M19.012 GLENOHUMERAL ARTHRITIS, LEFT: Primary | ICD-10-CM

## 2022-05-03 DIAGNOSIS — Z96.612 S/P REVERSE TOTAL SHOULDER ARTHROPLASTY, LEFT: Primary | ICD-10-CM

## 2022-05-03 NOTE — TELEPHONE ENCOUNTER
----- Message from Ric Blake sent at 5/3/2022  2:03 PM CDT -----  Pt would like a return call regarding getting a referral for physical therapy sent over to the [facility]. Please call back at .[phone]

## 2022-05-03 NOTE — TELEPHONE ENCOUNTER
Spoke with patient states she accidentally took additional dose of amlodipine 10 mg tonight at 7 pm.  States she was supposed to take carvedilol 12.5 mg instead.  Patient took her morning dosage or carvedilol 12.5 mg and amlodipine 10 mg this morning at 9 am.  Current B/P 157/79, pulse-79.  Patient denies having any symptoms at this time.  Spoke with on call provider Dr. Analia Sherman whom states She should be fine. Recheck blood pressure in an hour. Drink water to hydrate.  Hold her carvedilol tonight.  Patient given instructions and she verbalized  understanding.  Advised patient to call back if further assistance is needed.     Reason for Disposition   [1] DOUBLE DOSE (an extra dose or lesser amount) of prescription drug AND [2] NO symptoms (Exception: a double dose of antibiotics)    Protocols used: MEDICATION QUESTION CALL-A-

## 2022-05-04 ENCOUNTER — ANESTHESIA EVENT (OUTPATIENT)
Dept: SURGERY | Facility: HOSPITAL | Age: 77
End: 2022-05-04
Payer: MEDICARE

## 2022-05-04 ENCOUNTER — TELEPHONE (OUTPATIENT)
Dept: PREADMISSION TESTING | Facility: HOSPITAL | Age: 77
End: 2022-05-04
Payer: MEDICARE

## 2022-05-04 NOTE — TELEPHONE ENCOUNTER
Called and LVM for pt about the following:    Please arrive to [facility] ([facility] main lobby at 0515am on 5/5/22 for your scheduled procedure. NOTHING to eat or drink after midnight, except medications instructed by the Pre-admit Nurse.

## 2022-05-05 ENCOUNTER — PATIENT MESSAGE (OUTPATIENT)
Dept: SURGERY | Facility: HOSPITAL | Age: 77
End: 2022-05-05
Payer: MEDICARE

## 2022-05-05 ENCOUNTER — ANESTHESIA (OUTPATIENT)
Dept: SURGERY | Facility: HOSPITAL | Age: 77
End: 2022-05-05
Payer: MEDICARE

## 2022-05-05 ENCOUNTER — HOSPITAL ENCOUNTER (OUTPATIENT)
Facility: HOSPITAL | Age: 77
Discharge: HOME | End: 2022-05-05
Attending: STUDENT IN AN ORGANIZED HEALTH CARE EDUCATION/TRAINING PROGRAM | Admitting: STUDENT IN AN ORGANIZED HEALTH CARE EDUCATION/TRAINING PROGRAM
Payer: MEDICARE

## 2022-05-05 VITALS
TEMPERATURE: 98 F | HEART RATE: 70 BPM | BODY MASS INDEX: 30.51 KG/M2 | OXYGEN SATURATION: 98 % | DIASTOLIC BLOOD PRESSURE: 64 MMHG | RESPIRATION RATE: 16 BRPM | HEIGHT: 62 IN | SYSTOLIC BLOOD PRESSURE: 121 MMHG | WEIGHT: 165.81 LBS

## 2022-05-05 DIAGNOSIS — Z01.818 PREOP TESTING: ICD-10-CM

## 2022-05-05 DIAGNOSIS — M19.012 GLENOHUMERAL ARTHRITIS, LEFT: Primary | ICD-10-CM

## 2022-05-05 LAB
ALBUMIN SERPL BCP-MCNC: 3.6 G/DL (ref 3.5–5.2)
ALP SERPL-CCNC: 77 U/L (ref 55–135)
ALT SERPL W/O P-5'-P-CCNC: 33 U/L (ref 10–44)
ANION GAP SERPL CALC-SCNC: 10 MMOL/L (ref 8–16)
AST SERPL-CCNC: 30 U/L (ref 10–40)
BILIRUB SERPL-MCNC: 0.5 MG/DL (ref 0.1–1)
BUN SERPL-MCNC: 16 MG/DL (ref 8–23)
CALCIUM SERPL-MCNC: 10.2 MG/DL (ref 8.7–10.5)
CHLORIDE SERPL-SCNC: 109 MMOL/L (ref 95–110)
CO2 SERPL-SCNC: 24 MMOL/L (ref 23–29)
CREAT SERPL-MCNC: 1 MG/DL (ref 0.5–1.4)
ERYTHROCYTE [DISTWIDTH] IN BLOOD BY AUTOMATED COUNT: 14.1 % (ref 11.5–14.5)
EST. GFR  (AFRICAN AMERICAN): >60 ML/MIN/1.73 M^2
EST. GFR  (NON AFRICAN AMERICAN): 55 ML/MIN/1.73 M^2
GLUCOSE SERPL-MCNC: 110 MG/DL (ref 70–110)
HCT VFR BLD AUTO: 38.7 % (ref 37–48.5)
HGB BLD-MCNC: 13.1 G/DL (ref 12–16)
MCH RBC QN AUTO: 28.2 PG (ref 27–31)
MCHC RBC AUTO-ENTMCNC: 33.9 G/DL (ref 32–36)
MCV RBC AUTO: 83 FL (ref 82–98)
PLATELET # BLD AUTO: 245 K/UL (ref 150–450)
PMV BLD AUTO: 10.9 FL (ref 9.2–12.9)
POTASSIUM SERPL-SCNC: 4.1 MMOL/L (ref 3.5–5.1)
PROT SERPL-MCNC: 7.7 G/DL (ref 6–8.4)
RBC # BLD AUTO: 4.65 M/UL (ref 4–5.4)
SODIUM SERPL-SCNC: 143 MMOL/L (ref 136–145)
WBC # BLD AUTO: 5.54 K/UL (ref 3.9–12.7)

## 2022-05-05 PROCEDURE — 71000016 HC POSTOP RECOV ADDL HR: Performed by: STUDENT IN AN ORGANIZED HEALTH CARE EDUCATION/TRAINING PROGRAM

## 2022-05-05 PROCEDURE — 25000003 PHARM REV CODE 250: Performed by: NURSE ANESTHETIST, CERTIFIED REGISTERED

## 2022-05-05 PROCEDURE — 23472 RECONSTRUCT SHOULDER JOINT: CPT | Mod: LT,,, | Performed by: STUDENT IN AN ORGANIZED HEALTH CARE EDUCATION/TRAINING PROGRAM

## 2022-05-05 PROCEDURE — 63600175 PHARM REV CODE 636 W HCPCS: Performed by: ANESTHESIOLOGY

## 2022-05-05 PROCEDURE — 27201423 OPTIME MED/SURG SUP & DEVICES STERILE SUPPLY: Performed by: STUDENT IN AN ORGANIZED HEALTH CARE EDUCATION/TRAINING PROGRAM

## 2022-05-05 PROCEDURE — 71000015 HC POSTOP RECOV 1ST HR: Performed by: STUDENT IN AN ORGANIZED HEALTH CARE EDUCATION/TRAINING PROGRAM

## 2022-05-05 PROCEDURE — 85027 COMPLETE CBC AUTOMATED: CPT | Performed by: ANESTHESIOLOGY

## 2022-05-05 PROCEDURE — 63600175 PHARM REV CODE 636 W HCPCS: Performed by: NURSE ANESTHETIST, CERTIFIED REGISTERED

## 2022-05-05 PROCEDURE — 80053 COMPREHEN METABOLIC PANEL: CPT | Performed by: ANESTHESIOLOGY

## 2022-05-05 PROCEDURE — 63600175 PHARM REV CODE 636 W HCPCS: Performed by: STUDENT IN AN ORGANIZED HEALTH CARE EDUCATION/TRAINING PROGRAM

## 2022-05-05 PROCEDURE — 25000003 PHARM REV CODE 250: Performed by: STUDENT IN AN ORGANIZED HEALTH CARE EDUCATION/TRAINING PROGRAM

## 2022-05-05 PROCEDURE — 36000711: Performed by: STUDENT IN AN ORGANIZED HEALTH CARE EDUCATION/TRAINING PROGRAM

## 2022-05-05 PROCEDURE — 37000008 HC ANESTHESIA 1ST 15 MINUTES: Performed by: STUDENT IN AN ORGANIZED HEALTH CARE EDUCATION/TRAINING PROGRAM

## 2022-05-05 PROCEDURE — 36000710: Performed by: STUDENT IN AN ORGANIZED HEALTH CARE EDUCATION/TRAINING PROGRAM

## 2022-05-05 PROCEDURE — C1713 ANCHOR/SCREW BN/BN,TIS/BN: HCPCS | Performed by: STUDENT IN AN ORGANIZED HEALTH CARE EDUCATION/TRAINING PROGRAM

## 2022-05-05 PROCEDURE — 23472 PR RECONSTR TOTAL SHOULDER IMPLANT: ICD-10-PCS | Mod: LT,,, | Performed by: STUDENT IN AN ORGANIZED HEALTH CARE EDUCATION/TRAINING PROGRAM

## 2022-05-05 PROCEDURE — C1776 JOINT DEVICE (IMPLANTABLE): HCPCS | Performed by: STUDENT IN AN ORGANIZED HEALTH CARE EDUCATION/TRAINING PROGRAM

## 2022-05-05 PROCEDURE — 37000009 HC ANESTHESIA EA ADD 15 MINS: Performed by: STUDENT IN AN ORGANIZED HEALTH CARE EDUCATION/TRAINING PROGRAM

## 2022-05-05 PROCEDURE — 71000033 HC RECOVERY, INTIAL HOUR: Performed by: STUDENT IN AN ORGANIZED HEALTH CARE EDUCATION/TRAINING PROGRAM

## 2022-05-05 DEVICE — SCREW RSP GLENOID BP 5X14MM: Type: IMPLANTABLE DEVICE | Site: SHOULDER | Status: FUNCTIONAL

## 2022-05-05 DEVICE — BASEPLATE GLENOID REV RSP P2: Type: IMPLANTABLE DEVICE | Site: SHOULDER | Status: FUNCTIONAL

## 2022-05-05 DEVICE — IMPLANTABLE DEVICE: Type: IMPLANTABLE DEVICE | Site: SHOULDER | Status: FUNCTIONAL

## 2022-05-05 DEVICE — STEM ALTIVATE REV SZ8 108MM: Type: IMPLANTABLE DEVICE | Site: SHOULDER | Status: FUNCTIONAL

## 2022-05-05 DEVICE — INSERT SOCKET E+ SM STD SZ32: Type: IMPLANTABLE DEVICE | Site: SHOULDER | Status: FUNCTIONAL

## 2022-05-05 RX ORDER — ONDANSETRON 2 MG/ML
INJECTION INTRAMUSCULAR; INTRAVENOUS
Status: DISCONTINUED | OUTPATIENT
Start: 2022-05-05 | End: 2022-05-05

## 2022-05-05 RX ORDER — PHENYLEPHRINE HYDROCHLORIDE 10 MG/ML
INJECTION INTRAVENOUS
Status: DISCONTINUED | OUTPATIENT
Start: 2022-05-05 | End: 2022-05-05

## 2022-05-05 RX ORDER — ACETAMINOPHEN 500 MG
1000 TABLET ORAL EVERY 8 HOURS PRN
Qty: 60 TABLET | Refills: 0 | Status: SHIPPED | OUTPATIENT
Start: 2022-05-05

## 2022-05-05 RX ORDER — CHLORHEXIDINE GLUCONATE ORAL RINSE 1.2 MG/ML
10 SOLUTION DENTAL
Status: DISCONTINUED | OUTPATIENT
Start: 2022-05-05 | End: 2022-05-05 | Stop reason: HOSPADM

## 2022-05-05 RX ORDER — OXYCODONE HYDROCHLORIDE 5 MG/1
5 TABLET ORAL EVERY 4 HOURS PRN
Qty: 30 TABLET | Refills: 0 | Status: SHIPPED | OUTPATIENT
Start: 2022-05-05 | End: 2022-09-08

## 2022-05-05 RX ORDER — ONDANSETRON 2 MG/ML
4 INJECTION INTRAMUSCULAR; INTRAVENOUS DAILY PRN
Status: DISCONTINUED | OUTPATIENT
Start: 2022-05-05 | End: 2022-05-05 | Stop reason: HOSPADM

## 2022-05-05 RX ORDER — EPHEDRINE SULFATE 50 MG/ML
INJECTION, SOLUTION INTRAVENOUS
Status: DISCONTINUED | OUTPATIENT
Start: 2022-05-05 | End: 2022-05-05

## 2022-05-05 RX ORDER — DEXAMETHASONE SODIUM PHOSPHATE 4 MG/ML
INJECTION, SOLUTION INTRA-ARTICULAR; INTRALESIONAL; INTRAMUSCULAR; INTRAVENOUS; SOFT TISSUE
Status: DISCONTINUED | OUTPATIENT
Start: 2022-05-05 | End: 2022-05-05

## 2022-05-05 RX ORDER — SODIUM CHLORIDE 9 MG/ML
INJECTION, SOLUTION INTRAVENOUS CONTINUOUS
Status: DISCONTINUED | OUTPATIENT
Start: 2022-05-05 | End: 2022-05-05 | Stop reason: HOSPADM

## 2022-05-05 RX ORDER — SODIUM CHLORIDE 0.9 % (FLUSH) 0.9 %
3 SYRINGE (ML) INJECTION
Status: DISCONTINUED | OUTPATIENT
Start: 2022-05-05 | End: 2022-05-05 | Stop reason: HOSPADM

## 2022-05-05 RX ORDER — HYDROMORPHONE HYDROCHLORIDE 2 MG/ML
0.2 INJECTION, SOLUTION INTRAMUSCULAR; INTRAVENOUS; SUBCUTANEOUS EVERY 5 MIN PRN
Status: DISCONTINUED | OUTPATIENT
Start: 2022-05-05 | End: 2022-05-05 | Stop reason: HOSPADM

## 2022-05-05 RX ORDER — OXYCODONE HYDROCHLORIDE 5 MG/1
5 TABLET ORAL
Status: DISCONTINUED | OUTPATIENT
Start: 2022-05-05 | End: 2022-05-05 | Stop reason: HOSPADM

## 2022-05-05 RX ORDER — PROPOFOL 10 MG/ML
VIAL (ML) INTRAVENOUS
Status: DISCONTINUED | OUTPATIENT
Start: 2022-05-05 | End: 2022-05-05

## 2022-05-05 RX ORDER — TRANEXAMIC ACID 100 MG/ML
1000 INJECTION, SOLUTION INTRAVENOUS
Status: COMPLETED | OUTPATIENT
Start: 2022-05-05 | End: 2022-05-05

## 2022-05-05 RX ORDER — CELECOXIB 200 MG/1
200 CAPSULE ORAL 2 TIMES DAILY
Qty: 28 CAPSULE | Refills: 0 | Status: SHIPPED | OUTPATIENT
Start: 2022-05-05 | End: 2022-09-08

## 2022-05-05 RX ORDER — ASPIRIN 81 MG/1
81 TABLET ORAL 2 TIMES DAILY
Qty: 28 TABLET | Refills: 0 | Status: SHIPPED | OUTPATIENT
Start: 2022-05-05 | End: 2022-10-20 | Stop reason: SDUPTHER

## 2022-05-05 RX ORDER — ALBUTEROL SULFATE 0.83 MG/ML
2.5 SOLUTION RESPIRATORY (INHALATION) EVERY 4 HOURS PRN
Status: DISCONTINUED | OUTPATIENT
Start: 2022-05-05 | End: 2022-05-05 | Stop reason: HOSPADM

## 2022-05-05 RX ORDER — LIDOCAINE HYDROCHLORIDE 10 MG/ML
INJECTION, SOLUTION EPIDURAL; INFILTRATION; INTRACAUDAL; PERINEURAL
Status: DISCONTINUED | OUTPATIENT
Start: 2022-05-05 | End: 2022-05-05

## 2022-05-05 RX ORDER — KETOROLAC TROMETHAMINE 30 MG/ML
15 INJECTION, SOLUTION INTRAMUSCULAR; INTRAVENOUS EVERY 8 HOURS PRN
Status: DISCONTINUED | OUTPATIENT
Start: 2022-05-05 | End: 2022-05-05 | Stop reason: HOSPADM

## 2022-05-05 RX ORDER — FENTANYL CITRATE 50 UG/ML
INJECTION, SOLUTION INTRAMUSCULAR; INTRAVENOUS
Status: DISCONTINUED | OUTPATIENT
Start: 2022-05-05 | End: 2022-05-05

## 2022-05-05 RX ORDER — MEPERIDINE HYDROCHLORIDE 25 MG/ML
12.5 INJECTION INTRAMUSCULAR; INTRAVENOUS; SUBCUTANEOUS ONCE
Status: DISCONTINUED | OUTPATIENT
Start: 2022-05-05 | End: 2022-05-05 | Stop reason: HOSPADM

## 2022-05-05 RX ORDER — CEFAZOLIN SODIUM 2 G/50ML
2 SOLUTION INTRAVENOUS
Status: COMPLETED | OUTPATIENT
Start: 2022-05-05 | End: 2022-05-05

## 2022-05-05 RX ORDER — CEFAZOLIN SODIUM 2 G/50ML
2 SOLUTION INTRAVENOUS ONCE
Status: COMPLETED | OUTPATIENT
Start: 2022-05-05 | End: 2022-05-05

## 2022-05-05 RX ORDER — MIDAZOLAM HYDROCHLORIDE 1 MG/ML
INJECTION, SOLUTION INTRAMUSCULAR; INTRAVENOUS
Status: DISCONTINUED | OUTPATIENT
Start: 2022-05-05 | End: 2022-05-05

## 2022-05-05 RX ORDER — PROCHLORPERAZINE EDISYLATE 5 MG/ML
5 INJECTION INTRAMUSCULAR; INTRAVENOUS EVERY 30 MIN PRN
Status: DISCONTINUED | OUTPATIENT
Start: 2022-05-05 | End: 2022-05-05 | Stop reason: HOSPADM

## 2022-05-05 RX ORDER — ACETAMINOPHEN 10 MG/ML
1000 INJECTION, SOLUTION INTRAVENOUS ONCE
Status: COMPLETED | OUTPATIENT
Start: 2022-05-05 | End: 2022-05-05

## 2022-05-05 RX ORDER — ROCURONIUM BROMIDE 10 MG/ML
INJECTION, SOLUTION INTRAVENOUS
Status: DISCONTINUED | OUTPATIENT
Start: 2022-05-05 | End: 2022-05-05

## 2022-05-05 RX ORDER — TRAMADOL HYDROCHLORIDE 50 MG/1
50 TABLET ORAL
Qty: 36 TABLET | Refills: 0 | Status: SHIPPED | OUTPATIENT
Start: 2022-05-05 | End: 2022-09-08

## 2022-05-05 RX ORDER — DIPHENHYDRAMINE HYDROCHLORIDE 50 MG/ML
25 INJECTION INTRAMUSCULAR; INTRAVENOUS EVERY 6 HOURS PRN
Status: DISCONTINUED | OUTPATIENT
Start: 2022-05-05 | End: 2022-05-05 | Stop reason: HOSPADM

## 2022-05-05 RX ORDER — SODIUM CHLORIDE 9 MG/ML
INJECTION, SOLUTION INTRAVENOUS
Status: DISCONTINUED | OUTPATIENT
Start: 2022-05-05 | End: 2022-05-05 | Stop reason: HOSPADM

## 2022-05-05 RX ORDER — NEOSTIGMINE METHYLSULFATE 1 MG/ML
INJECTION, SOLUTION INTRAVENOUS
Status: DISCONTINUED | OUTPATIENT
Start: 2022-05-05 | End: 2022-05-05

## 2022-05-05 RX ADMIN — EPHEDRINE SULFATE 10 MG: 50 INJECTION INTRAVENOUS at 09:05

## 2022-05-05 RX ADMIN — HYDROMORPHONE HYDROCHLORIDE 0.2 MG: 2 INJECTION INTRAMUSCULAR; INTRAVENOUS; SUBCUTANEOUS at 11:05

## 2022-05-05 RX ADMIN — ONDANSETRON 4 MG: 2 INJECTION, SOLUTION INTRAMUSCULAR; INTRAVENOUS at 10:05

## 2022-05-05 RX ADMIN — MIDAZOLAM 2 MG: 1 INJECTION INTRAMUSCULAR; INTRAVENOUS at 07:05

## 2022-05-05 RX ADMIN — SODIUM CHLORIDE, SODIUM LACTATE, POTASSIUM CHLORIDE, AND CALCIUM CHLORIDE: .6; .31; .03; .02 INJECTION, SOLUTION INTRAVENOUS at 07:05

## 2022-05-05 RX ADMIN — ROCURONIUM BROMIDE 50 MG: 10 INJECTION, SOLUTION INTRAVENOUS at 07:05

## 2022-05-05 RX ADMIN — PHENYLEPHRINE HYDROCHLORIDE 100 MCG: 10 INJECTION INTRAVENOUS at 08:05

## 2022-05-05 RX ADMIN — SODIUM CHLORIDE, SODIUM LACTATE, POTASSIUM CHLORIDE, AND CALCIUM CHLORIDE: .6; .31; .03; .02 INJECTION, SOLUTION INTRAVENOUS at 09:05

## 2022-05-05 RX ADMIN — CEFAZOLIN SODIUM 2 G: 2 SOLUTION INTRAVENOUS at 08:05

## 2022-05-05 RX ADMIN — DEXAMETHASONE SODIUM PHOSPHATE 8 MG: 4 INJECTION, SOLUTION INTRAMUSCULAR; INTRAVENOUS at 10:05

## 2022-05-05 RX ADMIN — LIDOCAINE HYDROCHLORIDE 50 MG: 10 INJECTION, SOLUTION EPIDURAL; INFILTRATION; INTRACAUDAL; PERINEURAL at 07:05

## 2022-05-05 RX ADMIN — PHENYLEPHRINE HYDROCHLORIDE 200 MCG: 10 INJECTION INTRAVENOUS at 08:05

## 2022-05-05 RX ADMIN — CEFAZOLIN SODIUM 2 G: 2 SOLUTION INTRAVENOUS at 11:05

## 2022-05-05 RX ADMIN — PROPOFOL 100 MG: 10 INJECTION, EMULSION INTRAVENOUS at 07:05

## 2022-05-05 RX ADMIN — NEOSTIGMINE METHYLSULFATE 5 MG: 1 INJECTION INTRAVENOUS at 10:05

## 2022-05-05 RX ADMIN — TRANEXAMIC ACID 1000 MG: 100 INJECTION, SOLUTION INTRAVENOUS at 09:05

## 2022-05-05 RX ADMIN — FENTANYL CITRATE 100 MCG: 50 INJECTION, SOLUTION INTRAMUSCULAR; INTRAVENOUS at 07:05

## 2022-05-05 RX ADMIN — ROCURONIUM BROMIDE 10 MG: 10 INJECTION, SOLUTION INTRAVENOUS at 08:05

## 2022-05-05 RX ADMIN — SODIUM CHLORIDE: 0.9 INJECTION, SOLUTION INTRAVENOUS at 10:05

## 2022-05-05 RX ADMIN — ACETAMINOPHEN 1000 MG: 10 INJECTION INTRAVENOUS at 11:05

## 2022-05-05 RX ADMIN — GLYCOPYRROLATE 0.8 MG: 0.2 INJECTION, SOLUTION INTRAMUSCULAR; INTRAVENOUS at 10:05

## 2022-05-05 RX ADMIN — CHLORHEXIDINE GLUCONATE 0.12% ORAL RINSE 10 ML: 1.2 LIQUID ORAL at 06:05

## 2022-05-05 RX ADMIN — TRANEXAMIC ACID 1000 MG: 100 INJECTION, SOLUTION INTRAVENOUS at 08:05

## 2022-05-05 RX ADMIN — EPHEDRINE SULFATE 5 MG: 50 INJECTION INTRAVENOUS at 08:05

## 2022-05-05 RX ADMIN — EPHEDRINE SULFATE 5 MG: 50 INJECTION INTRAVENOUS at 10:05

## 2022-05-05 RX ADMIN — KETOROLAC TROMETHAMINE 15 MG: 30 INJECTION, SOLUTION INTRAMUSCULAR at 11:05

## 2022-05-05 NOTE — PATIENT INSTRUCTIONS
TOTAL SHOULDER ARTHROPLASTY    Contact the Sports Medicine Clinic at [phone] if you have questions about your instructions or follow-up appointment.    DIET:   Start with clear liquids and light foods to minimize nausea. Once these are tolerated, advance to a regular diet.     DRESSING AND WOUND CARE:   Keep the dressing clean and dry. It is normal for there to be some drainage after surgery since the shoulder was irrigated with large amounts of fluid. Reinforce with additional gauze as necessary.  Remove the dressing the 7th day after surgery and begin changing daily with clean gauze or Band-Aids®. Keep your incisions covered until you follow up in clinic.   If you have Steri-Strips in place of stitches, allow them to stay in place as long as possible. Steri-Strips are made of a fabric material that can get wet in the shower and pat dry with a towel. They usually fall off on their own within 7 to 10 days. You may trim the edges as they begin to curl.      BATHING:   You may bathe or shower on the 7th day after surgery, but do not scrub or soak the incisions. Dry the area by gently blotting it with a gauze or towel. After it is completely dry, cover the wound with clean gauze or Band-Aids®. Do NOT submerge the incisions (bath/swim) until after the sutures are removed and the wound has completely healed.     ACTIVITY:    Ice should be applied to the shoulder for 20-30 minutes, 5-6 times a day, to help control pain and swelling. Apply additional times as needed, especially after exercise, for the first 3-4 weeks. Do not apply ice directly to the skin; use a thin barrier in between. Also, do not use heat.    Elevate the shoulder by sleeping as upright as possible using extra pillows or a recliner. Do this for the first few days to help decrease pain and swelling.   Wear the sling at all times for 6 weeks including while sleeping. The only time you may remove the sling is for bathing and exercises. Do not lean  or put your body weight on your arm.   When your block wears off, start the following exercises:  Remove the sling for 5-10 minutes, 3 times a day, to do the following exercises:  Fully bend & straighten your fingers, your wrist, and your elbow several times.   Lean forward, bracing yourself on a table/counter with your normal arm. Let your surgical arm relax and hang straight down. Shift your weight so that your arm moves side to side, front to back, and in gentle circles like a pendulum or elephant's trunk. Use your body to generate the movement for this, NOT your surgical shoulder's muscles. (see drawing below)     Physical therapy should be started within 3 days of surgery. Take your therapy prescription to the PT clinic of your choice. If you have not received a therapy prescription, please contact your surgeon's office to have a script sent to your physical therapist.     PAIN CONTROL:    It is important to stay ahead of pain as it becomes challenging to get under control if you fall behind. Ice and elevation can help and should be used as much as possible in the first few days.   Narcotic pain medications, such as tramadol and oxycodone, should be taken as prescribed. The Tramadol is intended to be taken first as the primary medicine and then oxycodone taken for breakthrough pain. Wean off as soon as possible. Take these with food to decrease the chances of nausea and vomiting. Do not drink alcohol, drive a vehicle, or use heavy machinery while taking narcotic pain medications.    NSAID medications are used for pain control and to decrease inflammation. You may be prescribed an NSAID such as celebrex. Take as instructed. Other NSAID medications such as ibuprofen, Motrin, Advil, naproxen, or Aleve can be used once you have finished the celebrex, or if a prescription for celebrex was not provided.    Acetaminophen (Tylenol) is an effective over-the-counter pain medication that can be used with NSAID medications  and non-acetaminophen containing narcotics such as plain oxycodone.    ASPIRIN FOR PREVENTION OF BLOOD CLOTS:   You should take one 81 mg baby aspirin twice daily for two weeks starting the evening of the day you have surgery unless instructed otherwise or taking a different blood thinner such as enoxaparin or warfarin. If you are aware that you are at high risk for a blood clot, notify your physician as soon as possible.   Take aspirin at least 30 minutes before taking ibuprofen or Toradol.    CONSTIPATION PREVENTION:   Anesthesia and pain medications, changes in eating and drinking, and less activity can all lead to constipation after surgery. To prevent or reduce constipation, take an over-the-counter stool softener (brands include Colace and Miralax).  Follow the directions on the bottle. Drink plenty of water and eat high fiber foods including whole grains, fresh fruits, vegetables, beans, prunes or prune juice.     PROBLEMS TO REPORT:  Persistent bloody drainage that soaks through reinforced dressings.  Fever greater than 101F or 38C.   Incision that is very red, swollen, draining pus, shows red streaks, or feels hot.  Inability to urinate within 8 hours of surgery (a rare effect of the anesthesia).  If you develop a rash, generalized itching or swelling from the medications, STOP the medication and call the clinic or the orthopedic surgery resident on call.    Daytime calls should be directed to the Sports Medicine Clinic at [phone].   Night-time and weekend calls should be directed to the [facility] after [facility] nurse line at [phone].       FREQUENTLY ASKED QUESTIONS     WHAT DAILY ACTIVITIES CAN I DO?  After shoulder surgery, you may do what you feel comfortable doing in the sling.  Do not lift anything with your operative arm or put yourself at risk of falling.    CAN I DRIVE OR RIDE BY CAR/ TRAIN/ PLANE?   You should not drive while using a sling. There are no forced restrictions regarding  operating a motor vehicle, however you must always be the  of whether you are able to operate it safely. You should not drive while taking narcotic pain medications. You may ride in a car after surgery as needed. You may take a train or even fly the day after your surgery as long as you feel secure and comfortable.    WHAT ABOUT WORK?   You may return to an office-type job or to school whenever comfortable. For most patients this occurs 1-2 weeks after surgery. For more active jobs that require some lifting, you can wait until after your follow-up appointment. Any other unusual types of jobs should be discussed to determine a date for return to work.    WHAT ABOUT SWELLING?   Expect swelling as a normal process after surgery. Ice, elevation, and other treatments provided at physical therapy will allow this to improve in time. Some swelling may remain for up to 8 weeks, and this is normal.     WHAT IF IT REALLY HURTS TOO MUCH?   Surgery hurts and you cannot expect to be pain free, but our goal is for it to be tolerable. Try to use all available pain therapies such as narcotics, NSAIDS, and acetaminophen. Always try more ice and elevation. If the pain is not tolerable, call the clinic or the after hours nursing line.

## 2022-05-05 NOTE — ANESTHESIA PROCEDURE NOTES
Intubation    Date/Time: 5/5/2022 7:53 AM  Performed by: Dede Malone CRNA  Authorized by: Dede Malone CRNA     Intubation:     Induction:  Intravenous    Intubated:  Postinduction    Mask Ventilation:  Easy mask    Attempts:  1    Attempted By:  CRNA    Method of Intubation:  Direct    Blade:  Darek 3    Laryngeal View Grade: Grade I - full view of cords      Difficult Airway Encountered?: No      Complications:  None    Airway Device:  Oral endotracheal tube    Airway Device Size:  7.0    Style/Cuff Inflation:  Cuffed (inflated to minimal occlusive pressure)    Tube secured:  21    Secured at:  The lips    Placement Verified By:  Capnometry and Revisualization with laryngoscopy    Complicating Factors:  None    Findings Post-Intubation:  BS equal bilateral

## 2022-05-05 NOTE — TRANSFER OF CARE
"Anesthesia Transfer of Care Note    Patient: Leticia Grimes    Procedure(s) Performed: Procedure(s) (LRB):  ARTHROPLASTY, SHOULDER, TOTAL, REVERSE (Left)    Patient location: PACU    Anesthesia Type: general    Transport from OR: Transported from OR on room air with adequate spontaneous ventilation    Post pain: adequate analgesia    Post assessment: no apparent anesthetic complications    Post vital signs: stable    Level of consciousness: sedated    Nausea/Vomiting: no nausea/vomiting    Complications: none    Transfer of care protocol was followed      Last vitals:   Visit Vitals  /65   Pulse 62   Temp 36.6 °C (97.9 °F)   Resp 18   Ht 5' 1.5" (1.562 m)   Wt 75.2 kg (165 lb 12.6 oz)   SpO2 95%   Breastfeeding No   BMI 30.82 kg/m²     "

## 2022-05-05 NOTE — BRIEF OP NOTE
[facility] - [facility] (Hospital)  Brief Operative Note    Surgery Date: 5/5/2022     Surgeon(s) and Role:     * Tomas Taylor MD - Primary    Assisting Surgeon: None    Pre-op Diagnosis:  Glenohumeral arthritis, left [M19.012]    Post-op Diagnosis:  Post-Op Diagnosis Codes:     * Glenohumeral arthritis, left [M19.012]    Procedure(s) (LRB):  ARTHROPLASTY, SHOULDER, TOTAL, REVERSE (Left)    Anesthesia: General    Operative Findings: severe left glenohumeral arthritis with deficient rotator cuff    Estimated Blood Loss: 300 mL         Specimens:   Specimen (24h ago, onward)            None            Discharge Note    OUTCOME: Patient tolerated treatment/procedure well without complication and is now ready for discharge.    DISPOSITION: Home or Self Care    FINAL DIAGNOSIS:  <principal problem not specified>    FOLLOWUP: In clinic    DISCHARGE INSTRUCTIONS:  No discharge procedures on file.

## 2022-05-05 NOTE — OP NOTE
Patient Name: Leticia Grimes    Date of Surgery: 5/5/2022    Medical Record #: [id number]     Pre-operative Diagnosis:  Left Shoulder Glenohumeral arthritis with rotator cuff deficiency    Post-operative Diagnosis:  Left Shoulder Glenohumeral arthritis with rotator cuff deficiency    Procedure:  Left Reverse Total Shoulder Arthroplasty     Primary Surgeon: Tomas Taylor MD      Implants Utilized:    Implant Name Type Inv. Item Serial No.  Lot No. LRB No. Used Action   BASEPLATE GLENOID REV RSP P2 - [id number]  BASEPLATE GLENOID REV RSP P2  [id number] Left 1 Implanted   SCREW RSP GLENOID BP 5X14MM - [id number]  SCREW RSP GLENOID BP 5X14MM  DJO [id number] Left 1 Implanted   SCREW RSP GLENOID BP 5X14MM - [id number]  SCREW RSP GLENOID BP 5X14MM  [id number] Left 1 Implanted   SCREW RSP GLENOID BP 5X14MM - [id number]  SCREW RSP GLENOID BP 5X14MM  DJO [id number] Left 1 Implanted   rsp glenoid head with retaining screw    DJO [id number] Left 1 Implanted   rsp bone screw-locking    DJO [id number] Left 1 Implanted   STEM ALTIVATE REV SZ8 108MM - [id number]  STEM ALTIVATE REV SZ8 108MM  DJO [id number] Left 1 Implanted   INSERT SOCKET E+ SM STD SZ32 - [id number]  INSERT SOCKET E+ SM STD SZ32  DJO [id number] Left 1 Implanted       Anesthesia:  General endotracheal anesthetic with accompanying regional block.    Complications:  None    Estimated Blood Loss: 300 mL    Indications for Procedure:   Leticia Grimes is a 76 y.o. year old female who presents at this time with recalcitrant pain and dysfunction secondary to glenohumeral arthritis and underlying rotator cuff tear deficiency.  They have persistent pain and activity derived symptoms with deterioration in function and limitations in quality of life.  They have failed conservative options at this time and present today for elective reverse total shoulder replacement.    Description of Procedure:  This patient was taken to the operating room and placed in a supine  position on the operating table.  The patient was correctly identified upon entry and the operative site had been marked by the operating surgeon, and this was verified.  Intravenous antibiotics were administered prior to skin incision.  At this time general endotracheal anesthesia was initiated by the attending anesthesiologist and after successful induction of anesthetic, the patient was then positioned in the modified upright beach chair position.  The head was placed at neutral, all bony prominences were well padded, and the arm was supported in an articulated arm positioner.  After timeout was called the operative extremity was correctly identified by the operating surgeon and was subsequently sterilely prepped and draped in the usual surgical fashion and the procedure was then commenced.    A delto-pectoral approach was carried out in the standard fashion.  After an oblique skin incision was made, dissection was carried down to the deep tissues.  Hemostasis was achieved.  The deltopectoral interval was identified and the cephalic vein was mobilized laterally and protected.  The deltoid and pectoralis major muscles were mobilized and a deep self-retaining retractor was placed.  Adhesions were released up into the subacromial space and the rotator cuff tissue was visualized and found to be deficient.  The conjoint tendon was mobilized medially and protected throughout the case.  The long head of the biceps was identified and soft tissue tenodesis was performed to the upper border of the pectoralis major. The axillary nerve was identified and also protected throughout the procedure. A subscapularis peel was performed to release the residual anterior subscap and anterior capsule with release around the inferior humeral neck with care being taken to avoid damage to surrounding structures.  Osteophytes were then removed circumferentially from the humeral head.  The humerus was then positioned for preparation and  retractors were placed circumferentially.  A provisional humeral cut was made using an extramedullary guide and a protective plate put onto the humeral surface.     The glenoid was circumferentially exposed in the usual fashion.  The subscapularis remnant was mobilized with release of the rotator interval.  Any anterior adhesions as well as deep adhesions to the anterior capsule were incised inferiorly to approximately the apex of the glenoid.  Retractors were placed and the humerus was carefully retracted posteroinferiorly. Direct en face exposure of the glenoid was achieved and glenoid preparation was carried out. A guidepin was placed into the apex of the glenoid vault with a startpoint to allow inferior positioning of the baseplate and approximately 10 degrees of inferior tilt. The glenoid face was reamed to a flat surface with minimal medialization. We measured the screw depth.  We then tapped for the central screw.  The baseplate/screw construct was then opened and screwed into the glenoid with excellent purchase.  Four peripheral locking screws were drilled and placed.  A lateralized glenosphere was then impacted over the baseplate and a securing screw placed.    We then returned to the humerus.  We first reamed for our inlay humeral tray. The humeral canal was then entered through the superior head and medullary reaming was carried out up to 10 mm at which point appropriate chatter was noted. Next broaching was carried out with the final broach left in place. The humerus was visualized and a standard tray was trialed. Subsequent increased offset trays were trialed until appropriate soft tissue tensioning was noted, with care being taken to achieve optimal deltoid tension, stability, and range of motion. Ultimately, we decided on a size 8 stem so as to more deeply seat the implant within the bone and optimize soft tissue tension. The humeral trials and broach were then removed and the appropriate sized  humeral stem was impacted into place. The final implantable tray was then positioned and impacted into place. The joint was reduced and a final check was carried out to confirm satisfactory deltoid tension, stability, and ROM. The axillary nerve was palpated and intact. Satisfied with our arthroplasty, closure was then carried out.    The residual subscapularis tissue was allowed to retract due to insufficient tissue for repair. After copious irrigation we proceeded with superficial closure.  A layered closure was carried out utilizing 0 Ethibond sutures to re-approximate the delto-pectoral interval followed by 0 and 2-0 Vicryl sutures in a layered fashion for the skin and a sub-cuticular 3-0 prolene suture for final closure.  Steri-strips were placed and a sterile dressing was placed subsequently.  The patient was then placed in an ultra-sling device and brought into the supine position where she was successfully awakened from anesthetic, transferred to a gurney, and taken in stable condition to the recovery room.      There were no intraoperative complications. Needles and sponge counts were correct. IV antibiotics were started and completed within 30 min of skin incision.    POSTOPERATIVE PLAN:    Reverse Total Shoulder Arthroplasty protocol  NWB LUE in sling  Ok to be out of sling for pendulums, elbow, wrist ROM  ASA 81mg BID x 2wks for DVT ppx  F/u 10-14 days for wound check/suture removal      Tomas Taylor

## 2022-05-05 NOTE — TRANSFER OF CARE
"Anesthesia Transfer of Care Note    Patient: Leticia Grimes    Procedure(s) Performed: Procedure(s) (LRB):  ARTHROPLASTY, SHOULDER, TOTAL, REVERSE (Left)    Patient location: PACU    Anesthesia Type: general    Transport from OR: Transported from OR on room air with adequate spontaneous ventilation    Post pain: adequate analgesia    Post assessment: no apparent anesthetic complications    Post vital signs: stable    Level of consciousness: awake    Nausea/Vomiting: no nausea/vomiting    Complications: none    Transfer of care protocol was followed      Last vitals:   Visit Vitals  /64 (BP Location: Right arm, Patient Position: Lying)   Pulse 70   Temp 36.7 °C (98 °F) (Temporal)   Resp 16   Ht 5' 1.5" (1.562 m)   Wt 75.2 kg (165 lb 12.6 oz)   SpO2 98%   Breastfeeding No   BMI 30.82 kg/m²     "
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
## 2022-05-05 NOTE — ANESTHESIA POSTPROCEDURE EVALUATION
Anesthesia Post Evaluation    Patient: Leticia Grimes    Procedure(s) Performed: Procedure(s) (LRB):  ARTHROPLASTY, SHOULDER, TOTAL, REVERSE (Left)    Final Anesthesia Type: general      Patient location during evaluation: PACU  Patient participation: Yes- Able to Participate  Level of consciousness: awake and alert  Post-procedure vital signs: reviewed and stable  Pain management: adequate  Airway patency: patent  ELI mitigation strategies: Verification of full reversal of neuromuscular block  PONV status at discharge: No PONV  Anesthetic complications: no      Cardiovascular status: hemodynamically stable  Respiratory status: spontaneous ventilation  Hydration status: euvolemic  Follow-up not needed.          Vitals Value Taken Time   /64 05/05/22 1250   Temp 36.7 °C (98 °F) 05/05/22 1250   Pulse 70 05/05/22 1250   Resp 16 05/05/22 1250   SpO2 98 % 05/05/22 1250         Event Time   Out of Recovery 11:46:54         Pain/Shavon Score: Pain Rating Prior to Med Admin: 6 (5/5/2022 11:50 AM)  Shavon Score: 9 (5/5/2022 11:30 AM)

## 2022-05-07 ENCOUNTER — NURSE TRIAGE (OUTPATIENT)
Dept: ADMINISTRATIVE | Facility: CLINIC | Age: 77
End: 2022-05-07
Payer: MEDICARE

## 2022-05-07 NOTE — TELEPHONE ENCOUNTER
Patient daughter who is in the home with her mom states the patient is c/o not breathing right. She states she appears to be breathing in and out however patient states it doesn't feel right. Patient is post op shoulder replacement on 3/5.  Reason for Disposition   Sounds like a life-threatening emergency to the triager    Protocols used: POST-OP SYMPTOMS AND [id number]-Q-ME

## 2022-05-09 ENCOUNTER — TELEPHONE (OUTPATIENT)
Dept: CARDIOLOGY | Facility: CLINIC | Age: 77
End: 2022-05-09
Payer: MEDICARE

## 2022-05-09 DIAGNOSIS — Z86.74 HISTORY OF SUDDEN CARDIAC ARREST SUCCESSFULLY RESUSCITATED: Primary | ICD-10-CM

## 2022-05-11 ENCOUNTER — LAB VISIT (OUTPATIENT)
Dept: LAB | Facility: HOSPITAL | Age: 77
End: 2022-05-11
Attending: FAMILY MEDICINE
Payer: MEDICARE

## 2022-05-11 ENCOUNTER — CLINICAL SUPPORT (OUTPATIENT)
Dept: REHABILITATION | Facility: HOSPITAL | Age: 77
End: 2022-05-11
Attending: STUDENT IN AN ORGANIZED HEALTH CARE EDUCATION/TRAINING PROGRAM
Payer: MEDICARE

## 2022-05-11 ENCOUNTER — CLINICAL SUPPORT (OUTPATIENT)
Dept: AUDIOLOGY | Facility: CLINIC | Age: 77
End: 2022-05-11
Payer: MEDICARE

## 2022-05-11 DIAGNOSIS — Z46.1 HEARING AID FITTING OR ADJUSTMENT: Primary | ICD-10-CM

## 2022-05-11 DIAGNOSIS — M25.512 ACUTE PAIN OF LEFT SHOULDER: Primary | ICD-10-CM

## 2022-05-11 DIAGNOSIS — Z96.612 AFTERCARE FOLLOWING LEFT SHOULDER JOINT REPLACEMENT SURGERY: ICD-10-CM

## 2022-05-11 DIAGNOSIS — M19.012 GLENOHUMERAL ARTHRITIS, LEFT: ICD-10-CM

## 2022-05-11 DIAGNOSIS — Z86.74 HISTORY OF SUDDEN CARDIAC ARREST SUCCESSFULLY RESUSCITATED: ICD-10-CM

## 2022-05-11 DIAGNOSIS — Z96.612 PRESENCE OF ARTIFICIAL SHOULDER JOINT, LEFT: ICD-10-CM

## 2022-05-11 DIAGNOSIS — M62.512 MUSCLE WASTING AND ATROPHY, NOT ELSEWHERE CLASSIFIED, LEFT SHOULDER: ICD-10-CM

## 2022-05-11 DIAGNOSIS — Z96.612 S/P REVERSE TOTAL SHOULDER ARTHROPLASTY, LEFT: ICD-10-CM

## 2022-05-11 DIAGNOSIS — Z47.1 AFTERCARE FOLLOWING LEFT SHOULDER JOINT REPLACEMENT SURGERY: ICD-10-CM

## 2022-05-11 DIAGNOSIS — E78.2 MIXED HYPERLIPIDEMIA: ICD-10-CM

## 2022-05-11 DIAGNOSIS — M25.612 STIFFNESS OF LEFT SHOULDER JOINT: ICD-10-CM

## 2022-05-11 LAB — BNP SERPL-MCNC: 33 PG/ML (ref 0–99)

## 2022-05-11 PROCEDURE — 80053 COMPREHEN METABOLIC PANEL: CPT | Performed by: FAMILY MEDICINE

## 2022-05-11 PROCEDURE — 83880 ASSAY OF NATRIURETIC PEPTIDE: CPT | Performed by: INTERNAL MEDICINE

## 2022-05-11 PROCEDURE — 97162 PT EVAL MOD COMPLEX 30 MIN: CPT

## 2022-05-11 PROCEDURE — 36415 COLL VENOUS BLD VENIPUNCTURE: CPT | Performed by: FAMILY MEDICINE

## 2022-05-11 PROCEDURE — 97110 THERAPEUTIC EXERCISES: CPT

## 2022-05-11 PROCEDURE — 80061 LIPID PANEL: CPT | Performed by: FAMILY MEDICINE

## 2022-05-11 NOTE — PROGRESS NOTES
Leticia Grimes was seen 05/11/2022 for a hearing aid follow-up appointment.  She is doing well with her hearing aids and has decided to keep them. She does report that sounds seem sharper and more noticeable this week as compared to last week. She had surgery last Thursday and did not wear her hearing aids for 4 days. I feel like she is just getting re acclimated to them over the past few days. I pulled aids down to 95% of her target and she said that was better. I set auto acclimatization to increase to 100% over the next 14 days.  She was agreeable to this. She will let me know in a few weeks after back to 100% if she needs further adjustments.      Filters and domes given to her today.    She will reach out to me with her [employer] claim form of she needs my assistance filling it out. I wrote down her diagnosis code and CPT for her.      Patient will call for any future hearing aid follow-up appointments as needed.

## 2022-05-11 NOTE — PLAN OF CARE
[facility]   Physical Therapy Initial Evaluation   Date: 5/11/2022   Name: Leticia Grimes  Clinic Number: [id number]    Therapy Diagnosis:   Encounter Diagnoses   Name Primary?    S/P reverse total shoulder arthroplasty, left     Glenohumeral arthritis, left     Acute pain of left shoulder Yes    Stiffness of left shoulder joint     Muscle wasting and atrophy, not elsewhere classified, left shoulder     Aftercare following left shoulder joint replacement surgery     Presence of artificial shoulder joint, left       Physician: Tomas Taylor*     Physician Orders: PT Eval and Treat  Medical Diagnosis from Referral: S/P reverse total shoulder arthroplasty, left; glenohumeral arthritis, left.   Evaluation Date: 5/11/2022  Authorization Period Expiration: 12/31/22  Plan of Care Expiration: 7/6/22  Progress Note Due: 6/10/22  Visit # / Visits authorized: 1/ 20 (+6 for prior authorization)   FOTO: 1/ 3     Precautions: Standard and Reverse TSA Precautions   Sx Date: 5/5/22    Time In: 10:20 am  Time Out: 11:00 am  Total Billable Time (timed & untimed codes): 40 minutes    SUBJECTIVE   Date of onset: surgery was 6 days ago.     History of current condition - Leticia reports: a history of chronic L shoulder pain which resulted in her undergoing a L Reverse Total Shoulder Arthroplasty on 5/5/22.      Imaging: not applicable     Pain:  Current 2/10, worst 10/10, best 2/10   Location: L Shoulder   Description: Aching and Throbbing  Aggravating Factors: shoulder AROM.   Easing Factors: rest    Prior Therapy: underwent Physical Therapy prior to surgery.   Social History: Pt lives alone  Occupation: retired.   Prior Level of Function: Independent and pain free with all ADL, IADL, community mobility and functional activities.   Current Level of Function: Patient experiences quite a bit of difficulty with participation in ADL's.      Dominant Extremity: right    Pts goals: Pt reported  goals are to decrease overall pain levels in order to return to prior functional level.       Medical History:   Past Medical History:   Diagnosis Date    Adjustment insomnia 2020    Anxiety 2020    GERD (gastroesophageal reflux disease)     History of sudden cardiac arrest successfully resuscitated 2020    Hypertension     ICD (implantable cardioverter-defibrillator) in place 2020    Left arm swelling 9/3/2020    Mild vitamin D deficiency 2013    Osteopenia 2020    Vitamin D deficiency disease        Surgical History:   Leticia Grimes  has a past surgical history that includes Hysterectomy; Tubal ligation;  section, classic; Insertion of biventricular implantable cardioverter-defibrillator (ICD); Injection of joint (Left, 3/2/2021); and Reverse total shoulder arthroplasty (Left, 2022).    Medications:   Leticia has a current medication list which includes the following prescription(s): acetaminophen, alpha lipoic acid, amlodipine, aspirin, carvedilol, celecoxib, clonidine, lisinopril, lorazepam, mv-mn/folic/lutein/herbal 293, omega 3-dha-epa-fish oil, oxycodone, and tramadol.    Allergies:   Review of patient's allergies indicates:   Allergen Reactions    Codeine     Morphine Other (See Comments)        OBJECTIVE     RANGE OF MOTION:  *denotes pain       Shoulder ROM  (Degrees) Left Goal   Shoulder Flexion 80 120   Shoulder Abduction  NT 90   Shoulder ER  NT 60   Shoulder IR  NT 45         STRENGTH:  *denotes pain     MMT Left Goal   Shoulder Flexion NT 5/5   Shoulder Abduction NT 5/5   Elbow Flexion  NT 5/5   Elbow Extension NT 5/5         FUNCTION:     CMS Impairment/Limitation/Restriction for FOTO Shoulder Survey    Therapist reviewed FOTO scores for Leticia on 2022.   FOTO documents entered into [employer] - see Media section.    Limitation Score: 96%         TREATMENT       Manual Therapy was performed to improve pain and ROM for (0) minutes, including:  Manual  "Intervention Performed Today    PROM           Therapeutic Exercise was performed to improve strength, endurance, and ROM for (10) minutes including:  Intervention Performed Today    Biceps Curls  x  1x10   Ball Squeeze x  1x10   Shrugs x  1x10   Scapular Retractions x  1x10         PATIENT EDUCATION AND HOME EXERCISES     Education provided: (5) minutes  Issued HEP for scapular mobilization.      Written Home Exercises Provided: {Exercises were reviewed and Leticia was able to demonstrate them prior to the end of the session.  Leticia demonstrated good  understanding of the education provided. See EMR under Patient Instructions for exercises provided during therapy sessions.    ASSESSMENT   Leticia is a 76 y.o. female referred to outpatient Physical Therapy with a medical diagnosis of S/P reverse total shoulder arthroplasty, left; glenohumeral arthritis, left. Pt presents with impairments including: decreased ROM, decreased strength and decreased overall function.    Pt prognosis is Excellent.   Pt will benefit from skilled outpatient Physical Therapy to address the deficits stated above and in the chart below, provide pt/family education, and to maximize pt's level of independence.     Plan of care discussed with patient: Yes  Pt's spiritual, cultural and educational needs considered and patient is agreeable to the plan of care and goals as stated below:     Anticipated Barriers for therapy: sedentary lifestyle and age    Medical Necessity is demonstrated by the following  History  Co-morbidities and personal factors that may impact the plan of care Co-morbidities:   advanced age and anxiety    Personal Factors:   no deficits     moderate   Examination  Body Structures and Functions, activity limitations and participation restrictions that may impact the plan of care Body Regions:   upper extremities    Body Systems:    ROM  strength  gross coordinated movement    Participation Restrictions:   See above in "Current " "Level of Function"     Activity limitations:   Learning and applying knowledge  no deficits    General Tasks and Commands  no deficits    Communication  no deficits    Mobility  lifting and carrying objects    Self care  washing oneself (bathing, drying, washing hands)  caring for body parts (brushing teeth, shaving, grooming)    Domestic Life  shopping  cooking  doing house work (cleaning house, washing dishes, laundry)    Interactions/Relationships  no deficits    Life Areas  no deficits    Community and Social Life  community life  recreation and leisure         moderate   Clinical Presentation evolving clinical presentation with changing clinical characteristics moderate   Decision Making/ Complexity Score: moderate       GOALS:    Short Term Goals:  4 weeks Progress   5/11/2022   1. Patient will report decreased pain to <6/10 at worst on VAS to progress toward Prior Level of Function.    2. Patient will report improved function by a functional limitation score of <60 out of 100 on FOTO.    3. Patient will improve AROM to 50% of stated goals in order to progress towards Prior Level of Function.    4. Patient will improve strength to 50% of stated goals in order to progress towards Prior Level of Function.      Long Term Goals:  8 weeks Progress  5/11/2022   1. Patient will report decreased pain to <3/10 at worst on VAS to progress toward Prior Level of Function.      2. Patient will report improved function by a functional limitation score of <30 out of 100 on FOTO.    3. Patient will improve ROM and Functional Mobility to stated goals to return to Prior Level of Function.    4. Patient will improve strength to stated goals in order to return to Prior Level of Function.        PLAN   Plan of care Certification: 5/11/2022 to 7/6/22.    Outpatient Physical Therapy 2 times weekly for 8 weeks to include any combination of the following interventions: virtual visits, dry needling, modalities, electrical stimulation " (IFC, Pre-Mod, Attended with Functional Dry Needling), Manual Therapy, Neuromuscular Re-ed, Patient Education, Self Care, Therapeutic Activities, Therapeutic Exercise and Therapeutic Activites     Gianni Duarte, PT, DPT      I CERTIFY THE NEED FOR THESE SERVICES FURNISHED UNDER THIS PLAN OF TREATMENT AND WHILE UNDER MY CARE   Physician's comments:     Physician's Signature: ___________________________________________________

## 2022-05-12 ENCOUNTER — PATIENT MESSAGE (OUTPATIENT)
Dept: INTERNAL MEDICINE | Facility: CLINIC | Age: 77
End: 2022-05-12
Payer: MEDICARE

## 2022-05-12 ENCOUNTER — PATIENT MESSAGE (OUTPATIENT)
Dept: ORTHOPEDICS | Facility: CLINIC | Age: 77
End: 2022-05-12
Payer: MEDICARE

## 2022-05-12 PROBLEM — Z47.1 AFTERCARE FOLLOWING LEFT SHOULDER JOINT REPLACEMENT SURGERY: Status: ACTIVE | Noted: 2022-05-12

## 2022-05-12 PROBLEM — Z96.612: Status: ACTIVE | Noted: 2022-05-12

## 2022-05-12 PROBLEM — Z96.612 AFTERCARE FOLLOWING LEFT SHOULDER JOINT REPLACEMENT SURGERY: Status: ACTIVE | Noted: 2022-05-12

## 2022-05-12 PROBLEM — M62.512 MUSCLE WASTING AND ATROPHY, NOT ELSEWHERE CLASSIFIED, LEFT SHOULDER: Status: ACTIVE | Noted: 2022-05-12

## 2022-05-12 PROBLEM — M25.612 STIFFNESS OF LEFT SHOULDER JOINT: Status: ACTIVE | Noted: 2022-05-12

## 2022-05-12 PROBLEM — M25.512 ACUTE PAIN OF LEFT SHOULDER: Status: ACTIVE | Noted: 2022-05-12

## 2022-05-12 LAB
ALBUMIN SERPL BCP-MCNC: 3.2 G/DL (ref 3.5–5.2)
ALP SERPL-CCNC: 67 U/L (ref 55–135)
ALT SERPL W/O P-5'-P-CCNC: 24 U/L (ref 10–44)
ANION GAP SERPL CALC-SCNC: 8 MMOL/L (ref 8–16)
AST SERPL-CCNC: 22 U/L (ref 10–40)
BILIRUB SERPL-MCNC: 0.6 MG/DL (ref 0.1–1)
BUN SERPL-MCNC: 14 MG/DL (ref 8–23)
CALCIUM SERPL-MCNC: 10 MG/DL (ref 8.7–10.5)
CHLORIDE SERPL-SCNC: 102 MMOL/L (ref 95–110)
CHOLEST SERPL-MCNC: 221 MG/DL (ref 120–199)
CHOLEST/HDLC SERPL: 4 {RATIO} (ref 2–5)
CO2 SERPL-SCNC: 28 MMOL/L (ref 23–29)
CREAT SERPL-MCNC: 1 MG/DL (ref 0.5–1.4)
EST. GFR  (AFRICAN AMERICAN): >60 ML/MIN/1.73 M^2
EST. GFR  (NON AFRICAN AMERICAN): 54.9 ML/MIN/1.73 M^2
GLUCOSE SERPL-MCNC: 98 MG/DL (ref 70–110)
HDLC SERPL-MCNC: 55 MG/DL (ref 40–75)
HDLC SERPL: 24.9 % (ref 20–50)
LDLC SERPL CALC-MCNC: 131.8 MG/DL (ref 63–159)
NONHDLC SERPL-MCNC: 166 MG/DL
POTASSIUM SERPL-SCNC: 4 MMOL/L (ref 3.5–5.1)
PROT SERPL-MCNC: 6.9 G/DL (ref 6–8.4)
SODIUM SERPL-SCNC: 138 MMOL/L (ref 136–145)
TRIGL SERPL-MCNC: 171 MG/DL (ref 30–150)

## 2022-05-13 ENCOUNTER — TELEPHONE (OUTPATIENT)
Dept: CARDIOLOGY | Facility: CLINIC | Age: 77
End: 2022-05-13
Payer: MEDICARE

## 2022-05-13 NOTE — TELEPHONE ENCOUNTER
Pt contacted about results, pt verbalized understanding.            ----- Message from Luis Arthur MD sent at 5/12/2022  9:50 PM CDT -----  BNP wnl  NO CHF

## 2022-05-16 ENCOUNTER — OFFICE VISIT (OUTPATIENT)
Dept: ORTHOPEDICS | Facility: CLINIC | Age: 77
End: 2022-05-16
Payer: MEDICARE

## 2022-05-16 ENCOUNTER — PATIENT MESSAGE (OUTPATIENT)
Dept: ORTHOPEDICS | Facility: CLINIC | Age: 77
End: 2022-05-16

## 2022-05-16 ENCOUNTER — HOSPITAL ENCOUNTER (OUTPATIENT)
Dept: RADIOLOGY | Facility: HOSPITAL | Age: 77
Discharge: HOME | End: 2022-05-16
Attending: PHYSICIAN ASSISTANT
Payer: MEDICARE

## 2022-05-16 VITALS — WEIGHT: 165 LBS | BODY MASS INDEX: 30.36 KG/M2 | HEIGHT: 62 IN

## 2022-05-16 DIAGNOSIS — Z96.612 S/P REVERSE TOTAL SHOULDER ARTHROPLASTY, LEFT: ICD-10-CM

## 2022-05-16 DIAGNOSIS — M19.012 GLENOHUMERAL ARTHRITIS, LEFT: ICD-10-CM

## 2022-05-16 DIAGNOSIS — M25.512 LEFT SHOULDER PAIN, UNSPECIFIED CHRONICITY: ICD-10-CM

## 2022-05-16 DIAGNOSIS — Z98.890 POST-OPERATIVE STATE: Primary | ICD-10-CM

## 2022-05-16 DIAGNOSIS — M25.512 LEFT SHOULDER PAIN, UNSPECIFIED CHRONICITY: Primary | ICD-10-CM

## 2022-05-16 DIAGNOSIS — I10 ESSENTIAL HYPERTENSION: ICD-10-CM

## 2022-05-16 DIAGNOSIS — Z96.612 PRESENCE OF ARTIFICIAL SHOULDER JOINT, LEFT: ICD-10-CM

## 2022-05-16 DIAGNOSIS — K21.9 GASTROESOPHAGEAL REFLUX DISEASE WITHOUT ESOPHAGITIS: ICD-10-CM

## 2022-05-16 DIAGNOSIS — Z96.612 S/P REVERSE TOTAL SHOULDER ARTHROPLASTY, LEFT: Primary | ICD-10-CM

## 2022-05-16 PROCEDURE — 99024 POSTOP FOLLOW-UP VISIT: CPT | Mod: S$GLB,,, | Performed by: PHYSICIAN ASSISTANT

## 2022-05-16 PROCEDURE — 73030 X-RAY EXAM OF SHOULDER: CPT | Mod: TC,LT

## 2022-05-16 PROCEDURE — 3288F PR FALLS RISK ASSESSMENT DOCUMENTED: ICD-10-PCS | Mod: CPTII,S$GLB,, | Performed by: PHYSICIAN ASSISTANT

## 2022-05-16 PROCEDURE — 1125F PR PAIN SEVERITY QUANTIFIED, PAIN PRESENT: ICD-10-PCS | Mod: CPTII,S$GLB,, | Performed by: PHYSICIAN ASSISTANT

## 2022-05-16 PROCEDURE — 99999 PR PBB SHADOW E&M-EST. PATIENT-LVL III: ICD-10-PCS | Mod: PBBFAC,,, | Performed by: PHYSICIAN ASSISTANT

## 2022-05-16 PROCEDURE — 1159F PR MEDICATION LIST DOCUMENTED IN MEDICAL RECORD: ICD-10-PCS | Mod: CPTII,S$GLB,, | Performed by: PHYSICIAN ASSISTANT

## 2022-05-16 PROCEDURE — 3288F FALL RISK ASSESSMENT DOCD: CPT | Mod: CPTII,S$GLB,, | Performed by: PHYSICIAN ASSISTANT

## 2022-05-16 PROCEDURE — 1159F MED LIST DOCD IN RCRD: CPT | Mod: CPTII,S$GLB,, | Performed by: PHYSICIAN ASSISTANT

## 2022-05-16 PROCEDURE — 1101F PT FALLS ASSESS-DOCD LE1/YR: CPT | Mod: CPTII,S$GLB,, | Performed by: PHYSICIAN ASSISTANT

## 2022-05-16 PROCEDURE — 73030 XR SHOULDER COMPLETE 2 OR MORE VIEWS LEFT: ICD-10-PCS | Mod: 26,LT,, | Performed by: RADIOLOGY

## 2022-05-16 PROCEDURE — 99024 PR POST-OP FOLLOW-UP VISIT: ICD-10-PCS | Mod: S$GLB,,, | Performed by: PHYSICIAN ASSISTANT

## 2022-05-16 PROCEDURE — 99999 PR PBB SHADOW E&M-EST. PATIENT-LVL III: CPT | Mod: PBBFAC,,, | Performed by: PHYSICIAN ASSISTANT

## 2022-05-16 PROCEDURE — 1125F AMNT PAIN NOTED PAIN PRSNT: CPT | Mod: CPTII,S$GLB,, | Performed by: PHYSICIAN ASSISTANT

## 2022-05-16 PROCEDURE — 1160F RVW MEDS BY RX/DR IN RCRD: CPT | Mod: CPTII,S$GLB,, | Performed by: PHYSICIAN ASSISTANT

## 2022-05-16 PROCEDURE — 1101F PR PT FALLS ASSESS DOC 0-1 FALLS W/OUT INJ PAST YR: ICD-10-PCS | Mod: CPTII,S$GLB,, | Performed by: PHYSICIAN ASSISTANT

## 2022-05-16 PROCEDURE — 73030 X-RAY EXAM OF SHOULDER: CPT | Mod: 26,LT,, | Performed by: RADIOLOGY

## 2022-05-16 PROCEDURE — 1160F PR REVIEW ALL MEDS BY PRESCRIBER/CLIN PHARMACIST DOCUMENTED: ICD-10-PCS | Mod: CPTII,S$GLB,, | Performed by: PHYSICIAN ASSISTANT

## 2022-05-16 NOTE — PATIENT INSTRUCTIONS
Assessment:  Leticia Grimes is a  76 y.o. female   Data Unavailable with a chief complaint of Post-op Evaluation of the Left Shoulder  11 days s/p left reverse total shoulder arthroplasty.  Post-op    Encounter Diagnoses   Name Primary?    Post-operative state Yes    S/P reverse total shoulder arthroplasty, left     Glenohumeral arthritis, left     Essential hypertension     Gastroesophageal reflux disease without esophagitis     Presence of artificial shoulder joint, left       Plan:  Discussed using pain medicine at night and as needed  Suture removed and replaced with steri-strips  Come out of sling at least once daily for small arm pendulums and elbow range of motion  Follow up with Dr. Taylor as scheduled    Follow-up: With Dr. Taylor as scheduled or sooner if there are any problems between now and then.    Thank you for choosing [facility] and Dr. Nick Phoenix for your orthopedic & sports medicine care. It is our goal to provide you with exceptional care that will help keep you healthy, active, and get you back in the game.    If you felt that you received exemplary care today, please consider leaving us feedback on [employer] at [email or web address].     Please do not hesitate to reach out to us via email, phone, or MyChart with any questions, concerns, or feedback.    If you are experiencing pain/discomfort ,or have questions after 5pm and would like to be connected to the [facility] on-call team, please call this number and specify which Sports Medicine provider is treating you: [phone]

## 2022-05-16 NOTE — PROGRESS NOTES
Patient ID: Leticia Grimes  YOB: 1945  MRN: [id number]    Chief Complaint: Post op evaluation of the left shoulder    Referred By: Dr. Taylor patient     History of Present Illness: Leticia Grimes is a RHD 76 y.o. female   reitred with a chief complaint of Post op evaluation of the left shoulder. Patient presents today 11 days s/p left reverse total shoulder arthroplasty. Patient rates her pain a 7/10 today.   Doing well. Denies any fevers, chills, night sweats, numbness and tingling.     Shoulder Pain   The pain is present in the left shoulder. This is a chronic problem. The current episode started more than 1 year ago. The problem occurs intermittently. The problem has been gradually improving. The quality of the pain is described as aching and sharp. The pain is at a severity of 7/10. Pertinent negatives include no fever or numbness. The symptoms are aggravated by activity and lifting. She has tried cold for the symptoms.       Past Medical History:   Past Medical History:   Diagnosis Date    Adjustment insomnia 2020    Anxiety 2020    GERD (gastroesophageal reflux disease)     History of sudden cardiac arrest successfully resuscitated 2020    Hypertension     ICD (implantable cardioverter-defibrillator) in place 2020    Left arm swelling 9/3/2020    Mild vitamin D deficiency 2013    Osteopenia 2020    Vitamin D deficiency disease      Past Surgical History:   Procedure Laterality Date     SECTION, CLASSIC      HYSTERECTOMY      INJECTION OF JOINT Left 3/2/2021    Procedure: Left shoulder Joint injection;  Surgeon: Main Mcgraw MD;  [facility]: [facility];  Service: Pain Management;  Laterality: Left;    INSERTION OF BIVENTRICULAR IMPLANTABLE CARDIOVERTER-DEFIBRILLATOR (ICD)      REVERSE TOTAL SHOULDER ARTHROPLASTY Left 2022    Procedure: ARTHROPLASTY, SHOULDER, TOTAL, REVERSE;  Surgeon: Tomas Taylor MD;  Location:  [facility] OR;  Service: Orthopedics;  Laterality: Left;    TUBAL LIGATION       Family History   Problem Relation Age of Onset    Hypertension Mother     Arthritis Mother     Diabetes Father     Heart disease Father     Cancer Sister      Social History     Socioeconomic History    Marital status:    Occupational History    Occupation: retired school counselor   Tobacco Use    Smoking status: Never Smoker    Smokeless tobacco: Never Used   Substance and Sexual Activity    Alcohol use: No    Drug use: No    Sexual activity: Yes     Social Determinants of Health     Financial Resource Strain: Low Risk     Difficulty of Paying Living Expenses: Not hard at all   Food Insecurity: No Food Insecurity    Worried About Running Out of Food in the Last Year: Never true    Ran Out of Food in the Last Year: Never true   Transportation Needs: No Transportation Needs    Lack of Transportation (Medical): No    Lack of Transportation (Non-Medical): No   Physical Activity: Inactive    Days of Exercise per Week: 0 days    Minutes of Exercise per Session: 0 min   Stress: No Stress Concern Present    Feeling of Stress : Only a little   Social Connections: Moderately Isolated    Frequency of Communication with Friends and Family: More than three times a week    Frequency of Social Gatherings with Friends and Family: More than three times a week    Attends Mu-ism Services: 1 to 4 times per year    Active Member of Clubs or Organizations: No    Attends Club or Organization Meetings: Never    Marital Status:    Housing Stability: Unknown    Unable to Pay for Housing in the Last Year: No    Unstable Housing in the Last Year: No     Medication List with Changes/Refills   Current Medications    ACETAMINOPHEN (TYLENOL) 500 MG TABLET    Take 2 tablets (1,000 mg total) by mouth every 8 (eight) hours as needed for Pain.    ALPHA LIPOIC ACID 200 MG CAP    Take 1 capsule by mouth once daily.    AMLODIPINE  (NORVASC) 10 MG TABLET    Take 1 tablet (10 mg total) by mouth once daily.    ASPIRIN (ECOTRIN) 81 MG EC TABLET    Take 1 tablet (81 mg total) by mouth 2 (two) times a day. for 14 days    CARVEDILOL (COREG) 12.5 MG TABLET    Take 1 tablet (12.5 mg total) by mouth 2 (two) times daily with meals.    CELECOXIB (CELEBREX) 200 MG CAPSULE    Take 1 capsule (200 mg total) by mouth 2 (two) times daily.    CLONIDINE (CATAPRES) 0.1 MG TABLET    Take 0.1 mg by mouth as needed. If SBP > 200 mmHg    LISINOPRIL (PRINIVIL,ZESTRIL) 20 MG TABLET    Take 1 tablet (20 mg total) by mouth once daily.    LORAZEPAM (ATIVAN) 0.5 MG TABLET    TAKE 1 TABLET BY MOUTH AT BEDTIME FOR ANXIETY    MV-MN/FOLIC/LUTEIN/HERBAL 293 (ALIVE WOMEN'S 50 PLUS ORAL)    Take 1 tablet by mouth once daily.    OMEGA 3-DHA-EPA-FISH OIL (FISH OIL) 1,000 MG (120 MG-180 MG) CAP    Take 1 capsule by mouth 2 (two) times daily with meals.    OXYCODONE (ROXICODONE) 5 MG IMMEDIATE RELEASE TABLET    Take 1 tablet (5 mg total) by mouth every 4 (four) hours as needed for Pain (post-op pain).    TRAMADOL (ULTRAM) 50 MG TABLET    Take 1 tablet (50 mg total) by mouth every 4 to 6 hours as needed for Pain.     Review of patient's allergies indicates:   Allergen Reactions    Codeine     Morphine Other (See Comments)     Review of Systems   Constitutional: Negative for chills and fever.   HENT: Negative for ear discharge and hearing loss.    Eyes: Negative for blurred vision and visual disturbance.   Cardiovascular: Negative for chest pain and leg swelling.   Respiratory: Negative for cough and shortness of breath.    Endocrine: Negative for polyuria.   Hematologic/Lymphatic: Negative for bleeding problem.   Skin: Negative for rash.   Musculoskeletal: Positive for joint pain and muscle weakness. Negative for back pain, joint swelling and muscle cramps.   Gastrointestinal: Negative for nausea and vomiting.   Genitourinary: Negative for hematuria.   Neurological: Negative for  loss of balance, numbness and paresthesias.   Psychiatric/Behavioral: Negative for altered mental status.       Physical Exam:   Body mass index is 30.67 kg/m².  There were no vitals filed for this visit.   GENERAL: Well appearing, appropriate for stated age, no acute distress.  CARDIOVASCULAR: Pulses regular by peripheral palpation.  PULMONARY: Respirations are even and non-labored.  NEURO: Awake, alert, and oriented x 3.  PSYCH: Mood & affect are appropriate.  HEENT: Head is normocephalic and atraumatic.  General    Nursing note and vitals reviewed.            Left Shoulder Exam     Other   Sensation: normal     Comments:  Sutures removed   No s/s of infection  No active bleeding   No active drainage     All compartments soft and compressible. Intact extensor pollicis longus, flexor pollicis longus, finger flexion, finger extension, finger abduction and adduction. Sensation intact to radial, median, ulnar, and axillary nerve distributions. Hand warm and well perfused with capillary refill of less than 2 seconds, and palpable distal radial pulses.         Vascular Exam       Left Pulses      Radial:                    2+      Capillary Refill  Left Hand: normal capillary refill            Imaging:    X-Ray Shoulder 2 or More Views Left  Narrative: EXAMINATION:  XR SHOULDER COMPLETE 2 OR MORE VIEWS LEFT    CLINICAL HISTORY:  Pain in left shoulder    TECHNIQUE:  Two or three views of the left shoulder were preformed.    COMPARISON:  05/05/2022    FINDINGS:  A reverse left shoulder prosthesis is noted.  The previously noted air within the soft tissues as resolved.  No hardware failure or periprosthetic fracture noted.  Minimal AC joint arthropathy noted.  A single lead pacing device is present.  Impression: 1.  As above    Electronically signed by: Ihsan Neumann DO  Date:    05/16/2022  Time:    14:44      Relevant imaging results reviewed and interpreted by me, discussed with the patient and / or family today.      Other Tests:         Patient Instructions     Assessment:  Leticia Grimes is a  76 y.o. female   Data Unavailable with a chief complaint of Post-op Evaluation of the Left Shoulder  11 days s/p left reverse total shoulder arthroplasty.   Post-op    Encounter Diagnoses   Name Primary?    Post-operative state Yes    S/P reverse total shoulder arthroplasty, left     Glenohumeral arthritis, left     Essential hypertension     Gastroesophageal reflux disease without esophagitis     Presence of artificial shoulder joint, left       Plan:   Discussed using pain medicine at night and as needed   Suture removed and replaced with steri-strips   Come out of sling at least once daily for small arm pendulums and elbow range of motion   Follow up with Dr. Taylor as scheduled    Follow-up: With Dr. Taylor as scheduled or sooner if there are any problems between now and then.    Thank you for choosing [facility] and Dr. Nick Phoenix for your orthopedic & sports medicine care. It is our goal to provide you with exceptional care that will help keep you healthy, active, and get you back in the game.    If you felt that you received exemplary care today, please consider leaving us feedback on [employer] at [email or web address].     Please do not hesitate to reach out to us via email, phone, or MyChart with any questions, concerns, or feedback.    If you are experiencing pain/discomfort ,or have questions after 5pm and would like to be connected to the [facility] on-call team, please call this number and specify which Sports Medicine provider is treating you: [phone]        Provider Note/Medical Decision Making:   Healing well was seen by Dr. Taylor as well.    I discussed worrisome and red flag signs and symptoms with the patient. The patient expressed understanding and agreed to alert me immediately  or to go to the emergency room if they experience any of these.    Treatment plan was developed with input from the patient/family, and they expressed understanding and agreement with the plan. All questions were answered today.    Disclaimer: This note was prepared using a voice recognition system and is likely to have sound alike errors within the text.

## 2022-05-17 ENCOUNTER — NURSE TRIAGE (OUTPATIENT)
Dept: ADMINISTRATIVE | Facility: CLINIC | Age: 77
End: 2022-05-17
Payer: MEDICARE

## 2022-05-18 NOTE — TELEPHONE ENCOUNTER
As long as she did not have any symptoms, while low the blood pressure is of no clinical significance  If she had any symptoms, she needs to make sure she is staying adequately hydrated following her surgery  If the low readings persistent or symptomatic, she will need to follow-up with her cardiologist

## 2022-05-18 NOTE — TELEPHONE ENCOUNTER
"Patient calling regarding low BP. Reports last BP was 97/55. Per protocol, advised to see physician within 24 hours. Verbalized understanding. Knows to call back with new or worsening symptoms.     Reason for Disposition   [1] Systolic BP  AND [2] taking blood pressure medications AND [3] NOT dizzy, lightheaded or weak    Additional Information   Negative: Started suddenly after an allergic medicine, an allergic food, or bee sting   Negative: Shock suspected (e.g., cold/pale/clammy skin, too weak to stand, low BP, rapid pulse)   Negative: Difficult to awaken or acting confused (e.g., disoriented, slurred speech)   Negative: Fainted   Negative: [1] Systolic BP < 90 AND [2] dizzy, lightheaded, or weak   Negative: Chest pain   Negative: Bleeding (e.g., vomiting blood, rectal bleeding or tarry stools, severe vaginal bleeding)(Exception: fainted from sight of small amount of blood; small cut or abrasion)   Negative: Extra heart beats or heart is beating fast  (i.e., "palpitations")   Negative: Sounds like a life-threatening emergency to the triager   Negative: [1] Systolic BP < 80 AND [2] NOT dizzy, lightheaded or weak   Negative: Abdominal pain   Negative: Fever > 100.4 F (38.0 C)   Negative: Major surgery in the past month   Negative: [1] Drinking very little AND [2] dehydration suspected (e.g., no urine > 12 hours, very dry mouth, very lightheaded)   Negative: [1] Fall in systolic BP > 20 mm Hg from normal AND [2] dizzy, lightheaded, or weak   Negative: Patient sounds very sick or weak to the triager   Negative: [1] Systolic BP < 90 AND [2] NOT dizzy, lightheaded or weak   Negative: [1] Systolic BP  AND [2] taking blood pressure medications AND [3] dizzy, lightheaded or weak    Protocols used: BLOOD PRESSURE - LOW-A-      "

## 2022-05-20 ENCOUNTER — PATIENT MESSAGE (OUTPATIENT)
Dept: ORTHOPEDICS | Facility: CLINIC | Age: 77
End: 2022-05-20
Payer: MEDICARE

## 2022-05-24 ENCOUNTER — TELEPHONE (OUTPATIENT)
Dept: NEUROLOGY | Facility: CLINIC | Age: 77
End: 2022-05-24
Payer: MEDICARE

## 2022-05-24 NOTE — TELEPHONE ENCOUNTER
----- Message from Va Causey MA sent at 5/24/2022  3:13 PM CDT -----  this patient is a new patient and must be seen by Dr. Leonard. She was never seen by neurology at [facility] (Dr. Musa note is stating the patient was not seen). I can not see her tomorrow and she will need to be scheduled as a new patient with Dr. Leonard. Thanks!          I spoke with the patient and informed her that she would need to see Dr. Leonard first. She stated that she has chronic neuropathy, I let her know that it could take a while to get in and she asked if she could possibly be worked in sooner. I told her that someone would reach out to her and possibly try the ER or [facility] if need be.

## 2022-05-25 ENCOUNTER — CLINICAL SUPPORT (OUTPATIENT)
Dept: REHABILITATION | Facility: HOSPITAL | Age: 77
End: 2022-05-25
Attending: STUDENT IN AN ORGANIZED HEALTH CARE EDUCATION/TRAINING PROGRAM
Payer: MEDICARE

## 2022-05-25 DIAGNOSIS — M25.512 ACUTE PAIN OF LEFT SHOULDER: Primary | ICD-10-CM

## 2022-05-25 DIAGNOSIS — M25.612 STIFFNESS OF LEFT SHOULDER JOINT: ICD-10-CM

## 2022-05-25 DIAGNOSIS — Z96.612 PRESENCE OF ARTIFICIAL SHOULDER JOINT, LEFT: ICD-10-CM

## 2022-05-25 DIAGNOSIS — Z47.1 AFTERCARE FOLLOWING LEFT SHOULDER JOINT REPLACEMENT SURGERY: ICD-10-CM

## 2022-05-25 DIAGNOSIS — Z96.612 AFTERCARE FOLLOWING LEFT SHOULDER JOINT REPLACEMENT SURGERY: ICD-10-CM

## 2022-05-25 DIAGNOSIS — M62.512 MUSCLE WASTING AND ATROPHY, NOT ELSEWHERE CLASSIFIED, LEFT SHOULDER: ICD-10-CM

## 2022-05-25 PROCEDURE — 97140 MANUAL THERAPY 1/> REGIONS: CPT

## 2022-05-25 PROCEDURE — 97110 THERAPEUTIC EXERCISES: CPT

## 2022-05-31 ENCOUNTER — PATIENT MESSAGE (OUTPATIENT)
Dept: OTHER | Facility: OTHER | Age: 77
End: 2022-05-31
Payer: MEDICARE

## 2022-05-31 ENCOUNTER — PATIENT MESSAGE (OUTPATIENT)
Dept: CARDIOLOGY | Facility: CLINIC | Age: 77
End: 2022-05-31
Payer: MEDICARE

## 2022-05-31 ENCOUNTER — PATIENT MESSAGE (OUTPATIENT)
Dept: INTERNAL MEDICINE | Facility: CLINIC | Age: 77
End: 2022-05-31
Payer: MEDICARE

## 2022-05-31 DIAGNOSIS — I10 ESSENTIAL HYPERTENSION: ICD-10-CM

## 2022-06-01 ENCOUNTER — CLINICAL SUPPORT (OUTPATIENT)
Dept: REHABILITATION | Facility: HOSPITAL | Age: 77
End: 2022-06-01
Payer: MEDICARE

## 2022-06-01 DIAGNOSIS — M25.612 STIFFNESS OF LEFT SHOULDER JOINT: ICD-10-CM

## 2022-06-01 DIAGNOSIS — M25.512 ACUTE PAIN OF LEFT SHOULDER: Primary | ICD-10-CM

## 2022-06-01 DIAGNOSIS — Z96.612 PRESENCE OF ARTIFICIAL SHOULDER JOINT, LEFT: ICD-10-CM

## 2022-06-01 DIAGNOSIS — Z96.612 AFTERCARE FOLLOWING LEFT SHOULDER JOINT REPLACEMENT SURGERY: ICD-10-CM

## 2022-06-01 DIAGNOSIS — M62.512 MUSCLE WASTING AND ATROPHY, NOT ELSEWHERE CLASSIFIED, LEFT SHOULDER: ICD-10-CM

## 2022-06-01 DIAGNOSIS — Z47.1 AFTERCARE FOLLOWING LEFT SHOULDER JOINT REPLACEMENT SURGERY: ICD-10-CM

## 2022-06-01 PROCEDURE — 97110 THERAPEUTIC EXERCISES: CPT

## 2022-06-01 PROCEDURE — 97112 NEUROMUSCULAR REEDUCATION: CPT

## 2022-06-01 RX ORDER — AMLODIPINE BESYLATE 5 MG/1
5 TABLET ORAL DAILY
Qty: 90 TABLET | Refills: 3 | Status: SHIPPED | OUTPATIENT
Start: 2022-06-01 | End: 2023-06-12

## 2022-06-01 NOTE — TELEPHONE ENCOUNTER
Advise to decrease Amlodipine from 10 mg daily to 5 mg daily  Continue to check BP. And advise to send the reading to my office in 2 to 4 weeks

## 2022-06-01 NOTE — PROGRESS NOTES
"[facility]   Physical Therapy Treatment Note     Name: Leticia Grimes  Clinic Number: [id number]    Therapy Diagnosis:   Encounter Diagnoses   Name Primary?    Acute pain of left shoulder Yes    Stiffness of left shoulder joint     Muscle wasting and atrophy, not elsewhere classified, left shoulder     Aftercare following left shoulder joint replacement surgery     Presence of artificial shoulder joint, left      Physician: Tomas Taylor    Visit Date: 6/1/2022    Physician Orders: PT Eval and Treat  Medical Diagnosis from Referral: S/P reverse total shoulder arthroplasty, left; glenohumeral arthritis, left.   Evaluation Date: 5/11/2022  Authorization Period Expiration: 12/31/22  Plan of Care Expiration: 7/6/22  Progress Note Due: 6/10/22  Visit # / Visits authorized: 2/20 (+1 for prior authorization)   FOTO: 1/ 3      Precautions: Standard and Reverse TSA Precautions   Sx Date: 5/5/22      PTA Visit #: 0/5     Time In: 10:55 am   Time Out: 11:35 am  Total Billable Time: 40 minutes    SUBJECTIVE     Patient reports: soreness in her shoulder, especially the superior and posterior aspects.      He/She was compliant with home exercise program.  Response to previous treatment: no significant change.    Functional change: patient reports difficulty sleeping in her bed at night.      Pain: 5/10     Location: left shoulder    OBJECTIVE     Objective Measures updated at progress report unless specified.     TREATMENT     Leticia received the treatments listed below:     MANUAL THERAPY to improve ROM and pain for (0) minutes including:   Manual Intervention Performed Today    Astym      Joint Mobilizations     PROM   passive range of motion within surgical protocol         THERAPEUTIC EXERCISES to develop strength, endurance and ROM for (25) minutes including:  Intervention Performed Today    Upper Trapezius Stretch  x  10"x5 - L   Extension with Wand x  10"x5 - L   Internal Rotation " "with Wand x  10"x5 - L   Table Slides x  flexion, 10"x5   Biceps Curls - AROM x  3x10   Pronation/Supincation - AROM x  3x10         NEUROMUSCULAR RE-EDUCATION to improve Proprioception and motor control for (13) minutes including:  Intervention Performed Today    Scapular Retractions - AROM x  3x10   Shrugs - AROM x  3x10   Shoulder Isometrics x  x  x  x  Flexion: 3" hold, 1x10   Abduction: 3" hold, 1x10   External Rotation: 3" hold, 1x10   Internal Rotation: 3" hold, 1x10       PATIENT EDUCATION AND HOME EXERCISES     Home Exercises Provided and Patient Education Provided     Education provided: (included in therex) minutes  Precautions for Reverse Total Shoulder  home exercise program     Written Home Exercises Provided: yes.  Exercises were reviewed and Leticia was able to demonstrate them prior to the end of the session.  Leticia demonstrated good  understanding of the education provided. See EMR under Patient Instructions for exercises provided during therapy sessions.      ASSESSMENT   Progressed Therapeutic Exercise by adding multiple exercises per flowsheet to improve upper extremity ROM.  Progressed Neuromuscular Re-education by adding Shoulder Isometrics to improve motor control of upper extremity musculature.  Patient tolerated today's session well with decreased pain and improved shoulder mobility.      Leticia is progressing well towards her goals.   Pt prognosis is Excellent.     Pt will continue to benefit from skilled outpatient physical therapy to address the deficits listed in the problem list box on initial evaluation, provide pt/family education and to maximize pt's level of independence in the home and community environment.     Pt's spiritual, cultural and educational needs considered and pt agreeable to plan of care and goals.       Anticipated Barriers for therapy: sedentary lifestyle and age    GOALS:     Short Term Goals:  4 weeks Progress      1. Patient will report decreased pain to <6/10 at " worst on VAS to progress toward Prior Level of Function. Progressing    2. Patient will report improved function by a functional limitation score of <60 out of 100 on FOTO.     3. Patient will improve AROM to 50% of stated goals in order to progress towards Prior Level of Function.     4. Patient will improve strength to 50% of stated goals in order to progress towards Prior Level of Function.        Long Term Goals:  8 weeks Progress     1. Patient will report decreased pain to <3/10 at worst on VAS to progress toward Prior Level of Function.       2. Patient will report improved function by a functional limitation score of <30 out of 100 on FOTO.     3. Patient will improve ROM and Functional Mobility to stated goals to return to Prior Level of Function.     4. Patient will improve strength to stated goals in order to return to Prior Level of Function.             PLAN   Continue Plan of Care (POC) and progress per patient tolerance. See treatment section for details on planned progressions next session.  Progress Note Due: 6/10/22      Gianni Duarte, PT

## 2022-06-05 ENCOUNTER — PATIENT MESSAGE (OUTPATIENT)
Dept: ORTHOPEDICS | Facility: CLINIC | Age: 77
End: 2022-06-05
Payer: MEDICARE

## 2022-06-07 RX ORDER — METHYLPREDNISOLONE 4 MG/1
TABLET ORAL
Qty: 1 EACH | Refills: 0 | Status: SHIPPED | OUTPATIENT
Start: 2022-06-07 | End: 2022-09-08

## 2022-06-08 ENCOUNTER — CLINICAL SUPPORT (OUTPATIENT)
Dept: REHABILITATION | Facility: HOSPITAL | Age: 77
End: 2022-06-08
Payer: MEDICARE

## 2022-06-08 ENCOUNTER — PATIENT MESSAGE (OUTPATIENT)
Dept: ORTHOPEDICS | Facility: CLINIC | Age: 77
End: 2022-06-08
Payer: MEDICARE

## 2022-06-08 DIAGNOSIS — Z47.1 AFTERCARE FOLLOWING LEFT SHOULDER JOINT REPLACEMENT SURGERY: ICD-10-CM

## 2022-06-08 DIAGNOSIS — Z96.612 AFTERCARE FOLLOWING LEFT SHOULDER JOINT REPLACEMENT SURGERY: ICD-10-CM

## 2022-06-08 DIAGNOSIS — M25.612 STIFFNESS OF LEFT SHOULDER JOINT: ICD-10-CM

## 2022-06-08 DIAGNOSIS — Z96.612 PRESENCE OF ARTIFICIAL SHOULDER JOINT, LEFT: ICD-10-CM

## 2022-06-08 DIAGNOSIS — M25.512 ACUTE PAIN OF LEFT SHOULDER: Primary | ICD-10-CM

## 2022-06-08 DIAGNOSIS — M62.512 MUSCLE WASTING AND ATROPHY, NOT ELSEWHERE CLASSIFIED, LEFT SHOULDER: ICD-10-CM

## 2022-06-08 PROCEDURE — 97140 MANUAL THERAPY 1/> REGIONS: CPT

## 2022-06-08 PROCEDURE — 97112 NEUROMUSCULAR REEDUCATION: CPT

## 2022-06-08 PROCEDURE — 97110 THERAPEUTIC EXERCISES: CPT

## 2022-06-08 NOTE — PROGRESS NOTES
"[facility]   Physical Therapy Treatment Note     Name: Leticia Grimes  Clinic Number: [id number]    Therapy Diagnosis:   Encounter Diagnoses   Name Primary?    Acute pain of left shoulder Yes    Stiffness of left shoulder joint     Muscle wasting and atrophy, not elsewhere classified, left shoulder     Aftercare following left shoulder joint replacement surgery     Presence of artificial shoulder joint, left      Physician: Tomas Taylor    Visit Date: 6/8/2022    Physician Orders: PT Eval and Treat  Medical Diagnosis from Referral: S/P reverse total shoulder arthroplasty, left; glenohumeral arthritis, left.   Evaluation Date: 5/11/2022  Authorization Period Expiration: 12/31/22  Plan of Care Expiration: 7/6/22  Progress Note Due: 6/10/22  Visit # / Visits authorized: 3/20 (+1 for prior authorization)   FOTO: 1/ 3      Precautions: Standard and Reverse TSA Precautions   Sx Date: 5/5/22      PTA Visit #: 0/5     Time In: 11:00 am   Time Out: 11:45 am  Total Billable Time: 45 minutes    SUBJECTIVE     Patient reports: decreased frequency and intensity of symptoms but continued pain in her L arm as well as numbness into bilateral upper extremities.     He/She was compliant with home exercise program.  Response to previous treatment: improved shoulder mobility.   Functional change: patient reports difficulty sleeping in her bed at night.      Pain: 5/10     Location: left shoulder    OBJECTIVE     Objective Measures updated at progress report unless specified.     TREATMENT     Leticia received the treatments listed below:     MANUAL THERAPY to improve ROM and pain for (10) minutes including:   Manual Intervention Performed Today    Astym      Seated Thoracic Manipulation x    PROM x  flexion and Internal Rotation          THERAPEUTIC EXERCISES to develop strength, endurance and ROM for (25) minutes including:  Intervention Performed Today    Upper Trapezius Stretch  x  10"x5 - " "L   Extension with Wand x  10"x5 - L   Internal Rotation with Wand x  10"x5 - L   Table Slides x  flexion, 10"x5   Biceps Curls - AROM x  2#, 2x10   Pronation/Supincation - AROM x  1#, 3x10         NEUROMUSCULAR RE-EDUCATION to improve Proprioception and motor control for (8) minutes including:  Intervention Performed Today    Scapular Retractions - AROM x  3x10   Shrugs - AROM x  3x10   Shoulder Isometrics x    x  Flexion: 3" hold, 1x10   Abduction: 3" hold, 1x10   External Rotation: 3" hold, 1x10   Internal Rotation: 3" hold, 1x10       PATIENT EDUCATION AND HOME EXERCISES     Home Exercises Provided and Patient Education Provided     Education provided: (included in therex) minutes  Precautions for Reverse Total Shoulder  home exercise program     Written Home Exercises Provided: yes.  Exercises were reviewed and Leticia was able to demonstrate them prior to the end of the session.  Leticia demonstrated good  understanding of the education provided. See EMR under Patient Instructions for exercises provided during therapy sessions.      ASSESSMENT   Performed Manual Therapy to improve shoulder ROM.  Progressed Therapeutic Exercise by adding resistance to Biceps Curls and Pronation/Supination to improve upper extremity strength.  Patient demonstrated improved motor control of scapular stabilizers during Neuromuscular Re-education today.  Assessed cervical spine due to complaint of numbness in bilateral hands.  Was unable to reproduce upper extremity symptoms with cervical testing.      Leticia is progressing well towards her goals.   Pt prognosis is Excellent.     Pt will continue to benefit from skilled outpatient physical therapy to address the deficits listed in the problem list box on initial evaluation, provide pt/family education and to maximize pt's level of independence in the home and community environment.     Pt's spiritual, cultural and educational needs considered and pt agreeable to plan of care and " goals.       Anticipated Barriers for therapy: sedentary lifestyle and age    GOALS:     Short Term Goals:  4 weeks Progress      1. Patient will report decreased pain to <6/10 at worst on VAS to progress toward Prior Level of Function. Progressing    2. Patient will report improved function by a functional limitation score of <60 out of 100 on FOTO.     3. Patient will improve AROM to 50% of stated goals in order to progress towards Prior Level of Function.     4. Patient will improve strength to 50% of stated goals in order to progress towards Prior Level of Function.        Long Term Goals:  8 weeks Progress     1. Patient will report decreased pain to <3/10 at worst on VAS to progress toward Prior Level of Function.       2. Patient will report improved function by a functional limitation score of <30 out of 100 on FOTO.     3. Patient will improve ROM and Functional Mobility to stated goals to return to Prior Level of Function.     4. Patient will improve strength to stated goals in order to return to Prior Level of Function.             PLAN   Continue Plan of Care (POC) and progress per patient tolerance. See treatment section for details on planned progressions next session.  Progress Note Due: 6/10/22      Gianni Duarte, PT

## 2022-06-10 ENCOUNTER — TELEPHONE (OUTPATIENT)
Dept: ORTHOPEDICS | Facility: CLINIC | Age: 77
End: 2022-06-10
Payer: MEDICARE

## 2022-06-10 NOTE — TELEPHONE ENCOUNTER
Spoke with patient. Advised her that as long as she is not taking prescription pain medication and is comfortable driving that it is okay. -NS     ----- Message from Rachell Lara sent at 6/10/2022  3:46 PM CDT -----  Contact: Leticia  Type:  Needs Medical Advice    Who Called: Leticia   Would the patient rather a call back or a response via [facility]? call  Best Call Back Number:[phone] (home)    Additional Information: Pt is requesting a callback in regards to pt needing to know if she can drive about 5 miles please. Please call today please

## 2022-06-14 ENCOUNTER — HOSPITAL ENCOUNTER (OUTPATIENT)
Dept: RADIOLOGY | Facility: HOSPITAL | Age: 77
Discharge: HOME | End: 2022-06-14
Attending: STUDENT IN AN ORGANIZED HEALTH CARE EDUCATION/TRAINING PROGRAM
Payer: MEDICARE

## 2022-06-14 ENCOUNTER — PATIENT MESSAGE (OUTPATIENT)
Dept: INTERNAL MEDICINE | Facility: CLINIC | Age: 77
End: 2022-06-14
Payer: MEDICARE

## 2022-06-14 ENCOUNTER — OFFICE VISIT (OUTPATIENT)
Dept: ORTHOPEDICS | Facility: CLINIC | Age: 77
End: 2022-06-14
Payer: MEDICARE

## 2022-06-14 VITALS — WEIGHT: 165 LBS | BODY MASS INDEX: 31.15 KG/M2 | HEIGHT: 61 IN

## 2022-06-14 DIAGNOSIS — Z96.612 S/P REVERSE TOTAL SHOULDER ARTHROPLASTY, LEFT: Primary | ICD-10-CM

## 2022-06-14 DIAGNOSIS — Z96.612 S/P REVERSE TOTAL SHOULDER ARTHROPLASTY, LEFT: ICD-10-CM

## 2022-06-14 PROCEDURE — 1159F PR MEDICATION LIST DOCUMENTED IN MEDICAL RECORD: ICD-10-PCS | Mod: CPTII,S$GLB,, | Performed by: STUDENT IN AN ORGANIZED HEALTH CARE EDUCATION/TRAINING PROGRAM

## 2022-06-14 PROCEDURE — 73030 XR SHOULDER COMPLETE 2 OR MORE VIEWS LEFT: ICD-10-PCS | Mod: 26,LT,, | Performed by: RADIOLOGY

## 2022-06-14 PROCEDURE — 1125F AMNT PAIN NOTED PAIN PRSNT: CPT | Mod: CPTII,S$GLB,, | Performed by: STUDENT IN AN ORGANIZED HEALTH CARE EDUCATION/TRAINING PROGRAM

## 2022-06-14 PROCEDURE — 1101F PR PT FALLS ASSESS DOC 0-1 FALLS W/OUT INJ PAST YR: ICD-10-PCS | Mod: CPTII,S$GLB,, | Performed by: STUDENT IN AN ORGANIZED HEALTH CARE EDUCATION/TRAINING PROGRAM

## 2022-06-14 PROCEDURE — 73030 X-RAY EXAM OF SHOULDER: CPT | Mod: TC,LT

## 2022-06-14 PROCEDURE — 1125F PR PAIN SEVERITY QUANTIFIED, PAIN PRESENT: ICD-10-PCS | Mod: CPTII,S$GLB,, | Performed by: STUDENT IN AN ORGANIZED HEALTH CARE EDUCATION/TRAINING PROGRAM

## 2022-06-14 PROCEDURE — 99999 PR PBB SHADOW E&M-EST. PATIENT-LVL III: ICD-10-PCS | Mod: PBBFAC,,, | Performed by: STUDENT IN AN ORGANIZED HEALTH CARE EDUCATION/TRAINING PROGRAM

## 2022-06-14 PROCEDURE — 99024 PR POST-OP FOLLOW-UP VISIT: ICD-10-PCS | Mod: S$GLB,,, | Performed by: STUDENT IN AN ORGANIZED HEALTH CARE EDUCATION/TRAINING PROGRAM

## 2022-06-14 PROCEDURE — 99024 POSTOP FOLLOW-UP VISIT: CPT | Mod: S$GLB,,, | Performed by: STUDENT IN AN ORGANIZED HEALTH CARE EDUCATION/TRAINING PROGRAM

## 2022-06-14 PROCEDURE — 73030 X-RAY EXAM OF SHOULDER: CPT | Mod: 26,LT,, | Performed by: RADIOLOGY

## 2022-06-14 PROCEDURE — 1159F MED LIST DOCD IN RCRD: CPT | Mod: CPTII,S$GLB,, | Performed by: STUDENT IN AN ORGANIZED HEALTH CARE EDUCATION/TRAINING PROGRAM

## 2022-06-14 PROCEDURE — 3288F FALL RISK ASSESSMENT DOCD: CPT | Mod: CPTII,S$GLB,, | Performed by: STUDENT IN AN ORGANIZED HEALTH CARE EDUCATION/TRAINING PROGRAM

## 2022-06-14 PROCEDURE — 1101F PT FALLS ASSESS-DOCD LE1/YR: CPT | Mod: CPTII,S$GLB,, | Performed by: STUDENT IN AN ORGANIZED HEALTH CARE EDUCATION/TRAINING PROGRAM

## 2022-06-14 PROCEDURE — 3288F PR FALLS RISK ASSESSMENT DOCUMENTED: ICD-10-PCS | Mod: CPTII,S$GLB,, | Performed by: STUDENT IN AN ORGANIZED HEALTH CARE EDUCATION/TRAINING PROGRAM

## 2022-06-14 PROCEDURE — 99999 PR PBB SHADOW E&M-EST. PATIENT-LVL III: CPT | Mod: PBBFAC,,, | Performed by: STUDENT IN AN ORGANIZED HEALTH CARE EDUCATION/TRAINING PROGRAM

## 2022-06-14 NOTE — PROGRESS NOTES
Orthopaedics  Post-operative follow-up    Procedure Performed:  Left Reverse Total Shoulder Arthroplasty     Date of Surgery: 05/05/2022    Subjective: Leticia Grimes is now approximately 6 weeks out from her shoulder surgery.  She has been compliant with post-operative restrictions and has been progressing with PT.  Her pain and function continue to improve.  She has had some tightness in the shoulder and trouble with motion.  She has also had some left-sided tingling in her upper extremity and also lower extremity.    Exam:  Incision sites healed, C/D/I  No swelling or bruising noted  Fluid ROM with no crepitus  Active ROM: FF 45, ABD 30, ER 40  Supine ROM: , ABD 70, ER 60  Axillary nerve sensation and motor intact  Motor and sensory intact distally  Strong radial pulse, fingers warm and well perfused    Imaging:  X-Ray Shoulder 2 or More Views Left  Narrative: EXAMINATION:  XR SHOULDER COMPLETE 2 OR MORE VIEWS LEFT    CLINICAL HISTORY:  Presence of left artificial shoulder joint    TECHNIQUE:  Two or three views of the left shoulder were performed.    COMPARISON:  05/16/2022    FINDINGS:  Prior shoulder arthroplasty again noted.  Hardware is unchanged in appearance with no definite evidence of failure or loosening.  No acute fractures or dislocations visualized.  There is mild AC joint arthrosis.  Cardiac pacing device again noted.  Impression: As above.  No acute findings.    Electronically signed by: Jose Pimentel MD  Date:    06/14/2022  Time:    12:29          Impression:  S/p Left Reverse Total Shoulder Arthroplasty, second post-operative visit - routine healing    Plan:  She is 6 weeks out from her left reverse total shoulder arthroplasty.  She has some tightness in the shoulder but he is making progress with therapy.  I recommend that she continue with therapy, specifically working on aggressive range of motion  No restrictions in terms of range of motion  Advance PT per protocol  Symptomatic  treatment for pain / swelling  May advance activities as reviewed today: discontinue sling, encouraged active and passive ROM  Instructed patient to call clinic if questions or concerns    Follow-up in 6 weeks at 3 months post-op    Work status:  For weeks 0-6 from now:  1) Discontinue sling  2) Continue physical therapy  3) No lifting/pushing/pulling greater than 2 pounds  4) No overhead work  5) No repetitive motions        Tomas Taylor MD

## 2022-06-15 ENCOUNTER — CLINICAL SUPPORT (OUTPATIENT)
Dept: REHABILITATION | Facility: HOSPITAL | Age: 77
End: 2022-06-15
Payer: MEDICARE

## 2022-06-15 ENCOUNTER — CLINICAL SUPPORT (OUTPATIENT)
Dept: CARDIOLOGY | Facility: HOSPITAL | Age: 77
End: 2022-06-15
Payer: MEDICARE

## 2022-06-15 DIAGNOSIS — M62.512 MUSCLE WASTING AND ATROPHY, NOT ELSEWHERE CLASSIFIED, LEFT SHOULDER: ICD-10-CM

## 2022-06-15 DIAGNOSIS — Z47.1 AFTERCARE FOLLOWING LEFT SHOULDER JOINT REPLACEMENT SURGERY: ICD-10-CM

## 2022-06-15 DIAGNOSIS — M25.512 ACUTE PAIN OF LEFT SHOULDER: Primary | ICD-10-CM

## 2022-06-15 DIAGNOSIS — Z95.810 PRESENCE OF AUTOMATIC (IMPLANTABLE) CARDIAC DEFIBRILLATOR: ICD-10-CM

## 2022-06-15 DIAGNOSIS — Z96.612 PRESENCE OF ARTIFICIAL SHOULDER JOINT, LEFT: ICD-10-CM

## 2022-06-15 DIAGNOSIS — M25.612 STIFFNESS OF LEFT SHOULDER JOINT: ICD-10-CM

## 2022-06-15 DIAGNOSIS — Z96.612 AFTERCARE FOLLOWING LEFT SHOULDER JOINT REPLACEMENT SURGERY: ICD-10-CM

## 2022-06-15 PROCEDURE — 93296 REM INTERROG EVL PM/IDS: CPT | Performed by: INTERNAL MEDICINE

## 2022-06-15 PROCEDURE — 93295 DEV INTERROG REMOTE 1/2/MLT: CPT | Mod: S$GLB,,, | Performed by: INTERNAL MEDICINE

## 2022-06-15 PROCEDURE — 97140 MANUAL THERAPY 1/> REGIONS: CPT

## 2022-06-15 PROCEDURE — 93295 CARDIAC DEVICE CHECK - REMOTE: ICD-10-PCS | Mod: S$GLB,,, | Performed by: INTERNAL MEDICINE

## 2022-06-15 NOTE — TELEPHONE ENCOUNTER
She she should see any provider available    However, if she was able to participate in her physical therapy, not likely to be anything significant.    Please also let her know that is a normal blood pressure and not likely to be causing her any symptoms    Unless has more specific symptoms, possibly more anxiety related.  Has she seen her psychiatrist recently?

## 2022-06-15 NOTE — PROGRESS NOTES
[facility]   Physical Therapy Treatment Note     Name: Leticia Grimes  Clinic Number: [id number]    Therapy Diagnosis:   Encounter Diagnoses   Name Primary?    Acute pain of left shoulder Yes    Stiffness of left shoulder joint     Muscle wasting and atrophy, not elsewhere classified, left shoulder     Aftercare following left shoulder joint replacement surgery     Presence of artificial shoulder joint, left      Physician: Tomas Taylor*    Visit Date: 6/15/2022    Physician Orders: PT Eval and Treat  Medical Diagnosis from Referral: S/P reverse total shoulder arthroplasty, left; glenohumeral arthritis, left.   Evaluation Date: 5/11/2022  Authorization Period Expiration: 12/31/22  Plan of Care Expiration: 7/6/22  Progress Note Due: 6/10/22  Visit # / Visits authorized: 4/20 (+1 for prior authorization)   FOTO: 1/ 3      Precautions: Standard and Reverse TSA Precautions   Sx Date: 5/5/22      PTA Visit #: 0/5     Time In: 11:00 am   Time Out: 11:45 am  Total Billable Time: 15 minutes - decreased due to lack of billable staff     SUBJECTIVE     Patient reports: increased muscle tightness due to being allowed to discontinue use of her sling by MD.     He/She was compliant with home exercise program.  Response to previous treatment: improved shoulder mobility.   Functional change: improved sleeping at night.      Pain: 5/10     Location: left shoulder    OBJECTIVE     Objective Measures updated at progress report unless specified.     TREATMENT     Leticia received the treatments listed below:     MANUAL THERAPY to improve ROM and pain for (10) minutes including:   Manual Intervention Performed Today    Astym  x    Seated Thoracic Manipulation     PROM   flexion and Internal Rotation          THERAPEUTIC EXERCISES to develop strength, endurance and ROM for (5 - decreased due to lack of billable staff) minutes including:  Intervention Performed Today    Upper Trapezius Stretch  x  " 10"x5 - L   Extension with Wand x  10"x5 - L   Internal Rotation with Wand x  10"x5 - L   Table Slides x  flexion, 10"x5   Biceps Curls - AROM x  2#, 2x10   Pronation/Supincation - AROM x  2#, 3x10         NEUROMUSCULAR RE-EDUCATION to improve Proprioception and motor control for (0 - due to lack of billable staff) minutes including:  Intervention Performed Today    Rows x  Yellow Band; 3x10   Shrugs - AROM x  3x10   Shoulder Isometrics x  x  x  x  Flexion: 3" hold, 1x10   Abduction: 3" hold, 1x10   External Rotation: 3" hold, 1x10   Internal Rotation: 3" hold, 1x10       PATIENT EDUCATION AND HOME EXERCISES     Home Exercises Provided and Patient Education Provided     Education provided: (included in therex) minutes  Precautions for Reverse Total Shoulder  home exercise program     Written Home Exercises Provided: yes.  Exercises were reviewed and Leticia was able to demonstrate them prior to the end of the session.  Leticia demonstrated good  understanding of the education provided. See EMR under Patient Instructions for exercises provided during therapy sessions.      ASSESSMENT   Patient reported improved soft tissue mobility after Manual Therapy. Progressed Therapeutic Exercise by increasing resistance on Pronation/Supination to improve upper extremity strength. Progressed Neuromuscular Re-education by adding resistance to Rows to improve motor control of scapular stabilizers.     Leticia is progressing well towards her goals.   Pt prognosis is Excellent.     Pt will continue to benefit from skilled outpatient physical therapy to address the deficits listed in the problem list box on initial evaluation, provide pt/family education and to maximize pt's level of independence in the home and community environment.     Pt's spiritual, cultural and educational needs considered and pt agreeable to plan of care and goals.       Anticipated Barriers for therapy: sedentary lifestyle and age    GOALS:     Short Term Goals:  " 4 weeks Progress      1. Patient will report decreased pain to <6/10 at worst on VAS to progress toward Prior Level of Function. Progressing    2. Patient will report improved function by a functional limitation score of <60 out of 100 on FOTO.     3. Patient will improve AROM to 50% of stated goals in order to progress towards Prior Level of Function.     4. Patient will improve strength to 50% of stated goals in order to progress towards Prior Level of Function.        Long Term Goals:  8 weeks Progress     1. Patient will report decreased pain to <3/10 at worst on VAS to progress toward Prior Level of Function.       2. Patient will report improved function by a functional limitation score of <30 out of 100 on FOTO.     3. Patient will improve ROM and Functional Mobility to stated goals to return to Prior Level of Function.     4. Patient will improve strength to stated goals in order to return to Prior Level of Function.             PLAN   Reassess for Progress Note next visit.   Progress Note Due: 6/10/22      Gianni Duarte, PT

## 2022-06-22 ENCOUNTER — CLINICAL SUPPORT (OUTPATIENT)
Dept: REHABILITATION | Facility: HOSPITAL | Age: 77
End: 2022-06-22
Payer: MEDICARE

## 2022-06-22 DIAGNOSIS — M25.612 STIFFNESS OF LEFT SHOULDER JOINT: ICD-10-CM

## 2022-06-22 DIAGNOSIS — Z47.1 AFTERCARE FOLLOWING LEFT SHOULDER JOINT REPLACEMENT SURGERY: ICD-10-CM

## 2022-06-22 DIAGNOSIS — M62.512 MUSCLE WASTING AND ATROPHY, NOT ELSEWHERE CLASSIFIED, LEFT SHOULDER: ICD-10-CM

## 2022-06-22 DIAGNOSIS — M25.512 ACUTE PAIN OF LEFT SHOULDER: Primary | ICD-10-CM

## 2022-06-22 DIAGNOSIS — Z96.612 AFTERCARE FOLLOWING LEFT SHOULDER JOINT REPLACEMENT SURGERY: ICD-10-CM

## 2022-06-22 DIAGNOSIS — Z96.612 PRESENCE OF ARTIFICIAL SHOULDER JOINT, LEFT: ICD-10-CM

## 2022-06-22 PROCEDURE — 97140 MANUAL THERAPY 1/> REGIONS: CPT

## 2022-06-22 PROCEDURE — 97110 THERAPEUTIC EXERCISES: CPT

## 2022-06-22 PROCEDURE — 97112 NEUROMUSCULAR REEDUCATION: CPT

## 2022-06-22 NOTE — PROGRESS NOTES
[facility] [place] [facility]   Physical Therapy Progress Note     Name: Leticia Grimse  Clinic Number: [id number]    Therapy Diagnosis:   Encounter Diagnoses   Name Primary?    Acute pain of left shoulder Yes    Stiffness of left shoulder joint     Muscle wasting and atrophy, not elsewhere classified, left shoulder     Aftercare following left shoulder joint replacement surgery     Presence of artificial shoulder joint, left      Physician: Tomas Taylor    Visit Date: 6/22/2022    Physician Orders: PT Eval and Treat  Medical Diagnosis from Referral: S/P reverse total shoulder arthroplasty, left; glenohumeral arthritis, left.   Evaluation Date: 5/11/2022  Authorization Period Expiration: 12/31/22  Plan of Care Expiration: 7/6/22  Progress Note Due: 7/22/22  Visit # / Visits authorized: 5/20 (+1 for prior authorization)   FOTO: 1/ 3      Precautions: Standard and Reverse TSA Precautions   Sx Date: 5/5/22      PTA Visit #: 0/5     Time In: 11:00 am   Time Out: 11:55 am  Total Billable Time: 40 minutes - decreased due to lack of billable staff     SUBJECTIVE     Patient reports: increased muscle tightness due to being allowed to discontinue use of her sling by MD.     He/She was compliant with home exercise program.  Response to previous treatment: improved shoulder mobility.   Functional change: improved sleeping at night.      Pain: 5/10     Location: left shoulder    OBJECTIVE     RANGE OF MOTION:  *denotes pain         Shoulder ROM  (Degrees) Left  Initial Left  Update Goal   Shoulder Flexion 80 60 120   Shoulder Abduction  NT 70 90   Shoulder ER  NT 10 60   Shoulder IR  NT 30 45            STRENGTH:  *denotes pain      MMT Left  Initial Left  Update Goal   Shoulder Flexion NT 3*/5 5/5   Shoulder Abduction NT 3*/5 5/5   Elbow Flexion  NT 5/5 5/5   Elbow Extension NT 5/5 5/5            FUNCTION:      CMS Impairment/Limitation/Restriction for FOTO Shoulder Survey     Therapist reviewed FOTO  "scores for Leticia on 5/11/2022.   FOTO documents entered into EPIC - see Media section.     Limitation Score (Evaluation): 96%  Limitation Score (Evaluation): 50%         TREATMENT     Leticia received the treatments listed below:     MANUAL THERAPY to improve ROM and pain for (10) minutes including:   Manual Intervention Performed Today    Astym  x    Seated Thoracic Manipulation     PROM   flexion and Internal Rotation          THERAPEUTIC EXERCISES to develop strength, endurance and ROM for (15 - decreased due to lack of billable staff) minutes including:  Intervention Performed Today    Upper Trapezius Stretch  x  10"x5 - L   Rhomboid Stretch  x  10"x5   Physioball Roll-Outs x  10"x3 each direction   Extension with Wand x  10"x5 - L   Internal Rotation with Wand x  10"x5 - L   Table Slides x  flexion, 10"x5   Biceps Curls - AROM x  3#, 2x10   Goals, measurements, and FOTO x  visit 6         NEUROMUSCULAR RE-EDUCATION to improve Proprioception and motor control for (15) minutes including:  Intervention Performed Today    Rows x  Yellow Band; 3x10   Shrugs  x  5#, 2x10   Internal Rotation Walkouts x  Yellow Band x 3 steps, 2x10   External Rotation Walkouts  x  Yellow Band x 1 step, 1x10       PATIENT EDUCATION AND HOME EXERCISES     Home Exercises Provided and Patient Education Provided     Education provided: (included in therex) minutes  Precautions for Reverse Total Shoulder  home exercise program     Written Home Exercises Provided: yes.  Exercises were reviewed and Leticia was able to demonstrate them prior to the end of the session.  Leticia demonstrated good  understanding of the education provided. See EMR under Patient Instructions for exercises provided during therapy sessions.      ASSESSMENT   Performed Manual Therapy to improve overhead ROM. Progressed Therapeutic Exercise adding Rhomboid Stretch and Physioball Roll Outs to improve ROM. Progressed Neuromuscular Re-education by adding Internal Rotation " Walkouts and External Rotation Walkouts to improve motor control of rotator cuff.     Patient is progressing well with pain, elbow strength, and ADL tolerance; however patient remains limited with shoulder ROM, strength, and overhead ADL's. Patient will continue to benefit from skilled Physical Therapy to address remaining limitations.     Leticia is progressing well towards her goals.   Pt prognosis is Excellent.     Pt will continue to benefit from skilled outpatient physical therapy to address the deficits listed in the problem list box on initial evaluation, provide pt/family education and to maximize pt's level of independence in the home and community environment.     Pt's spiritual, cultural and educational needs considered and pt agreeable to plan of care and goals.       Anticipated Barriers for therapy: sedentary lifestyle and age    GOALS:     Short Term Goals:  4 weeks Progress      1. Patient will report decreased pain to <6/10 at worst on VAS to progress toward Prior Level of Function. Met   6/22/22   2. Patient will report improved function by a functional limitation score of <60 out of 100 on FOTO. Met  6/22/22   3. Patient will improve AROM to 50% of stated goals in order to progress towards Prior Level of Function.  Not Met   4. Patient will improve strength to 50% of stated goals in order to progress towards Prior Level of Function.  Not Met      Long Term Goals:  8 weeks Progress     1. Patient will report decreased pain to <3/10 at worst on VAS to progress toward Prior Level of Function.   Not Met    2. Patient will report improved function by a functional limitation score of <30 out of 100 on FOTO.  Not Met   3. Patient will improve ROM and Functional Mobility to stated goals to return to Prior Level of Function.  Not Met   4. Patient will improve strength to stated goals in order to return to Prior Level of Function.  Not Met           PLAN   Reassess for Progress Note next visit.   Plan of Care  Expiration: 7/6/22      Gianni Duarte, PT

## 2022-06-29 ENCOUNTER — CLINICAL SUPPORT (OUTPATIENT)
Dept: REHABILITATION | Facility: HOSPITAL | Age: 77
End: 2022-06-29
Payer: MEDICARE

## 2022-06-29 ENCOUNTER — TELEPHONE (OUTPATIENT)
Dept: ORTHOPEDICS | Facility: CLINIC | Age: 77
End: 2022-06-29
Payer: MEDICARE

## 2022-06-29 DIAGNOSIS — Z96.612 PRESENCE OF ARTIFICIAL SHOULDER JOINT, LEFT: ICD-10-CM

## 2022-06-29 DIAGNOSIS — Z96.612 S/P REVERSE TOTAL SHOULDER ARTHROPLASTY, LEFT: Primary | ICD-10-CM

## 2022-06-29 DIAGNOSIS — M25.512 ACUTE PAIN OF LEFT SHOULDER: Primary | ICD-10-CM

## 2022-06-29 DIAGNOSIS — Z96.612 AFTERCARE FOLLOWING LEFT SHOULDER JOINT REPLACEMENT SURGERY: ICD-10-CM

## 2022-06-29 DIAGNOSIS — Z47.1 AFTERCARE FOLLOWING LEFT SHOULDER JOINT REPLACEMENT SURGERY: ICD-10-CM

## 2022-06-29 DIAGNOSIS — M62.512 MUSCLE WASTING AND ATROPHY, NOT ELSEWHERE CLASSIFIED, LEFT SHOULDER: ICD-10-CM

## 2022-06-29 DIAGNOSIS — M25.612 STIFFNESS OF LEFT SHOULDER JOINT: ICD-10-CM

## 2022-06-29 PROCEDURE — 97140 MANUAL THERAPY 1/> REGIONS: CPT

## 2022-06-29 PROCEDURE — 97110 THERAPEUTIC EXERCISES: CPT

## 2022-06-29 NOTE — PROGRESS NOTES
"[facility]   Physical Therapy Daily Treatment Note + POC    Name: Leticia Grimes  Clinic Number: [id number]    Therapy Diagnosis:   Encounter Diagnoses   Name Primary?    Acute pain of left shoulder Yes    Stiffness of left shoulder joint     Muscle wasting and atrophy, not elsewhere classified, left shoulder     Aftercare following left shoulder joint replacement surgery     Presence of artificial shoulder joint, left      Physician: Tomas Taylor*    Visit Date: 6/29/2022    Physician Orders: PT Eval and Treat  Medical Diagnosis from Referral: S/P reverse total shoulder arthroplasty, left; glenohumeral arthritis, left.   Evaluation Date: 5/11/2022  Authorization Period Expiration: 12/31/22  Plan of Care Expiration: 8/10/22  Progress Note Due: 7/29/22  Visit # / Visits authorized: 6/20 (+1 for prior authorization)   FOTO: 1/ 3      Precautions: Standard and Reverse TSA Precautions   Sx Date: 5/5/22      PTA Visit #: 0/5     Time In: 9:05 am   Time Out: 9:41 am  Total Billable Time: 23 minutes - decreased due to lack of billable staff     SUBJECTIVE     Patient reports: increased peripheral neuropathy but shoulder feels better.     He/She was compliant with home exercise program.  Response to previous treatment: improved shoulder mobility.   Functional change: improved sleeping at night.      Pain: 5/10     Location: left shoulder    OBJECTIVE     Objective Measures updated at progress report unless specified.     TREATMENT     Leticia received the treatments listed below:     MANUAL THERAPY to improve ROM and pain for (10) minutes including:   Manual Intervention Performed Today    Astym      Seated Thoracic Manipulation     PROM x  flexion and Internal Rotation          THERAPEUTIC EXERCISES to develop strength, endurance and ROM for (13 - decreased due to lack of billable staff) minutes including:  Intervention Performed Today    Upper Trapezius Stretch    10"x5 - L " "  Rhomboid Stretch    10"x5   Physioball Roll-Outs   10"x3 each direction   Extension with Wand x  10"x5 - L   Internal Rotation with Wand x  10"x5 - L   Table Slides x  flexion and scaption, 10"x5   Overhead Pulleys x  3 minutes   Biceps Curls  x  3#, 3x10   Goals, measurements, and FOTO   visit 6         NEUROMUSCULAR RE-EDUCATION to improve Proprioception and motor control for (0 - decreased due to lack of billable staff) minutes including:  Intervention Performed Today    Rows x  Yellow Band; 3x10   Internal Rotation Walkouts x  Yellow Band x 3 steps, 2x10   External Rotation Walkouts  x  Yellow Band x 1 step, 1x10   Barrel Hug x  Red Band, 2x10       PATIENT EDUCATION AND HOME EXERCISES     Home Exercises Provided and Patient Education Provided     Education provided: (included in therex) minutes  Precautions for Reverse Total Shoulder  home exercise program     Written Home Exercises Provided: yes.  Exercises were reviewed and Leticia was able to demonstrate them prior to the end of the session.  Leticia demonstrated good  understanding of the education provided. See EMR under Patient Instructions for exercises provided during therapy sessions.      ASSESSMENT     Response to MT: patient demonstrated mildly improved ROM after Manual Therapy.   Response to Therapeutic Exercise: progressed by adding Scaption to Table Slides and by adding Overhead Pulleys to improve shoulder ROM with reaching overhead.   Response to Neuromuscular Re-education: progressed by adding Barrel Hugs to improve activation/motor control of Serratus Anterior.     Leticia is progressing well towards her goals.   Pt prognosis is Excellent.     Pt will continue to benefit from skilled outpatient physical therapy to address the deficits listed in the problem list box on initial evaluation, provide pt/family education and to maximize pt's level of independence in the home and community environment.     Pt's spiritual, cultural and educational needs " considered and pt agreeable to plan of care and goals.    Anticipated Barriers for therapy: sedentary lifestyle and age    GOALS:     Short Term Goals:  4 weeks Progress      1. Patient will report decreased pain to <6/10 at worst on VAS to progress toward Prior Level of Function. Met   6/22/22   2. Patient will report improved function by a functional limitation score of <60 out of 100 on FOTO. Met  6/22/22   3. Patient will improve AROM to 50% of stated goals in order to progress towards Prior Level of Function.  Not Met   4. Patient will improve strength to 50% of stated goals in order to progress towards Prior Level of Function.  Not Met      Long Term Goals:  8 weeks Progress     1. Patient will report decreased pain to <3/10 at worst on VAS to progress toward Prior Level of Function.   Not Met    2. Patient will report improved function by a functional limitation score of <30 out of 100 on FOTO.  Not Met   3. Patient will improve ROM and Functional Mobility to stated goals to return to Prior Level of Function.  Not Met   4. Patient will improve strength to stated goals in order to return to Prior Level of Function.  Not Met           PLAN   Progress with emphasis on improving shoulder ROM and strength per protocol.   Plan of Care Expiration: 8/10/22      Gianni Duarte, PT

## 2022-06-30 NOTE — PLAN OF CARE
[facility]  Physical Therapy Plan of Care Note    Name: Leticia Grimes  Clinic Number: [id number]    Therapy Diagnosis:   Encounter Diagnoses   Name Primary?    Acute pain of left shoulder Yes    Stiffness of left shoulder joint     Muscle wasting and atrophy, not elsewhere classified, left shoulder     Aftercare following left shoulder joint replacement surgery     Presence of artificial shoulder joint, left      Physician: Tomas Taylor*    Visit Date: 6/29/2022    Physician Orders: PT Eval and Treat  Medical Diagnosis from Referral: S/P reverse total shoulder arthroplasty, left; glenohumeral arthritis, left.   Evaluation Date: 5/11/2022  Authorization Period Expiration: 12/31/22  Plan of Care Expiration: 8/10/22  Progress Note Due: 7/29/22  Visit # / Visits authorized: 6/20 (+1 for prior authorization)   FOTO: 1/ 3      Precautions: Standard and Reverse TSA Precautions   Sx Date: 5/5/22    Functional Level Prior to Evaluation: patient was unable to use her arm for her normal ADL's.     SUBJECTIVE     Update: significantly decreased shoulder pain and improved mobility with ADL's.     OBJECTIVE     Update:     RANGE OF MOTION:  *denotes pain      Shoulder ROM  (Degrees) Left  Initial Left  Update Goal   Shoulder Flexion 80 60 120   Shoulder Abduction  NT 70 90   Shoulder ER  NT 10 60   Shoulder IR  NT 30 45            STRENGTH:  *denotes pain      MMT Left  Initial Left  Update Goal   Shoulder Flexion NT 3*/5 5/5   Shoulder Abduction NT 3*/5 5/5   Elbow Flexion  NT 5/5 5/5   Elbow Extension NT 5/5 5/5            FUNCTION:      CMS Impairment/Limitation/Restriction for FOTO Shoulder Survey     Therapist reviewed FOTO scores for Leticia on 5/11/2022.   FOTO documents entered into [employer] - see Media section.     Limitation Score (Evaluation): 96%  Limitation Score (Evaluation): 50%         ASSESSMENT     Update: Patient has progressed well with pain, ROM, and ADL tolerance;  however, patient remains limited with reaching overhead, reaching behind her back, and lifting things for ADL's.  Patient will benefit from continued Physical Therapy to address remaining limitations.      Previous Short Term Goals Status:  None were met  New Short Term Goals Status:   Some goals were met  Long Term Goal Status: continue per initial plan of care.  Reasons for Recertification of Therapy: to address remaining limitations.    GOALS:     Short Term Goals:  4 weeks Progress      1. Patient will report decreased pain to <6/10 at worst on VAS to progress toward Prior Level of Function. Met   6/22/22   2. Patient will report improved function by a functional limitation score of <60 out of 100 on FOTO. Met  6/22/22   3. Patient will improve AROM to 50% of stated goals in order to progress towards Prior Level of Function.  Not Met   4. Patient will improve strength to 50% of stated goals in order to progress towards Prior Level of Function.  Not Met        Long Term Goals:  8 weeks Progress     1. Patient will report decreased pain to <3/10 at worst on VAS to progress toward Prior Level of Function.   Not Met    2. Patient will report improved function by a functional limitation score of <30 out of 100 on FOTO.  Not Met   3. Patient will improve ROM and Functional Mobility to stated goals to return to Prior Level of Function.  Not Met   4. Patient will improve strength to stated goals in order to return to Prior Level of Function.  Not Met         PLAN     Updated Certification Period: 6/29/22 to 8/10/22  Recommended Treatment Plan: 3 times per week for 6 weeks:  Manual Therapy, Neuromuscular Re-ed, Therapeutic Activities and Therapeutic Exercise  Other Recommendations: none    Gianni Duarte, PT    I CERTIFY THE NEED FOR THESE SERVICES FURNISHED UNDER THIS PLAN OF TREATMENT AND WHILE UNDER MY CARE  Physician's comments:      Physician's Signature: ___________________________________________________

## 2022-07-06 ENCOUNTER — CLINICAL SUPPORT (OUTPATIENT)
Dept: REHABILITATION | Facility: HOSPITAL | Age: 77
End: 2022-07-06
Attending: STUDENT IN AN ORGANIZED HEALTH CARE EDUCATION/TRAINING PROGRAM
Payer: MEDICARE

## 2022-07-06 DIAGNOSIS — Z96.612 PRESENCE OF ARTIFICIAL SHOULDER JOINT, LEFT: ICD-10-CM

## 2022-07-06 DIAGNOSIS — Z47.1 AFTERCARE FOLLOWING LEFT SHOULDER JOINT REPLACEMENT SURGERY: ICD-10-CM

## 2022-07-06 DIAGNOSIS — Z96.612 S/P REVERSE TOTAL SHOULDER ARTHROPLASTY, LEFT: ICD-10-CM

## 2022-07-06 DIAGNOSIS — M25.512 ACUTE PAIN OF LEFT SHOULDER: Primary | ICD-10-CM

## 2022-07-06 DIAGNOSIS — Z96.612 AFTERCARE FOLLOWING LEFT SHOULDER JOINT REPLACEMENT SURGERY: ICD-10-CM

## 2022-07-06 DIAGNOSIS — M62.512 MUSCLE WASTING AND ATROPHY, NOT ELSEWHERE CLASSIFIED, LEFT SHOULDER: ICD-10-CM

## 2022-07-06 DIAGNOSIS — M25.612 STIFFNESS OF LEFT SHOULDER JOINT: ICD-10-CM

## 2022-07-06 PROCEDURE — 97112 NEUROMUSCULAR REEDUCATION: CPT | Performed by: PHYSICAL THERAPIST

## 2022-07-06 PROCEDURE — 97110 THERAPEUTIC EXERCISES: CPT | Performed by: PHYSICAL THERAPIST

## 2022-07-06 PROCEDURE — 97140 MANUAL THERAPY 1/> REGIONS: CPT | Performed by: PHYSICAL THERAPIST

## 2022-07-06 NOTE — PROGRESS NOTES
[facility]   Physical Therapy Daily Treatment Note + POC    Name: Leticia Grimes  Clinic Number: [id number]    Therapy Diagnosis:   Encounter Diagnoses   Name Primary?    S/P reverse total shoulder arthroplasty, left     Acute pain of left shoulder Yes    Stiffness of left shoulder joint     Muscle wasting and atrophy, not elsewhere classified, left shoulder     Aftercare following left shoulder joint replacement surgery     Presence of artificial shoulder joint, left      Physician: Tomas Taylor*    Visit Date: 7/6/2022    Physician Orders: PT Eval and Treat  Medical Diagnosis from Referral: S/P reverse total shoulder arthroplasty, left; glenohumeral arthritis, left.   Evaluation Date: 5/11/2022  Authorization Period Expiration: 12/31/22  Plan of Care Expiration: 8/10/22  Progress Note Due: 7/29/22  Visit # / Visits authorized: 7/20 (+1 for prior authorization)   FOTO: 1/ 3      Precautions: Standard and Reverse TSA Precautions   Sx Date: 5/5/22      PTA Visit #: 0/5     Time In: 8:00 am   Time Out: 8:53 am  Total Billable Time: 53 minutes     SUBJECTIVE     Patient reports: shoulder feels great but has some pain around the scapula. She has been performing her HEP and it helps her symptoms.      He/She was compliant with home exercise program.  Response to previous treatment: improved shoulder mobility.   Functional change: improved sleeping at night.      Pain: 5/10     Location: left shoulder    OBJECTIVE     Objective Measures updated at progress report unless specified.     TREATMENT     Leticia received the treatments listed below:     MANUAL THERAPY to improve ROM and pain for (15) minutes including:   Manual Intervention Performed Today    Astym      Seated Thoracic Manipulation     PROM x  all planes of motion          THERAPEUTIC EXERCISES to develop strength, endurance and ROM for (23) minutes including:  Intervention Performed Today    Upper Trapezius Stretch  x  " 10"x5 - L   Rhomboid Stretch    10"x5   Physioball Roll-Outs   10"x3 each direction   Extension with Wand x  10"x5 - L   Internal Rotation with Wand x  10"x5 - L   Table Slides x  flexion, scaption, abduction  10"x5   Overhead Pulleys x  3 minutes   Biceps Curls  x  3#, 3x10   Goals, measurements, and FOTO   visit 6         NEUROMUSCULAR RE-EDUCATION to improve Proprioception and motor control for (15) minutes including:  Intervention Performed Today    Rows x  Yellow Band x15, Red band 2x10    Internal Rotation Walkouts x  Yellow Band x 3 steps, 2x10   External Rotation Walkouts    Yellow Band x 1 step, 2x10   Barrel Hug x  Red Band, 2x10       PATIENT EDUCATION AND HOME EXERCISES     Home Exercises Provided and Patient Education Provided     Education provided: (included in therex) minutes  Precautions for Reverse Total Shoulder  home exercise program     Written Home Exercises Provided: yes.  Exercises were reviewed and Leticia was able to demonstrate them prior to the end of the session.  Leticia demonstrated good  understanding of the education provided. See EMR under Patient Instructions for exercises provided during therapy sessions.      ASSESSMENT     Leticia tolerated PT session well with slight improvements in ROM with manual therapy and AAROM exercises. Table slides in abduction were added today with no complaints of discomfort. Held off on banded external rotation walkouts due to complaints of pain in the biceps and deltoid. Otherwise, Leticia had no complaints of pain or discomfort throughout treatment session. Treatment should continue to include range of motion exercises to improve overall function of her left shoulder.     Leticia is progressing well towards her goals.   Pt prognosis is Excellent.     Pt will continue to benefit from skilled outpatient physical therapy to address the deficits listed in the problem list box on initial evaluation, provide pt/family education and to maximize pt's level of " independence in the home and community environment.     Pt's spiritual, cultural and educational needs considered and pt agreeable to plan of care and goals.    Anticipated Barriers for therapy: sedentary lifestyle and age    GOALS:     Short Term Goals:  4 weeks Progress      1. Patient will report decreased pain to <6/10 at worst on VAS to progress toward Prior Level of Function. Met   6/22/22   2. Patient will report improved function by a functional limitation score of <60 out of 100 on FOTO. Met  6/22/22   3. Patient will improve AROM to 50% of stated goals in order to progress towards Prior Level of Function.  Not Met   4. Patient will improve strength to 50% of stated goals in order to progress towards Prior Level of Function.  Not Met      Long Term Goals:  8 weeks Progress     1. Patient will report decreased pain to <3/10 at worst on VAS to progress toward Prior Level of Function.   Not Met    2. Patient will report improved function by a functional limitation score of <30 out of 100 on FOTO.  Not Met   3. Patient will improve ROM and Functional Mobility to stated goals to return to Prior Level of Function.  Not Met   4. Patient will improve strength to stated goals in order to return to Prior Level of Function.  Not Met           PLAN   Progress with emphasis on improving shoulder ROM and strength per protocol.   Plan of Care Expiration: 8/10/22      Richard Chapin, PT

## 2022-07-14 ENCOUNTER — CLINICAL SUPPORT (OUTPATIENT)
Dept: REHABILITATION | Facility: HOSPITAL | Age: 77
End: 2022-07-14
Payer: MEDICARE

## 2022-07-14 DIAGNOSIS — Z96.612 PRESENCE OF ARTIFICIAL SHOULDER JOINT, LEFT: ICD-10-CM

## 2022-07-14 DIAGNOSIS — M25.512 ACUTE PAIN OF LEFT SHOULDER: Primary | ICD-10-CM

## 2022-07-14 DIAGNOSIS — M25.612 STIFFNESS OF LEFT SHOULDER JOINT: ICD-10-CM

## 2022-07-14 DIAGNOSIS — Z47.1 AFTERCARE FOLLOWING LEFT SHOULDER JOINT REPLACEMENT SURGERY: ICD-10-CM

## 2022-07-14 DIAGNOSIS — M62.512 MUSCLE WASTING AND ATROPHY, NOT ELSEWHERE CLASSIFIED, LEFT SHOULDER: ICD-10-CM

## 2022-07-14 DIAGNOSIS — Z96.612 AFTERCARE FOLLOWING LEFT SHOULDER JOINT REPLACEMENT SURGERY: ICD-10-CM

## 2022-07-14 PROCEDURE — 97140 MANUAL THERAPY 1/> REGIONS: CPT

## 2022-07-14 PROCEDURE — 97112 NEUROMUSCULAR REEDUCATION: CPT

## 2022-07-14 PROCEDURE — 97110 THERAPEUTIC EXERCISES: CPT

## 2022-07-14 NOTE — PROGRESS NOTES
[facility]   Physical Therapy Daily Treatment Note     Name: Leticia Grimes  Clinic Number: [id number]    Therapy Diagnosis:   Encounter Diagnoses   Name Primary?    Acute pain of left shoulder Yes    Stiffness of left shoulder joint     Muscle wasting and atrophy, not elsewhere classified, left shoulder     Aftercare following left shoulder joint replacement surgery     Presence of artificial shoulder joint, left      Physician: Tomas Taylor*    Visit Date: 7/14/2022    Physician Orders: PT Eval and Treat  Medical Diagnosis from Referral: S/P reverse total shoulder arthroplasty, left; glenohumeral arthritis, left.   Evaluation Date: 5/11/2022  Authorization Period Expiration: 12/31/22  Plan of Care Expiration: 8/10/22  Progress Note Due: 7/29/22  Visit # / Visits authorized: 8/20 (+1 for prior authorization)   FOTO: 1/3      Precautions: Standard and Reverse TSA Precautions   Sx Date: 5/5/22      PTA Visit #: 0/5     Time In: 9:25 am   Time Out: 10:15 am  Total Billable Time: 47 minutes     SUBJECTIVE     Patient reports: she is now having intermittent shoulder pain at 2/10, no pain at end of today's treatment session.     He/She was compliant with home exercise program.  Response to previous treatment: improved shoulder mobility.   Functional change: improved sleeping at night.      Pain: 2/10     Location: left shoulder    OBJECTIVE     Objective Measures updated at progress report unless specified.     TREATMENT     Leticia received the treatments listed below:     Leticia received the following manual therapy techniques: Myofacial release and Soft tissue Mobilization were applied to the: left upper quadrant for 15 minutes, including:    Manual Intervention Performed Today    Soft Tissue Mobilization x left upper trapezius, levator scapulae , rhomboids , latissimus dorsi , pectorals, infraspinatus , teres major and minor , subscapularis , deltoid and biceps    Joint  "Mobilizations x Scapular mobility   Mobilization with movement     Astym      Seated Thoracic Manipulation         THERAPEUTIC EXERCISES to develop strength, endurance and ROM for 22 minutes including:  Intervention Performed Today    PROM  x 5 min   Upper Trapezius Stretch  x  10"x5 - left    Rhomboid Stretch    10"x5   ER supine with wand  x 10x/ 2 sets   Physioball Roll-Outs   10"x3 each direction   Extension with Wand x  10x, 2 sets - left    Internal Rotation with Wand x  10"x5 - left    Table Slides x  flexion, scaption, abduction  10"x5   Overhead Pulleys   3 minutes   Biceps Curls  x  3#, 3x10   Goals, measurements, and FOTO   visit 6   Supine biceps extension  x 2 min   sidelying shoulder ER x 10x       NEUROMUSCULAR RE-EDUCATION to improve Proprioception and motor control for (10) minutes including:  Intervention Performed Today    Rows x  Yellow Band x10/ 2 sets,  (Red band 2x10)    Internal Rotation Walkouts x  Yellow Band x 1 steps, 2sets/ 10x   External Rotation Walkouts  x  Yellow Band x 1 step, 2 sets/10x   Barrel Hug x  yellow Band, 8x (decreased)       PATIENT EDUCATION AND HOME EXERCISES     Home Exercises Provided and Patient Education Provided     Education provided: (included in therex) minutes  Precautions for Reverse Total Shoulder  home exercise program     Written Home Exercises Provided: yes.  Exercises were reviewed and Leticia was able to demonstrate them prior to the end of the session.  Leticia demonstrated good  understanding of the education provided. See EMR under Patient Instructions for exercises provided during therapy sessions.      ASSESSMENT     Patient tolerated treatment well - she remains limited in all classical planes but ROM with increased ease after manual releases and PROM.     Leticia is progressing well towards her goals.   Pt prognosis is Excellent.     Pt will continue to benefit from skilled outpatient physical therapy to address the deficits listed in the problem list " box on initial evaluation, provide pt/family education and to maximize pt's level of independence in the home and community environment.     Pt's spiritual, cultural and educational needs considered and pt agreeable to plan of care and goals.    Anticipated Barriers for therapy: sedentary lifestyle and age    GOALS:     Short Term Goals:  4 weeks Progress      1. Patient will report decreased pain to <6/10 at worst on VAS to progress toward Prior Level of Function. Met   6/22/22   2. Patient will report improved function by a functional limitation score of <60 out of 100 on FOTO. Met  6/22/22   3. Patient will improve AROM to 50% of stated goals in order to progress towards Prior Level of Function.  Not Met   4. Patient will improve strength to 50% of stated goals in order to progress towards Prior Level of Function.  Not Met      Long Term Goals:  8 weeks Progress     1. Patient will report decreased pain to <3/10 at worst on VAS to progress toward Prior Level of Function.   Not Met    2. Patient will report improved function by a functional limitation score of <30 out of 100 on FOTO.  Not Met   3. Patient will improve ROM and Functional Mobility to stated goals to return to Prior Level of Function.  Not Met   4. Patient will improve strength to stated goals in order to return to Prior Level of Function.  Not Met           PLAN   Progress with emphasis on improving shoulder ROM and strength per protocol.   Plan of Care Expiration: 8/10/22      Elizabeth Garcia PT

## 2022-07-26 ENCOUNTER — CLINICAL SUPPORT (OUTPATIENT)
Dept: REHABILITATION | Facility: HOSPITAL | Age: 77
End: 2022-07-26
Attending: STUDENT IN AN ORGANIZED HEALTH CARE EDUCATION/TRAINING PROGRAM
Payer: MEDICARE

## 2022-07-26 DIAGNOSIS — Z47.1 AFTERCARE FOLLOWING LEFT SHOULDER JOINT REPLACEMENT SURGERY: ICD-10-CM

## 2022-07-26 DIAGNOSIS — M25.612 STIFFNESS OF LEFT SHOULDER JOINT: ICD-10-CM

## 2022-07-26 DIAGNOSIS — M62.512 MUSCLE WASTING AND ATROPHY, NOT ELSEWHERE CLASSIFIED, LEFT SHOULDER: ICD-10-CM

## 2022-07-26 DIAGNOSIS — Z96.612 PRESENCE OF ARTIFICIAL SHOULDER JOINT, LEFT: ICD-10-CM

## 2022-07-26 DIAGNOSIS — M25.512 ACUTE PAIN OF LEFT SHOULDER: Primary | ICD-10-CM

## 2022-07-26 DIAGNOSIS — Z96.612 AFTERCARE FOLLOWING LEFT SHOULDER JOINT REPLACEMENT SURGERY: ICD-10-CM

## 2022-07-26 PROCEDURE — 97110 THERAPEUTIC EXERCISES: CPT

## 2022-07-26 PROCEDURE — 97112 NEUROMUSCULAR REEDUCATION: CPT

## 2022-07-26 NOTE — PROGRESS NOTES
[facility]   Physical Therapy Daily Treatment Note     Name: Leticia Grimes  Clinic Number: [id number]    Therapy Diagnosis:   Encounter Diagnoses   Name Primary?    Acute pain of left shoulder Yes    Stiffness of left shoulder joint     Muscle wasting and atrophy, not elsewhere classified, left shoulder     Aftercare following left shoulder joint replacement surgery     Presence of artificial shoulder joint, left      Physician: Tomas Taylor*    Visit Date: 7/26/2022    Physician Orders: PT Eval and Treat  Medical Diagnosis from Referral: S/P reverse total shoulder arthroplasty, left; glenohumeral arthritis, left.   Evaluation Date: 5/11/2022  Authorization Period Expiration: 12/31/22  Plan of Care Expiration: 8/10/22  Progress Note Due: 7/29/22  Visit # / Visits authorized: 9/20 (+1 for prior authorization)   FOTO: 1/3      Precautions: Standard and Reverse TSA Precautions   Sx Date: 5/5/22      PTA Visit #: 0/5     Time In: 9:00 am   Time Out: 9:45 am  Total Billable Time: 38 minutes     SUBJECTIVE     Patient reports: overall no increase in pain since our last visit - overall she feels that her strength is not where she wants it to be.     He/She was compliant with home exercise program.  Response to previous treatment: improved shoulder mobility.   Functional change: improved sleeping at night.      Pain: 0/10     Location: left shoulder    OBJECTIVE     Objective Measures updated at progress report unless specified.     TREATMENT     Leticia received the treatments listed below:     Leticia received the following manual therapy techniques: Myofacial release and Soft tissue Mobilization were applied to the: left upper quadrant for 0 minutes, including:    Manual Intervention Performed Today    Soft Tissue Mobilization  left upper trapezius, levator scapulae , rhomboids , latissimus dorsi , pectorals, infraspinatus , teres major and minor , subscapularis , deltoid and  "biceps    Joint Mobilizations  Scapular mobility   Mobilization with movement     Astym      Seated Thoracic Manipulation         THERAPEUTIC EXERCISES to develop strength, endurance and ROM for 28 minutes including:  Intervention Performed Today    PROM  x 5 min   Upper Trapezius Stretch  x  10"x5 - left    Rhomboid Stretch    10"x5   Shoulder Flexion x 2x10 - 1#   Shoulder abduction  x 2x10 - 0#   ER supine with wand  x 10x/ 2 sets   Physioball Roll-Outs   10"x3 each direction   Flexion with Wand x 15x    Extension with Wand x  10x, 2 sets - left    Internal Rotation with Wand   10"x5 - left    Table Slides x  flexion, scaption, abduction  10"x5   Overhead Pulleys   3 minutes   Biceps Curls  x  5#, 3x10   Goals, measurements, and FOTO   visit 6   Supine biceps extension   2 min   sidelying shoulder ER  10x       NEUROMUSCULAR RE-EDUCATION to improve Proprioception and motor control for (10) minutes including:  Intervention Performed Today    Rows x  Yellow Band x10/ 3 sets   Internal Rotation Walkouts x  Yellow Band x 1 steps, 2sets/ 10x   External Rotation Walkouts  x  Yellow Band x 1 step, 2 sets/10x   Barrel Hug   yellow Band, 8x (decreased)       PATIENT EDUCATION AND HOME EXERCISES     Home Exercises Provided and Patient Education Provided     Education provided: (included in therex) minutes  Precautions for Reverse Total Shoulder  home exercise program     Written Home Exercises Provided: yes.  Exercises were reviewed and Leticia was able to demonstrate them prior to the end of the session.  Leticia demonstrated good  understanding of the education provided. See EMR under Patient Instructions for exercises provided during therapy sessions.      ASSESSMENT     Leticiakwame Grimes tolerated PT session well with no  complaints of pain or discomfort, secondary to improved motor control and range of motion. Objective findings are improving with of range of motion and functional mobility.  Therapy exercises were reviewed " by revisiting exercises given from previous home exercise program while adding active shoulder flexion and abduction.  Handouts were issued during today's visit. Leticia demonstrated good understanding of new exercises and will continue to progress at home until next follow-up.        Leticia is progressing well towards her goals.   Pt prognosis is Excellent.     Pt will continue to benefit from skilled outpatient physical therapy to address the deficits listed in the problem list box on initial evaluation, provide pt/family education and to maximize pt's level of independence in the home and community environment.     Pt's spiritual, cultural and educational needs considered and pt agreeable to plan of care and goals.    Anticipated Barriers for therapy: sedentary lifestyle and age    GOALS:     Short Term Goals:  4 weeks Progress      1. Patient will report decreased pain to <6/10 at worst on VAS to progress toward Prior Level of Function. Met   6/22/22   2. Patient will report improved function by a functional limitation score of <60 out of 100 on FOTO. Met  6/22/22   3. Patient will improve AROM to 50% of stated goals in order to progress towards Prior Level of Function.  Not Met   4. Patient will improve strength to 50% of stated goals in order to progress towards Prior Level of Function.  Not Met      Long Term Goals:  8 weeks Progress     1. Patient will report decreased pain to <3/10 at worst on VAS to progress toward Prior Level of Function.   Not Met    2. Patient will report improved function by a functional limitation score of <30 out of 100 on FOTO.  Not Met   3. Patient will improve ROM and Functional Mobility to stated goals to return to Prior Level of Function.  Not Met   4. Patient will improve strength to stated goals in order to return to Prior Level of Function.  Not Met           PLAN   Progress with emphasis on improving shoulder ROM and strength per protocol.   Plan of Care Expiration:  8/10/22      Giacomo Gregorio, PT

## 2022-07-28 ENCOUNTER — CLINICAL SUPPORT (OUTPATIENT)
Dept: REHABILITATION | Facility: HOSPITAL | Age: 77
End: 2022-07-28
Payer: MEDICARE

## 2022-07-28 DIAGNOSIS — Z96.612 PRESENCE OF ARTIFICIAL SHOULDER JOINT, LEFT: ICD-10-CM

## 2022-07-28 DIAGNOSIS — M62.512 MUSCLE WASTING AND ATROPHY, NOT ELSEWHERE CLASSIFIED, LEFT SHOULDER: ICD-10-CM

## 2022-07-28 DIAGNOSIS — Z47.1 AFTERCARE FOLLOWING LEFT SHOULDER JOINT REPLACEMENT SURGERY: ICD-10-CM

## 2022-07-28 DIAGNOSIS — Z96.612 AFTERCARE FOLLOWING LEFT SHOULDER JOINT REPLACEMENT SURGERY: ICD-10-CM

## 2022-07-28 DIAGNOSIS — M25.612 STIFFNESS OF LEFT SHOULDER JOINT: ICD-10-CM

## 2022-07-28 DIAGNOSIS — M25.512 ACUTE PAIN OF LEFT SHOULDER: Primary | ICD-10-CM

## 2022-07-28 PROCEDURE — 97112 NEUROMUSCULAR REEDUCATION: CPT

## 2022-07-28 PROCEDURE — 97140 MANUAL THERAPY 1/> REGIONS: CPT

## 2022-07-28 PROCEDURE — 97110 THERAPEUTIC EXERCISES: CPT

## 2022-07-28 NOTE — PROGRESS NOTES
[facility]   Physical Therapy Daily Treatment Note     Name: Leticia Grimes  Clinic Number: [id number]    Therapy Diagnosis:   Encounter Diagnoses   Name Primary?    Acute pain of left shoulder Yes    Stiffness of left shoulder joint     Muscle wasting and atrophy, not elsewhere classified, left shoulder     Aftercare following left shoulder joint replacement surgery     Presence of artificial shoulder joint, left      Physician: Tomas Taylor*    Visit Date: 7/28/2022    Physician Orders: PT Eval and Treat  Medical Diagnosis from Referral: S/P reverse total shoulder arthroplasty, left; glenohumeral arthritis, left.   Evaluation Date: 5/11/2022  Authorization Period Expiration: 12/31/22  Plan of Care Expiration: 8/10/22  Progress Note Due: 7/29/22  Visit # / Visits authorized: 10/20 (+1 for prior authorization)   FOTO: 1/3      Precautions: Standard and Reverse TSA Precautions   Sx Date: 5/5/22      PTA Visit #: 0/5     Time In: 9:00 am   Time Out: 9:53 am  Total Billable Time: 53 minutes     SUBJECTIVE     Patient reports: no pain but continued weakness/stiffness.     He/She was compliant with home exercise program.  Response to previous treatment: improved shoulder mobility.   Functional change: improved sleeping at night.      Pain: 0/10     Location: left shoulder    OBJECTIVE     Objective Measures updated at progress report unless specified.     TREATMENT     Leticia received the treatments listed below:     MANUAL THERAPY to improve ROM and pain for (10) minutes including:  Manual Intervention Performed Today    Scapular mobilizations  x    subscap release  x  sidelying and supine    Active Release Technique  x  subscapularis   PROM x  flexion, abduction, External Rotation, and Internal Rotation    Astym      Seated Thoracic Manipulation           THERAPEUTIC EXERCISES to develop strength, endurance and ROM for (30) minutes including:  Intervention Performed Today   "  Overhead Pulleys: flexion and scaption x  3 minutes each   Upper Trapezius Stretch  x  10"x5 - left    Rhomboid Stretch    10"x5   Flexion with Wand - supine x  2x10   External Rotation with Wand - supine x  10"x5   Extension with Wand - standing x  10"x5   Internal Rotation with Wand - standing x  10"x5   Table Slides: flexion    10"x5   Shoulder Flexion  2x10 - 1#   Shoulder abduction   2x10 - 0#   Biceps Curls    5#, 3x10   Triceps Pushdown x  10#, 2x10 **increase sets next visit       NEUROMUSCULAR RE-EDUCATION to improve Proprioception and motor control for (10) minutes including:  Intervention Performed Today    Rows x  30#, 3x10   Lat Pulldowns  x  30#, 2x10 **increase sets next visit   Wall Push Ups x  2x10   Ball on Wall: circles    clockwise/counter-cockwise, 1 minute each       PATIENT EDUCATION AND HOME EXERCISES     Home Exercises Provided and Patient Education Provided     Education provided: (included in therex) minutes  Precautions for Reverse Total Shoulder  home exercise program     Written Home Exercises Provided: yes.  Exercises were reviewed and Leticia was able to demonstrate them prior to the end of the session.  Leticia demonstrated good  understanding of the education provided. See EMR under Patient Instructions for exercises provided during therapy sessions.      ASSESSMENT   Response to Manual Therapy: patient reported improved shoulder mobility.   Response to Therapeutic Exercise: increased sets of Flexion with Wand to improve shoulder strength. Added Triceps Pushdown to improve shoulder strength.   Response to Neuromuscular Re-education: added Lat Pulldowns and Wall Push Ups to improve motor control of scapular stabilizers.     Leticia is progressing well towards her goals.   Pt prognosis is Excellent.     Pt will continue to benefit from skilled outpatient physical therapy to address the deficits listed in the problem list box on initial evaluation, provide pt/family education and to " maximize pt's level of independence in the home and community environment.     Pt's spiritual, cultural and educational needs considered and pt agreeable to plan of care and goals.    Anticipated Barriers for therapy: sedentary lifestyle and age    GOALS:     Short Term Goals:  4 weeks Progress      1. Patient will report decreased pain to <6/10 at worst on VAS to progress toward Prior Level of Function. Met   6/22/22   2. Patient will report improved function by a functional limitation score of <60 out of 100 on FOTO. Met  6/22/22   3. Patient will improve AROM to 50% of stated goals in order to progress towards Prior Level of Function.  Not Met   4. Patient will improve strength to 50% of stated goals in order to progress towards Prior Level of Function.  Not Met      Long Term Goals:  8 weeks Progress     1. Patient will report decreased pain to <3/10 at worst on VAS to progress toward Prior Level of Function.   Not Met    2. Patient will report improved function by a functional limitation score of <30 out of 100 on FOTO.  Not Met   3. Patient will improve ROM and Functional Mobility to stated goals to return to Prior Level of Function.  Not Met   4. Patient will improve strength to stated goals in order to return to Prior Level of Function.  Not Met           PLAN   Reassess for Plan of Care vs Discharge next visit.    Plan of Care Expiration: 8/10/22      Gianni Duarte, PT

## 2022-07-29 ENCOUNTER — OFFICE VISIT (OUTPATIENT)
Dept: ORTHOPEDICS | Facility: CLINIC | Age: 77
End: 2022-07-29
Payer: MEDICARE

## 2022-07-29 VITALS — HEIGHT: 61 IN | BODY MASS INDEX: 31.15 KG/M2 | WEIGHT: 165 LBS

## 2022-07-29 DIAGNOSIS — M19.012 GLENOHUMERAL ARTHRITIS, LEFT: Primary | ICD-10-CM

## 2022-07-29 PROCEDURE — 99024 PR POST-OP FOLLOW-UP VISIT: ICD-10-PCS | Mod: S$GLB,,, | Performed by: STUDENT IN AN ORGANIZED HEALTH CARE EDUCATION/TRAINING PROGRAM

## 2022-07-29 PROCEDURE — 1159F MED LIST DOCD IN RCRD: CPT | Mod: CPTII,S$GLB,, | Performed by: STUDENT IN AN ORGANIZED HEALTH CARE EDUCATION/TRAINING PROGRAM

## 2022-07-29 PROCEDURE — 99999 PR PBB SHADOW E&M-EST. PATIENT-LVL III: CPT | Mod: PBBFAC,,, | Performed by: STUDENT IN AN ORGANIZED HEALTH CARE EDUCATION/TRAINING PROGRAM

## 2022-07-29 PROCEDURE — 99999 PR PBB SHADOW E&M-EST. PATIENT-LVL III: ICD-10-PCS | Mod: PBBFAC,,, | Performed by: STUDENT IN AN ORGANIZED HEALTH CARE EDUCATION/TRAINING PROGRAM

## 2022-07-29 PROCEDURE — 1159F PR MEDICATION LIST DOCUMENTED IN MEDICAL RECORD: ICD-10-PCS | Mod: CPTII,S$GLB,, | Performed by: STUDENT IN AN ORGANIZED HEALTH CARE EDUCATION/TRAINING PROGRAM

## 2022-07-29 PROCEDURE — 1160F RVW MEDS BY RX/DR IN RCRD: CPT | Mod: CPTII,S$GLB,, | Performed by: STUDENT IN AN ORGANIZED HEALTH CARE EDUCATION/TRAINING PROGRAM

## 2022-07-29 PROCEDURE — 1126F PR PAIN SEVERITY QUANTIFIED, NO PAIN PRESENT: ICD-10-PCS | Mod: CPTII,S$GLB,, | Performed by: STUDENT IN AN ORGANIZED HEALTH CARE EDUCATION/TRAINING PROGRAM

## 2022-07-29 PROCEDURE — 1126F AMNT PAIN NOTED NONE PRSNT: CPT | Mod: CPTII,S$GLB,, | Performed by: STUDENT IN AN ORGANIZED HEALTH CARE EDUCATION/TRAINING PROGRAM

## 2022-07-29 PROCEDURE — 1160F PR REVIEW ALL MEDS BY PRESCRIBER/CLIN PHARMACIST DOCUMENTED: ICD-10-PCS | Mod: CPTII,S$GLB,, | Performed by: STUDENT IN AN ORGANIZED HEALTH CARE EDUCATION/TRAINING PROGRAM

## 2022-07-29 PROCEDURE — 99024 POSTOP FOLLOW-UP VISIT: CPT | Mod: S$GLB,,, | Performed by: STUDENT IN AN ORGANIZED HEALTH CARE EDUCATION/TRAINING PROGRAM

## 2022-07-29 NOTE — PROGRESS NOTES
Orthopaedics  Post-operative follow-up    Procedure Performed:  Left Reverse Total Shoulder Arthroplasty     Date of Surgery: 05/05/2022    Subjective: Leticia Grimes is now approximately 3 months out from her rTSA.  She has been compliant with post-operative restrictions and has been progressing with PT. She is a little bit stiff but is making progress. Pain is improved.      Exam:  Incision sites healed, C/D/I  No swelling or bruising noted  Fluid ROM with no crepitus  Active upright ROM: , ABD 80, ER 30  Active Supine ROM: , , ER 50  Functional ROM: able to touch back of head and rear waistband  Axillary nerve sensation and motor intact  Motor and sensory intact distally  Strong radial pulse, fingers warm and well perfused    Imaging:  X-Ray Shoulder 2 or More Views Left  Narrative: EXAMINATION:  XR SHOULDER COMPLETE 2 OR MORE VIEWS LEFT    CLINICAL HISTORY:  Presence of left artificial shoulder joint    TECHNIQUE:  Two or three views of the left shoulder were performed.    COMPARISON:  05/16/2022    FINDINGS:  Prior shoulder arthroplasty again noted.  Hardware is unchanged in appearance with no definite evidence of failure or loosening.  No acute fractures or dislocations visualized.  There is mild AC joint arthrosis.  Cardiac pacing device again noted.  Impression: As above.  No acute findings.    Electronically signed by: Jose Pimentel MD  Date:    06/14/2022  Time:    12:29        Impression:  S/p Left Reverse Total Shoulder Arthroplasty, second post-operative visit - routine healing    Plan:  She is 3 months out from her left reverse total shoulder arthroplasty. She continues to have some tightness in the shoulder but this has improved since her last visit and she continues to progress with therapy. She also reports that she has been able to increase activities around the house.   Advance PT per protocol  Symptomatic treatment for pain / swelling  May advance activities as reviewed  today: initiate work on strengthening and continue work on ROM  Instructed patient to call clinic if questions or concerns    Follow-up with me in 2 months for motion check.             Tomas Taylor MD    I, Orville Man, acted as a scribe for Tomas Taylor MD for the duration of this office visit.

## 2022-08-10 ENCOUNTER — CLINICAL SUPPORT (OUTPATIENT)
Dept: REHABILITATION | Facility: HOSPITAL | Age: 77
End: 2022-08-10
Attending: STUDENT IN AN ORGANIZED HEALTH CARE EDUCATION/TRAINING PROGRAM
Payer: MEDICARE

## 2022-08-10 DIAGNOSIS — M25.512 ACUTE PAIN OF LEFT SHOULDER: Primary | ICD-10-CM

## 2022-08-10 DIAGNOSIS — Z96.612 AFTERCARE FOLLOWING LEFT SHOULDER JOINT REPLACEMENT SURGERY: ICD-10-CM

## 2022-08-10 DIAGNOSIS — M25.612 STIFFNESS OF LEFT SHOULDER JOINT: ICD-10-CM

## 2022-08-10 DIAGNOSIS — M62.512 MUSCLE WASTING AND ATROPHY, NOT ELSEWHERE CLASSIFIED, LEFT SHOULDER: ICD-10-CM

## 2022-08-10 DIAGNOSIS — Z47.1 AFTERCARE FOLLOWING LEFT SHOULDER JOINT REPLACEMENT SURGERY: ICD-10-CM

## 2022-08-10 DIAGNOSIS — Z96.612 PRESENCE OF ARTIFICIAL SHOULDER JOINT, LEFT: ICD-10-CM

## 2022-08-10 PROCEDURE — 97140 MANUAL THERAPY 1/> REGIONS: CPT

## 2022-08-10 PROCEDURE — 97112 NEUROMUSCULAR REEDUCATION: CPT

## 2022-08-10 PROCEDURE — 97110 THERAPEUTIC EXERCISES: CPT

## 2022-08-10 NOTE — PROGRESS NOTES
[facility]   Physical Therapy Daily Treatment Note + Plan of Care      Name: Leticia Grimes  Clinic Number: [id number]    Therapy Diagnosis:   Encounter Diagnoses   Name Primary?    Acute pain of left shoulder Yes    Stiffness of left shoulder joint     Muscle wasting and atrophy, not elsewhere classified, left shoulder     Aftercare following left shoulder joint replacement surgery     Presence of artificial shoulder joint, left      Physician: Tomas Taylor*    Visit Date: 8/10/2022    Physician Orders: PT Eval and Treat  Medical Diagnosis from Referral: S/P reverse total shoulder arthroplasty, left; glenohumeral arthritis, left.   Evaluation Date: 5/11/2022  Authorization Period Expiration: 12/31/22  Plan of Care Expiration: 9/14/22  Progress Note Due: 9/9/22  Visit # / Visits authorized: 11/20 (+1 for prior authorization)   FOTO: 1/3      Precautions: Standard and Reverse TSA Precautions   Sx Date: 5/5/22      PTA Visit #: 0/5     Time In: 9:00 am   Time Out: 10:02 am  Total Billable Time: 60 minutes (denotes billable time)    SUBJECTIVE     Patient reports: no pain but continued weakness/stiffness.     He/She was compliant with home exercise program.  Response to previous treatment: improved shoulder mobility.   Functional change: improved sleeping at night.      Pain at Worst: 4/10     Location: left shoulder    OBJECTIVE     Objective Measures updated at progress report unless specified.     RANGE OF MOTION:  *denotes pain      Shoulder ROM  (Degrees) Left  Initial Left  Update Goal   Shoulder Flexion 80 80 120   Shoulder Abduction  NT 70 90   Shoulder ER  NT 30 60   Shoulder IR  NT 30 45            STRENGTH:  *denotes pain      MMT Left  Initial Left  Update Goal   Shoulder Flexion NT 4/5 5/5   Shoulder Abduction NT 4/5 5/5   Elbow Flexion  NT 5/5 5/5   Elbow Extension NT 5/5 5/5            FUNCTION:      CMS Impairment/Limitation/Restriction for  "FOTO Shoulder Survey     Therapist reviewed FOTO scores for Leticia on 5/11/2022.   FOTO documents entered into [employer] - see Media section.     Limitation Score (Evaluation): 96%  Limitation Score (Evaluation): 50%  Limitation Score (Evaluation): 43%         TREATMENT     Leticia received the treatments listed below:     MANUAL THERAPY to improve ROM and pain for (10) minutes including:  Manual Intervention Performed Today    Scapular mobilizations  x    subscap release  x  sidelying and supine    Active Release Technique    subscapularis   PROM x  flexion, abduction, External Rotation, and Internal Rotation    Astym      Seated Thoracic Manipulation           THERAPEUTIC EXERCISES to develop strength, endurance and ROM for (38) minutes including:   Intervention Performed Today    Overhead Pulleys: flexion and scaption x  3 minutes each   Upper Trapezius Stretch    10"x5 - left    Rhomboid Stretch    10"x5   Flexion with Wand - supine   2x10   External Rotation with Wand - supine   10"x5   Extension with Wand - standing   10"x5   Internal Rotation with Wand - standing   10"x5   Table Slides: flexion    10"x5   Shoulder Flexion  2x10 - 1#   Shoulder abduction   2x10 - 0#   Biceps Curls  x  5#, 3x10   Triceps Pushdown x  10#, 3x10    Updated goals, measurements, and FOTO x  visit 12       NEUROMUSCULAR RE-EDUCATION to improve Proprioception and motor control for (12) minutes including:  Intervention Performed Today    Rows x  30#, 3x10   Lat Pulldowns  x  30#, 3x10   Wall Push Ups x  2x10   Ball on Wall: circles    clockwise/counter-cockwise, 1 minute each       PATIENT EDUCATION AND HOME EXERCISES     Home Exercises Provided and Patient Education Provided     Education provided: (included in therex) minutes  Precautions for Reverse Total Shoulder  home exercise program     Written Home Exercises Provided: yes.  Exercises were reviewed and Leticai was able to demonstrate them prior to the end of the session.  Leticia " demonstrated good  understanding of the education provided. See EMR under Patient Instructions for exercises provided during therapy sessions.      ASSESSMENT   Response to Manual Therapy: patient reported improved shoulder mobility. DH  Response to Therapeutic Exercise: updated goals, measurements, and FOTO for Plan of Care. DH  Patient required consistent cueing for biceps and triceps strengthening to maintain proper form, tolerated well though AD  Response to Neuromuscular Re-education: Patient required consistent cueing for exercises performed as she reported that she had not performed any of these exercises before, to maintain adequate form throughout. Was very fatigued following. AD    Leticia is progressing well towards her goals.   Pt prognosis is Excellent.     Pt will continue to benefit from skilled outpatient physical therapy to address the deficits listed in the problem list box on initial evaluation, provide pt/family education and to maximize pt's level of independence in the home and community environment.     Pt's spiritual, cultural and educational needs considered and pt agreeable to plan of care and goals.    Anticipated Barriers for therapy: sedentary lifestyle and age    GOALS:     Short Term Goals:  4 weeks Progress      1. Patient will report decreased pain to <6/10 at worst on VAS to progress toward Prior Level of Function. Met   6/22/22   2. Patient will report improved function by a functional limitation score of <60 out of 100 on FOTO. Met  6/22/22   3. Patient will improve AROM to 50% of stated goals in order to progress towards Prior Level of Function.  Met  8/11/22   4. Patient will improve strength to 50% of stated goals in order to progress towards Prior Level of Function. Met  8/11/22      Long Term Goals:  8 weeks Progress     1. Patient will report decreased pain to <3/10 at worst on VAS to progress toward Prior Level of Function.   Not Met    2. Patient will report improved  function by a functional limitation score of <30 out of 100 on FOTO.  Not Met   3. Patient will improve ROM and Functional Mobility to stated goals to return to Prior Level of Function.  Not Met   4. Patient will improve strength to stated goals in order to return to Prior Level of Function.  Not Met           PLAN   Progress per protocol with emphasis on shoulder ROM and strengthening.   Plan of Care Expiration: 9/8/22      Gianni Duarte, PT  Randi Cabrera, MINERVA, DPT

## 2022-08-11 NOTE — PLAN OF CARE
[facility]  Physical Therapy Plan of Care Note    Name: Leticia Grimes  Clinic Number: [id number]    Therapy Diagnosis:   Encounter Diagnoses   Name Primary?    Acute pain of left shoulder Yes    Stiffness of left shoulder joint     Muscle wasting and atrophy, not elsewhere classified, left shoulder     Aftercare following left shoulder joint replacement surgery     Presence of artificial shoulder joint, left      Physician: Tomas Taylor*    Visit Date: 8/10/2022    Physician Orders: PT Eval and Treat  Medical Diagnosis from Referral: S/P reverse total shoulder arthroplasty, left; glenohumeral arthritis, left.   Evaluation Date: 5/11/2022  Authorization Period Expiration: 12/31/22  Plan of Care Expiration: 9/14/22  Progress Note Due: 9/9/22  Visit # / Visits authorized: 11/20 (+1 for prior authorization)   FOTO: 1/3      Precautions: Standard and Reverse TSA Precautions   Sx Date: 5/5/22  Functional Level Prior to Evaluation: Patient was unable to participate in her typical ADL's.     SUBJECTIVE     Update: Patient reports no pain but continued weakness/stiffness.    OBJECTIVE     Update:     RANGE OF MOTION:  *denotes pain      Shoulder ROM  (Degrees) Left  Initial Left  Update Goal   Shoulder Flexion 80 80 120   Shoulder Abduction  NT 70 90   Shoulder ER  NT 30 60   Shoulder IR  NT 30 45            STRENGTH:  *denotes pain      MMT Left  Initial Left  Update Goal   Shoulder Flexion NT 4/5 5/5   Shoulder Abduction NT 4/5 5/5   Elbow Flexion  NT 5/5 5/5   Elbow Extension NT 5/5 5/5            FUNCTION:      CMS Impairment/Limitation/Restriction for FOTO Shoulder Survey     Therapist reviewed FOTO scores for Leticia on 5/11/2022.   FOTO documents entered into [employer] - see Media section.     Limitation Score (Evaluation): 96%  Limitation Score (Evaluation): 50%  Limitation Score (Evaluation): 43%         ASSESSMENT     Update: Patient has progressed well with shoulder pain, ROM,  and strength; however, patient remains limited with shoulder ROM and strength.  Patient will benefit from continued Physical Therapy to address remaining limitations.      Previous Short Term Goals Status:   Some goals were met  New Short Term Goals Status:   All goals were met  Long Term Goal Status: continue per initial plan of care.  Reasons for Recertification of Therapy: to address remaining limitations.    GOALS:     Short Term Goals:  4 weeks Progress       1. Patient will report decreased pain to <6/10 at worst on VAS to progress toward Prior Level of Function. Met   6/22/22   2. Patient will report improved function by a functional limitation score of <60 out of 100 on FOTO. Met  6/22/22   3. Patient will improve AROM to 50% of stated goals in order to progress towards Prior Level of Function.  Met  8/11/22   4. Patient will improve strength to 50% of stated goals in order to progress towards Prior Level of Function. Met  8/11/22        Long Term Goals:  8 weeks Progress      1. Patient will report decreased pain to <3/10 at worst on VAS to progress toward Prior Level of Function.   Not Met    2. Patient will report improved function by a functional limitation score of <30 out of 100 on FOTO.  Not Met   3. Patient will improve ROM and Functional Mobility to stated goals to return to Prior Level of Function.  Not Met   4. Patient will improve strength to stated goals in order to return to Prior Level of Function.  Not Met           PLAN     Updated Certification Period: 8/10/22 to 9/14/22  Recommended Treatment Plan: 2 times per week for 5 weeks:  Manual Therapy, Neuromuscular Re-ed, Therapeutic Activities and Therapeutic Exercise  Other Recommendations: none    Gianni Duarte, PT    I CERTIFY THE NEED FOR THESE SERVICES FURNISHED UNDER THIS PLAN OF TREATMENT AND WHILE UNDER MY CARE  Physician's comments:      Physician's Signature: ___________________________________________________

## 2022-08-24 DIAGNOSIS — Z78.0 MENOPAUSE: ICD-10-CM

## 2022-08-25 ENCOUNTER — CLINICAL SUPPORT (OUTPATIENT)
Dept: REHABILITATION | Facility: HOSPITAL | Age: 77
End: 2022-08-25
Payer: MEDICARE

## 2022-08-25 DIAGNOSIS — M62.512 MUSCLE WASTING AND ATROPHY, NOT ELSEWHERE CLASSIFIED, LEFT SHOULDER: ICD-10-CM

## 2022-08-25 DIAGNOSIS — M25.612 STIFFNESS OF LEFT SHOULDER JOINT: ICD-10-CM

## 2022-08-25 DIAGNOSIS — Z47.1 AFTERCARE FOLLOWING LEFT SHOULDER JOINT REPLACEMENT SURGERY: ICD-10-CM

## 2022-08-25 DIAGNOSIS — M25.512 ACUTE PAIN OF LEFT SHOULDER: Primary | ICD-10-CM

## 2022-08-25 DIAGNOSIS — Z96.612 AFTERCARE FOLLOWING LEFT SHOULDER JOINT REPLACEMENT SURGERY: ICD-10-CM

## 2022-08-25 DIAGNOSIS — Z96.612 PRESENCE OF ARTIFICIAL SHOULDER JOINT, LEFT: ICD-10-CM

## 2022-08-25 PROCEDURE — 97140 MANUAL THERAPY 1/> REGIONS: CPT

## 2022-08-25 PROCEDURE — 97110 THERAPEUTIC EXERCISES: CPT

## 2022-08-25 PROCEDURE — 97112 NEUROMUSCULAR REEDUCATION: CPT

## 2022-08-25 NOTE — PROGRESS NOTES
[facility]   Physical Therapy Daily Treatment Note    Name: Leticia Grimes  Clinic Number: [id number]    Therapy Diagnosis:   Encounter Diagnoses   Name Primary?    Acute pain of left shoulder Yes    Stiffness of left shoulder joint     Muscle wasting and atrophy, not elsewhere classified, left shoulder     Aftercare following left shoulder joint replacement surgery     Presence of artificial shoulder joint, left      Physician: Tomas Taylor*    Visit Date: 8/25/2022    Physician Orders: PT Eval and Treat  Medical Diagnosis from Referral: S/P reverse total shoulder arthroplasty, left; glenohumeral arthritis, left.   Evaluation Date: 5/11/2022  Authorization Period Expiration: 12/31/22  Plan of Care Expiration: 9/14/22  Progress Note Due: 9/9/22  Visit # / Visits authorized: 11/20 (+1 for prior authorization)   FOTO: 1/3      Precautions: Standard and Reverse TSA Precautions   Sx Date: 5/5/22      PTA Visit #: 0/5     Time In: 8:00 am   Time Out: 8:45 am  Total Billable Time: 45 minutes     SUBJECTIVE     Patient reports: increased stiffness due to being out of town since her last visit.     He/She was compliant with home exercise program.  Response to previous treatment: improved shoulder mobility.   Functional change: improved sleeping at night.      Pain at Worst: 4/10     Location: left shoulder    OBJECTIVE     Objective Measures updated at progress report unless specified.     TREATMENT     Leticia received the treatments listed below:     MANUAL THERAPY to improve ROM and pain for (10) minutes including:  Manual Intervention Performed Today    Scapular mobilizations      subscap release  x  sidelying and supine    Active Release Technique  x  subscapularis   PROM x  flexion, abduction, External Rotation, and Internal Rotation    Astym      Seated Thoracic Manipulation           THERAPEUTIC EXERCISES to develop strength, endurance and ROM for (20) minutes including:  "  Intervention Performed Today    UBE x  2' forward/2' backward   Overhead Pulleys: flexion and scaption   3 minutes each   Upper Trapezius Stretch  x  10"x5 - left    Rhomboid Stretch  x  10"x5   External Rotation with Wand - supine   10"x5   Extension with Wand - standing x  10"x5   Internal Rotation with Wand - standing x  10"x5   Table Slides: flexion  x  table elevated, 10"x5   Shoulder Flexion - supine x  2 minutes - **progress to 3 minutes when able   Shoulder abduction - sidelying x  2 minutes - **progress to 3 minutes when able   Biceps Curls  x  5#, 3x10   Triceps Pushdown x  10#, 3x10          NEUROMUSCULAR RE-EDUCATION to improve Proprioception and motor control for (10) minutes including:  Intervention Performed Today    Bilateral External Rotation  x  Red Band, 1x10 **increase sets next visit    Barrel Hug x  Red Band, 1x10 **increase sets next visit   Lower Trap Shrug x  30#, 2x10   Wall Push Ups   2x10   Ball on Wall: circles    clockwise/counter-cockwise, 1 minute each       PATIENT EDUCATION AND HOME EXERCISES     Home Exercises Provided and Patient Education Provided     Education provided: (included in therex) minutes  Precautions for Reverse Total Shoulder  home exercise program     Written Home Exercises Provided: yes.  Exercises were reviewed and Leticia was able to demonstrate them prior to the end of the session.  Leticia demonstrated good  understanding of the education provided. See EMR under Patient Instructions for exercises provided during therapy sessions.      ASSESSMENT   Response to Manual Therapy: performed Subscap Release and PROM to improve L shoulder ROM.  Response to Therapeutic Exercise: progressed Table Slides by elevating table to improve L shoulder ROM and performed Shoulder Flexion and Abduction on timer to improve L shoulder muscular endurance.  Response to Neuromuscular Re-education: added Bilateral External Rotation and Barrel Hug to improve activation/motor control of " Lower Trapezius and Serratus Anterior.     Leticia is progressing well towards her goals.   Pt prognosis is Excellent.     Pt will continue to benefit from skilled outpatient physical therapy to address the deficits listed in the problem list box on initial evaluation, provide pt/family education and to maximize pt's level of independence in the home and community environment.     Pt's spiritual, cultural and educational needs considered and pt agreeable to plan of care and goals.    Anticipated Barriers for therapy: sedentary lifestyle and age    GOALS:     Short Term Goals:  4 weeks Progress      1. Patient will report decreased pain to <6/10 at worst on VAS to progress toward Prior Level of Function. Met   6/22/22   2. Patient will report improved function by a functional limitation score of <60 out of 100 on FOTO. Met  6/22/22   3. Patient will improve AROM to 50% of stated goals in order to progress towards Prior Level of Function.  Met  8/11/22   4. Patient will improve strength to 50% of stated goals in order to progress towards Prior Level of Function. Met  8/11/22      Long Term Goals:  8 weeks Progress     1. Patient will report decreased pain to <3/10 at worst on VAS to progress toward Prior Level of Function.   Not Met    2. Patient will report improved function by a functional limitation score of <30 out of 100 on FOTO.  Not Met   3. Patient will improve ROM and Functional Mobility to stated goals to return to Prior Level of Function.  Not Met   4. Patient will improve strength to stated goals in order to return to Prior Level of Function.  Not Met           PLAN   Progress per protocol with emphasis on shoulder ROM and strengthening.   Plan of Care Expiration: 9/8/22      Gianni Duarte, PT, DPT, SCS

## 2022-08-26 ENCOUNTER — CLINICAL SUPPORT (OUTPATIENT)
Dept: REHABILITATION | Facility: HOSPITAL | Age: 77
End: 2022-08-26
Payer: MEDICARE

## 2022-08-26 DIAGNOSIS — Z47.1 AFTERCARE FOLLOWING LEFT SHOULDER JOINT REPLACEMENT SURGERY: ICD-10-CM

## 2022-08-26 DIAGNOSIS — M25.512 ACUTE PAIN OF LEFT SHOULDER: Primary | ICD-10-CM

## 2022-08-26 DIAGNOSIS — Z96.612 AFTERCARE FOLLOWING LEFT SHOULDER JOINT REPLACEMENT SURGERY: ICD-10-CM

## 2022-08-26 DIAGNOSIS — Z96.612 PRESENCE OF ARTIFICIAL SHOULDER JOINT, LEFT: ICD-10-CM

## 2022-08-26 DIAGNOSIS — M62.512 MUSCLE WASTING AND ATROPHY, NOT ELSEWHERE CLASSIFIED, LEFT SHOULDER: ICD-10-CM

## 2022-08-26 DIAGNOSIS — M25.612 STIFFNESS OF LEFT SHOULDER JOINT: ICD-10-CM

## 2022-08-26 PROCEDURE — 97140 MANUAL THERAPY 1/> REGIONS: CPT

## 2022-08-26 PROCEDURE — 97112 NEUROMUSCULAR REEDUCATION: CPT

## 2022-08-26 PROCEDURE — 97110 THERAPEUTIC EXERCISES: CPT

## 2022-08-26 NOTE — PROGRESS NOTES
[facility]   Physical Therapy Daily Treatment Note    Name: Leticia Grimes  Clinic Number: [id number]    Therapy Diagnosis:   Encounter Diagnoses   Name Primary?    Acute pain of left shoulder Yes    Stiffness of left shoulder joint     Muscle wasting and atrophy, not elsewhere classified, left shoulder     Aftercare following left shoulder joint replacement surgery     Presence of artificial shoulder joint, left      Physician: Tomas Taylor*    Visit Date: 8/26/2022    Physician Orders: PT Eval and Treat  Medical Diagnosis from Referral: S/P reverse total shoulder arthroplasty, left; glenohumeral arthritis, left.   Evaluation Date: 5/11/2022  Authorization Period Expiration: 12/31/22  Plan of Care Expiration: 9/14/22  Progress Note Due: 9/9/22  Visit # / Visits authorized: 13/20 (+1 for prior authorization)   FOTO: 1/3      Precautions: Standard and Reverse TSA Precautions   Sx Date: 5/5/22      PTA Visit #: 0/5     Time In: 8:08 am   Time Out: 9:00 am  Total Billable Time: 48 minutes (denotes billable time)    SUBJECTIVE     Patient reports: that she was having issues with her neuropathy last night so she was in a good bit of pain throughout the night, rating at 8/10 pain. Patient reports that her pain is a little better now since waking up.     He/She was compliant with home exercise program.  Response to previous treatment: improved shoulder mobility.   Functional change: improved sleeping at night.      Pain at Worst: 4/10     Location: left shoulder    OBJECTIVE     Objective Measures updated at progress report unless specified.     TREATMENT     Leticia received the treatments listed below:       MANUAL THERAPY to improve ROM and pain for (12) minutes including:  Manual Intervention Performed Today    Scapular mobilizations      subscap release  x  sidelying and supine    Active Release Technique  x  subscapularis   PROM x  flexion, abduction, External Rotation, and  "Internal Rotation    Astym      Seated Thoracic Manipulation         THERAPEUTIC EXERCISES to develop strength, endurance and ROM for (26) minutes including:   Intervention Performed Today    UBE x  2' forward/2' backward   Overhead Pulleys: flexion and scaption   3 minutes each   Upper Trapezius Stretch  x  10"x5 - left    Rhomboid Stretch    10"x5   External Rotation with Wand - supine   10"x5   Extension with Wand - standing x  10"x5   Internal Rotation with Wand - standing x  10"x5   Table Slides: flexion  x  table elevated, 10"x5   Shoulder Flexion - supine x  3 minutes   Shoulder abduction - sidelying x  3 minutes    Biceps Curls  x  5#, 3x10   Triceps Pushdown x  10#, 3x10        NEUROMUSCULAR RE-EDUCATION to improve Proprioception and motor control for (10) minutes including:  Intervention Performed Today    Bilateral External Rotation  x  Red Band, 2x10    Barrel Hug x  Red Band, 2x10    Lower Trap Shrug x  20#, 2x10 (unable to tolerate 30 lbs)   Wall Push Ups   2x10   Ball on Wall: circles    clockwise/counter-cockwise, 1 minute each       PATIENT EDUCATION AND HOME EXERCISES     Home Exercises Provided and Patient Education Provided     Education provided: (included in therex) minutes  Precautions for Reverse Total Shoulder  home exercise program     Written Home Exercises Provided: yes.  Exercises were reviewed and Leticia was able to demonstrate them prior to the end of the session.  Leticia demonstrated good  understanding of the education provided. See EMR under Patient Instructions for exercises provided during therapy sessions.      ASSESSMENT   Response to Manual Therapy: patient noted with increased tone and tenderness in anterior deltoid and biceps region so worked on this region while continuing to performed Subscap Release and PROM to improve L shoulder ROM.  Response to Therapeutic Exercise: Continued with previous mobility exercises but increased duration of supine shoulder flexion and side lying " shoulder abduction to challenge endurance as well  Response to Neuromuscular Re-education: progressed endurance with Bilateral External Rotation and Barrel Hugs to improve activation/motor control of Lower Trapezius and Serratus Anterior.     Leticia is progressing well towards her goals.   Pt prognosis is Excellent.     Pt will continue to benefit from skilled outpatient physical therapy to address the deficits listed in the problem list box on initial evaluation, provide pt/family education and to maximize pt's level of independence in the home and community environment.     Pt's spiritual, cultural and educational needs considered and pt agreeable to plan of care and goals.    Anticipated Barriers for therapy: sedentary lifestyle and age    GOALS:     Short Term Goals:  4 weeks Progress      1. Patient will report decreased pain to <6/10 at worst on VAS to progress toward Prior Level of Function. Met   6/22/22   2. Patient will report improved function by a functional limitation score of <60 out of 100 on FOTO. Met  6/22/22   3. Patient will improve AROM to 50% of stated goals in order to progress towards Prior Level of Function.  Met  8/11/22   4. Patient will improve strength to 50% of stated goals in order to progress towards Prior Level of Function. Met  8/11/22      Long Term Goals:  8 weeks Progress     1. Patient will report decreased pain to <3/10 at worst on VAS to progress toward Prior Level of Function.   Not Met    2. Patient will report improved function by a functional limitation score of <30 out of 100 on FOTO.  Not Met   3. Patient will improve ROM and Functional Mobility to stated goals to return to Prior Level of Function.  Not Met   4. Patient will improve strength to stated goals in order to return to Prior Level of Function.  Not Met           PLAN   Progress per protocol with emphasis on shoulder ROM and strengthening.   Plan of Care Expiration: 9/8/22      Randi Cabrera, PT, DPT

## 2022-08-30 ENCOUNTER — CLINICAL SUPPORT (OUTPATIENT)
Dept: AUDIOLOGY | Facility: CLINIC | Age: 77
End: 2022-08-30
Payer: MEDICARE

## 2022-08-30 ENCOUNTER — CLINICAL SUPPORT (OUTPATIENT)
Dept: REHABILITATION | Facility: HOSPITAL | Age: 77
End: 2022-08-30
Payer: MEDICARE

## 2022-08-30 DIAGNOSIS — Z47.1 AFTERCARE FOLLOWING LEFT SHOULDER JOINT REPLACEMENT SURGERY: ICD-10-CM

## 2022-08-30 DIAGNOSIS — M62.512 MUSCLE WASTING AND ATROPHY, NOT ELSEWHERE CLASSIFIED, LEFT SHOULDER: ICD-10-CM

## 2022-08-30 DIAGNOSIS — M25.512 ACUTE PAIN OF LEFT SHOULDER: Primary | ICD-10-CM

## 2022-08-30 DIAGNOSIS — Z96.612 AFTERCARE FOLLOWING LEFT SHOULDER JOINT REPLACEMENT SURGERY: ICD-10-CM

## 2022-08-30 DIAGNOSIS — M25.612 STIFFNESS OF LEFT SHOULDER JOINT: ICD-10-CM

## 2022-08-30 DIAGNOSIS — Z96.612 PRESENCE OF ARTIFICIAL SHOULDER JOINT, LEFT: ICD-10-CM

## 2022-08-30 DIAGNOSIS — Z46.1 HEARING AID FITTING OR ADJUSTMENT: Primary | ICD-10-CM

## 2022-08-30 PROCEDURE — 97140 MANUAL THERAPY 1/> REGIONS: CPT

## 2022-08-30 PROCEDURE — 97110 THERAPEUTIC EXERCISES: CPT

## 2022-08-30 PROCEDURE — 97112 NEUROMUSCULAR REEDUCATION: CPT

## 2022-08-30 NOTE — PROGRESS NOTES
[facility]   Physical Therapy Daily Treatment Note    Name: Leticia Grimes  Clinic Number: [id number]    Therapy Diagnosis:   Encounter Diagnoses   Name Primary?    Acute pain of left shoulder Yes    Stiffness of left shoulder joint     Muscle wasting and atrophy, not elsewhere classified, left shoulder     Aftercare following left shoulder joint replacement surgery     Presence of artificial shoulder joint, left      Physician: Tomas Taylor*    Visit Date: 8/30/2022    Physician Orders: PT Eval and Treat  Medical Diagnosis from Referral: S/P reverse total shoulder arthroplasty, left; glenohumeral arthritis, left.   Evaluation Date: 5/11/2022  Authorization Period Expiration: 12/31/22  Plan of Care Expiration: 9/14/22  Progress Note Due: 9/9/22  Visit # / Visits authorized: 13/20 (+1 for prior authorization)   FOTO: 1/3      Precautions: Standard and Reverse TSA Precautions   Sx Date: 5/5/22      PTA Visit #: 0/5     Time In: 9:45 am   Time Out: 10:30 am  Total Billable Time: 42 minutes (denotes billable time)    SUBJECTIVE     Patient reports: that she is still having left sided shoulder and scapular pain since going to see grandchild in [place] over a week ago. Patient feels that the pain has been a bit more constant.     She was compliant with home exercise program.  Response to previous treatment: improved shoulder mobility.   Functional change: improved sleeping at night.      Pain at Worst: 4/10     Location: left shoulder    OBJECTIVE     Objective Measures updated at progress report unless specified.     TREATMENT     Leticia received the treatments listed below:     MANUAL THERAPY to improve ROM and pain for (18) minutes including:  Manual Intervention Performed Today    Soft Tissue Mobilizations x Upper trapezius, supraspinatus, biceps and scar mobilizations with cupping    subscap release  x  supine    Active Release Technique  x  subscapularis   PROM x  flexion,  "abduction, External Rotation, and Internal Rotation    Astym      Seated Thoracic Manipulation         THERAPEUTIC EXERCISES to develop strength, endurance and ROM for (16) minutes including:   Intervention Performed Today    UBE x  2' forward/2' backward   Overhead Pulleys: flexion and scaption   3 minutes each   Upper Trapezius Stretch    10"x5 - left    Rhomboid Stretch    10"x5   External Rotation with Wand - supine   10"x5   Extension with Wand - standing x  10"x5   Internal Rotation with Wand - standing x  10"x5   Table Slides: flexion    table elevated, 10"x5   Shoulder Flexion - supine x  3 minutes   Shoulder abduction - sidelying x  3 minutes    Biceps Curls    5#, 3x10   Triceps Pushdown   10#, 3x10        NEUROMUSCULAR RE-EDUCATION to improve Proprioception and motor control for (8) minutes including:  Intervention Performed Today    Bilateral External Rotation  x  Red Band, 3x10    Barrel Hug x  Red Band, 3x10    Lower Trap Shrug   20#, 2x10 (unable to tolerate 30 lbs)   Wall Push Ups   2x10   Ball on Wall: circles    clockwise/counter-cockwise, 1 minute each       PATIENT EDUCATION AND HOME EXERCISES     Home Exercises Provided and Patient Education Provided     Education provided: (included in therex) minutes  Precautions for Reverse Total Shoulder  home exercise program     Written Home Exercises Provided: yes.  Exercises were reviewed and Leticia was able to demonstrate them prior to the end of the session.  Leticia demonstrated good  understanding of the education provided. See EMR under Patient Instructions for exercises provided during therapy sessions.      ASSESSMENT     Patient noted with more overall pain and discomfort with more significant range of motion restrictions which improved with manual therapy interventions performed. Progressed endurance with theraband exercises today. Educated patient on focusing on posture more as she is in a state of tension throughout neck when at rest.     Leticia " is progressing well towards her goals.   Pt prognosis is Excellent.     Pt will continue to benefit from skilled outpatient physical therapy to address the deficits listed in the problem list box on initial evaluation, provide pt/family education and to maximize pt's level of independence in the home and community environment.     Pt's spiritual, cultural and educational needs considered and pt agreeable to plan of care and goals.    Anticipated Barriers for therapy: sedentary lifestyle and age    GOALS:     Short Term Goals:  4 weeks Progress      Patient will report decreased pain to <6/10 at worst on VAS to progress toward Prior Level of Function. Met   6/22/22   Patient will report improved function by a functional limitation score of <60 out of 100 on FOTO. Met  6/22/22   Patient will improve AROM to 50% of stated goals in order to progress towards Prior Level of Function.  Met  8/11/22   Patient will improve strength to 50% of stated goals in order to progress towards Prior Level of Function. Met  8/11/22      Long Term Goals:  8 weeks Progress     Patient will report decreased pain to <3/10 at worst on VAS to progress toward Prior Level of Function.   Not Met    Patient will report improved function by a functional limitation score of <30 out of 100 on FOTO.  Not Met   Patient will improve ROM and Functional Mobility to stated goals to return to Prior Level of Function.  Not Met   Patient will improve strength to stated goals in order to return to Prior Level of Function.  Not Met        PLAN   Progress per protocol with emphasis on shoulder ROM and strengthening.   Plan of Care Expiration: 9/8/22      Randi Cabrera, PT, DPT

## 2022-08-30 NOTE — PROGRESS NOTES
Leticia Grimes was seen 08/30/2022 for a hearing aid follow-up appointment.  She is having some issues with her hearing aids. Specifically, her RIGHT retention wire not staying in her mala bowl, feels like she is hearing more out of the left aid than the right and phone connectivity issues.     Aids were cleaned and checked and are in good working order. Domes and retention wires were replaced on both aids and filter was replaced in the right aid. After these changes, she immediately noticed an improvement in her hearing out of the right aid. This is probably due to the combination of new retention wire that is providing a better seal and changing the filter.     Otoscopy revealed more cerumen in the left ear than the right, both non-occluding.    I checked her phone's bluetooth and her 3 aid connections are listed. We reviewed her melissa and she did well with changing the volume, splitting volume if needed and changing to one of the pre-built programs.     She complains that her ringer is too loud and also the alerts of text messages too. I showed her under settings and sounds & haptics how to decrease the volume there and this was much better.    She practiced answering the phone and she did very well. She reports that often, she has to switch back to Iphone or it switches automatically. We discussed that if she goes to a different part of her home and the phone is not within range then calls will drop or not be received. Also, in her car, the car's BT will take over. She mentioned that sometimes she is just sitting at her table and it switches. I asked her to monitor this and keep me posted.     The right retention wire stayed in place for our entire visit. I showed her a custom slim tip mold and told her that we can get one for her right ear if the wire continues to be a problem. For now, she opts to try the new wire and let me know when she needs a new one or changes her mind about getting a custom mold  made.    She verbalized understanding on all of the above.     Patient will call for any future hearing aid follow-up appointments as needed.

## 2022-09-07 ENCOUNTER — TELEPHONE (OUTPATIENT)
Dept: NEUROLOGY | Facility: CLINIC | Age: 77
End: 2022-09-07
Payer: MEDICARE

## 2022-09-07 NOTE — TELEPHONE ENCOUNTER
----- Message from Christina Horton sent at 9/7/2022  1:28 PM CDT -----  Regarding: rtn call  Contact: pt  Returning call to office. Can be reached at  [phone]

## 2022-09-07 NOTE — TELEPHONE ENCOUNTER
----- Message from Claudia Pena sent at 9/7/2022 10:15 AM CDT -----  Regarding: pt advice  Contact: pt  Type:  Needs Medical Advice    Who Called:  Leticia Grimes   Symptoms (please be specific):  neuropathy in left leg, right arm, unable to sleep    How long has patient had these symptoms:  2 weeks   Pharmacy name and phone #:       [facility] [facility] [address]  [address] [facility] [address]  Phone: [phone] Fax: [phone]    Would the patient rather a call back or a response via [facility]? Call back   Best Call Back Number:  [phone]  Additional Information:

## 2022-09-08 ENCOUNTER — OFFICE VISIT (OUTPATIENT)
Dept: INTERNAL MEDICINE | Facility: CLINIC | Age: 77
End: 2022-09-08
Payer: MEDICARE

## 2022-09-08 VITALS
HEART RATE: 78 BPM | OXYGEN SATURATION: 97 % | HEIGHT: 61 IN | DIASTOLIC BLOOD PRESSURE: 78 MMHG | SYSTOLIC BLOOD PRESSURE: 122 MMHG | WEIGHT: 159.31 LBS | BODY MASS INDEX: 30.08 KG/M2

## 2022-09-08 DIAGNOSIS — G56.01 CARPAL TUNNEL SYNDROME OF RIGHT WRIST: Primary | ICD-10-CM

## 2022-09-08 DIAGNOSIS — G89.29 CHRONIC LEFT SI JOINT PAIN: ICD-10-CM

## 2022-09-08 DIAGNOSIS — M53.3 CHRONIC LEFT SI JOINT PAIN: ICD-10-CM

## 2022-09-08 PROCEDURE — 1159F PR MEDICATION LIST DOCUMENTED IN MEDICAL RECORD: ICD-10-PCS | Mod: CPTII,S$GLB,, | Performed by: PHYSICIAN ASSISTANT

## 2022-09-08 PROCEDURE — 1101F PR PT FALLS ASSESS DOC 0-1 FALLS W/OUT INJ PAST YR: ICD-10-PCS | Mod: CPTII,S$GLB,, | Performed by: PHYSICIAN ASSISTANT

## 2022-09-08 PROCEDURE — 99214 PR OFFICE/OUTPT VISIT, EST, LEVL IV, 30-39 MIN: ICD-10-PCS | Mod: S$GLB,,, | Performed by: PHYSICIAN ASSISTANT

## 2022-09-08 PROCEDURE — 1160F PR REVIEW ALL MEDS BY PRESCRIBER/CLIN PHARMACIST DOCUMENTED: ICD-10-PCS | Mod: CPTII,S$GLB,, | Performed by: PHYSICIAN ASSISTANT

## 2022-09-08 PROCEDURE — 3074F SYST BP LT 130 MM HG: CPT | Mod: CPTII,S$GLB,, | Performed by: PHYSICIAN ASSISTANT

## 2022-09-08 PROCEDURE — 3288F PR FALLS RISK ASSESSMENT DOCUMENTED: ICD-10-PCS | Mod: CPTII,S$GLB,, | Performed by: PHYSICIAN ASSISTANT

## 2022-09-08 PROCEDURE — 1159F MED LIST DOCD IN RCRD: CPT | Mod: CPTII,S$GLB,, | Performed by: PHYSICIAN ASSISTANT

## 2022-09-08 PROCEDURE — 3288F FALL RISK ASSESSMENT DOCD: CPT | Mod: CPTII,S$GLB,, | Performed by: PHYSICIAN ASSISTANT

## 2022-09-08 PROCEDURE — 99999 PR PBB SHADOW E&M-EST. PATIENT-LVL IV: ICD-10-PCS | Mod: PBBFAC,,, | Performed by: PHYSICIAN ASSISTANT

## 2022-09-08 PROCEDURE — 1101F PT FALLS ASSESS-DOCD LE1/YR: CPT | Mod: CPTII,S$GLB,, | Performed by: PHYSICIAN ASSISTANT

## 2022-09-08 PROCEDURE — 1160F RVW MEDS BY RX/DR IN RCRD: CPT | Mod: CPTII,S$GLB,, | Performed by: PHYSICIAN ASSISTANT

## 2022-09-08 PROCEDURE — 3074F PR MOST RECENT SYSTOLIC BLOOD PRESSURE < 130 MM HG: ICD-10-PCS | Mod: CPTII,S$GLB,, | Performed by: PHYSICIAN ASSISTANT

## 2022-09-08 PROCEDURE — 1125F PR PAIN SEVERITY QUANTIFIED, PAIN PRESENT: ICD-10-PCS | Mod: CPTII,S$GLB,, | Performed by: PHYSICIAN ASSISTANT

## 2022-09-08 PROCEDURE — 3078F DIAST BP <80 MM HG: CPT | Mod: CPTII,S$GLB,, | Performed by: PHYSICIAN ASSISTANT

## 2022-09-08 PROCEDURE — 99999 PR PBB SHADOW E&M-EST. PATIENT-LVL IV: CPT | Mod: PBBFAC,,, | Performed by: PHYSICIAN ASSISTANT

## 2022-09-08 PROCEDURE — 1125F AMNT PAIN NOTED PAIN PRSNT: CPT | Mod: CPTII,S$GLB,, | Performed by: PHYSICIAN ASSISTANT

## 2022-09-08 PROCEDURE — 3078F PR MOST RECENT DIASTOLIC BLOOD PRESSURE < 80 MM HG: ICD-10-PCS | Mod: CPTII,S$GLB,, | Performed by: PHYSICIAN ASSISTANT

## 2022-09-08 PROCEDURE — 99214 OFFICE O/P EST MOD 30 MIN: CPT | Mod: S$GLB,,, | Performed by: PHYSICIAN ASSISTANT

## 2022-09-08 RX ORDER — PREDNISONE 20 MG/1
20 TABLET ORAL DAILY
Qty: 5 TABLET | Refills: 0 | Status: SHIPPED | OUTPATIENT
Start: 2022-09-08 | End: 2022-09-13

## 2022-09-08 RX ORDER — TIZANIDINE 4 MG/1
4 TABLET ORAL EVERY 8 HOURS PRN
Qty: 20 TABLET | Refills: 0 | Status: SHIPPED | OUTPATIENT
Start: 2022-09-08 | End: 2022-09-29

## 2022-09-08 NOTE — PROGRESS NOTES
Subjective:       Patient ID: Leticia Grimes is a 77 y.o. female.    Chief Complaint: Leg Pain (Left leg for the past 3 wks ) and Hand Pain (Right hand)      Patient Care Team:  Ben Marie MD as PCP - General (Family Medicine)  Nader Salguero MD as Consulting Physician (Radiology)  Richard Delong Jr., MD as Consulting Physician (Neurology)  Belinda Chapa as Digital Medicine Health   Nessa Gallardo PharmD as Hyperlipidemia Digital Medicine Clinician  Ben Marie MD as Hyperlipidemia Digital Medicine Responsible Provider (Family Medicine)  Nessa Gallardo PharmD as Hypertension Digital Medicine Clinician  Ben Marie MD as Hypertension Digital Medicine Responsible Provider (Family Medicine)  Yuri Can [employer] as Hypertension Digital Medicine Contract    HPI  Patient is new to me.    Leticia Grimes is a 77 y.o. female who presents today with complaints of Leg Pain (Left leg for the past 3 wks ) and Hand Pain (Right hand)  Patient presents to clinic with complaints of dull achy pain to left buttock and radiates down posterior legs.  Pain is been there for over a year but worsened over the summer.  Denies injury or trauma.  Reports intermittent numbness in her left foot.  No weakness or incontinence.    Reports right wrist pain and weakness that is chronic.  Reports history of neuropathy to her hand.  Has history of carpal tunnel with carpal tunnel release surgery.  Reports numbness to fingers 2 through 4.      Review of Systems   Constitutional:  Negative for chills and fever.   Respiratory:  Negative for shortness of breath.    Cardiovascular:  Negative for chest pain and leg swelling.   Musculoskeletal:  Positive for back pain (right side into right buttock) and myalgias.   Neurological:  Positive for weakness (right hand) and numbness (right hand).     Objective:      Physical Exam  Vitals and nursing note reviewed.   Constitutional:       General: She is not in acute  distress.     Appearance: She is well-developed.   HENT:      Head: Normocephalic and atraumatic.   Eyes:      General: Lids are normal. No scleral icterus.     Extraocular Movements: Extraocular movements intact.      Conjunctiva/sclera: Conjunctivae normal.   Cardiovascular:      Rate and Rhythm: Normal rate and regular rhythm.      Pulses:           Radial pulses are 2+ on the right side and 2+ on the left side.        Dorsalis pedis pulses are 2+ on the right side and 2+ on the left side.        Posterior tibial pulses are 2+ on the right side and 2+ on the left side.   Pulmonary:      Effort: Pulmonary effort is normal.      Breath sounds: Normal breath sounds. No decreased breath sounds, wheezing, rhonchi or rales.   Musculoskeletal:      Right wrist: No swelling, deformity, tenderness or bony tenderness. Normal range of motion. Normal pulse.      Lumbar back: No swelling, deformity or bony tenderness. Normal range of motion.        Back:       Right lower leg: No edema.      Left lower leg: No edema.      Comments: Positive Tinel and Phalen's test to right wrist, negative Finkelstein   Neurological:      Mental Status: She is alert.      Cranial Nerves: No cranial nerve deficit.      Motor: Motor function is intact.      Gait: Gait is intact.      Deep Tendon Reflexes:      Reflex Scores:       Patellar reflexes are 2+ on the right side and 2+ on the left side.     Comments: Ambulatory without assistive device   Psychiatric:         Mood and Affect: Mood and affect normal.       Assessment:       1. Carpal tunnel syndrome of right wrist    2. Chronic left SI joint pain        Plan:   1. Carpal tunnel syndrome of right wrist    2. Chronic left SI joint pain  -     predniSONE (DELTASONE) 20 MG tablet  -     tiZANidine (ZANAFLEX) 4 MG tablet    Warm compresses the SI joint followed by stretches, handout given to patient  Take Tylenol for pain  Ice compresses to right wrist, also wear cock-up wrist splint that  she has at home for 2-3 weeks, stretches given

## 2022-09-13 ENCOUNTER — CLINICAL SUPPORT (OUTPATIENT)
Dept: CARDIOLOGY | Facility: HOSPITAL | Age: 77
End: 2022-09-13
Payer: MEDICARE

## 2022-09-13 ENCOUNTER — CLINICAL SUPPORT (OUTPATIENT)
Dept: REHABILITATION | Facility: HOSPITAL | Age: 77
End: 2022-09-13
Payer: MEDICARE

## 2022-09-13 DIAGNOSIS — Z95.810 PRESENCE OF AUTOMATIC (IMPLANTABLE) CARDIAC DEFIBRILLATOR: ICD-10-CM

## 2022-09-13 DIAGNOSIS — Z96.612 PRESENCE OF ARTIFICIAL SHOULDER JOINT, LEFT: ICD-10-CM

## 2022-09-13 DIAGNOSIS — Z47.1 AFTERCARE FOLLOWING LEFT SHOULDER JOINT REPLACEMENT SURGERY: ICD-10-CM

## 2022-09-13 DIAGNOSIS — M62.512 MUSCLE WASTING AND ATROPHY, NOT ELSEWHERE CLASSIFIED, LEFT SHOULDER: Primary | ICD-10-CM

## 2022-09-13 DIAGNOSIS — Z96.612 AFTERCARE FOLLOWING LEFT SHOULDER JOINT REPLACEMENT SURGERY: ICD-10-CM

## 2022-09-13 PROCEDURE — 97140 MANUAL THERAPY 1/> REGIONS: CPT

## 2022-09-13 PROCEDURE — 93296 REM INTERROG EVL PM/IDS: CPT | Performed by: INTERNAL MEDICINE

## 2022-09-13 PROCEDURE — 97110 THERAPEUTIC EXERCISES: CPT

## 2022-09-13 NOTE — PROGRESS NOTES
[facility]   Physical Therapy Daily Treatment Note    Name: Leticia Grimes  Clinic Number: [id number]    Therapy Diagnosis:   Encounter Diagnoses   Name Primary?    Muscle wasting and atrophy, not elsewhere classified, left shoulder Yes    Aftercare following left shoulder joint replacement surgery     Presence of artificial shoulder joint, left        Physician: Tomas Taylor    Visit Date: 9/13/2022    Physician Orders: PT Eval and Treat  Medical Diagnosis from Referral: S/P reverse total shoulder arthroplasty, left; glenohumeral arthritis, left.   Evaluation Date: 5/11/2022  Authorization Period Expiration: 12/31/22  Plan of Care Expiration: 10/13/22  Progress Note Due: 10/13/22  Visit # / Visits authorized: 15/20 (+1 for prior authorization)   FOTO: 3/3      Precautions: Standard and Reverse TSA Precautions   Sx Date: 5/5/22      PTA Visit #: 0/5     Time In: 9:00 am   Time Out: 9:55 am  Total Billable Time: 50 minutes (denotes billable time)    SUBJECTIVE     Patient reports: See plan of care update    She was compliant with home exercise program.  Response to previous treatment: improved shoulder mobility.   Functional change: improved sleeping at night.      Pain at Worst: 4/10     Location: left shoulder    OBJECTIVE     Objective Measures updated at progress report unless specified.     TREATMENT     Leticia received the treatments listed below:     MANUAL THERAPY to improve ROM and pain for (10) minutes including:  Manual Intervention 9/13/2022     Soft Tissue Mobilizations x Upper trapezius, supraspinatus,subscapularis, levator scapula    subscap release  x  supine    Active Release Technique    subscapularis   PROM x  flexion, abduction, External Rotation, and Internal Rotation    Astym      Seated Thoracic Manipulation         THERAPEUTIC EXERCISES to develop strength, endurance and ROM for (30) minutes including:   Intervention 9/13/2022     UBE x  5' forward/2'  "backward   Overhead Pulleys: flexion and scaption   3 minutes each   Upper Trapezius Stretch    10"x5 - left    Rhomboid Stretch    10"x5   External Rotation with Wand - supine   10"x5   Extension with Wand - standing x  10"x5 arms ER/palms forward   Internal Rotation with Wand - standing   10"x5   Table Slides: flexion    table elevated, 10"x5   Shoulder Flexion - supine x  15x   Shoulder abduction - sidelying x  15x    Biceps Curls    5#, 3x10   Triceps Pushdown   10#, 3x10    [place] V x 10x/3 each direction yellow band   Re-assessment x        NEUROMUSCULAR RE-EDUCATION to improve Proprioception and motor control for (0) minutes including:  Intervention 9/13/2022     Bilateral External Rotation    Red Band, 3x10    Barrel Hug   Red Band, 3x10    Lower Trap Shrug   20#, 2x10 (unable to tolerate 30 lbs)   Wall Push Ups   2x10   Ball on Wall: circles    clockwise/counter-cockwise, 1 minute each       PATIENT EDUCATION AND HOME EXERCISES     Home Exercises Provided and Patient Education Provided     Education provided: (included in therex) minutes  home exercise program - importance of strengthening and flexibility exercise's.    Written Home Exercises Provided: yes.  Exercises were reviewed and Leticia was able to demonstrate them prior to the end of the session.  Leticia demonstrated good  understanding of the education provided. See EMR under Patient Instructions for exercises provided during therapy sessions.      ASSESSMENT     See plan of care update    Leticia is progressing well towards her goals.   Pt prognosis is Excellent.     Pt will continue to benefit from skilled outpatient physical therapy to address the deficits listed in the problem list box on initial evaluation, provide pt/family education and to maximize pt's level of independence in the home and community environment.     Pt's spiritual, cultural and educational needs considered and pt agreeable to plan of care and goals.    Anticipated Barriers for " therapy: sedentary lifestyle and age    GOALS:    See plan of care update       PLAN     See plan of care update      Elizabeth Garcia, PT

## 2022-09-13 NOTE — PLAN OF CARE
[facility]  Physical Therapy Plan of Care Note    Name: Leticia Grimes  Clinic Number: [id number]    Therapy Diagnosis:   Encounter Diagnoses   Name Primary?    Muscle wasting and atrophy, not elsewhere classified, left shoulder Yes    Aftercare following left shoulder joint replacement surgery     Presence of artificial shoulder joint, left      Physician: Tomas Taylor*    Visit Date: 9/13/2022    Physician Orders: PT Eval and Treat  Medical Diagnosis from Referral: S/P reverse total shoulder arthroplasty, left; glenohumeral arthritis, left.   Evaluation Date: 5/11/2022  Authorization Period Expiration: 12/31/22  Plan of Care Expiration: 10/13/22  Progress Note Due: 10/13/22  Visit # / Visits authorized: 15/20 (+1 for prior authorization)   FOTO: 3/3      Precautions: Standard and Reverse TSA Precautions   Sx Date: 5/5/22    SUBJECTIVE     Update: Has not attended therapy last 4 visits due to out of town, and has had sciatica last week. She reports that she is having pain at 3/10 in her shoulder and shoulder blade and also is reporting leg pain.    OBJECTIVE     Update:   RANGE OF MOTION:  *denotes pain      Shoulder ROM  (Degrees) Left  Initial Left  8/10/22 Left/AROM  9/13/22 Left/PROM  9/13/22 Goal   Shoulder Flexion 80 80 105 100 120   Shoulder Abduction  NT 70 95 85 90   Shoulder ER  NT 30 Left upper trapezial  35 60   Shoulder IR  NT 30 Gluteal fold 35 45   ext NT NT 40 NT 50 AROM            STRENGTH:  *denotes pain      MMT Left  Initial Left  8/10/22 Left  9/13/22 Goal   Shoulder Flexion NT 4/5 4-/5 arm at side 5/5   Shoulder Abduction NT 4/5 4-/5 arm 45 ABD 5/5   Elbow Flexion  NT 5/5 NT 5/5   Elbow Extension NT 5/5 NT 5/5   Shoulder ER NT NT 3+/5 5/5   Shoulder IR NT NT 3+/5 5/5   Serratus anterior  NT NT 3+/5 5/5            FUNCTION/FOTO:         ASSESSMENT     Update: Patient has made progress with both AROM and PROM but remains with weakness and limited ROM in her  arm, she is reporting improved overall function and is rating herself as improved per the FOTO.  Patient should benefit from continued treatment to address strength deficits and increase AROM.    Previous Short Term Goals Status:   see below  New Short Term Goals Status:   see below  Long Term Goal Status: continue per initial plan of care.  Reasons for Recertification of Therapy:   see above    GOALS    Short Term Goals:  4 weeks Progress      Patient will report decreased pain to <6/10 at worst on VAS to progress toward Prior Level of Function. Met   6/22/22   Patient will report improved function by a functional limitation score of <60 out of 100 on FOTO. Met  6/22/22   Patient will improve AROM to 50% of stated goals in order to progress towards Prior Level of Function.  Met  8/11/22   Patient will improve strength to 50% of stated goals in order to progress towards Prior Level of Function. Met  8/11/22     Long Term Goals:  8 weeks Progress     Patient will report decreased pain to <3/10 at worst on VAS to progress toward Prior Level of Function.   Progressing 9/13/22    Patient will report improved function by a functional limitation score of <30 out of 100 on FOTO.  MET  9/13/22   Patient will improve ROM and Functional Mobility to stated goals to return to Prior Level of Function.  Progressing 9/13/22   Patient will improve strength to stated goals in order to return to Prior Level of Function. Progressing 9/13/22       PLAN     Updated Certification Period: 9/13/22 to 10/13/22   Recommended Treatment Plan: 2 times per week for 4 weeks:  Electrical Stimulation PRN, Manual Therapy, Moist Heat/ Ice, Neuromuscular Re-ed, Patient Education, Self Care, Therapeutic Activities, Therapeutic Exercise, and FDN/PRN  Other Recommendations: none    Elizabeth Garcia, PT    I CERTIFY THE NEED FOR THESE SERVICES FURNISHED UNDER THIS PLAN OF TREATMENT AND WHILE UNDER MY CARE  Physician's comments:      Physician's Signature:  ___________________________________________________

## 2022-09-15 ENCOUNTER — NURSE TRIAGE (OUTPATIENT)
Dept: ADMINISTRATIVE | Facility: CLINIC | Age: 77
End: 2022-09-15
Payer: MEDICARE

## 2022-09-15 NOTE — TELEPHONE ENCOUNTER
Tested positive for covid on Sept 10. Temp 97.2. Cough is improving. Was congest and is not clear. Pt had questions about quarantine. All questions answered per cdc guidelines. No further concerns at present time.   Reason for Disposition   General information question, no triage required and triager able to answer question    Protocols used: Information Only Call-A-AH

## 2022-09-16 ENCOUNTER — CLINICAL SUPPORT (OUTPATIENT)
Dept: REHABILITATION | Facility: HOSPITAL | Age: 77
End: 2022-09-16
Payer: MEDICARE

## 2022-09-16 DIAGNOSIS — Z96.612 PRESENCE OF ARTIFICIAL SHOULDER JOINT, LEFT: ICD-10-CM

## 2022-09-16 DIAGNOSIS — M62.512 MUSCLE WASTING AND ATROPHY, NOT ELSEWHERE CLASSIFIED, LEFT SHOULDER: Primary | ICD-10-CM

## 2022-09-16 DIAGNOSIS — Z96.612 AFTERCARE FOLLOWING LEFT SHOULDER JOINT REPLACEMENT SURGERY: ICD-10-CM

## 2022-09-16 DIAGNOSIS — Z47.1 AFTERCARE FOLLOWING LEFT SHOULDER JOINT REPLACEMENT SURGERY: ICD-10-CM

## 2022-09-16 PROCEDURE — 97110 THERAPEUTIC EXERCISES: CPT

## 2022-09-16 PROCEDURE — 97140 MANUAL THERAPY 1/> REGIONS: CPT

## 2022-09-16 NOTE — PROGRESS NOTES
[facility]   Physical Therapy Daily Treatment Note    Name: Leticia Grimes  Clinic Number: [id number]    Therapy Diagnosis:   Encounter Diagnoses   Name Primary?    Muscle wasting and atrophy, not elsewhere classified, left shoulder Yes    Aftercare following left shoulder joint replacement surgery     Presence of artificial shoulder joint, left          Physician: Tomas Taylor    Visit Date: 9/16/2022    Physician Orders: PT Eval and Treat  Medical Diagnosis from Referral: S/P reverse total shoulder arthroplasty, left; glenohumeral arthritis, left.   Evaluation Date: 5/11/2022  Authorization Period Expiration: 12/31/22  Plan of Care Expiration: 10/13/22  Progress Note Due: 10/13/22  Visit # / Visits authorized: 16/20 (+1 for prior authorization)   FOTO: 3/3      Precautions: Standard and Reverse TSA Precautions   Sx Date: 5/5/22      PTA Visit #: 0/5     Time In: 10:10 am   Time Out: 10:57 am  Total Billable Time: 45 minutes (denotes billable time)    SUBJECTIVE     Patient reports: She reports that she is still having sciatica  but that her shoulder is at 3/10 today.    She was compliant with home exercise program.  Response to previous treatment: improved shoulder mobility.   Functional change: improved sleeping at night.      Pain at Worst: 4/10     Location: left shoulder    OBJECTIVE     Objective Measures updated at progress report unless specified.     TREATMENT     Leticia received the treatments listed below:     MANUAL THERAPY to improve ROM and pain for (8) minutes including:  Manual Intervention 9/16/2022     Soft Tissue Mobilizations x Upper trapezius, supraspinatus,subscapularis, levator scapula    subscap release  x  supine    Active Release Technique    subscapularis   PROM x  flexion, abduction, External Rotation, and Internal Rotation    Astym      Seated Thoracic Manipulation         THERAPEUTIC EXERCISES to develop strength, endurance and ROM for (30)  "minutes including:   Intervention 9/16/2022     UBE x  3' forward/3' backward   Overhead Pulleys: flexion and scaption   3 minutes each   Upper Trapezius Stretch    10"x5 - left    Rhomboid Stretch    10"x5   External Rotation with Wand - supine   10"x5   Extension with Wand - standing x  10"x5 arms ER/palms forward   Internal Rotation with Wand - standing   10"x5   Table Slides: flexion    table elevated, 10"x5   Shoulder Flexion - sidelying x  30x   Shoulder abduction - sidelying x  30x    Biceps Curls    5#, 3x10   Triceps Pushdown   10#, 3x10    Stanley V x 10x/3 each direction red band        Re-assessment         NEUROMUSCULAR RE-EDUCATION to improve Proprioception and motor control for (7) minutes including:  Intervention 9/16/2022     Bilateral External Rotation    Red Band, 3x10    Barrel Hug   Red Band, 3x10    Lower Trap Shrug   20#, 2x10 (unable to tolerate 30 lbs)   Wall Push Ups   2x10   Ball on Wall: circles    clockwise/counter-cockwise, 1 minute each   Prone rows x 10x   Prone latissimus dorsi (I) x 10x   Prone W x 10x   [place] on wall x NO BAND up, middle, lower; 5x (verbal and manual cues to complete)       PATIENT EDUCATION AND HOME EXERCISES     Home Exercises Provided and Patient Education Provided     Education provided: (included in therex) minutes  home exercise program - importance of strengthening and flexibility exercise's.    Written Home Exercises Provided: yes.  Exercises were reviewed and Leticia was able to demonstrate them prior to the end of the session.  Leticia demonstrated good  understanding of the education provided. See EMR under Patient Instructions for exercises provided during therapy sessions.      ASSESSMENT     Patient reports compliance with home exercise program and was able to progress with strengthening today.  She continues with limitations in A/PROM as well as overall endurance and strength but was able to progress with theraband and prone scapular " strengthening.    Leticia is progressing well towards her goals.   Pt prognosis is Excellent.     Pt will continue to benefit from skilled outpatient physical therapy to address the deficits listed in the problem list box on initial evaluation, provide pt/family education and to maximize pt's level of independence in the home and community environment.     Pt's spiritual, cultural and educational needs considered and pt agreeable to plan of care and goals.    Anticipated Barriers for therapy: sedentary lifestyle and age    GOALS:    Reviewed: 9/16/2022    Short Term Goals:  4 weeks Progress       Patient will report decreased pain to <6/10 at worst on VAS to progress toward Prior Level of Function. Met   6/22/22   Patient will report improved function by a functional limitation score of <60 out of 100 on FOTO. Met  6/22/22   Patient will improve AROM to 50% of stated goals in order to progress towards Prior Level of Function.  Met  8/11/22   Patient will improve strength to 50% of stated goals in order to progress towards Prior Level of Function. Met  8/11/22      Long Term Goals:  8 weeks Progress      Patient will report decreased pain to <3/10 at worst on VAS to progress toward Prior Level of Function.   Progressing 9/13/22    Patient will report improved function by a functional limitation score of <30 out of 100 on FOTO.  MET  9/13/22   Patient will improve ROM and Functional Mobility to stated goals to return to Prior Level of Function.  Progressing 9/13/22   Patient will improve strength to stated goals in order to return to Prior Level of Function. Progressing 9/13/22           PLAN     Updated Certification Period: 9/13/22 to 10/13/22   Recommended Treatment Plan: 2 times per week for 4 weeks:  Electrical Stimulation PRN, Manual Therapy, Moist Heat/ Ice, Neuromuscular Re-ed, Patient Education, Self Care, Therapeutic Activities, Therapeutic Exercise, and FDN/PRN    Monitor response to today's treatment session  and progress as indicated.    Elizabeth Garcia, PT

## 2022-09-20 ENCOUNTER — CLINICAL SUPPORT (OUTPATIENT)
Dept: REHABILITATION | Facility: HOSPITAL | Age: 77
End: 2022-09-20
Payer: MEDICARE

## 2022-09-20 DIAGNOSIS — Z96.612 AFTERCARE FOLLOWING LEFT SHOULDER JOINT REPLACEMENT SURGERY: ICD-10-CM

## 2022-09-20 DIAGNOSIS — Z96.612 PRESENCE OF ARTIFICIAL SHOULDER JOINT, LEFT: ICD-10-CM

## 2022-09-20 DIAGNOSIS — Z47.1 AFTERCARE FOLLOWING LEFT SHOULDER JOINT REPLACEMENT SURGERY: ICD-10-CM

## 2022-09-20 DIAGNOSIS — M62.512 MUSCLE WASTING AND ATROPHY, NOT ELSEWHERE CLASSIFIED, LEFT SHOULDER: Primary | ICD-10-CM

## 2022-09-20 PROCEDURE — 97110 THERAPEUTIC EXERCISES: CPT | Mod: CQ

## 2022-09-20 PROCEDURE — 97140 MANUAL THERAPY 1/> REGIONS: CPT | Mod: CQ

## 2022-09-20 PROCEDURE — 97112 NEUROMUSCULAR REEDUCATION: CPT | Mod: CQ

## 2022-09-20 NOTE — PROGRESS NOTES
[facility]   Physical Therapy Daily Treatment Note    Name: Leticia Grimes  Clinic Number: [id number]    Therapy Diagnosis:   Encounter Diagnoses   Name Primary?    Muscle wasting and atrophy, not elsewhere classified, left shoulder Yes    Aftercare following left shoulder joint replacement surgery     Presence of artificial shoulder joint, left        Physician: Tomas Taylor    Visit Date: 9/20/2022    Physician Orders: PT Eval and Treat  Medical Diagnosis from Referral: S/P reverse total shoulder arthroplasty, left; glenohumeral arthritis, left.   Evaluation Date: 5/11/2022  Authorization Period Expiration: 12/31/22  Plan of Care Expiration: 10/13/22  Progress Note Due: 10/13/22  Visit # / Visits authorized: 17/20 (+1 for prior authorization)   FOTO: 3/3      Precautions: Standard and Reverse TSA Precautions   Sx Date: 5/5/22      PTA Visit #: 1/5     Time In: 10:00 am   Time Out: 10:45 am  Total Billable Time: 45 minutes (denotes billable time)    SUBJECTIVE     Patient reports: she barely has any pain today.     She was compliant with home exercise program.  Response to previous treatment: improved shoulder mobility.   Functional change: improved sleeping at night.      Pain at Worst: 1/10     Location: left shoulder    OBJECTIVE     Objective Measures updated at progress report unless specified.     TREATMENT     Leticia received the treatments listed below:     MANUAL THERAPY to improve ROM and pain for (15) minutes including:  Manual Intervention 9/20/2022     Soft Tissue Mobilizations x Upper trapezius, supraspinatus,subscapularis, levator scapula    subscap release  x  supine    Active Release Technique    subscapularis   PROM   flexion, abduction, External Rotation, and Internal Rotation    Astym      Seated Thoracic Manipulation         THERAPEUTIC EXERCISES to develop strength, endurance and ROM for (15) minutes including:   Intervention 9/20/2022     UBE x  3'  "forward/3' backward   Overhead Pulleys: flexion and scaption   3 minutes each   Upper Trapezius Stretch    10"x5 - left    Rhomboid Stretch    10"x5   External Rotation with Wand - supine   10"x5   Flexion with wand x    Extension with Wand - standing x  10"x5 arms ER/palms forward   Internal Rotation with Wand - standing   10"x5   Table Slides: flexion    table elevated, 10"x5   Shoulder Flexion - sidelying x  30x   Shoulder abduction - sidelying x  30x    Biceps Curls    5#, 3x10   Triceps Pushdown   10#, 3x10    [place] V x 10x/3 each direction red band        Re-assessment         NEUROMUSCULAR RE-EDUCATION to improve Proprioception and motor control for (15) minutes including:  Intervention 9/20/2022     Bilateral External Rotation  x  Red Band, 3x10    Barrel Hug   Red Band, 3x10    Lower Trap Shrug   20#, 2x10 (unable to tolerate 30 lbs)   Wall Push Ups   2x10   Ball on Wall: circles    clockwise/counter-cockwise, 1 minute each   Prone rows x 10x/2   Prone latissimus dorsi (I) x 10x/2   Prone W x 10x/2   Spidermans on wall  NO BAND up, middle, lower; 5x (verbal and manual cues to complete)       PATIENT EDUCATION AND HOME EXERCISES     Home Exercises Provided and Patient Education Provided     Education provided: (included in therex) minutes  home exercise program - importance of strengthening and flexibility exercise's.    Written Home Exercises Provided: yes.  Exercises were reviewed and Leticia was able to demonstrate them prior to the end of the session.  Leticia demonstrated good  understanding of the education provided. See EMR under Patient Instructions for exercises provided during therapy sessions.      ASSESSMENT     Patient did well with session today with minimal complaints of discomfort. Challenged by prone activities, verbal and tactile cues for scapular activation and decreased upper trap engagement. Good response to scapular release, patient left session with reports of decreased tightness. "     Leticia is progressing well towards her goals.   Pt prognosis is Excellent.     Pt will continue to benefit from skilled outpatient physical therapy to address the deficits listed in the problem list box on initial evaluation, provide pt/family education and to maximize pt's level of independence in the home and community environment.     Pt's spiritual, cultural and educational needs considered and pt agreeable to plan of care and goals.    Anticipated Barriers for therapy: sedentary lifestyle and age    GOALS:    Reviewed: 9/20/2022    Short Term Goals:  4 weeks Progress       Patient will report decreased pain to <6/10 at worst on VAS to progress toward Prior Level of Function. Met   6/22/22   Patient will report improved function by a functional limitation score of <60 out of 100 on FOTO. Met  6/22/22   Patient will improve AROM to 50% of stated goals in order to progress towards Prior Level of Function.  Met  8/11/22   Patient will improve strength to 50% of stated goals in order to progress towards Prior Level of Function. Met  8/11/22      Long Term Goals:  8 weeks Progress      Patient will report decreased pain to <3/10 at worst on VAS to progress toward Prior Level of Function.   Progressing 9/13/22    Patient will report improved function by a functional limitation score of <30 out of 100 on FOTO.  MET  9/13/22   Patient will improve ROM and Functional Mobility to stated goals to return to Prior Level of Function.  Progressing 9/13/22   Patient will improve strength to stated goals in order to return to Prior Level of Function. Progressing 9/13/22           PLAN     Updated Certification Period: 9/13/22 to 10/13/22   Recommended Treatment Plan: 2 times per week for 4 weeks:  Electrical Stimulation PRN, Manual Therapy, Moist Heat/ Ice, Neuromuscular Re-ed, Patient Education, Self Care, Therapeutic Activities, Therapeutic Exercise, and FDN/PRN    Monitor response to today's treatment session and progress  as indicated.    Julia Tucker, PTA

## 2022-09-23 ENCOUNTER — CLINICAL SUPPORT (OUTPATIENT)
Dept: REHABILITATION | Facility: HOSPITAL | Age: 77
End: 2022-09-23
Payer: MEDICARE

## 2022-09-23 DIAGNOSIS — M62.512 MUSCLE WASTING AND ATROPHY, NOT ELSEWHERE CLASSIFIED, LEFT SHOULDER: Primary | ICD-10-CM

## 2022-09-23 DIAGNOSIS — Z47.1 AFTERCARE FOLLOWING LEFT SHOULDER JOINT REPLACEMENT SURGERY: ICD-10-CM

## 2022-09-23 DIAGNOSIS — Z96.612 PRESENCE OF ARTIFICIAL SHOULDER JOINT, LEFT: ICD-10-CM

## 2022-09-23 DIAGNOSIS — Z96.612 AFTERCARE FOLLOWING LEFT SHOULDER JOINT REPLACEMENT SURGERY: ICD-10-CM

## 2022-09-23 PROCEDURE — 97112 NEUROMUSCULAR REEDUCATION: CPT

## 2022-09-23 PROCEDURE — 97140 MANUAL THERAPY 1/> REGIONS: CPT

## 2022-09-23 PROCEDURE — 97110 THERAPEUTIC EXERCISES: CPT

## 2022-09-23 NOTE — PROGRESS NOTES
[facility]   Physical Therapy Daily Treatment Note    Name: Leticia Grimes  Clinic Number: [id number]    Therapy Diagnosis:   Encounter Diagnoses   Name Primary?    Muscle wasting and atrophy, not elsewhere classified, left shoulder Yes    Aftercare following left shoulder joint replacement surgery     Presence of artificial shoulder joint, left        Physician: Tomas Taylor    Visit Date: 9/23/2022    Physician Orders: PT Eval and Treat  Medical Diagnosis from Referral: S/P reverse total shoulder arthroplasty, left; glenohumeral arthritis, left.   Evaluation Date: 5/11/2022  Authorization Period Expiration: 12/31/22  Plan of Care Expiration: 10/13/22  Progress Note Due: 10/13/22  Visit # / Visits authorized: 18/20 (+1 for prior authorization)   FOTO: 3/3      Precautions: Standard and Reverse TSA Precautions   Sx Date: 5/5/22      PTA Visit #: 0/5     Time In: 9:45 am   Time Out: 10:30 am  Total Billable Time: 44 minutes (denotes billable time)    SUBJECTIVE     Patient reports: she barely has any pain today. She is reporting improved function and that she can now reach into cabinet easier and reports that she is feeling stronger.    She was compliant with home exercise program.  Response to previous treatment: improved shoulder mobility.   Functional change: can reach into cabinet easier    Pain at Worst: 3/10     Location: left shoulder    OBJECTIVE     Objective Measures updated at progress report unless specified.     TREATMENT     Leticia received the treatments listed below:     MANUAL THERAPY to improve ROM and pain for (8) minutes including:  Manual Intervention 9/23/2022     Soft Tissue Mobilizations x Upper trapezius, supraspinatus,subscapularis, levator scapula    subscap release  x  supine    Active Release Technique    subscapularis   PROM   flexion, abduction, External Rotation, and Internal Rotation    Astym      Seated Thoracic Manipulation         THERAPEUTIC  "EXERCISES to develop strength, endurance and ROM for (28) minutes including:   Intervention 9/23/2022     UBE x  3' forward/3' backward   Overhead Pulleys: flexion and scaption   3 minutes each   Upper Trapezius Stretch    10"x5 - left    Rhomboid Stretch    10"x5   External Rotation with Wand - supine   10"x5   Flexion with wand     Extension with Wand - standing x  10"x5 arms ER/palms forward   Internal Rotation with Wand - standing   10"x5   Table Slides: flexion    table elevated, 10"x5   Shoulder Flexion - sidelying x  10x/2 set 1#   Shoulder abduction - sidelying x  10x/2 set 1#   ER in sidelying  x 10x/2 set 1#   Biceps Curls    5#, 3x10   Triceps Pushdown   10#, 3x10    [place] V x 30x each direction red band (green next time)        Re-assessment         NEUROMUSCULAR RE-EDUCATION to improve Proprioception and motor control for (8) minutes including:  Intervention 9/23/2022     Bilateral External Rotation    Red Band, 3x10    Barrel Hug   Red Band, 3x10    Lower Trap Shrug   20#, 2x10 (unable to tolerate 30 lbs)   Wall Push Ups   2x10   Ball on Wall: circles    clockwise/counter-cockwise, 1 minute each   Prone rows x 10x/2    Prone latissimus dorsi (I) x 10x/2   Prone W x 10x/2    Spidermans on wall x NO BAND - up, middle, lower; 5x (verbal and manual cues to complete)       PATIENT EDUCATION AND HOME EXERCISES     Home Exercises Provided and Patient Education Provided     Education provided: (included in therex) minutes  home exercise program - importance of strengthening and flexibility exercise's.    Written Home Exercises Provided: patient was instructed to continue with home exercise program.  Exercises were reviewed and Leticia was able to demonstrate them prior to the end of the session.  Leticia demonstrated good  understanding of the education provided. See EMR under Patient Instructions for exercises provided during therapy sessions.      ASSESSMENT     Patient is reporting improved functional " activity and overall decreasing pain levels, she remains limited in AROM post total shoulder.  She was able to progress with therapeutic exercise to increase scapular strength and end ROM strength.    Leticia is progressing well towards her goals.   Pt prognosis is Excellent.     Pt will continue to benefit from skilled outpatient physical therapy to address the deficits listed in the problem list box on initial evaluation, provide pt/family education and to maximize pt's level of independence in the home and community environment.     Pt's spiritual, cultural and educational needs considered and pt agreeable to plan of care and goals.    Anticipated Barriers for therapy: sedentary lifestyle and age    GOALS:    Reviewed: 9/23/2022    Short Term Goals:  4 weeks Progress       Patient will report decreased pain to <6/10 at worst on VAS to progress toward Prior Level of Function. Met   6/22/22   Patient will report improved function by a functional limitation score of <60 out of 100 on FOTO. Met  6/22/22   Patient will improve AROM to 50% of stated goals in order to progress towards Prior Level of Function.  Met  8/11/22   Patient will improve strength to 50% of stated goals in order to progress towards Prior Level of Function. Met  8/11/22      Long Term Goals:  8 weeks Progress      Patient will report decreased pain to <3/10 at worst on VAS to progress toward Prior Level of Function.   Progressing 9/13/22    Patient will report improved function by a functional limitation score of <30 out of 100 on FOTO.  MET  9/13/22   Patient will improve ROM and Functional Mobility to stated goals to return to Prior Level of Function.  Progressing 9/13/22   Patient will improve strength to stated goals in order to return to Prior Level of Function. Progressing 9/13/22           PLAN     Updated Certification Period: 9/13/22 to 10/13/22   Recommended Treatment Plan: 2 times per week for 4 weeks:  Electrical Stimulation PRN, Manual  Therapy, Moist Heat/ Ice, Neuromuscular Re-ed, Patient Education, Self Care, Therapeutic Activities, Therapeutic Exercise, and FDN/PRN    Monitor response to today's treatment session and progress as indicated.    Elizabeth Garcia, PT

## 2022-09-26 ENCOUNTER — CLINICAL SUPPORT (OUTPATIENT)
Dept: REHABILITATION | Facility: HOSPITAL | Age: 77
End: 2022-09-26
Payer: MEDICARE

## 2022-09-26 DIAGNOSIS — M62.512 MUSCLE WASTING AND ATROPHY, NOT ELSEWHERE CLASSIFIED, LEFT SHOULDER: Primary | ICD-10-CM

## 2022-09-26 DIAGNOSIS — Z96.612 PRESENCE OF ARTIFICIAL SHOULDER JOINT, LEFT: ICD-10-CM

## 2022-09-26 DIAGNOSIS — Z47.1 AFTERCARE FOLLOWING LEFT SHOULDER JOINT REPLACEMENT SURGERY: ICD-10-CM

## 2022-09-26 DIAGNOSIS — Z96.612 AFTERCARE FOLLOWING LEFT SHOULDER JOINT REPLACEMENT SURGERY: ICD-10-CM

## 2022-09-26 PROCEDURE — 97110 THERAPEUTIC EXERCISES: CPT

## 2022-09-26 PROCEDURE — 97112 NEUROMUSCULAR REEDUCATION: CPT

## 2022-09-26 NOTE — PROGRESS NOTES
[facility]   Physical Therapy Daily Treatment Note    Name: Leticia Grimes  Clinic Number: [id number]    Therapy Diagnosis:   Encounter Diagnoses   Name Primary?    Muscle wasting and atrophy, not elsewhere classified, left shoulder Yes    Aftercare following left shoulder joint replacement surgery     Presence of artificial shoulder joint, left          Physician: Tomas Taylor    Visit Date: 9/26/2022    Physician Orders: PT Eval and Treat  Medical Diagnosis from Referral: S/P reverse total shoulder arthroplasty, left; glenohumeral arthritis, left.   Evaluation Date: 5/11/2022  Authorization Period Expiration: 12/31/22  Plan of Care Expiration: 10/13/22  Progress Note Due: 10/13/22  Visit # / Visits authorized: 19/20 (+1 for prior authorization)   FOTO: 3/3      Precautions: Standard and Reverse TSA Precautions   Sx Date: 5/5/22      PTA Visit #: 0/5     Time In: 10:15 am   Time Out: 11:00 am  Total Billable Time: 41 minutes (denotes billable time)    SUBJECTIVE     Patient reports: she is doing good today, traveled to [place] this weekend without incident, she continues to feel that she is getting stronger.    She was compliant with home exercise program.  Response to previous treatment: improved shoulder mobility.   Functional change: can reach into cabinet easier    Pain at Worst: 2-3/10     Location: left shoulder    OBJECTIVE     Objective Measures updated at progress report unless specified.     TREATMENT     Leticia received the treatments listed below:     MANUAL THERAPY to improve ROM and pain for (5) minutes including:  Manual Intervention 9/26/2022     Soft Tissue Mobilizations x Upper trapezius, supraspinatus,subscapularis, levator scapula    subscap release  x  supine    Active Release Technique    subscapularis   PROM   flexion, abduction, External Rotation, and Internal Rotation    Astym      Seated Thoracic Manipulation         THERAPEUTIC EXERCISES to develop  "strength, endurance and ROM for (28) minutes including:     Intervention 9/26/2022     UBE x  3' forward/3' backward   Overhead Pulleys: flexion and scaption   3 minutes each   Upper Trapezius Stretch    10"x5 - left    Rhomboid Stretch    10"x5   External Rotation with Wand - supine   10"x5   Flexion with wand     Extension with Wand - standing x  10"x10 arms ER/palms forward   Internal Rotation with Wand - standing   10"x5   Table Slides: flexion    table elevated, 10"x5   Shoulder Flexion - sidelying x  10x/2 set 1#   Shoulder abduction - sidelying x  10x/2 set 1#   ER in sidelying  x 8x/2 set 2#   Biceps Curls    5#, 3x10   Triceps Pushdown   10#, 3x10    [place] V x 15x/ 2 sets each direction green band         Re-assessment         NEUROMUSCULAR RE-EDUCATION to improve Proprioception and motor control for (8) minutes including:  Intervention 9/26/2022     Bilateral External Rotation    Red Band, 3x10    Barrel Hug   Red Band, 3x10    Lower Trap Shrug   20#, 2x10 (unable to tolerate 30 lbs)   Wall Push Ups   2x10   Ball on Wall: circles    clockwise/counter-cockwise, 1 minute each   Prone rows x 10x/2    Prone latissimus dorsi (I) x 10x/2   Prone W x 10x/2    Spidermans on wall x NO BAND - up, middle, lower; 5x (verbal and manual cues to complete)   Standing wall slide with end range flexion lift x 5x bilateral        PATIENT EDUCATION AND HOME EXERCISES     Home Exercises Provided and Patient Education Provided     Education provided: (included in therex) minutes  home exercise program - importance of strengthening and flexibility exercise's.    Written Home Exercises Provided: patient was instructed to continue with home exercise program.  Exercises were reviewed and Leticia was able to demonstrate them prior to the end of the session.  Leticia demonstrated good  understanding of the education provided. See EMR under Patient Instructions for exercises provided during therapy sessions.      ASSESSMENT "         Leticia is progressing well towards her goals.   Pt prognosis is Excellent.     Pt will continue to benefit from skilled outpatient physical therapy to address the deficits listed in the problem list box on initial evaluation, provide pt/family education and to maximize pt's level of independence in the home and community environment.     Pt's spiritual, cultural and educational needs considered and pt agreeable to plan of care and goals.    Anticipated Barriers for therapy: sedentary lifestyle and age    GOALS:    Reviewed: 9/26/2022    Short Term Goals:  4 weeks Progress       Patient will report decreased pain to <6/10 at worst on VAS to progress toward Prior Level of Function. Met   6/22/22   Patient will report improved function by a functional limitation score of <60 out of 100 on FOTO. Met  6/22/22   Patient will improve AROM to 50% of stated goals in order to progress towards Prior Level of Function.  Met  8/11/22   Patient will improve strength to 50% of stated goals in order to progress towards Prior Level of Function. Met  8/11/22      Long Term Goals:  8 weeks Progress      Patient will report decreased pain to <3/10 at worst on VAS to progress toward Prior Level of Function.   Progressing 9/13/22    Patient will report improved function by a functional limitation score of <30 out of 100 on FOTO.  MET  9/13/22   Patient will improve ROM and Functional Mobility to stated goals to return to Prior Level of Function.  Progressing 9/13/22   Patient will improve strength to stated goals in order to return to Prior Level of Function. Progressing 9/13/22           PLAN     Updated Certification Period: 9/13/22 to 10/13/22   Recommended Treatment Plan: 2 times per week for 4 weeks:  Electrical Stimulation PRN, Manual Therapy, Moist Heat/ Ice, Neuromuscular Re-ed, Patient Education, Self Care, Therapeutic Activities, Therapeutic Exercise, and FDN/PRN    Monitor response to today's treatment session and  progress as indicated.    Elizabeth Garcia, PT

## 2022-09-29 ENCOUNTER — TELEPHONE (OUTPATIENT)
Dept: NEUROLOGY | Facility: CLINIC | Age: 77
End: 2022-09-29
Payer: MEDICARE

## 2022-09-29 ENCOUNTER — TELEPHONE (OUTPATIENT)
Dept: INTERNAL MEDICINE | Facility: CLINIC | Age: 77
End: 2022-09-29
Payer: MEDICARE

## 2022-09-29 ENCOUNTER — TELEPHONE (OUTPATIENT)
Dept: NEUROSURGERY | Facility: CLINIC | Age: 77
End: 2022-09-29
Payer: MEDICARE

## 2022-09-29 DIAGNOSIS — M53.3 CHRONIC LEFT SI JOINT PAIN: Primary | ICD-10-CM

## 2022-09-29 DIAGNOSIS — G89.29 CHRONIC LEFT SI JOINT PAIN: Primary | ICD-10-CM

## 2022-09-29 RX ORDER — TIZANIDINE 4 MG/1
4 TABLET ORAL EVERY 8 HOURS PRN
Qty: 20 TABLET | Refills: 0 | Status: SHIPPED | OUTPATIENT
Start: 2022-09-29 | End: 2022-10-20

## 2022-09-29 RX ORDER — PREDNISONE 20 MG/1
20 TABLET ORAL DAILY
Qty: 5 TABLET | Refills: 0 | Status: SHIPPED | OUTPATIENT
Start: 2022-09-29 | End: 2022-10-04

## 2022-09-29 NOTE — TELEPHONE ENCOUNTER
"Spoke to pt.  Pt states she was seen 09/08/2022 and is having a "sciatica flare up"  Pt is inquiring if we can prescribe more Zanaflex and Prednisone to address flareup and if not if there are alternative Rx or therapies we can consider.  Pt has an appointment with Neuro in November to address Neuropathy and is trying to manage pain until then.  "

## 2022-09-29 NOTE — TELEPHONE ENCOUNTER
----- Message from Nichole Alarcon sent at 9/29/2022 10:05 AM CDT -----  Contact: Patient  Leticiakwame Grimes would like a call back at [phone], in regards to consulting with nurse about questions she's having about the medication she was prescribed.

## 2022-09-29 NOTE — TELEPHONE ENCOUNTER
Spoke with pt pt states that shes not having any back pain she states that she's having leg pain pt was advised to f/u with her PCP so that he can evaluate her and order any necessary images so that he can place a referral to the correct dept so that the pt can be seen pt states that she will reach out to her PCP office so that they can get her in and have her evaluated.

## 2022-09-29 NOTE — TELEPHONE ENCOUNTER
----- Message from Va Causey MA sent at 9/29/2022  9:46 AM CDT -----  Contact: Patient    ----- Message -----  From: Ena Prabhakar  Sent: 9/29/2022   9:28 AM CDT  To: [facility] Staff    Type:  Same Day Appointment Request    Caller is requesting a same day appointment.  Caller declined first available appointment listed below.    Name of Caller:Leticia Grimes   When is the first available appointment?n/a  Symptoms:sciatica pain   Best Call Back Number:[phone]  Additional Information:

## 2022-09-29 NOTE — TELEPHONE ENCOUNTER
Spoke with patient regarding an upcoming appointment and to also inform her that I was forwarding her message regarding Sciatica Pain to the correct department.-BR

## 2022-09-30 ENCOUNTER — CLINICAL SUPPORT (OUTPATIENT)
Dept: REHABILITATION | Facility: HOSPITAL | Age: 77
End: 2022-09-30
Payer: MEDICARE

## 2022-09-30 DIAGNOSIS — M62.512 MUSCLE WASTING AND ATROPHY, NOT ELSEWHERE CLASSIFIED, LEFT SHOULDER: Primary | ICD-10-CM

## 2022-09-30 DIAGNOSIS — Z47.1 AFTERCARE FOLLOWING LEFT SHOULDER JOINT REPLACEMENT SURGERY: ICD-10-CM

## 2022-09-30 DIAGNOSIS — Z96.612 AFTERCARE FOLLOWING LEFT SHOULDER JOINT REPLACEMENT SURGERY: ICD-10-CM

## 2022-09-30 DIAGNOSIS — Z96.612 PRESENCE OF ARTIFICIAL SHOULDER JOINT, LEFT: ICD-10-CM

## 2022-09-30 PROCEDURE — 97112 NEUROMUSCULAR REEDUCATION: CPT

## 2022-09-30 PROCEDURE — 97110 THERAPEUTIC EXERCISES: CPT

## 2022-09-30 NOTE — PROGRESS NOTES
[facility]   Physical Therapy Daily Treatment Note    Name: Leticia Grimes  Clinic Number: [id number]    Therapy Diagnosis:   Encounter Diagnoses   Name Primary?    Muscle wasting and atrophy, not elsewhere classified, left shoulder Yes    Aftercare following left shoulder joint replacement surgery     Presence of artificial shoulder joint, left        Physician: Tomas Taylor    Visit Date: 9/30/2022    Physician Orders: PT Eval and Treat  Medical Diagnosis from Referral: S/P reverse total shoulder arthroplasty, left; glenohumeral arthritis, left.   Evaluation Date: 5/11/2022  Authorization Period Expiration: 12/31/22  Plan of Care Expiration: 10/13/22  Progress Note Due: 10/13/22  Visit # / Visits authorized: 20/20 (+1 for prior authorization)   FOTO: 3/3      Precautions: Standard and Reverse TSA Precautions   Sx Date: 5/5/22      PTA Visit #: 0/5     Time In: 9:00 am   Time Out: 9:45 am  Total Billable Time: 43 minutes (denotes billable time)    SUBJECTIVE     Patient reports: she is continuing to have some pain over her lateral arm at times, we discussed home exercise program and using arm but not for heavy activities overhead.    She was compliant with home exercise program.  Response to previous treatment: improved shoulder mobility.   Functional change: can reach into cabinet easier    Pain at Worst: 2-3/10     Location: left shoulder    OBJECTIVE     Objective Measures updated at progress report unless specified.     TREATMENT     Leticia received the treatments listed below:     MANUAL THERAPY to improve ROM and pain for (5) minutes including:  Manual Intervention 9/30/2022     Soft Tissue Mobilizations x Upper trapezius, supraspinatus,subscapularis, levator scapula    subscap release  x  supine    Active Release Technique    subscapularis   PROM   flexion, abduction, External Rotation, and Internal Rotation    Astym      Seated Thoracic Manipulation         THERAPEUTIC  "EXERCISES to develop strength, endurance and ROM for (28) minutes including:     Intervention 9/30/2022     UBE x  3' forward/3' backward   Overhead Pulleys: flexion and scaption   3 minutes each   Upper Trapezius Stretch    10"x5 - left    Rhomboid Stretch    10"x5   External Rotation with Wand - supine   10"x5   Flexion with wand     Extension with Wand - standing x  10"x10 arms ER/palms forward   Internal Rotation with Wand - standing   10"x5   Table Slides: flexion    table elevated, 10"x5   Shoulder Flexion - sidelying x  10x/2 set 1#   Shoulder abduction - sidelying x  10x/2 set 1#   ER in sidelying  x 10x/2 set 2#   Biceps Curls    5#, 3x10   Triceps Pushdown   10#, 3x10    Decherd V x 10x/ 3 sets each direction green band         Re-assessment         NEUROMUSCULAR RE-EDUCATION to improve Proprioception and motor control for (10) minutes including:  Intervention 9/30/2022     Bilateral External Rotation    Red Band, 3x10    Barrel Hug   Red Band, 3x10    Lower Trap Shrug   20#, 2x10 (unable to tolerate 30 lbs)   Wall Push Ups   2x10   Ball on Wall: circles    clockwise/counter-cockwise, 1 minute each   Prone rows x 10x/2    Prone latissimus dorsi (I) x 10x/2   Prone W x 10x/2    Spidermans on wall x NO BAND - up, middle, lower; 5x (verbal and manual cues to complete)   Standing wall slide with end range flexion lift x 5x bilateral        PATIENT EDUCATION AND HOME EXERCISES     Home Exercises Provided and Patient Education Provided     Education provided: (included in therex) minutes  home exercise program - importance of strengthening and flexibility exercise's.    Written Home Exercises Provided: patient was instructed to continue with home exercise program.  Exercises were reviewed and Leticia was able to demonstrate them prior to the end of the session.  Leticia demonstrated good  understanding of the education provided. See EMR under Patient Instructions for exercises provided during therapy " sessions.      ASSESSMENT     Patient continues to progress with strengthening but does require cues to avoid substitution at trunk with use of UE.  She reports some relief with manual today.  She remains with some limitation in use of left UE due to decreased ROM and strength but is tolerating all treatment well. Patient was encouraged to continue  with home exercise program participation.      Leticia is progressing well towards her goals.   Pt prognosis is Excellent.     Pt will continue to benefit from skilled outpatient physical therapy to address the deficits listed in the problem list box on initial evaluation, provide pt/family education and to maximize pt's level of independence in the home and community environment.     Pt's spiritual, cultural and educational needs considered and pt agreeable to plan of care and goals.    Anticipated Barriers for therapy: sedentary lifestyle and age    GOALS:    Reviewed: 9/30/2022    Short Term Goals:  4 weeks Progress       Patient will report decreased pain to <6/10 at worst on VAS to progress toward Prior Level of Function. Met   6/22/22   Patient will report improved function by a functional limitation score of <60 out of 100 on FOTO. Met  6/22/22   Patient will improve AROM to 50% of stated goals in order to progress towards Prior Level of Function.  Met  8/11/22   Patient will improve strength to 50% of stated goals in order to progress towards Prior Level of Function. Met  8/11/22      Long Term Goals:  8 weeks Progress      Patient will report decreased pain to <3/10 at worst on VAS to progress toward Prior Level of Function.   Progressing 9/13/22    Patient will report improved function by a functional limitation score of <30 out of 100 on FOTO.  MET  9/13/22   Patient will improve ROM and Functional Mobility to stated goals to return to Prior Level of Function.  Progressing 9/13/22   Patient will improve strength to stated goals in order to return to Prior Level  of Function. Progressing 9/13/22           PLAN     Updated Certification Period: 9/13/22 to 10/13/22   Recommended Treatment Plan: 2 times per week for 4 weeks:  Electrical Stimulation PRN, Manual Therapy, Moist Heat/ Ice, Neuromuscular Re-ed, Patient Education, Self Care, Therapeutic Activities, Therapeutic Exercise, and FDN/PRN    Monitor response to today's treatment session and progress as indicated.    Elizabeth Garcia, PT

## 2022-10-01 ENCOUNTER — NURSE TRIAGE (OUTPATIENT)
Dept: ADMINISTRATIVE | Facility: CLINIC | Age: 77
End: 2022-10-01
Payer: MEDICARE

## 2022-10-03 ENCOUNTER — CLINICAL SUPPORT (OUTPATIENT)
Dept: REHABILITATION | Facility: HOSPITAL | Age: 77
End: 2022-10-03
Payer: MEDICARE

## 2022-10-03 DIAGNOSIS — Z96.612 AFTERCARE FOLLOWING LEFT SHOULDER JOINT REPLACEMENT SURGERY: ICD-10-CM

## 2022-10-03 DIAGNOSIS — Z47.1 AFTERCARE FOLLOWING LEFT SHOULDER JOINT REPLACEMENT SURGERY: ICD-10-CM

## 2022-10-03 DIAGNOSIS — Z96.612 PRESENCE OF ARTIFICIAL SHOULDER JOINT, LEFT: ICD-10-CM

## 2022-10-03 DIAGNOSIS — M62.512 MUSCLE WASTING AND ATROPHY, NOT ELSEWHERE CLASSIFIED, LEFT SHOULDER: Primary | ICD-10-CM

## 2022-10-03 PROCEDURE — 97112 NEUROMUSCULAR REEDUCATION: CPT

## 2022-10-03 PROCEDURE — 97110 THERAPEUTIC EXERCISES: CPT

## 2022-10-03 NOTE — TELEPHONE ENCOUNTER
Agree with recommendations from RN.  
Pt calling with questions regarding interactions between tizanidine & tylenol, if ok to take tylenol while taking muscle relaxer for arthritis, was told not to take IBU& to take tylenol, so wanting to be certain its ok. Pt advised concurrent use of med increase risk for hepatotoxicity, would not advise taking more than one dose of tylenol 650mg daily until speaking with provider further. Pt vu.     Reason for Disposition   Caller has medicine question only, adult not sick, AND triager answers question    Protocols used: Medication Question Call-A-AH    
Yes

## 2022-10-03 NOTE — PROGRESS NOTES
[facility]   Physical Therapy Daily Treatment Note    Name: Leticia Grimes  Clinic Number: [id number]    Therapy Diagnosis:   Encounter Diagnoses   Name Primary?    Muscle wasting and atrophy, not elsewhere classified, left shoulder Yes    Aftercare following left shoulder joint replacement surgery     Presence of artificial shoulder joint, left        Physician: Tomas Taylor    Visit Date: 10/3/2022    Physician Orders: PT Eval and Treat  Medical Diagnosis from Referral: S/P reverse total shoulder arthroplasty, left; glenohumeral arthritis, left.   Evaluation Date: 5/11/2022  Authorization Period Expiration: 12/31/22  Plan of Care Expiration: 10/13/22  Progress Note Due: 10/13/22  Visit # / Visits authorized: 21/30 (+1 for prior authorization)   FOTO: 3/3      Precautions: Standard and Reverse TSA Precautions   Sx Date: 5/5/22      PTA Visit #: 0/5     Time In: 8:10 am   Time Out: 9:00 am  Total Billable Time: 43 minutes (denotes billable time)    SUBJECTIVE     Patient reports: she is continuing to feel better with her shoulder - she did try heat and codmans which did help with her stiffness in her shoulder.    She was compliant with home exercise program.  Response to previous treatment: improved shoulder mobility.   Functional change: can reach into cabinet easier    Pain at Worst: 0/10   at start of treatment session  Location: left shoulder    OBJECTIVE     Objective Measures updated at progress report unless specified.     TREATMENT     Leticia received the treatments listed below:     MANUAL THERAPY to improve ROM and pain for (5) minutes including:  Manual Intervention 10/3/2022     Soft Tissue Mobilizations x Upper trapezius, supraspinatus,subscapularis, levator scapula    subscap release  x  supine    Active Release Technique    subscapularis   PROM   flexion, abduction, External Rotation, and Internal Rotation    Astym      Seated Thoracic Manipulation    "      THERAPEUTIC EXERCISES to develop strength, endurance and ROM for (28) minutes including:     Intervention 10/3/2022     UBE x  3' forward/3' backward   Overhead Pulleys: flexion and scaption   3 minutes each   Upper Trapezius Stretch    10"x5 - left    Rhomboid Stretch    10"x5   External Rotation with Wand - supine   10"x5   Flexion with wand     Extension with Wand - standing x  10"x10 arms ER/palms forward   Internal Rotation with Wand - standing   10"x5   Table Slides: flexion    table elevated, 10"x5   Shoulder Flexion - sidelying x  10x/2 set 2#   Shoulder abduction - sidelying x  10x/2 set 2#   ER in sidelying  x 10x/2 set 2#   Biceps Curls  x  2# x10   Triceps Pushdown   10#, 3x10    Ajith V x 10x/ 3 sets each direction green band         Re-assessment         NEUROMUSCULAR RE-EDUCATION to improve Proprioception and motor control for (10) minutes including:  Intervention 10/3/2022     Bilateral External Rotation    Red Band, 3x10    Barrel Hug   Red Band, 3x10    Lower Trap Shrug   20#, 2x10 (unable to tolerate 30 lbs)   Wall Push Ups   2x10   Ball on Wall: circles    clockwise/counter-cockwise, 1 minute each   Prone rows x 10x/2    Prone latissimus dorsi (I) x 10x/2   Prone W x 10x/2    Spidermans on wall x NO BAND - up, middle, lower; 5x (verbal and manual cues to complete)   Standing wall slide with end range flexion lift x 5x bilateral        PATIENT EDUCATION AND HOME EXERCISES     Home Exercises Provided and Patient Education Provided     Education provided: (included in therex) minutes  home exercise program - importance of strengthening and flexibility exercise's.    Written Home Exercises Provided: patient was instructed to continue with home exercise program.  Exercises were reviewed and Leticia was able to demonstrate them prior to the end of the session.  Leticia demonstrated good  understanding of the education provided. See EMR under Patient Instructions for exercises provided during " therapy sessions.      ASSESSMENT     Patient with reports of decreased pain and       Leticia is progressing well towards her goals.   Pt prognosis is Excellent.     Pt will continue to benefit from skilled outpatient physical therapy to address the deficits listed in the problem list box on initial evaluation, provide pt/family education and to maximize pt's level of independence in the home and community environment.     Pt's spiritual, cultural and educational needs considered and pt agreeable to plan of care and goals.    Anticipated Barriers for therapy: sedentary lifestyle and age    GOALS:    Reviewed: 10/3/2022    Short Term Goals:  4 weeks Progress       Patient will report decreased pain to <6/10 at worst on VAS to progress toward Prior Level of Function. Met   6/22/22   Patient will report improved function by a functional limitation score of <60 out of 100 on FOTO. Met  6/22/22   Patient will improve AROM to 50% of stated goals in order to progress towards Prior Level of Function.  Met  8/11/22   Patient will improve strength to 50% of stated goals in order to progress towards Prior Level of Function. Met  8/11/22      Long Term Goals:  8 weeks Progress      Patient will report decreased pain to <3/10 at worst on VAS to progress toward Prior Level of Function.   Progressing 9/13/22    Patient will report improved function by a functional limitation score of <30 out of 100 on FOTO.  MET  9/13/22   Patient will improve ROM and Functional Mobility to stated goals to return to Prior Level of Function.  Progressing 9/13/22   Patient will improve strength to stated goals in order to return to Prior Level of Function. Progressing 9/13/22           PLAN     Updated Certification Period: 9/13/22 to 10/13/22   Recommended Treatment Plan: 2 times per week for 4 weeks:  Electrical Stimulation PRN, Manual Therapy, Moist Heat/ Ice, Neuromuscular Re-ed, Patient Education, Self Care, Therapeutic Activities, Therapeutic  Exercise, and FDN/PRN    Monitor response to today's treatment session and progress as indicated.    Elizabeth Garcia, PT

## 2022-10-04 ENCOUNTER — OFFICE VISIT (OUTPATIENT)
Dept: ORTHOPEDICS | Facility: CLINIC | Age: 77
End: 2022-10-04
Payer: MEDICARE

## 2022-10-04 VITALS — HEIGHT: 61 IN | BODY MASS INDEX: 30.02 KG/M2 | WEIGHT: 159 LBS

## 2022-10-04 DIAGNOSIS — Z96.612 S/P REVERSE TOTAL SHOULDER ARTHROPLASTY, LEFT: Primary | ICD-10-CM

## 2022-10-04 PROCEDURE — 99213 OFFICE O/P EST LOW 20 MIN: CPT | Mod: S$GLB,,, | Performed by: STUDENT IN AN ORGANIZED HEALTH CARE EDUCATION/TRAINING PROGRAM

## 2022-10-04 PROCEDURE — 99999 PR PBB SHADOW E&M-EST. PATIENT-LVL III: ICD-10-PCS | Mod: PBBFAC,,, | Performed by: STUDENT IN AN ORGANIZED HEALTH CARE EDUCATION/TRAINING PROGRAM

## 2022-10-04 PROCEDURE — 1125F PR PAIN SEVERITY QUANTIFIED, PAIN PRESENT: ICD-10-PCS | Mod: CPTII,S$GLB,, | Performed by: STUDENT IN AN ORGANIZED HEALTH CARE EDUCATION/TRAINING PROGRAM

## 2022-10-04 PROCEDURE — 1125F AMNT PAIN NOTED PAIN PRSNT: CPT | Mod: CPTII,S$GLB,, | Performed by: STUDENT IN AN ORGANIZED HEALTH CARE EDUCATION/TRAINING PROGRAM

## 2022-10-04 PROCEDURE — 99213 PR OFFICE/OUTPT VISIT, EST, LEVL III, 20-29 MIN: ICD-10-PCS | Mod: S$GLB,,, | Performed by: STUDENT IN AN ORGANIZED HEALTH CARE EDUCATION/TRAINING PROGRAM

## 2022-10-04 PROCEDURE — 1101F PR PT FALLS ASSESS DOC 0-1 FALLS W/OUT INJ PAST YR: ICD-10-PCS | Mod: CPTII,S$GLB,, | Performed by: STUDENT IN AN ORGANIZED HEALTH CARE EDUCATION/TRAINING PROGRAM

## 2022-10-04 PROCEDURE — 1159F MED LIST DOCD IN RCRD: CPT | Mod: CPTII,S$GLB,, | Performed by: STUDENT IN AN ORGANIZED HEALTH CARE EDUCATION/TRAINING PROGRAM

## 2022-10-04 PROCEDURE — 1160F RVW MEDS BY RX/DR IN RCRD: CPT | Mod: CPTII,S$GLB,, | Performed by: STUDENT IN AN ORGANIZED HEALTH CARE EDUCATION/TRAINING PROGRAM

## 2022-10-04 PROCEDURE — 1160F PR REVIEW ALL MEDS BY PRESCRIBER/CLIN PHARMACIST DOCUMENTED: ICD-10-PCS | Mod: CPTII,S$GLB,, | Performed by: STUDENT IN AN ORGANIZED HEALTH CARE EDUCATION/TRAINING PROGRAM

## 2022-10-04 PROCEDURE — 3288F PR FALLS RISK ASSESSMENT DOCUMENTED: ICD-10-PCS | Mod: CPTII,S$GLB,, | Performed by: STUDENT IN AN ORGANIZED HEALTH CARE EDUCATION/TRAINING PROGRAM

## 2022-10-04 PROCEDURE — 3288F FALL RISK ASSESSMENT DOCD: CPT | Mod: CPTII,S$GLB,, | Performed by: STUDENT IN AN ORGANIZED HEALTH CARE EDUCATION/TRAINING PROGRAM

## 2022-10-04 PROCEDURE — 1101F PT FALLS ASSESS-DOCD LE1/YR: CPT | Mod: CPTII,S$GLB,, | Performed by: STUDENT IN AN ORGANIZED HEALTH CARE EDUCATION/TRAINING PROGRAM

## 2022-10-04 PROCEDURE — 1159F PR MEDICATION LIST DOCUMENTED IN MEDICAL RECORD: ICD-10-PCS | Mod: CPTII,S$GLB,, | Performed by: STUDENT IN AN ORGANIZED HEALTH CARE EDUCATION/TRAINING PROGRAM

## 2022-10-04 PROCEDURE — 99999 PR PBB SHADOW E&M-EST. PATIENT-LVL III: CPT | Mod: PBBFAC,,, | Performed by: STUDENT IN AN ORGANIZED HEALTH CARE EDUCATION/TRAINING PROGRAM

## 2022-10-04 NOTE — PROGRESS NOTES
Orthopaedic Follow-Up Visit    Last Appointment: 22  Diagnosis: S/p Left Reverse Total Shoulder Arthroplasty  Prior Procedure: L rTSA 22    Leticia Grimes is a 77 y.o. female who is here for f/u evaluation of her left shoulder. She is 5 months s/p left r TSA. The patient was last seen here by me on 22 at which point we decided to have her continue with PT to try and regain more of her range of motion. The patient returns today reporting that the symptoms are gradually improving. She is able to perform ADL's including hygeine.    To review her history, Leticia Grimes is a 77 y.o. female who is now approximately 5 months s/p left reverse total shoulder arthoplasty.    Patient's medications, allergies, past medical, surgical, social and family histories were reviewed and updated as appropriate.    Review of Systems   All systems reviewed were negative.  Specifically, the patient denies fever, chills, weight loss, chest pain, shortness of breath, or dyspnea on exertion.      Past Medical History:   Diagnosis Date    Adjustment insomnia 2020    Anxiety 2020    GERD (gastroesophageal reflux disease)     History of sudden cardiac arrest successfully resuscitated 2020    Hypertension     ICD (implantable cardioverter-defibrillator) in place 2020    Left arm swelling 9/3/2020    Mild vitamin D deficiency 2013    Osteopenia 2020    Vitamin D deficiency disease        Past Surgical History:   Procedure Laterality Date     SECTION, CLASSIC      HYSTERECTOMY      INJECTION OF JOINT Left 2021    Procedure: Left shoulder Joint injection;  Surgeon: Main Mcgraw MD;  Location: [facility];  Service: Pain Management;  Laterality: Left;    INSERTION OF BIVENTRICULAR IMPLANTABLE CARDIOVERTER-DEFIBRILLATOR (ICD)      REVERSE TOTAL SHOULDER ARTHROPLASTY Left 2022    Procedure: ARTHROPLASTY, SHOULDER, TOTAL, REVERSE;  Surgeon: Tomas Taylor MD;  Location:  [facility] OR;  Service: Orthopedics;  Laterality: Left;    TUBAL LIGATION         Patient's Medications   New Prescriptions    No medications on file   Previous Medications    ACETAMINOPHEN (TYLENOL) 500 MG TABLET    Take 2 tablets (1,000 mg total) by mouth every 8 (eight) hours as needed for Pain.    ALPHA LIPOIC ACID 200 MG CAP    Take 1 capsule by mouth once daily.    AMLODIPINE (NORVASC) 5 MG TABLET    Take 1 tablet (5 mg total) by mouth once daily.    ASPIRIN (ECOTRIN) 81 MG EC TABLET    Take 1 tablet (81 mg total) by mouth 2 (two) times a day. for 14 days    CARVEDILOL (COREG) 12.5 MG TABLET    Take 1 tablet (12.5 mg total) by mouth 2 (two) times daily with meals.    CLONIDINE (CATAPRES) 0.1 MG TABLET    Take 0.1 mg by mouth as needed. If SBP > 200 mmHg    LISINOPRIL (PRINIVIL,ZESTRIL) 20 MG TABLET    Take 1 tablet (20 mg total) by mouth once daily.    MV-MN/FOLIC/LUTEIN/HERBAL 293 (ALIVE WOMEN'S 50 PLUS ORAL)    Take 1 tablet by mouth once daily.    OMEGA 3-DHA-EPA-FISH OIL (FISH OIL) 1,000 MG (120 MG-180 MG) CAP    Take 1 capsule by mouth 2 (two) times daily with meals.    PREDNISONE (DELTASONE) 20 MG TABLET    Take 1 tablet (20 mg total) by mouth once daily. for 5 days    TIZANIDINE (ZANAFLEX) 4 MG TABLET    Take 1 tablet (4 mg total) by mouth every 8 (eight) hours as needed (muscle spasm).   Modified Medications    No medications on file   Discontinued Medications    No medications on file       Family History   Problem Relation Age of Onset    Hypertension Mother     Arthritis Mother     Diabetes Father     Heart disease Father     Cancer Sister        Review of patient's allergies indicates:   Allergen Reactions    Codeine     Morphine Other (See Comments)         Objective:      Physical Exam  Patient is alert and oriented, no distress. Skin is intact. Neuro is normal with no focal motor or sensory findings.    Cervical exam is unremarkable. Intact cervical ROM. Negative Spurling's test    Physical  Exam:  RIGHT    LEFT    Upright ROM:  Forward Elevation 180    110  Abduction  120    90  ER (at side)  80    40  IR   T8    Back pocket    Active Supine ROM:  Forward Elevation 180    160  Abduction  120    110  ER (at side)  80    50    Strength:   Supraspinatus  5/5    4+/5  Infraspinatus  5/5    4+/5  Subscap / IR  5/5    5/5       Imaging:    XR Results:  Results for orders placed during the hospital encounter of 06/14/22    X-Ray Shoulder 2 or More Views Left    Narrative  EXAMINATION:  XR SHOULDER COMPLETE 2 OR MORE VIEWS LEFT    CLINICAL HISTORY:  Presence of left artificial shoulder joint    TECHNIQUE:  Two or three views of the left shoulder were performed.    COMPARISON:  05/16/2022    FINDINGS:  Prior shoulder arthroplasty again noted.  Hardware is unchanged in appearance with no definite evidence of failure or loosening.  No acute fractures or dislocations visualized.  There is mild AC joint arthrosis.  Cardiac pacing device again noted.    Impression  As above.  No acute findings.      Electronically signed by: Jose Pimentel MD  Date:    06/14/2022  Time:    12:29        Physician read: I agree with the above impression.    Assessment/Plan:   Leticia Grimes is a 77 y.o. female s/p Left Reverse Total Shoulder Arthroplasty     Plan:    She continues to make progress with her range of motion and function. I reassured her that she is still only 5 months out from surgery and I expect her to continue to improve range of motion.  Discussed continuing to work on strengthening to further progress her range of motion.   Follow up in 6 months at 1 year post-op (around 5/5/23) with XR.           Tomas Taylor MD    I, Orville Man, acted as a scribe for Tomas Taylor MD for the duration of this office visit.

## 2022-10-05 ENCOUNTER — TELEPHONE (OUTPATIENT)
Dept: ORTHOPEDICS | Facility: HOSPITAL | Age: 77
End: 2022-10-05
Payer: MEDICARE

## 2022-10-05 ENCOUNTER — NURSE TRIAGE (OUTPATIENT)
Dept: ADMINISTRATIVE | Facility: CLINIC | Age: 77
End: 2022-10-05
Payer: MEDICARE

## 2022-10-05 ENCOUNTER — CLINICAL SUPPORT (OUTPATIENT)
Dept: REHABILITATION | Facility: HOSPITAL | Age: 77
End: 2022-10-05
Payer: MEDICARE

## 2022-10-05 DIAGNOSIS — M62.512 MUSCLE WASTING AND ATROPHY, NOT ELSEWHERE CLASSIFIED, LEFT SHOULDER: Primary | ICD-10-CM

## 2022-10-05 DIAGNOSIS — Z96.612 AFTERCARE FOLLOWING LEFT SHOULDER JOINT REPLACEMENT SURGERY: ICD-10-CM

## 2022-10-05 DIAGNOSIS — Z96.612 PRESENCE OF ARTIFICIAL SHOULDER JOINT, LEFT: ICD-10-CM

## 2022-10-05 DIAGNOSIS — Z47.1 AFTERCARE FOLLOWING LEFT SHOULDER JOINT REPLACEMENT SURGERY: ICD-10-CM

## 2022-10-05 PROCEDURE — 97112 NEUROMUSCULAR REEDUCATION: CPT | Mod: KX

## 2022-10-05 PROCEDURE — 97110 THERAPEUTIC EXERCISES: CPT | Mod: KX

## 2022-10-05 NOTE — TELEPHONE ENCOUNTER
"Pt stated she had left shoulder sx in May. Pt now going to PT and stated that she is having left shoulder pain down to hand 8/10. Pt seen by Dr. Taylor on yesterday for pain. Pt asking if she can take Tramadol 50 mg that she has left from procedure. Dr. Brayan Stanley notified; Okay to take one tramadol 50 mg and one tylenol 325 mg at the same time for pain and can repeat later tonight if needed. Also, instructed pt to use arm sling per . Pt to f/u in am with provider. Verbalized understanding. Encounter routed to provider.     Reason for Disposition   [1] SEVERE pain AND [2] not improved 2 hours after pain medicine    Additional Information   Negative: Passed out (i.e., lost consciousness, collapsed and was not responding)   Negative: Shock suspected (e.g., cold/pale/clammy skin, too weak to stand, low BP, rapid pulse)   Negative: [1] Similar pain previously AND [2] it was from "heart attack"   Negative: [1] Similar pain previously AND [2] it was from "angina" AND [3] not relieved by nitroglycerin   Negative: Sounds like a life-threatening emergency to the triager   Negative: Difficulty breathing or unusual sweating (e.g., sweating without exertion)   Negative: [1] Pain lasting > 5 minutes AND [2] pain also present in chest  (Exception: pain is clearly made worse by movement)   Negative: [1] Age > 40 AND [2] no obvious cause AND [3] pain even when not moving the arm  (Exception: pain is clearly made worse by moving arm or bending neck)    Protocols used: Shoulder Pain-A-AH    "

## 2022-10-05 NOTE — TELEPHONE ENCOUNTER
The patient called through the nurse call line regarding left shoulder and arm pain.  Had surgery with Dr. Taylor for the left shoulder in May.  Has been going to therapy.  Today developed radiating pain from the shoulder down to her hand.  She wanted to know if she could take tramadol that she had left over from surgery.      A told the nurse to have her take 1 tramadol with 1 500 mg Tylenol now and then again during the night if needed and check with Dr. Taylor's nurse tomorrow

## 2022-10-05 NOTE — PROGRESS NOTES
[facility]   Physical Therapy Daily Treatment Note    Name: Leticia Grimes  Clinic Number: [id number]    Therapy Diagnosis:   Encounter Diagnoses   Name Primary?    Muscle wasting and atrophy, not elsewhere classified, left shoulder Yes    Aftercare following left shoulder joint replacement surgery     Presence of artificial shoulder joint, left      Physician: Tomas Taylor  Visit Date: 10/5/2022  Physician Orders: PT Eval and Treat  Medical Diagnosis from Referral: S/P reverse total shoulder arthroplasty, left; glenohumeral arthritis, left.   Evaluation Date: 5/11/2022  Authorization Period Expiration: 12/31/22  Plan of Care Expiration: 10/13/22  Progress Note Due: 10/13/22  Visit # / Visits authorized: 21/30 (+1 for prior authorization)   FOTO: 3/3      Precautions: Standard and Reverse TSA Precautions   Sx Date: 5/5/22      PTA Visit #: 0/5     Time In: 0815   Time Out: 0900   Total Billable Time: 45 minutes (denotes billable time)    SUBJECTIVE     Patient reports: she is continuing to feel better with her shoulder. Been experiencing some discomfort secondary to weather change.     She was compliant with home exercise program.  Response to previous treatment: improved shoulder mobility.   Functional change: can reach into cabinet easier    Pain at Worst: 0/10   at start of treatment session  Location: left shoulder    OBJECTIVE     Objective Measures updated at progress report unless specified.     TREATMENT     Leticia received the treatments listed below:     MANUAL THERAPY to improve ROM and pain for (10) minutes including:  Manual Intervention 10/5/2022     Soft Tissue Mobilizations x Upper trapezius, supraspinatus,subscapularis, levator scapula    subscap release  x  supine    Active Release Technique    subscapularis   PROM   flexion, abduction, External Rotation, and Internal Rotation    Astym      Seated Thoracic Manipulation         THERAPEUTIC EXERCISES to develop  "strength, endurance and ROM for (25) minutes including:     Intervention 10/5/2022     UBE x  3' forward/3' backward   Overhead Pulleys: flexion and scaption   3 minutes each   Upper Trapezius Stretch    10"x5 - left    Rhomboid Stretch    10"x5   External Rotation with Wand - supine   10"x5   Flexion with wand     Extension with Wand - standing x  10"x10 arms ER/palms forward   Internal Rotation with Wand - standing   10"x5   Table Slides: flexion    table elevated, 10"x5   Shoulder Flexion - sidelying x  10x/2 set 2#   Shoulder abduction - sidelying x  10x/2 set 2#   ER in sidelying  x 10x/2 set 2#   Biceps Curls  x  2# x10   Triceps Pushdown   10#, 3x10    Range V x 10x/ 3 sets each direction green band         Re-assessment         NEUROMUSCULAR RE-EDUCATION to improve Proprioception and motor control for (10) minutes including:  Intervention 10/5/2022     Bilateral External Rotation    Red Band, 3x10    Barrel Hug   Red Band, 3x10    Lower Trap Shrug   20#, 2x10 (unable to tolerate 30 lbs)   Wall Push Ups   2x10   Ball on Wall: circles  x  clockwise/counter-cockwise, 1 minute each   Prone rows  10x/2    Prone latissimus dorsi (I)  10x/2   Prone W  10x/2    Spidermans on wall x NO BAND - up, middle, lower; 5x (verbal and manual cues to complete)   Standing wall slide with end range flexion lift x 5x bilateral        PATIENT EDUCATION AND HOME EXERCISES     Home Exercises Provided and Patient Education Provided     Education provided: (included in therex) minutes  home exercise program - importance of strengthening and flexibility exercise's.    Written Home Exercises Provided: patient was instructed to continue with home exercise program.  Exercises were reviewed and Leticia was able to demonstrate them prior to the end of the session.  Leticia demonstrated good  understanding of the education provided. See EMR under Patient Instructions for exercises provided during therapy sessions.      ASSESSMENT     Leticia " Caity Grimes tolerated PT session well with minimal  complaints of pain or discomfort, secondary to poor motor control and decrease muscle strength. Objective findings are improving with measurements of ROM and functional mobility.  Therapy exercises were reviewed by revisiting exercises given from previous home exercise program while adding no new exercises.  Handouts were issued during today's visit. Leticia demonstrated good understanding of new exercises and will continue to progress at home until next follow-up.       Leticia is progressing well towards her goals.   Pt prognosis is Excellent.     Pt will continue to benefit from skilled outpatient physical therapy to address the deficits listed in the problem list box on initial evaluation, provide pt/family education and to maximize pt's level of independence in the home and community environment.     Pt's spiritual, cultural and educational needs considered and pt agreeable to plan of care and goals.    Anticipated Barriers for therapy: sedentary lifestyle and age    GOALS:    Reviewed: 10/5/2022    Short Term Goals:  4 weeks Progress       Patient will report decreased pain to <6/10 at worst on VAS to progress toward Prior Level of Function. Met   6/22/22   Patient will report improved function by a functional limitation score of <60 out of 100 on FOTO. Met  6/22/22   Patient will improve AROM to 50% of stated goals in order to progress towards Prior Level of Function.  Met  8/11/22   Patient will improve strength to 50% of stated goals in order to progress towards Prior Level of Function. Met  8/11/22      Long Term Goals:  8 weeks Progress      Patient will report decreased pain to <3/10 at worst on VAS to progress toward Prior Level of Function.   Progressing 9/13/22    Patient will report improved function by a functional limitation score of <30 out of 100 on FOTO.  MET  9/13/22   Patient will improve ROM and Functional Mobility to stated goals to return to  Prior Level of Function.  Progressing 9/13/22   Patient will improve strength to stated goals in order to return to Prior Level of Function. Progressing 9/13/22           PLAN     Updated Certification Period: 9/13/22 to 10/13/22   Recommended Treatment Plan: 2 times per week for 4 weeks:  Electrical Stimulation PRN, Manual Therapy, Moist Heat/ Ice, Neuromuscular Re-ed, Patient Education, Self Care, Therapeutic Activities, Therapeutic Exercise, and FDN/PRN    Monitor response to today's treatment session and progress as indicated.    Nick Da Silva, PT DPT

## 2022-10-06 ENCOUNTER — OFFICE VISIT (OUTPATIENT)
Dept: INTERNAL MEDICINE | Facility: CLINIC | Age: 77
End: 2022-10-06
Payer: MEDICARE

## 2022-10-06 DIAGNOSIS — M54.42 CHRONIC BILATERAL LOW BACK PAIN WITH LEFT-SIDED SCIATICA: Primary | ICD-10-CM

## 2022-10-06 DIAGNOSIS — G89.29 CHRONIC LEFT HIP PAIN: ICD-10-CM

## 2022-10-06 DIAGNOSIS — G89.29 CHRONIC BILATERAL LOW BACK PAIN WITH LEFT-SIDED SCIATICA: Primary | ICD-10-CM

## 2022-10-06 DIAGNOSIS — M25.552 CHRONIC LEFT HIP PAIN: ICD-10-CM

## 2022-10-06 PROCEDURE — 1160F PR REVIEW ALL MEDS BY PRESCRIBER/CLIN PHARMACIST DOCUMENTED: ICD-10-PCS | Mod: CPTII,95,, | Performed by: FAMILY MEDICINE

## 2022-10-06 PROCEDURE — 1160F RVW MEDS BY RX/DR IN RCRD: CPT | Mod: CPTII,95,, | Performed by: FAMILY MEDICINE

## 2022-10-06 PROCEDURE — 1159F MED LIST DOCD IN RCRD: CPT | Mod: CPTII,95,, | Performed by: FAMILY MEDICINE

## 2022-10-06 PROCEDURE — 99214 OFFICE O/P EST MOD 30 MIN: CPT | Mod: 95,,, | Performed by: FAMILY MEDICINE

## 2022-10-06 PROCEDURE — 1159F PR MEDICATION LIST DOCUMENTED IN MEDICAL RECORD: ICD-10-PCS | Mod: CPTII,95,, | Performed by: FAMILY MEDICINE

## 2022-10-06 PROCEDURE — 99214 PR OFFICE/OUTPT VISIT, EST, LEVL IV, 30-39 MIN: ICD-10-PCS | Mod: 95,,, | Performed by: FAMILY MEDICINE

## 2022-10-06 RX ORDER — GABAPENTIN 400 MG/1
400 CAPSULE ORAL NIGHTLY
Qty: 30 CAPSULE | Refills: 0 | Status: SHIPPED | OUTPATIENT
Start: 2022-10-06 | End: 2022-10-20 | Stop reason: SDUPTHER

## 2022-10-06 NOTE — PROGRESS NOTES
Subjective:   Patient ID: Leticia Grimes is a 77 y.o. female.  Chief Complaint:  No chief complaint on file.    The patient location is: Home  The chief complaint leading to consultation is:  Low back pain, hip pain, and sciatica    Visit type: audio only    Face to Face time with patient: 0 minutes  30 minutes of total time spent on the encounter, which includes face to face time and non-face to face time preparing to see the patient (eg, review of tests), Obtaining and/or reviewing separately obtained history, Documenting clinical information in the electronic or other health record, Independently interpreting results (not separately reported) and communicating results to the patient/family/caregiver, or Care coordination (not separately reported).     Each patient to whom he or she provides medical services by telemedicine is:  (1) informed of the relationship between the physician and patient and the respective role of any other health care provider with respect to management of the patient; and (2) notified that he or she may decline to receive medical services by telemedicine and may withdraw from such care at any time.    Patient scheduled visit to discuss persistent low back pain, left hip pain, and left sciatica   Reported went see another provider for this recently and referred to specialist in November 2022   However in review of chart, some confusion over the visit   Show she was sent to Neurology for weakness, gait disturbance, imbalance, neuropathy, and carpal tunnel syndrome   Back pain was diagnosed as sacroiliac related and treated with oral steroids and muscle relaxants  States steroids helped initially, but now pain has returned  Zanaflex no help   Multiple visits in past for mild intermittent low back pain, but no imaging on file     Back Pain  This is a recurrent problem. The current episode started more than 1 month ago. The problem occurs intermittently. The problem is unchanged. The pain is  present in the lumbar spine. The quality of the pain is described as burning, shooting and stabbing. Radiates to: left buttock, left hip, left leg. The pain is at a severity of 7/10. The pain is moderate. The pain is Worse during the night. The symptoms are aggravated by lying down. Stiffness is present All day. Associated symptoms include leg pain, numbness, paresthesias, tingling and weakness. Pertinent negatives include no abdominal pain, bladder incontinence, bowel incontinence, chest pain, dysuria, fever, headaches, paresis, pelvic pain, perianal numbness or weight loss. Risk factors include obesity, menopause, poor posture, lack of exercise and sedentary lifestyle. She has tried bed rest, muscle relaxant and NSAIDs (oral steroids) for the symptoms. The treatment provided mild relief.       Review of Systems   Constitutional:  Negative for chills, fatigue, fever and weight loss.   Cardiovascular:  Negative for chest pain, palpitations and leg swelling.   Gastrointestinal:  Negative for abdominal pain, bowel incontinence, constipation, diarrhea, nausea and vomiting.   Genitourinary:  Negative for bladder incontinence, decreased urine volume, difficulty urinating, dysuria, flank pain, frequency, hematuria, pelvic pain and urgency.   Musculoskeletal:  Positive for arthralgias, back pain and myalgias. Negative for gait problem, joint swelling, neck pain and neck stiffness.   Skin:  Negative for rash.   Neurological:  Positive for tingling, weakness, numbness and paresthesias. Negative for dizziness, tremors, syncope, light-headedness and headaches.   Psychiatric/Behavioral:  Negative for sleep disturbance. The patient is not nervous/anxious.      Objective:     Assessment:       ICD-10-CM ICD-9-CM   1. Chronic bilateral low back pain with left-sided sciatica  M54.42 724.2    G89.29 724.3     338.29   2. Chronic left hip pain  M25.552 719.45    G89.29 338.29     Plan:   Chronic bilateral low back pain with  left-sided sciatica  Chronic left hip pain  -     gabapentin (NEURONTIN) 400 MG capsule; Take 1 capsule (400 mg total) by mouth every evening.  Dispense: 30 capsule; Refill: 0  -     X-Ray Lumbar Spine Ap And Lateral; Future; Expected date: 10/06/2022  -     X-Ray Hips Bilateral 2 View Incl AP Pelvis; Future; Expected date: 10/06/2022    Discussed unable to adequately assessed patient for her chronic low back pain issues without a face-to-face visit   Trial of gabapentin 400 mg nightly for sciatica and neuropathic pain relief  Can discontinue Zanaflex   Schedule face-to-face visit   X-rays of hip and lumbar spine ordered for that today   Additional evaluation and treatment will be recommended after review of x-rays and physical exam   Patient expresses agreement understanding to the above plan

## 2022-10-07 ENCOUNTER — PATIENT MESSAGE (OUTPATIENT)
Dept: INTERNAL MEDICINE | Facility: CLINIC | Age: 77
End: 2022-10-07
Payer: MEDICARE

## 2022-10-11 ENCOUNTER — CLINICAL SUPPORT (OUTPATIENT)
Dept: REHABILITATION | Facility: HOSPITAL | Age: 77
End: 2022-10-11
Payer: MEDICARE

## 2022-10-11 DIAGNOSIS — Z96.612 AFTERCARE FOLLOWING LEFT SHOULDER JOINT REPLACEMENT SURGERY: ICD-10-CM

## 2022-10-11 DIAGNOSIS — M62.512 MUSCLE WASTING AND ATROPHY, NOT ELSEWHERE CLASSIFIED, LEFT SHOULDER: Primary | ICD-10-CM

## 2022-10-11 DIAGNOSIS — Z96.612 PRESENCE OF ARTIFICIAL SHOULDER JOINT, LEFT: ICD-10-CM

## 2022-10-11 DIAGNOSIS — Z47.1 AFTERCARE FOLLOWING LEFT SHOULDER JOINT REPLACEMENT SURGERY: ICD-10-CM

## 2022-10-11 PROCEDURE — 97112 NEUROMUSCULAR REEDUCATION: CPT

## 2022-10-11 PROCEDURE — 97110 THERAPEUTIC EXERCISES: CPT

## 2022-10-11 NOTE — PROGRESS NOTES
[facility]   Physical Therapy Daily Treatment Note    Name: Leticia Grimes  Clinic Number: [id number]    Therapy Diagnosis:   Encounter Diagnoses   Name Primary?    Muscle wasting and atrophy, not elsewhere classified, left shoulder Yes    Aftercare following left shoulder joint replacement surgery     Presence of artificial shoulder joint, left        Physician: Tomas Taylor  Visit Date: 10/11/2022  Physician Orders: PT Eval and Treat  Medical Diagnosis from Referral: S/P reverse total shoulder arthroplasty, left; glenohumeral arthritis, left.   Evaluation Date: 5/11/2022  Authorization Period Expiration: 12/31/22  Plan of Care Expiration: 10/13/22  Progress Note Due: 10/13/22  Visit # / Visits authorized: 21/30 (+1 for prior authorization)   FOTO: 3/3      Precautions: Standard and Reverse TSA Precautions   Sx Date: 5/5/22      PTA Visit #: 0/5     Time In: 8:56 am   Time Out: 9:40 am  Total Billable Time: 42 minutes (denotes billable time)    SUBJECTIVE     Patient reports: she is feeling good with shoulder - fatigue but no pain with exercise's.    She was compliant with home exercise program.  Response to previous treatment: improved shoulder mobility.   Functional change: can reach into cabinet easier    Pain at Worst: 0/10   at start of treatment session  Location: left shoulder    OBJECTIVE     Objective Measures updated at progress report unless specified.     TREATMENT     Leticia received the treatments listed below:     MANUAL THERAPY to improve ROM and pain for 8 minutes including:  Manual Intervention 10/11/2022     Soft Tissue Mobilizations x Upper trapezius, supraspinatus,subscapularis, levator scapula    subscap release  x  supine    Active Release Technique    subscapularis   PROM   flexion, abduction, External Rotation, and Internal Rotation    Astym      Seated Thoracic Manipulation         THERAPEUTIC EXERCISES to develop strength, endurance and ROM for 26  "minutes including:     Intervention 10/11/2022     UBE x  3' forward/3' backward   Overhead Pulleys: flexion and scaption   3 minutes each   Upper Trapezius Stretch    10"x5 - left    Rhomboid Stretch    10"x5   External Rotation with Wand - supine   10"x5   Flexion with wand     Extension with Wand - standing x  10"x10 arms ER/palms forward   Internal Rotation with Wand - standing   10"x5   Table Slides: flexion    table elevated, 10"x5   Shoulder Flexion - sidelying x  10x/2 set 2#   Shoulder abduction - sidelying x  10x/2 set 2#   ER in sidelying  x 10x/2 set 2#   Biceps Curls  x  4# x10   Triceps Pushdown   10#, 3x10    Ajith V x 10x/ 2 sets each direction green band         Re-assessment         NEUROMUSCULAR RE-EDUCATION to improve Proprioception and motor control for 8 minutes including:  Intervention 10/11/2022     Bilateral External Rotation    Red Band, 3x10    Barrel Hug   Red Band, 3x10    Lower Trap Shrug   20#, 2x10 (unable to tolerate 30 lbs)   Wall Push Ups   2x10   Ball on Wall: circles  x  clockwise/counter-cockwise, 1 minute each   Prone rows x 10x/2    Prone latissimus dorsi (I) x 10x/2   Prone W x 10x/2    Spidermans on wall x yellow BAND - up, middle, lower; 5x alternating sides   (verbal and manual cues to complete)   Standing wall slide with end range flexion lift x 5x bilateral        PATIENT EDUCATION AND HOME EXERCISES     Home Exercises Provided and Patient Education Provided     Education provided: (included in therex) minutes  home exercise program - importance of strengthening and flexibility exercise's.    Written Home Exercises Provided: patient was instructed to continue with home exercise program.  Exercises were reviewed and Leticia was able to demonstrate them prior to the end of the session.  Leticia demonstrated good  understanding of the education provided. See EMR under Patient Instructions for exercises provided during therapy sessions.      ASSESSMENT     Patient tolerated " all treatment well and was able to progress with strengthening and increased resistance with wall exercise's.  She is progressing well with all therapeutic exercise and neuromuscular re-education and was encouraged to continue with home exercise program.    Leticia is progressing well towards her goals.   Pt prognosis is Excellent.     Pt will continue to benefit from skilled outpatient physical therapy to address the deficits listed in the problem list box on initial evaluation, provide pt/family education and to maximize pt's level of independence in the home and community environment.     Pt's spiritual, cultural and educational needs considered and pt agreeable to plan of care and goals.    Anticipated Barriers for therapy: sedentary lifestyle and age    GOALS:    Reviewed: 10/11/2022    Short Term Goals:  4 weeks Progress       Patient will report decreased pain to <6/10 at worst on VAS to progress toward Prior Level of Function. Met   6/22/22   Patient will report improved function by a functional limitation score of <60 out of 100 on FOTO. Met  6/22/22   Patient will improve AROM to 50% of stated goals in order to progress towards Prior Level of Function.  Met  8/11/22   Patient will improve strength to 50% of stated goals in order to progress towards Prior Level of Function. Met  8/11/22      Long Term Goals:  8 weeks Progress      Patient will report decreased pain to <3/10 at worst on VAS to progress toward Prior Level of Function.   Progressing 9/13/22    Patient will report improved function by a functional limitation score of <30 out of 100 on FOTO.  MET  9/13/22   Patient will improve ROM and Functional Mobility to stated goals to return to Prior Level of Function.  Progressing 9/13/22   Patient will improve strength to stated goals in order to return to Prior Level of Function. Progressing 9/13/22           PLAN     Updated Certification Period: 9/13/22 to 10/13/22   Recommended Treatment Plan: 2 times  per week for 4 weeks:  Electrical Stimulation PRN, Manual Therapy, Moist Heat/ Ice, Neuromuscular Re-ed, Patient Education, Self Care, Therapeutic Activities, Therapeutic Exercise, and FDN/PRN    Monitor response to today's treatment session anticipate DC at next treatment session.    Elizabeth Garcia, PT

## 2022-10-13 ENCOUNTER — CLINICAL SUPPORT (OUTPATIENT)
Dept: REHABILITATION | Facility: HOSPITAL | Age: 77
End: 2022-10-13
Payer: MEDICARE

## 2022-10-13 DIAGNOSIS — Z47.1 AFTERCARE FOLLOWING LEFT SHOULDER JOINT REPLACEMENT SURGERY: ICD-10-CM

## 2022-10-13 DIAGNOSIS — Z96.612 AFTERCARE FOLLOWING LEFT SHOULDER JOINT REPLACEMENT SURGERY: ICD-10-CM

## 2022-10-13 DIAGNOSIS — Z96.612 PRESENCE OF ARTIFICIAL SHOULDER JOINT, LEFT: ICD-10-CM

## 2022-10-13 DIAGNOSIS — M62.512 MUSCLE WASTING AND ATROPHY, NOT ELSEWHERE CLASSIFIED, LEFT SHOULDER: Primary | ICD-10-CM

## 2022-10-13 PROCEDURE — 97112 NEUROMUSCULAR REEDUCATION: CPT

## 2022-10-13 PROCEDURE — 97110 THERAPEUTIC EXERCISES: CPT

## 2022-10-13 NOTE — PLAN OF CARE
[facility]  Physical Therapy Discharge Note    Name: Leticia Grimes  Clinic Number: [id number]    Therapy Diagnosis:   Encounter Diagnoses   Name Primary?    Muscle wasting and atrophy, not elsewhere classified, left shoulder Yes    Aftercare following left shoulder joint replacement surgery     Presence of artificial shoulder joint, left      Physician: Tomas Taylor*    Physician Orders: PT Eval and Treat  Medical Diagnosis from Referral: S/P reverse total shoulder arthroplasty, left; glenohumeral arthritis, left.   Evaluation Date: 5/11/2022      Date of Last visit: 10/13/22  Total Visits Received: 25    ASSESSMENT      RANGE OF MOTION:        Shoulder ROM  (Degrees) Left  Initial Left  8/10/22 Left/AROM  9/13/22 Left/PROM  9/13/22 Left/AROM  10/19/22 Left/PROM  10/19/22 Goal   Shoulder Flexion 80 80 105 100 110 120 120   Shoulder Abduction  NT 70 95 85 105 90 90   Shoulder ER  NT 30 Left upper trapezial  35 C6 62 60   Shoulder IR  NT 30 Gluteal fold 35 Glut med 55 45   ext NT NT 40 NT 45 NT 50 AROM            STRENGTH:  *denotes pain      MMT Left  Initial Left  8/10/22 Left  9/13/22 Left   10/19/22 Goal   Shoulder Flexion NT 4/5 4-/5 arm at side 4/5-4+/5 5/5   Shoulder Abduction NT 4/5 4-/5 arm 45 ABD 4/5-4+/5 5/5   Elbow Flexion  NT 5/5 NT 4/5-4+/5 5/5   Elbow Extension NT 5/5 NT 4/5-4+/5 5/5   Shoulder ER NT NT 3+/5 4/5-4+/5 5/5   Shoulder IR NT NT 3+/5 4/5-4+/5 5/5   Serratus anterior  NT NT 3+/5 4/5-4+/5 5/5            FUNCTION/FOTO:       Discharge reason: Patient has reached the maximum rehab potential for the present time, and has met all goals    Discharge FOTO Score: 40%    Goals:   Short Term Goals:  4 weeks Progress       Patient will report decreased pain to <6/10 at worst on VAS to progress toward Prior Level of Function. Met   6/22/22   Patient will report improved function by a functional limitation score of <60 out of 100 on FOTO. Met  6/22/22   Patient will  improve AROM to 50% of stated goals in order to progress towards Prior Level of Function.  Met  8/11/22   Patient will improve strength to 50% of stated goals in order to progress towards Prior Level of Function. Met  8/11/22      Long Term Goals:  8 weeks Progress      Patient will report decreased pain to <3/10 at worst on VAS to progress toward Prior Level of Function.   MET 10/19/22    Patient will report improved function by a functional limitation score of <30 out of 100 on FOTO.  MET  9/13/22   Patient will improve ROM and Functional Mobility to stated goals to return to Prior Level of Function.  MET 10/19/22    Patient will improve strength to stated goals in order to return to Prior Level of Function. Partially  MET 10/19/22            PLAN   This patient is discharged from Physical Therapy      Elizabeth Garcia PT

## 2022-10-13 NOTE — PROGRESS NOTES
[facility]   Physical Therapy Daily Treatment Note    Name: Leticia Grimes  Clinic Number: [id number]    Therapy Diagnosis:   Encounter Diagnoses   Name Primary?    Muscle wasting and atrophy, not elsewhere classified, left shoulder Yes    Aftercare following left shoulder joint replacement surgery     Presence of artificial shoulder joint, left        Physician: Tomas Taylor  Visit Date: 10/13/2022  Physician Orders: PT Eval and Treat  Medical Diagnosis from Referral: S/P reverse total shoulder arthroplasty, left; glenohumeral arthritis, left.   Evaluation Date: 5/11/2022  Authorization Period Expiration: 12/31/22  Plan of Care Expiration: 10/13/22  Progress Note Due: 10/13/22  Visit # / Visits authorized: 24/30 (+1 for prior authorization)   FOTO: 3/3      Precautions: Standard and Reverse TSA Precautions   Sx Date: 5/5/22      PTA Visit #: 0/5     Time In: 8:56 am   Time Out: 9:40 am  Total Billable Time: 45 minutes (denotes billable time)    SUBJECTIVE     Patient reports: she is feeling good with shoulder - fatigue but no pain with exercise's.    She was compliant with home exercise program.  Response to previous treatment: improved shoulder mobility.   Functional change: can reach into cabinet easier    Pain at Worst: 0/10   at start of treatment session  Location: left shoulder    OBJECTIVE     Objective Measures updated at progress report unless specified.     TREATMENT     Leticia received the treatments listed below:     MANUAL THERAPY to improve ROM and pain for 5 minutes including:  Manual Intervention 10/13/2022     Soft Tissue Mobilizations x Upper trapezius, supraspinatus,subscapularis, levator scapula    subscap release  x  supine    Active Release Technique    subscapularis   PROM   flexion, abduction, External Rotation, and Internal Rotation    Astym      Seated Thoracic Manipulation         THERAPEUTIC EXERCISES to develop strength, endurance and ROM for 26  "minutes including:     Intervention 10/13/2022     UBE x  3' forward/3' backward   Overhead Pulleys: flexion and scaption   3 minutes each   Upper Trapezius Stretch    10"x5 - left    Rhomboid Stretch    10"x5   External Rotation with Wand - supine   10"x5   Flexion with wand     Extension with Wand - standing x  10"x10 arms ER/palms forward   Internal Rotation with Wand - standing   10"x5   Table Slides: flexion    table elevated, 10"x5   Shoulder Flexion - sidelying x  10x/2 set 2#   Shoulder abduction - sidelying x  10x/2 set 2#   ER in sidelying  x 10x/2 set 2#   Biceps Curls  x  4# x10   Triceps Pushdown   10#, 3x10    [place] V x 10x/ 2 sets each direction green band         Re-assessment         NEUROMUSCULAR RE-EDUCATION to improve Proprioception and motor control for 15 minutes including:  Intervention 10/13/2022     Bilateral External Rotation    Red Band, 3x10    Barrel Hug   Red Band, 3x10    Lower Trap Shrug   20#, 2x10 (unable to tolerate 30 lbs)   Wall Push Ups x  x10   Ball on Wall: circles  x  clockwise/counter-cockwise, 1 minute each   Prone rows x 10x/2    Prone latissimus dorsi (I) x 10x/2   Prone W x 10x/2    Spidermans on wall x yellow BAND - up, middle, lower; 5x alternating sides   (Less verbal and manual cues to complete)   Standing wall slide with end range flexion lift x 5x bilateral        PATIENT EDUCATION AND HOME EXERCISES     Home Exercises Provided and Patient Education Provided     Education provided: (included in therex) minutes  home exercise program - importance of strengthening and flexibility exercise's.    Written Home Exercises Provided: patient was instructed to continue with home exercise program.  Exercises were reviewed and Leticia was able to demonstrate them prior to the end of the session.  Leticia demonstrated good  understanding of the education provided. See EMR under Patient Instructions for exercises provided during therapy sessions.      ASSESSMENT     See DC " summary    Leticia is progressing well towards her goals.   Pt prognosis is Excellent.     Pt will continue to benefit from skilled outpatient physical therapy to address the deficits listed in the problem list box on initial evaluation, provide pt/family education and to maximize pt's level of independence in the home and community environment.     Pt's spiritual, cultural and educational needs considered and pt agreeable to plan of care and goals.    Anticipated Barriers for therapy: sedentary lifestyle and age    GOALS:    Reviewed: 10/13/2022    See DC summary    PLAN     See DC summary    Elizabeth Garcia, PT

## 2022-10-20 ENCOUNTER — OFFICE VISIT (OUTPATIENT)
Dept: INTERNAL MEDICINE | Facility: CLINIC | Age: 77
End: 2022-10-20
Payer: MEDICARE

## 2022-10-20 ENCOUNTER — HOSPITAL ENCOUNTER (OUTPATIENT)
Dept: RADIOLOGY | Facility: HOSPITAL | Age: 77
Discharge: HOME | End: 2022-10-20
Attending: FAMILY MEDICINE
Payer: MEDICARE

## 2022-10-20 ENCOUNTER — TELEPHONE (OUTPATIENT)
Dept: CARDIOLOGY | Facility: HOSPITAL | Age: 77
End: 2022-10-20
Payer: MEDICARE

## 2022-10-20 VITALS
HEIGHT: 61 IN | WEIGHT: 165.56 LBS | OXYGEN SATURATION: 97 % | DIASTOLIC BLOOD PRESSURE: 82 MMHG | SYSTOLIC BLOOD PRESSURE: 124 MMHG | BODY MASS INDEX: 31.26 KG/M2

## 2022-10-20 DIAGNOSIS — G89.29 CHRONIC BILATERAL LOW BACK PAIN WITH LEFT-SIDED SCIATICA: ICD-10-CM

## 2022-10-20 DIAGNOSIS — M54.42 CHRONIC BILATERAL LOW BACK PAIN WITH LEFT-SIDED SCIATICA: ICD-10-CM

## 2022-10-20 DIAGNOSIS — M25.552 CHRONIC LEFT HIP PAIN: ICD-10-CM

## 2022-10-20 DIAGNOSIS — Z00.00 ANNUAL PHYSICAL EXAM: Primary | ICD-10-CM

## 2022-10-20 DIAGNOSIS — E55.9 MILD VITAMIN D DEFICIENCY: Chronic | ICD-10-CM

## 2022-10-20 DIAGNOSIS — Z78.9 STATIN INTOLERANCE: Chronic | ICD-10-CM

## 2022-10-20 DIAGNOSIS — I10 ESSENTIAL HYPERTENSION: Chronic | ICD-10-CM

## 2022-10-20 DIAGNOSIS — E78.2 MIXED HYPERLIPIDEMIA: Chronic | ICD-10-CM

## 2022-10-20 DIAGNOSIS — Z23 NEED FOR INFLUENZA VACCINATION: ICD-10-CM

## 2022-10-20 DIAGNOSIS — G89.29 CHRONIC LEFT HIP PAIN: ICD-10-CM

## 2022-10-20 PROCEDURE — 1159F PR MEDICATION LIST DOCUMENTED IN MEDICAL RECORD: ICD-10-PCS | Mod: CPTII,S$GLB,, | Performed by: FAMILY MEDICINE

## 2022-10-20 PROCEDURE — 99999 PR PBB SHADOW E&M-EST. PATIENT-LVL IV: ICD-10-PCS | Mod: PBBFAC,,, | Performed by: FAMILY MEDICINE

## 2022-10-20 PROCEDURE — 3079F PR MOST RECENT DIASTOLIC BLOOD PRESSURE 80-89 MM HG: ICD-10-PCS | Mod: CPTII,S$GLB,, | Performed by: FAMILY MEDICINE

## 2022-10-20 PROCEDURE — 1125F PR PAIN SEVERITY QUANTIFIED, PAIN PRESENT: ICD-10-PCS | Mod: CPTII,S$GLB,, | Performed by: FAMILY MEDICINE

## 2022-10-20 PROCEDURE — 3079F DIAST BP 80-89 MM HG: CPT | Mod: CPTII,S$GLB,, | Performed by: FAMILY MEDICINE

## 2022-10-20 PROCEDURE — 90694 FLU VACCINE - QUADRIVALENT - ADJUVANTED: ICD-10-PCS | Mod: S$GLB,,, | Performed by: FAMILY MEDICINE

## 2022-10-20 PROCEDURE — 3074F PR MOST RECENT SYSTOLIC BLOOD PRESSURE < 130 MM HG: ICD-10-PCS | Mod: CPTII,S$GLB,, | Performed by: FAMILY MEDICINE

## 2022-10-20 PROCEDURE — 1159F MED LIST DOCD IN RCRD: CPT | Mod: CPTII,S$GLB,, | Performed by: FAMILY MEDICINE

## 2022-10-20 PROCEDURE — 1125F AMNT PAIN NOTED PAIN PRSNT: CPT | Mod: CPTII,S$GLB,, | Performed by: FAMILY MEDICINE

## 2022-10-20 PROCEDURE — 73521 X-RAY EXAM HIPS BI 2 VIEWS: CPT | Mod: TC

## 2022-10-20 PROCEDURE — 99215 PR OFFICE/OUTPT VISIT, EST, LEVL V, 40-54 MIN: ICD-10-PCS | Mod: S$GLB,,, | Performed by: FAMILY MEDICINE

## 2022-10-20 PROCEDURE — 90694 VACC AIIV4 NO PRSRV 0.5ML IM: CPT | Mod: S$GLB,,, | Performed by: FAMILY MEDICINE

## 2022-10-20 PROCEDURE — 3074F SYST BP LT 130 MM HG: CPT | Mod: CPTII,S$GLB,, | Performed by: FAMILY MEDICINE

## 2022-10-20 PROCEDURE — 99999 PR PBB SHADOW E&M-EST. PATIENT-LVL IV: CPT | Mod: PBBFAC,,, | Performed by: FAMILY MEDICINE

## 2022-10-20 PROCEDURE — 99215 OFFICE O/P EST HI 40 MIN: CPT | Mod: S$GLB,,, | Performed by: FAMILY MEDICINE

## 2022-10-20 PROCEDURE — 72100 X-RAY EXAM L-S SPINE 2/3 VWS: CPT | Mod: TC

## 2022-10-20 PROCEDURE — 1160F RVW MEDS BY RX/DR IN RCRD: CPT | Mod: CPTII,S$GLB,, | Performed by: FAMILY MEDICINE

## 2022-10-20 PROCEDURE — G0008 FLU VACCINE - QUADRIVALENT - ADJUVANTED: ICD-10-PCS | Mod: S$GLB,,, | Performed by: FAMILY MEDICINE

## 2022-10-20 PROCEDURE — 72100 XR LUMBAR SPINE AP AND LATERAL: ICD-10-PCS | Mod: 26,,, | Performed by: RADIOLOGY

## 2022-10-20 PROCEDURE — 73521 XR HIPS BILATERAL 2 VIEW INCL AP PELVIS: ICD-10-PCS | Mod: 26,,, | Performed by: RADIOLOGY

## 2022-10-20 PROCEDURE — 1160F PR REVIEW ALL MEDS BY PRESCRIBER/CLIN PHARMACIST DOCUMENTED: ICD-10-PCS | Mod: CPTII,S$GLB,, | Performed by: FAMILY MEDICINE

## 2022-10-20 PROCEDURE — 73521 X-RAY EXAM HIPS BI 2 VIEWS: CPT | Mod: 26,,, | Performed by: RADIOLOGY

## 2022-10-20 PROCEDURE — G0008 ADMIN INFLUENZA VIRUS VAC: HCPCS | Mod: S$GLB,,, | Performed by: FAMILY MEDICINE

## 2022-10-20 PROCEDURE — 72100 X-RAY EXAM L-S SPINE 2/3 VWS: CPT | Mod: 26,,, | Performed by: RADIOLOGY

## 2022-10-20 RX ORDER — GABAPENTIN 400 MG/1
400 CAPSULE ORAL NIGHTLY
Qty: 90 CAPSULE | Refills: 3 | Status: SHIPPED | OUTPATIENT
Start: 2022-10-20 | End: 2022-11-29

## 2022-10-20 RX ORDER — ASPIRIN 81 MG/1
81 TABLET ORAL DAILY
Refills: 0
Start: 2022-10-20 | End: 2023-02-14

## 2022-10-20 NOTE — PROGRESS NOTES
Subjective:   Patient ID: Leticia Grimes is a 77 y.o. female.  Chief Complaint:  Annual Exam    Presents for follow-up on chronic low back pain and chronic hip pain in annual physical exam    Last visit was virtual in early October 2022 to discuss persistent low back pain, left hip pain, and left sciatica   Based on reported symptoms felt to be more likely related to degenerative disease of her lumbar sacral spine than an actual hip problem  Recommended gabapentin 400 mg nightly  Hip x-ray done prior to today's visit overall normal   Reports 75% improvement in symptoms with medication    Last annual physical exam October 2021   CBC, CMP, TSH all normal   Lipid panel with    Hepatitis-C screening negative    Medical History:  - Hypertension. Controlled. On Amlodipine 5 mg daily, lisinopril 20 mg daily, and Coreg 12.5 mg twice a day.  Reports compliance.  Denies side effects.  No shortness of breath or swelling.  EGFR May 20 20-54.9.  - History cardiac arrest. Has implantable device.  Stable.  Asymptomatic.  No recurrent symptoms.  Followed by Cardiology.  - Hyperlipidemia. Statin intolerant  On aspirin 81 mg daily and fish oil 1 capsule twice a day.  Reports compliance.  Denies side effects.  LDL May 2022 132. Denies chest pain claudication.  - Anxiety and Insomnia.  Followed by Psychiatry.   currently stable off all medications.    Health maintenance needs include:  - Bone density  - Tetanus, shingles, COVID, pneumonia, and flu vaccines      Current Outpatient Medications:     acetaminophen (TYLENOL) 500 MG tablet, Take 2 tablets (1,000 mg total) by mouth every 8 (eight) hours as needed for Pain., Disp: 60 tablet, Rfl: 0    alpha lipoic acid 200 mg Cap, Take 1 capsule by mouth once daily., Disp: , Rfl:     amLODIPine (NORVASC) 5 MG tablet, Take 1 tablet (5 mg total) by mouth once daily., Disp: 90 tablet, Rfl: 3    carvediloL (COREG) 12.5 MG tablet, Take 1 tablet (12.5 mg total) by mouth 2 (two) times daily  "with meals., Disp: 180 tablet, Rfl: 3    cloNIDine (CATAPRES) 0.1 MG tablet, Take 0.1 mg by mouth as needed. If SBP > 200 mmHg, Disp: , Rfl:     lisinopriL (PRINIVIL,ZESTRIL) 20 MG tablet, Take 1 tablet (20 mg total) by mouth once daily., Disp: 90 tablet, Rfl: 3    mv-mn/folic/lutein/herbal 293 (ALIVE WOMEN'S 50 PLUS ORAL), Take 1 tablet by mouth once daily., Disp: , Rfl:     omega 3-dha-epa-fish oil (FISH OIL) 1,000 mg (120 mg-180 mg) Cap, Take 1 capsule by mouth 2 (two) times daily with meals., Disp: 180 capsule, Rfl: 3    aspirin (ECOTRIN) 81 MG EC tablet, Take 1 tablet (81 mg total) by mouth once daily., Disp: , Rfl: 0    gabapentin (NEURONTIN) 400 MG capsule, Take 1 capsule (400 mg total) by mouth every evening., Disp: 90 capsule, Rfl: 3    Review of Systems   Constitutional:  Negative for chills, fatigue and fever.   Eyes:  Negative for visual disturbance.   Respiratory:  Negative for cough, chest tightness, shortness of breath and wheezing.    Cardiovascular:  Negative for chest pain, palpitations and leg swelling.   Gastrointestinal:  Negative for abdominal distention, abdominal pain, constipation, diarrhea, nausea and vomiting.   Genitourinary:  Negative for decreased urine volume, difficulty urinating, dysuria, flank pain, frequency, hematuria, pelvic pain and urgency.   Musculoskeletal:  Positive for arthralgias and back pain. Negative for gait problem, joint swelling, myalgias, neck pain and neck stiffness.   Skin:  Negative for rash.   Neurological:  Positive for numbness. Negative for dizziness, tremors, syncope, weakness, light-headedness and headaches.   Psychiatric/Behavioral:  Negative for sleep disturbance. The patient is not nervous/anxious.      Objective:   /82 (BP Location: Left arm, Patient Position: Sitting, BP Method: Small (Manual))   Ht 5' 1" (1.549 m)   Wt 75.1 kg (165 lb 9.1 oz)   SpO2 97%   BMI 31.28 kg/m²     Physical Exam  Vitals and nursing note reviewed.   Constitutional: "       Appearance: She is well-developed. She is obese.   Neck:      Thyroid: No thyroid mass, thyromegaly or thyroid tenderness.      Vascular: Normal carotid pulses. No JVD.   Cardiovascular:      Rate and Rhythm: Normal rate and regular rhythm.      Pulses:           Radial pulses are 2+ on the right side and 2+ on the left side.      Heart sounds: Normal heart sounds. No murmur heard.    No friction rub. No gallop.   Pulmonary:      Effort: Pulmonary effort is normal.      Breath sounds: Normal breath sounds. No wheezing, rhonchi or rales.   Abdominal:      General: There is no distension.      Palpations: Abdomen is soft.      Tenderness: There is no abdominal tenderness.   Musculoskeletal:      Lumbar back: Bony tenderness present. No spasms. Decreased range of motion. Negative right straight leg raise test and negative left straight leg raise test.      Right hip: Normal. Normal range of motion.      Left hip: Normal. Normal range of motion.      Right lower leg: No edema.      Left lower leg: No edema.   Skin:     General: Skin is warm and dry.      Findings: No rash.   Neurological:      Mental Status: She is alert.   Psychiatric:         Attention and Perception: Attention normal.         Mood and Affect: Mood normal.       Assessment:       ICD-10-CM ICD-9-CM   1. Annual physical exam  Z00.00 V70.0   2. Need for influenza vaccination  Z23 V04.81   3. Chronic bilateral low back pain with left-sided sciatica  M54.42 724.2    G89.29 724.3     338.29   4. Essential hypertension  I10 401.9   5. Mixed hyperlipidemia  E78.2 272.2   6. Statin intolerance  Z78.9 995.27   7. Mild vitamin D deficiency  E55.9 268.9     Plan:   Annual physical exam  Need for influenza vaccination  -     Influenza - Quadrivalent (Adjuvanted)  Blood pressure normal.  BMI 31.  Overall exam stable.    Repeat renal function panel and TSH today, other labs up-to-date   Colon cancer screening up-to-date   Breast cancer screening  up-to-date  Will schedule bone density   Recommend tetanus booster, shingles vaccine, and COVID booster through pharmacy   COVID booster should be done 90 days after last infection  Pneumonia vaccine when available   Flu vaccine today     Chronic bilateral low back pain with left-sided sciatica  -     gabapentin (NEURONTIN) 400 MG capsule; Take 1 capsule (400 mg total) by mouth every evening.  Dispense: 90 capsule; Refill: 3  Symptoms improved on gabapentin   Lumbar sacral x-ray pending   If shows degenerative disease as expected, patient is agreeable to physical therapy     Essential hypertension  -     Renal Function Panel; Future; Expected date: 10/20/2022  Controlled.  Stable.  Asymptomatic.  BP at goal.    Continue lisinopril 20 mg daily   Continue amlodipine 5 mg daily   Continue Coreg 12.5 mg twice a day     Mixed hyperlipidemia  Statin intolerance  Stable.  Asymptomatic.  LDL not at goal.    Continue fish oil supplementation  Can discuss with Cardiology if Rapatha or other equivalent med recommended.    Mild vitamin D deficiency  -     Vitamin D; Future; Expected date: 10/20/2022  Restart weekly high-dose or daily low-dose supplement as indicated     Follow-up with any/all specialists scheduled     Return to clinic 1 year for annual physical exam or sooner if needed    Addendum   X-ray lumbar sacral spine shows  There is mild grade 1 anterolisthesis of L4 on L5.  Vertebral body heights are within normal limits.  Intervertebral disk spaces are well preserved. Multilevel facet arthropathy is present in the lower lumbar spine.  No acute fractures or subluxations are demonstrated.  The remaining visualized osseous and soft tissue structures demonstrate no appreciable abnormality.     Impression:  Degenerative findings with spondylolisthesis as above.    Physical therapy referral ordered     40+ minutes of total time spent on the encounter, which includes face to face time and non-face to face time preparing to  see the patient (eg, review of tests), Obtaining and/or reviewing separately obtained history, documenting clinical information in the electronic or other health record, independently interpreting results (not separately reported) and communicating results to the patient/family/caregiver, or Care coordination (not separately reported).

## 2022-10-26 ENCOUNTER — TELEPHONE (OUTPATIENT)
Dept: INTERNAL MEDICINE | Facility: CLINIC | Age: 77
End: 2022-10-26
Payer: MEDICARE

## 2022-10-26 NOTE — TELEPHONE ENCOUNTER
----- Message from Jimmy Esqueda sent at 10/26/2022 10:18 AM CDT -----  Contact: self [phone]  Pt is calling requesting pneumonia shot . Please call back at [phone] . Thanks/nicholas

## 2022-10-26 NOTE — TELEPHONE ENCOUNTER
Spoke with pt; pt was calling in reference to getting pneumonia shot; MA advised pt she can call [facility] pharmacy to get vaccine due to PCP office was out; pt verbalized understanding /LD

## 2022-11-05 ENCOUNTER — NURSE TRIAGE (OUTPATIENT)
Dept: ADMINISTRATIVE | Facility: CLINIC | Age: 77
End: 2022-11-05
Payer: MEDICARE

## 2022-11-06 NOTE — TELEPHONE ENCOUNTER
Pt c/o having carpal tunnel in right hand and left side sciatica pain 8/10 for last few days. Pt stated that pain is so bad she has trouble sleeping at night. Stated that Tizanidine 4mg Q8 as needed helps with the pain. BP is currently 171/85 and denies any cardiac or neuro symptoms. Care advice to be seen within 3 days per protocol for BP and to take Tizanidine as prescribed for pain. Pt verbalized understanding. Unable to schedule appt with provider. [facility] offered and declined. Encounter routed to provider for f/u appt.       Reason for Disposition   Systolic BP  >= 160 OR Diastolic >= 100    Additional Information   Negative: Difficult to awaken or acting confused (e.g., disoriented, slurred speech)   Negative: SEVERE difficulty breathing (e.g., struggling for each breath, speaks in single words)   Negative: [1] Weakness of the face, arm or leg on one side of the body AND [2] new-onset   Negative: [1] Numbness (i.e., loss of sensation) of the face, arm or leg on one side of the body AND [2] new-onset   Negative: [1] Chest pain lasts > 5 minutes AND [2] history of heart disease (i.e., heart attack, bypass surgery, angina, angioplasty, CHF)   Negative: [1] Chest pain AND [2] took nitrogylcerin AND [3] pain was not relieved   Negative: Sounds like a life-threatening emergency to the triager   Negative: [1] Systolic BP  >= 160 OR Diastolic >= 100 AND [2] cardiac or neurologic symptoms (e.g., chest pain, difficulty breathing, unsteady gait, blurred vision)   Negative: [1] Pregnant 20 or more weeks (or postpartum < 6 weeks) AND [2] new hand or face swelling   Negative: [1] Pregnant 20 or more weeks (or postpartum < 6 weeks) AND [2] Systolic BP >= 160 OR Diastolic >= 100   Negative: [1] Systolic BP  >= 200 OR Diastolic >= 120 AND [2] having NO cardiac or neurologic symptoms   Negative: [1] Pregnant 20 or more weeks (or postpartum < 6 weeks) AND [2] Systolic BP  >= 140 OR Diastolic >= 90   Negative: [1] Systolic BP  >= 180  OR Diastolic >= 110 AND [2] missed most recent dose of blood pressure medication   Negative: Systolic BP  >= 180 OR Diastolic >= 110   Negative: Ran out of BP medications    Protocols used: Blood Pressure - High-A-AH

## 2022-11-15 ENCOUNTER — CLINICAL SUPPORT (OUTPATIENT)
Dept: REHABILITATION | Facility: HOSPITAL | Age: 77
End: 2022-11-15
Payer: MEDICARE

## 2022-11-15 DIAGNOSIS — G89.29 CHRONIC BILATERAL LOW BACK PAIN WITH LEFT-SIDED SCIATICA: ICD-10-CM

## 2022-11-15 DIAGNOSIS — M25.552 LEFT HIP PAIN: ICD-10-CM

## 2022-11-15 DIAGNOSIS — M54.50 CHRONIC BILATERAL LOW BACK PAIN WITHOUT SCIATICA: ICD-10-CM

## 2022-11-15 DIAGNOSIS — M54.42 CHRONIC BILATERAL LOW BACK PAIN WITH LEFT-SIDED SCIATICA: ICD-10-CM

## 2022-11-15 DIAGNOSIS — R26.9 GAIT ABNORMALITY: ICD-10-CM

## 2022-11-15 DIAGNOSIS — G89.29 CHRONIC BILATERAL LOW BACK PAIN WITHOUT SCIATICA: ICD-10-CM

## 2022-11-15 PROCEDURE — 97162 PT EVAL MOD COMPLEX 30 MIN: CPT

## 2022-11-15 NOTE — PLAN OF CARE
"[facility]   Physical Therapy Initial Evaluation     Date: 11/15/2022   Name: Leticia Grimes  Clinic Number: [id number]    Therapy Diagnosis:   Encounter Diagnoses   Name Primary?    Chronic bilateral low back pain with left-sided sciatica     Gait abnormality     Left hip pain     Chronic bilateral low back pain without sciatica      Physician: Ben Marie MD    Physician Orders: PT Eval and Treat   Medical Diagnosis from Referral:    Chronic bilateral low back pain with left-sided sciatica     Evaluation Date: 11/15/2022  Authorization Period Expiration: 10/20/2023  Plan of Care Expiration: 2/15/2023  Progress Note Due: 12/15/2022  Visit # / Visits authorized: 1/ 1   FOTO: 1/ 3     Precautions: Standard    Time In: 0930  Time Out: 1015  Total Appointment Time (timed & untimed codes): 45 minutes    SUBJECTIVE   Date of onset: 2022    History of current condition - Leticia reports: that she experiences pain at the left lateral thigh and gluteal region.  Reports that she has experienced the symptoms for approximately 6 months.  Symptoms are worsened at night with sleeping on the left leg.  Has been prescribed muscle relaxant.  She does report of pain relief with muscle relaxant and takes only when flared-up.  Also occasionally takes Gabapentin for pain relief.  Biggest problem is with sleeping.  Walks everyday for 45 minutes and doesn't hurt with walking.  Sometimes heavy objects picking up can increase pain.  Has previously been treated this year for a left total shoulder replacement.    Falls: None    Imaging, X-ray bilateral hips:        "FINDINGS:  There is no radiographic evidence of acute osseous, articular, or soft tissue abnormality.  Bilateral hip joint spaces are preserved."    X-ray lumbar spine:     "FINDINGS:  There is mild grade 1 anterolisthesis of L4 on L5.  Vertebral body heights are within normal limits.  Intervertebral disk spaces are well preserved. Multilevel " "facet arthropathy is present in the lower lumbar spine.  No acute fractures or subluxations are demonstrated.  The remaining visualized osseous and soft tissue structures demonstrate no appreciable abnormality."       Prior Therapy: Has received therapy this year for total shoulder replacement  Social History:   Occupation: Retired  Prior Level of Function: Independent, very active, walking for exercise, returning to many activities since total shoulder replacement  Current Level of Function: Pain with sleeping on the left side, no pain with walking regimen. Not sure if there are other activities that increase pain    Pain:  Current 3/10, worst 8/10, best 1/10   Location: left hip  Description: Aching and Throbbing  Aggravating Factors: Laying on the left side  Easing Factors: sleeping on the right side or the back (not complete pain relief)    Patients goals: Sleep with decreased pain     Medical History:   Past Medical History:   Diagnosis Date    Adjustment insomnia 2020    Anxiety 2020    GERD (gastroesophageal reflux disease)     History of sudden cardiac arrest successfully resuscitated 2020    Hypertension     ICD (implantable cardioverter-defibrillator) in place 2020    Left arm swelling 9/3/2020    Mild vitamin D deficiency 2013    Osteopenia 2020    Vitamin D deficiency disease        Surgical History:   Leticia Grimes  has a past surgical history that includes Hysterectomy; Tubal ligation;  section, classic; Insertion of biventricular implantable cardioverter-defibrillator (ICD); Injection of joint (Left, 2021); and Reverse total shoulder arthroplasty (Left, 2022).    Medications:   Leticia has a current medication list which includes the following prescription(s): acetaminophen, alpha lipoic acid, amlodipine, aspirin, carvedilol, clonidine, gabapentin, lisinopril, mv-mn/folic/lutein/herbal 293, omega 3-dha-epa-fish oil, and pneumoc 20-hiwot conj-dip " cr(pf).    Allergies:   Review of patient's allergies indicates:   Allergen Reactions    Codeine     Morphine Other (See Comments)    Statins-hmg-coa reductase inhibitors           OBJECTIVE     CMS Impairment/Limitation/Restriction for FOTO Orthopedic Lower Back Survey    Therapist reviewed FOTO scores for Leticia Grimes on 11/15/2022.   FOTO documents entered into [employer] - see Media section.    Limitation Score: Patient started but must have timed out          Posture/Structure: Pt presents with lordotic lumbar spine, mild convexity left thoracic, even PSIS, GT, PC     Gait: hip elevation during left stance phase of gait greater than right      Balance: SL R=8 seconds, L=2 seconds with pain, .           AROM:    Thoracolumbar  (single inclinometer at L4/5) AROM  (degrees) Pain/Dysfunction with Movement   Flexion 70    Extension 18    Right side bending 30    Left side bending 22 pulling   Right rotation 66%    Left rotation 66%        Hip Left Pain/Dysfunction with Movement   Hip flex 120    Hip Abd     Hip ER (supine) 52    Hip IR (Supine) 22    Hip Extn (sidelying) 18 deg    PSLR 72      Strength:     L/E MMT Right Left Pain/Dysfunction with Movement   Hip Flexion 5/5 4+/5    Hip Extension 3+/5 3+/5    Hip Abd 4/5 3+/5    Hip Add 4/5 4/5    Hip IR 4-/5 4-/5    Hip ER 4+/5 4+/5    Knee Flexion 5/5 5/5    Knee Extension 5/5 5/5    Ankle DF 5/5 5/5    Ankle PF 5/5 4/5 Mild left anterior thigh pain   ASLR  4-/5 4-/5      Squat: 66% no pain        PIVM Lumbar/Thoracic: mild pain and hypomobility L5-S1, pain normal mobility PA L4-L5 and L2-L3 left rotation             Palpation: Tenderness on palpation of the left glut med/min from PC to GT, no significant tenderness on palpation of the lumbar PSM        TREATMENT     Total Treatment time (time-based codes) separate from Evaluation: 5 minutes      Leticia received the treatments listed below:        THERAPEUTIC EXERCISES to develop strength, endurance, ROM, flexibility,  posture, and core stabilization for (5) minutes including:    Intervention Performed Today    bicycle     Lower trunk     Pelvic tilts  Ant/post tilt  With bilateral knee to chest   Supine pullover glut/piriformis     bridging     Supine clamshells with theraband                   Plan for Next Visit:      PATIENT EDUCATION AND HOME EXERCISES     Education provided:   - pelvic tilts    Written Home Exercises Provided: yes. Exercises were reviewed and Leticia was able to demonstrate them prior to the end of the session.  Leticia demonstrated fair  understanding of the education provided. See EMR under Patient Instructions for exercises provided during therapy sessions.    ASSESSMENT     Leticia is a 77 y.o. female referred to outpatient Physical Therapy with a medical diagnosis of    Chronic bilateral low back pain with left-sided sciatica   The patient presents with signs and symptoms consistent with diagnosis along with decreased ROM left side bending and bilateral rotation lumbar spine, decreased ROM left hip ER, weakness left hip musculature, gait deficits, squat deficits, problems with single leg stance at the left lower extremities and problems with sleeping/laying on the left side. .     Pt prognosis is Good, , if patient is consistent and compliant with PT and home exercise program.   Pt will benefit from skilled outpatient Physical Therapy to address the deficits stated above and in the chart below, provide pt/family education, and to maximize pt's level of independence.     Plan of care discussed with patient: Yes  Pt's spiritual, cultural and educational needs considered and patient is agreeable to the plan of care and goals as stated below:     Anticipated Barriers for therapy: osteopenia, HTN, anxiety, defibrillator placement, MI, left total shoulder arthroplasty    Medical Necessity is demonstrated by the following  History  Co-morbidities and personal factors that may impact the plan of care Co-morbidities:    osteopenia, HTN, anxiety, defibrillator placement, MI, left total shoulder replacement    Personal Factors:   no deficits     high   Examination  Body Structures and Functions, activity limitations and participation restrictions that may impact the plan of care Body Regions:   back  lower extremities    Body Systems:    ROM  strength  balance  gait  motor control  joint mobility, muscle tone, muscle length    Participation Restrictions:   See current level of function listed above     Activity limitations:   Learning and applying knowledge  no deficits    General Tasks and Commands  no deficits    Communication  no deficits    Mobility  lifting and carrying objects    Self care  dressing    Domestic Life  cooking  doing house work (cleaning house, washing dishes, laundry)    Interactions/Relationships  no deficits    Life Areas  no deficits    Community and Social Life  community life  recreation and leisure         moderate   Clinical Presentation evolving clinical presentation with changing clinical characteristics moderate   Decision Making/ Complexity Score: moderate     Goals: Reviewed:11/15/2022    Short Term Goals: In 4 weeks   1.Patient to be educated on HEP.  2. Patient  to increase lumbar ROM to WFL's without pain, in order to improve available range of motion for ADL's.    3. Patient  to increase hip AROM to ER 70 deg, in order to assist with more normalized gait pattern.  4. Patient  to increase B LE strength to 4-/5, in order to perform gait without compensation  5. Patient   to have pain less than 2/10 at worst, to improve QOL.  6. Patient  to improve score on the FOTO, to improve QOL.  7. Patient  to be educated on postural/body mechanics awareness.    Long Term Goals: In 8 weeks  1.Patient to improve score on the FOTO to improve QOL.  2. Patient to demo increase in LE strength to 4/5, n order to perform gait without compensation  3. Patient to have decreased pain to 2/10 at worst, to improve  QOL.  4. Patient to demo increase lumbar ROM to B Rot 100% and left sidebending 30 deg, in order to improve available range of motion for ADL's.  .  5. Patient to increase hip AROM to ER 70 deg, in order to assist with more normalized gait pattern.  6. Patient to perform daily activities including sleeping, ascending/descending stairs, squatting without increased symptoms.        PLAN     Plan of care Certification: 11/15/2022 to 2/15/2023.    Outpatient Physical Therapy 2 times weekly for 8 weeks to include the following interventions: Gait Training, Manual Therapy, Moist Heat/ Ice, Neuromuscular Re-ed, Patient Education, Self Care, Therapeutic Activities, and Therapeutic Exercise.     Moises Harper, PT      I CERTIFY THE NEED FOR THESE SERVICES FURNISHED UNDER THIS PLAN OF TREATMENT AND WHILE UNDER MY CARE   Physician's comments:     Physician's Signature: ___________________________________________________

## 2022-11-16 ENCOUNTER — TELEPHONE (OUTPATIENT)
Dept: INTERNAL MEDICINE | Facility: CLINIC | Age: 77
End: 2022-11-16
Payer: MEDICARE

## 2022-11-16 NOTE — TELEPHONE ENCOUNTER
----- Message from Juanita Rashid sent at 11/16/2022  9:40 AM CST -----  Contact: [phone]  Pt states she called 7 times yesterday re referral being entered for her to have physical therapy but the therapy office still states it has not been received. She states she already supplied the fax # and everything needed. Can you please call pt with an update.

## 2022-11-16 NOTE — TELEPHONE ENCOUNTER
----- Message from Marie Olson sent at 11/16/2022 10:47 AM CST -----  Contact: Leticia  Patient is calling office to disregard previous message about referral request to physical therapy at outside facility, reports she will be staying at [facility].Patient Is not requesting call back

## 2022-11-16 NOTE — TELEPHONE ENCOUNTER
Spoke with pt; pt stated she was receiving physical therapy but she would like to switch facility closer to her home; pt states she would like to receive therapy at [facility] in [place] /    Fax #: [phone]

## 2022-11-17 ENCOUNTER — CLINICAL SUPPORT (OUTPATIENT)
Dept: REHABILITATION | Facility: HOSPITAL | Age: 77
End: 2022-11-17
Payer: MEDICARE

## 2022-11-17 DIAGNOSIS — G89.29 CHRONIC BILATERAL LOW BACK PAIN WITHOUT SCIATICA: ICD-10-CM

## 2022-11-17 DIAGNOSIS — R26.9 GAIT ABNORMALITY: Primary | ICD-10-CM

## 2022-11-17 DIAGNOSIS — R68.89 DECREASED STRENGTH, ENDURANCE, AND MOBILITY: ICD-10-CM

## 2022-11-17 DIAGNOSIS — Z74.09 DECREASED STRENGTH, ENDURANCE, AND MOBILITY: ICD-10-CM

## 2022-11-17 DIAGNOSIS — M54.50 CHRONIC BILATERAL LOW BACK PAIN WITHOUT SCIATICA: ICD-10-CM

## 2022-11-17 DIAGNOSIS — R53.1 DECREASED STRENGTH, ENDURANCE, AND MOBILITY: ICD-10-CM

## 2022-11-17 DIAGNOSIS — M25.552 LEFT HIP PAIN: ICD-10-CM

## 2022-11-17 PROCEDURE — 97140 MANUAL THERAPY 1/> REGIONS: CPT | Mod: CQ

## 2022-11-17 PROCEDURE — 97110 THERAPEUTIC EXERCISES: CPT | Mod: CQ

## 2022-11-17 NOTE — PROGRESS NOTES
[facility]   Physical Therapy Treatment Note     Name: Leticia Grimes  Clinic Number: [id number]    Therapy Diagnosis:   Encounter Diagnoses   Name Primary?    Gait abnormality Yes    Left hip pain     Decreased strength, endurance, and mobility     Chronic bilateral low back pain without sciatica      Physician: Ben Marie MD    Visit Date: 11/17/2022    Physician Orders: PT Eval and Treat   Medical Diagnosis from Referral:    Chronic bilateral low back pain with left-sided sciatica      Evaluation Date: 11/15/2022  Authorization Period Expiration: 12/30/2022  Plan of Care Expiration: 2/15/2023  Progress Note Due: 12/15/2022  Visit # / Visits authorized: 1/ 12 + eval  FOTO: 1/ 3      Precautions: Standard    PTA Visit #: 1/5     Time In: 1000  Time Out: 1045  Total Billable Time: 40 minutes    SUBJECTIVE     Pt reports: glute/hip is feeling better today. She was able to sleep better last night,states her pain was only at a 4, where usually, it is at a 9 or 10 at night. Her left shoulder/neck are bothering her more today, thinking it is due to the cold weather. .  She was compliant with home exercise program.  Response to previous treatment: 1st treatment  Functional change: none noted    Pain: 2/10  Location: low back/left lower extremity    OBJECTIVE     Objective Measures updated at progress report unless specified.     Treatment     Leticia received the treatments listed below:      therapeutic exercises to develop strength, endurance, ROM, flexibility, posture, and core stabilization for 30 minutes including:  Nu step with handles for endurance level 1 5 minutes  Seated hip abduction 3 minutes  Seated ball squeezes 3 minutes 3s holds  Sidelying clamshells 2x10 each bilateral  Lower trunk rotations 3 minutes  Bridges with ab brace 3x10  Piriformis stretch 3x30s each    manual therapy techniques: Myofacial release and Soft tissue Mobilization were applied to the: left hip for 10  minutes, including:  Soft tissue mobilization to left hip, glute, abductors, IT band and surrounding musculature    Patient Education and Home Exercises     Home Exercises Provided and Patient Education Provided     Education provided:     Written Home Exercises Provided: Patient instructed to cont prior HEP. Exercises were reviewed and Leticia was able to demonstrate them prior to the end of the session.  Leticia demonstrated good  understanding of the education provided. See EMR under Patient Instructions for exercises provided during therapy sessions    ASSESSMENT     Patient did well with session today with minimal complaints of discomfort. Cardiac fatigue noted with nu step, no pain accompanied. Verbal and tactile cues necessary for proper ab brace with core activities. Patient with increased tenderness and tightness in left IT band which improved with manual therapy. Patient left session with no reports of increased pain.     Leticia Is progressing well towards her goals.   Pt prognosis is Good.     Pt will continue to benefit from skilled outpatient physical therapy to address the deficits listed in the problem list box on initial evaluation, provide pt/family education and to maximize pt's level of independence in the home and community environment.     Pt's spiritual, cultural and educational needs considered and pt agreeable to plan of care and goals.     Anticipated barriers to physical therapy: osteopenia, HTN, anxiety, defibrillator placement, MI, left total shoulder arthroplasty    Goals: Reviewed:11/15/2022    Short Term Goals: In 4 weeks   1.Patient to be educated on HEP.  2. Patient  to increase lumbar ROM to WFL's without pain, in order to improve available range of motion for ADL's.    3. Patient  to increase hip AROM to ER 70 deg, in order to assist with more normalized gait pattern.  4. Patient  to increase B LE strength to 4-/5, in order to perform gait without compensation  5. Patient   to have pain  less than 2/10 at worst, to improve QOL.  6. Patient  to improve score on the FOTO, to improve QOL.  7. Patient  to be educated on postural/body mechanics awareness.     Long Term Goals: In 8 weeks  1.Patient to improve score on the FOTO to improve QOL.  2. Patient to demo increase in LE strength to 4/5, n order to perform gait without compensation  3. Patient to have decreased pain to 2/10 at worst, to improve QOL.  4. Patient to demo increase lumbar ROM to B Rot 100% and left sidebending 30 deg, in order to improve available range of motion for ADL's.  .  5. Patient to increase hip AROM to ER 70 deg, in order to assist with more normalized gait pattern.  6. Patient to perform daily activities including sleeping, ascending/descending stairs, squatting without increased symptoms.    PLAN     Plan of care Certification: 11/15/2022 to 2/15/2023.    Continue Plan of Care (POC) and progress per patient tolerance.     Julia Tucker, PTA

## 2022-11-21 ENCOUNTER — DOCUMENTATION ONLY (OUTPATIENT)
Dept: REHABILITATION | Facility: HOSPITAL | Age: 77
End: 2022-11-21

## 2022-11-21 ENCOUNTER — CLINICAL SUPPORT (OUTPATIENT)
Dept: REHABILITATION | Facility: HOSPITAL | Age: 77
End: 2022-11-21
Payer: MEDICARE

## 2022-11-21 DIAGNOSIS — M25.552 LEFT HIP PAIN: ICD-10-CM

## 2022-11-21 DIAGNOSIS — M54.50 CHRONIC BILATERAL LOW BACK PAIN WITHOUT SCIATICA: ICD-10-CM

## 2022-11-21 DIAGNOSIS — G89.29 CHRONIC BILATERAL LOW BACK PAIN WITHOUT SCIATICA: ICD-10-CM

## 2022-11-21 DIAGNOSIS — Z74.09 DECREASED STRENGTH, ENDURANCE, AND MOBILITY: ICD-10-CM

## 2022-11-21 DIAGNOSIS — R26.9 GAIT ABNORMALITY: Primary | ICD-10-CM

## 2022-11-21 DIAGNOSIS — R53.1 DECREASED STRENGTH, ENDURANCE, AND MOBILITY: ICD-10-CM

## 2022-11-21 DIAGNOSIS — R68.89 DECREASED STRENGTH, ENDURANCE, AND MOBILITY: ICD-10-CM

## 2022-11-21 PROCEDURE — 97110 THERAPEUTIC EXERCISES: CPT | Mod: CQ

## 2022-11-21 PROCEDURE — 97140 MANUAL THERAPY 1/> REGIONS: CPT | Mod: CQ

## 2022-11-21 NOTE — PROGRESS NOTES
PT/PTA met face to face to discuss pt's treatment plan and progress towards established goals. Pt will be seen by a physical therapist minimally every 6th visit or every 30 days.        Isaac Morel PTA

## 2022-11-21 NOTE — PROGRESS NOTES
[facility]   Physical Therapy Treatment Note     Name: Leticia Grimes  Clinic Number: [id number]    Therapy Diagnosis:   Encounter Diagnoses   Name Primary?    Gait abnormality Yes    Left hip pain     Decreased strength, endurance, and mobility     Chronic bilateral low back pain without sciatica        Physician: Ben Marie MD    Visit Date: 11/21/2022    Physician Orders: PT Eval and Treat   Medical Diagnosis from Referral:    Chronic bilateral low back pain with left-sided sciatica      Evaluation Date: 11/15/2022  Authorization Period Expiration: 12/30/2022  Plan of Care Expiration: 2/15/2023  Progress Note Due: 12/15/2022  Visit # / Visits authorized: 2/12 + eval  FOTO: 1/ 3      Precautions: Standard    PTA Visit #: 2/5     Time In: 0700  Time Out: 0745  Total Billable Time: 40 minutes    SUBJECTIVE     Pt reports: no pain today.     She was compliant with home exercise program.    Response to previous treatment: felt good after manual therapy    Functional change: able to lay on her left side for a little while    Pain: 0/10  Location: low back/left lower extremity    OBJECTIVE     Objective Measures updated at progress report unless specified.     Treatment     Leticia received the treatments listed below:      therapeutic exercises to develop strength, endurance, ROM, flexibility, posture, and core stabilization for 30 minutes including:  Nu step with handles for endurance level 1 5 minutes  Seated hip abduction with belt 3 minutes  Seated ball squeezes 3 minutes 3 second holds  Sidelying clamshells 2 x 10 each bilateral  Lower trunk rotations 3 minutes  Bridges with ab brace 3x10  Piriformis stretch 3 x 30 second each    manual therapy techniques: Myofacial release and Soft tissue Mobilization were applied to the: left hip for 10 minutes, including:  left glutes, piriformis, and TFL with tiger tail    Soft tissue mobilization to left hip, glute, abductors, IT band and  surrounding musculature    Patient Education and Home Exercises     Home Exercises Provided and Patient Education Provided     Education provided: 11/21/2022: added home exercise program to my chart    Written Home Exercises Provided: Patient instructed to cont prior HEP. Exercises were reviewed and Leticia was able to demonstrate them prior to the end of the session.  Leticia demonstrated good  understanding of the education provided. See EMR under Patient Instructions for exercises provided during therapy sessions    ASSESSMENT     Patient did well with session today with minimal complaints of discomfort. Less cardiac fatigue noted with nu step. Patient with tenderness and tightness in left iliotibial band which remained tight during manual therapy. Patient left session with soreness after manual therapy.      Leticia Is progressing well towards her goals.   Pt prognosis is Good.     Pt will continue to benefit from skilled outpatient physical therapy to address the deficits listed in the problem list box on initial evaluation, provide pt/family education and to maximize pt's level of independence in the home and community environment.     Pt's spiritual, cultural and educational needs considered and pt agreeable to plan of care and goals.     Anticipated barriers to physical therapy: osteopenia, HTN, anxiety, defibrillator placement, MI, left total shoulder arthroplasty    Goals: Reviewed:11/15/2022    Short Term Goals: In 4 weeks   1.Patient to be educated on HEP.  2. Patient  to increase lumbar ROM to WFL's without pain, in order to improve available range of motion for ADL's.    3. Patient  to increase hip AROM to ER 70 deg, in order to assist with more normalized gait pattern.  4. Patient  to increase B LE strength to 4-/5, in order to perform gait without compensation  5. Patient   to have pain less than 2/10 at worst, to improve QOL.  6. Patient  to improve score on the FOTO, to improve QOL.  7. Patient  to be  educated on postural/body mechanics awareness.     Long Term Goals: In 8 weeks  1.Patient to improve score on the FOTO to improve QOL.  2. Patient to demo increase in LE strength to 4/5, n order to perform gait without compensation  3. Patient to have decreased pain to 2/10 at worst, to improve QOL.  4. Patient to demo increase lumbar ROM to B Rot 100% and left sidebending 30 deg, in order to improve available range of motion for ADL's.  .  5. Patient to increase hip AROM to ER 70 deg, in order to assist with more normalized gait pattern.  6. Patient to perform daily activities including sleeping, ascending/descending stairs, squatting without increased symptoms.    PLAN     Plan of care Certification: 11/15/2022 to 2/15/2023.    Continue Plan of Care (POC) and progress per patient tolerance.     Isaac Morel, PTA

## 2022-11-29 ENCOUNTER — OFFICE VISIT (OUTPATIENT)
Dept: NEUROLOGY | Facility: CLINIC | Age: 77
End: 2022-11-29
Payer: MEDICARE

## 2022-11-29 ENCOUNTER — TELEPHONE (OUTPATIENT)
Dept: PAIN MEDICINE | Facility: CLINIC | Age: 77
End: 2022-11-29
Payer: MEDICARE

## 2022-11-29 VITALS
DIASTOLIC BLOOD PRESSURE: 70 MMHG | WEIGHT: 164 LBS | OXYGEN SATURATION: 99 % | SYSTOLIC BLOOD PRESSURE: 149 MMHG | HEIGHT: 61 IN | BODY MASS INDEX: 30.96 KG/M2 | RESPIRATION RATE: 16 BRPM | HEART RATE: 60 BPM

## 2022-11-29 DIAGNOSIS — F44.0 DISSOCIATIVE AMNESIA: Chronic | ICD-10-CM

## 2022-11-29 DIAGNOSIS — F41.9 ANXIETY: Chronic | ICD-10-CM

## 2022-11-29 DIAGNOSIS — E55.9 MILD VITAMIN D DEFICIENCY: Chronic | ICD-10-CM

## 2022-11-29 DIAGNOSIS — F51.02 ADJUSTMENT INSOMNIA: Chronic | ICD-10-CM

## 2022-11-29 DIAGNOSIS — R26.9 GAIT ABNORMALITY: ICD-10-CM

## 2022-11-29 DIAGNOSIS — G56.03 BILATERAL CARPAL TUNNEL SYNDROME: ICD-10-CM

## 2022-11-29 DIAGNOSIS — M54.42 CHRONIC BILATERAL LOW BACK PAIN WITH LEFT-SIDED SCIATICA: ICD-10-CM

## 2022-11-29 DIAGNOSIS — E78.2 MIXED HYPERLIPIDEMIA: Chronic | ICD-10-CM

## 2022-11-29 DIAGNOSIS — K21.9 GASTROESOPHAGEAL REFLUX DISEASE WITHOUT ESOPHAGITIS: ICD-10-CM

## 2022-11-29 DIAGNOSIS — Z95.810 ICD (IMPLANTABLE CARDIOVERTER-DEFIBRILLATOR) IN PLACE: Chronic | ICD-10-CM

## 2022-11-29 DIAGNOSIS — M25.552 LEFT HIP PAIN: ICD-10-CM

## 2022-11-29 DIAGNOSIS — M54.17 LUMBOSACRAL RADICULOPATHY AT S1: ICD-10-CM

## 2022-11-29 DIAGNOSIS — R53.1 DECREASED STRENGTH, ENDURANCE, AND MOBILITY: ICD-10-CM

## 2022-11-29 DIAGNOSIS — I10 ESSENTIAL HYPERTENSION: Chronic | ICD-10-CM

## 2022-11-29 DIAGNOSIS — Z78.9 STATIN INTOLERANCE: Chronic | ICD-10-CM

## 2022-11-29 DIAGNOSIS — Z96.612 PRESENCE OF ARTIFICIAL SHOULDER JOINT, LEFT: ICD-10-CM

## 2022-11-29 DIAGNOSIS — M85.80 OSTEOPENIA, UNSPECIFIED LOCATION: Chronic | ICD-10-CM

## 2022-11-29 DIAGNOSIS — R68.89 DECREASED STRENGTH, ENDURANCE, AND MOBILITY: ICD-10-CM

## 2022-11-29 DIAGNOSIS — G89.29 CHRONIC BILATERAL LOW BACK PAIN WITH LEFT-SIDED SCIATICA: ICD-10-CM

## 2022-11-29 DIAGNOSIS — R20.2 PARESTHESIA OF BOTH HANDS: ICD-10-CM

## 2022-11-29 DIAGNOSIS — Z74.09 DECREASED STRENGTH, ENDURANCE, AND MOBILITY: ICD-10-CM

## 2022-11-29 DIAGNOSIS — R29.90 MULTIPLE NEUROLOGICAL SYMPTOMS: Primary | ICD-10-CM

## 2022-11-29 DIAGNOSIS — Z86.74 HISTORY OF SUDDEN CARDIAC ARREST SUCCESSFULLY RESUSCITATED: Chronic | ICD-10-CM

## 2022-11-29 PROBLEM — Z47.1 AFTERCARE FOLLOWING LEFT SHOULDER JOINT REPLACEMENT SURGERY: Status: RESOLVED | Noted: 2022-05-12 | Resolved: 2022-11-29

## 2022-11-29 PROBLEM — M62.512 MUSCLE WASTING AND ATROPHY, NOT ELSEWHERE CLASSIFIED, LEFT SHOULDER: Status: RESOLVED | Noted: 2022-05-12 | Resolved: 2022-11-29

## 2022-11-29 PROBLEM — M54.50 CHRONIC BILATERAL LOW BACK PAIN WITHOUT SCIATICA: Status: RESOLVED | Noted: 2022-11-17 | Resolved: 2022-11-29

## 2022-11-29 PROCEDURE — 1159F MED LIST DOCD IN RCRD: CPT | Mod: CPTII,S$GLB,, | Performed by: PSYCHIATRY & NEUROLOGY

## 2022-11-29 PROCEDURE — 3078F DIAST BP <80 MM HG: CPT | Mod: CPTII,S$GLB,, | Performed by: PSYCHIATRY & NEUROLOGY

## 2022-11-29 PROCEDURE — 1125F AMNT PAIN NOTED PAIN PRSNT: CPT | Mod: CPTII,S$GLB,, | Performed by: PSYCHIATRY & NEUROLOGY

## 2022-11-29 PROCEDURE — 3288F FALL RISK ASSESSMENT DOCD: CPT | Mod: CPTII,S$GLB,, | Performed by: PSYCHIATRY & NEUROLOGY

## 2022-11-29 PROCEDURE — 3288F PR FALLS RISK ASSESSMENT DOCUMENTED: ICD-10-PCS | Mod: CPTII,S$GLB,, | Performed by: PSYCHIATRY & NEUROLOGY

## 2022-11-29 PROCEDURE — 3077F PR MOST RECENT SYSTOLIC BLOOD PRESSURE >= 140 MM HG: ICD-10-PCS | Mod: CPTII,S$GLB,, | Performed by: PSYCHIATRY & NEUROLOGY

## 2022-11-29 PROCEDURE — 99499 RISK ADDL DX/OHS AUDIT: ICD-10-PCS | Mod: S$GLB,,, | Performed by: PSYCHIATRY & NEUROLOGY

## 2022-11-29 PROCEDURE — 99499 UNLISTED E&M SERVICE: CPT | Mod: S$GLB,,, | Performed by: PSYCHIATRY & NEUROLOGY

## 2022-11-29 PROCEDURE — 3078F PR MOST RECENT DIASTOLIC BLOOD PRESSURE < 80 MM HG: ICD-10-PCS | Mod: CPTII,S$GLB,, | Performed by: PSYCHIATRY & NEUROLOGY

## 2022-11-29 PROCEDURE — 99205 PR OFFICE/OUTPT VISIT, NEW, LEVL V, 60-74 MIN: ICD-10-PCS | Mod: S$GLB,,, | Performed by: PSYCHIATRY & NEUROLOGY

## 2022-11-29 PROCEDURE — 99999 PR PBB SHADOW E&M-EST. PATIENT-LVL V: CPT | Mod: PBBFAC,,, | Performed by: PSYCHIATRY & NEUROLOGY

## 2022-11-29 PROCEDURE — 1159F PR MEDICATION LIST DOCUMENTED IN MEDICAL RECORD: ICD-10-PCS | Mod: CPTII,S$GLB,, | Performed by: PSYCHIATRY & NEUROLOGY

## 2022-11-29 PROCEDURE — 1101F PT FALLS ASSESS-DOCD LE1/YR: CPT | Mod: CPTII,S$GLB,, | Performed by: PSYCHIATRY & NEUROLOGY

## 2022-11-29 PROCEDURE — 99999 PR PBB SHADOW E&M-EST. PATIENT-LVL V: ICD-10-PCS | Mod: PBBFAC,,, | Performed by: PSYCHIATRY & NEUROLOGY

## 2022-11-29 PROCEDURE — 99205 OFFICE O/P NEW HI 60 MIN: CPT | Mod: S$GLB,,, | Performed by: PSYCHIATRY & NEUROLOGY

## 2022-11-29 PROCEDURE — 1125F PR PAIN SEVERITY QUANTIFIED, PAIN PRESENT: ICD-10-PCS | Mod: CPTII,S$GLB,, | Performed by: PSYCHIATRY & NEUROLOGY

## 2022-11-29 PROCEDURE — 1101F PR PT FALLS ASSESS DOC 0-1 FALLS W/OUT INJ PAST YR: ICD-10-PCS | Mod: CPTII,S$GLB,, | Performed by: PSYCHIATRY & NEUROLOGY

## 2022-11-29 PROCEDURE — 3077F SYST BP >= 140 MM HG: CPT | Mod: CPTII,S$GLB,, | Performed by: PSYCHIATRY & NEUROLOGY

## 2022-11-29 RX ORDER — BACLOFEN 10 MG/1
10 TABLET ORAL NIGHTLY PRN
Qty: 31 TABLET | Refills: 2 | Status: SHIPPED | OUTPATIENT
Start: 2022-11-29 | End: 2023-06-07

## 2022-11-29 NOTE — PROGRESS NOTES
Subjective:       Patient ID: Leticia Grimes is a 77 y.o. female.    Chief Complaint: No chief complaint on file.          HPI      The patient describes paroxysmal bilateral (mostly RT side) hand numbness, tingling and pain since . The numbness and tingling involve all the fingers. The symptoms do wake the patient up from sleep. The patient denies  weakness. No muscle wasting. No neck pain with radiation to the hands.  No similar symptoms in the feet. No history of trauma.  No history of diabetes. No history of hypothyroidism. No history of rheumatoid arthritis.  No history of amyloidosis or kidney disease.     The patient started experiencing severe LT-sided low back pain in . The pain is sharp, 6-8/10 and radiated to the LT lower extremity. It radiates to back of the leg and the sole. The patient is having difficulty walking due to the pain. Tylenol, NSAIDs and GBP failed to alleviate the pain. The patient stated PT recently with some relief. No fixed weakness. The patient denies persistent numbness in the distribution of pain radiation. No bowel or bladder incontinence. No trauma.      Review of Systems   Constitutional:  Negative for appetite change and fatigue.   HENT:  Negative for hearing loss and tinnitus.    Eyes:  Negative for photophobia and visual disturbance.   Respiratory:  Negative for apnea and shortness of breath.    Cardiovascular:  Negative for chest pain and palpitations.   Gastrointestinal:  Negative for nausea and vomiting.   Endocrine: Negative for cold intolerance and heat intolerance.   Genitourinary:  Negative for difficulty urinating and urgency.   Musculoskeletal:  Positive for arthralgias and back pain. Negative for gait problem, joint swelling, myalgias, neck pain and neck stiffness.   Skin:  Negative for color change and rash.   Allergic/Immunologic: Negative for environmental allergies and immunocompromised state.   Neurological:  Positive for numbness. Negative  for dizziness, tremors, seizures, syncope, facial asymmetry, speech difficulty, weakness, light-headedness and headaches.   Hematological:  Negative for adenopathy. Does not bruise/bleed easily.   Psychiatric/Behavioral:  Positive for sleep disturbance. Negative for agitation, behavioral problems, confusion, decreased concentration, dysphoric mood, hallucinations, self-injury and suicidal ideas. The patient is nervous/anxious. The patient is not hyperactive.                Current Outpatient Medications:     acetaminophen (TYLENOL) 500 MG tablet, Take 2 tablets (1,000 mg total) by mouth every 8 (eight) hours as needed for Pain., Disp: 60 tablet, Rfl: 0    alpha lipoic acid 200 mg Cap, Take 1 capsule by mouth once daily., Disp: , Rfl:     amLODIPine (NORVASC) 5 MG tablet, Take 1 tablet (5 mg total) by mouth once daily., Disp: 90 tablet, Rfl: 3    aspirin (ECOTRIN) 81 MG EC tablet, Take 1 tablet (81 mg total) by mouth once daily., Disp: , Rfl: 0    carvediloL (COREG) 12.5 MG tablet, Take 1 tablet (12.5 mg total) by mouth 2 (two) times daily with meals., Disp: 180 tablet, Rfl: 3    cloNIDine (CATAPRES) 0.1 MG tablet, Take 0.1 mg by mouth as needed. If SBP > 200 mmHg, Disp: , Rfl:     gabapentin (NEURONTIN) 400 MG capsule, Take 1 capsule (400 mg total) by mouth every evening., Disp: 90 capsule, Rfl: 3    lisinopriL (PRINIVIL,ZESTRIL) 20 MG tablet, Take 1 tablet (20 mg total) by mouth once daily., Disp: 90 tablet, Rfl: 3    mv-mn/folic/lutein/herbal 293 (ALIVE WOMEN'S 50 PLUS ORAL), Take 1 tablet by mouth once daily., Disp: , Rfl:     omega 3-dha-epa-fish oil (FISH OIL) 1,000 mg (120 mg-180 mg) Cap, Take 1 capsule by mouth 2 (two) times daily with meals., Disp: 180 capsule, Rfl: 3    pneumoc 20-hiwot conj-dip cr,PF, (PREVNAR-20, PF,) 0.5 mL Syrg injection, Inject 0.5 mLs into the muscle., Disp: 0.5 mL, Rfl: 0  Past Medical History:   Diagnosis Date    Adjustment insomnia 9/1/2020    Anxiety 9/1/2020    GERD  (gastroesophageal reflux disease)     History of sudden cardiac arrest successfully resuscitated 2020    Hypertension     ICD (implantable cardioverter-defibrillator) in place 2020    Left arm swelling 9/3/2020    Mild vitamin D deficiency 2013    Osteopenia 2020    Vitamin D deficiency disease      Past Surgical History:   Procedure Laterality Date     SECTION, CLASSIC      HYSTERECTOMY      INJECTION OF JOINT Left 2021    Procedure: Left shoulder Joint injection;  Surgeon: Main Mcgraw MD;  Location: [facility];  Service: Pain Management;  Laterality: Left;    INSERTION OF BIVENTRICULAR IMPLANTABLE CARDIOVERTER-DEFIBRILLATOR (ICD)      REVERSE TOTAL SHOULDER ARTHROPLASTY Left 2022    Procedure: ARTHROPLASTY, SHOULDER, TOTAL, REVERSE;  Surgeon: Tomas Taylor MD;  Location: [facility];  Service: Orthopedics;  Laterality: Left;    TUBAL LIGATION       Social History     Socioeconomic History    Marital status:    Occupational History    Occupation: retired school counselor   Tobacco Use    Smoking status: Never    Smokeless tobacco: Never   Substance and Sexual Activity    Alcohol use: No    Drug use: No    Sexual activity: Not Currently     Partners: Male     Birth control/protection: Abstinence, None     Social Determinants of Health     Financial Resource Strain: Low Risk     Difficulty of Paying Living Expenses: Not hard at all   Food Insecurity: No Food Insecurity    Worried About Running Out of Food in the Last Year: Never true    Ran Out of Food in the Last Year: Never true   Transportation Needs: No Transportation Needs    Lack of Transportation (Medical): No    Lack of Transportation (Non-Medical): No   Physical Activity: Inactive    Days of Exercise per Week: 0 days    Minutes of Exercise per Session: 0 min   Stress: No Stress Concern Present    Feeling of Stress : Only a little   Social Connections: Moderately Isolated    Frequency of  Communication with Friends and Family: More than three times a week    Frequency of Social Gatherings with Friends and Family: More than three times a week    Attends [employer] Services: 1 to 4 times per year    Active Member of Clubs or Organizations: No    Attends Club or Organization Meetings: Never    Marital Status:    Housing Stability: Unknown    Unable to Pay for Housing in the Last Year: No    Unstable Housing in the Last Year: No             Past/Current Medical/Surgical History, Past/Current Social History, Past/Current Family History and Past/Current Medications were reviewed in detail.        Objective:           VITAL SIGNS WERE REVIEWED      GENERAL APPEARANCE:     The patient looks comfortable.    BMI 30.99     No signs of respiratory distress.    Normal breathing pattern.    No dysmorphic features    Normal eye contact.     GENERAL MEDICAL EXAM:    HEENT:  Head is atraumatic normocephalic.     Fundoscopic (Ophthalmoscopic) exam showed no disc edema.      Neck and Axillae: No JVD. No visible lesions.    Cardiopulmonary: No cyanosis. No tachypnea. Normal respiratory effort. AICD.     Gastrointestinal/Urogenital:  No jaundice. No stomas or lesions. No visible hernias. No catheters.     Skin, Hair and Nails: No pathognonomic skin rash. No neurofibromatosis. No visible lesions.No stigmata of autoimmune disease. No clubbing.    Limbs: No varicose veins. No visible swelling.    Muskoskeletal: No visible deformities.No visible lesions.           Neurologic Exam     Mental Status   Oriented to person, place, and time.   Follows 3 step commands.   Attention: normal. Concentration: normal.   Speech: speech is normal   Level of consciousness: alert  Able to name object. Able to repeat. Normal comprehension.     Cranial Nerves   Cranial nerves II through XII intact.     CN II   Visual fields full to confrontation.   Visual acuity: normal  Right visual field deficit: none  Left visual field deficit: none      CN III, IV, VI   Pupils are equal, round, and reactive to light.  Extraocular motions are normal.   Right pupil: Size: 2 mm. Shape: regular. Reactivity: brisk. Consensual response: intact. Accommodation: intact.   Left pupil: Size: 2 mm. Shape: regular. Reactivity: brisk. Consensual response: intact. Accommodation: intact.   CN III: no CN III palsy  CN VI: no CN VI palsy  Nystagmus: none   Diplopia: none  Ophthalmoparesis: none  Upgaze: normal  Downgaze: normal  Conjugate gaze: present  Vestibulo-ocular reflex: present    CN V   Facial sensation intact.   Right facial sensation deficit: none  Left facial sensation deficit: none  Jaw jerk: normal    CN VII   Facial expression full, symmetric.   Right facial weakness: none  Left facial weakness: none    CN VIII   CN VIII normal.   Hearing: intact    CN IX, X   CN IX normal.   CN X normal.   Palate: symmetric    CN XI   CN XI normal.   Right sternocleidomastoid strength: normal  Left sternocleidomastoid strength: normal  Right trapezius strength: normal  Left trapezius strength: normal    CN XII   CN XII normal.   Tongue: not atrophic  Fasciculations: absent  Tongue deviation: none    Motor Exam   Muscle bulk: normal  Overall muscle tone: normal  Right arm tone: normal  Left arm tone: normal  Right arm pronator drift: absent  Left arm pronator drift: absent  Right leg tone: normal  Left leg tone: normal    Strength   Strength 5/5 throughout.   Right neck flexion: 5/5  Left neck flexion: 5/5  Right neck extension: 5/5  Left neck extension: 5/5  Right deltoid: 5/5  Left deltoid: 5/5  Right biceps: 5/5  Left biceps: 5/5  Right triceps: 5/5  Left triceps: 5/5  Right wrist flexion: 5/5  Left wrist flexion: 5/5  Right wrist extension: 5/5  Left wrist extension: 5/5  Right interossei: 5/5  Left interossei: 5/5  Right iliopsoas: 5/5  Left iliopsoas: 5/5  Right quadriceps: 5/5  Left quadriceps: 5/5  Right hamstrin/5  Left hamstrin/5  Right glutei: 5/5  Left glutei:  5/5  Right anterior tibial: 5/5  Left anterior tibial: 5/5  Right posterior tibial: 5/5  Left posterior tibial: 5/5  Right peroneal: 5/5  Left peroneal: 5/5  Right gastroc: 5/5  Left gastroc: 5/5    Sensory Exam   Light touch normal.   Right arm light touch: normal  Left arm light touch: normal  Right leg light touch: normal  Left leg light touch: normal  Vibration normal.   Right arm vibration: normal  Left arm vibration: normal  Right leg vibration: normal  Left leg vibration: normal  Proprioception normal.   Right arm proprioception: normal  Left arm proprioception: normal  Right leg proprioception: normal  Left leg proprioception: normal  Pinprick normal.   Right arm pinprick: normal  Left arm pinprick: normal  Right leg pinprick: normal  Left leg pinprick: normal  Graphesthesia: normal  Stereognosis: normal    Gait, Coordination, and Reflexes     Gait  Gait: normal    Coordination   Romberg: negative  Finger to nose coordination: normal  Heel to shin coordination: normal  Tandem walking coordination: normal    Tremor   Resting tremor: absent  Intention tremor: absent  Action tremor: absent    Reflexes   Right brachioradialis: 2+  Left brachioradialis: 2+  Right biceps: 2+  Left biceps: 2+  Right triceps: 2+  Left triceps: 2+  Right patellar: 2+  Left patellar: 2+  Right achilles: 2+  Left achilles: 2+  Right plantar: normal  Left plantar: normal  Right Reyes: absent  Left Reyes: absent  Right ankle clonus: absent  Left ankle clonus: absent  Right pendular knee jerk: absent  Left pendular knee jerk: absent    Lab Results   Component Value Date    WBC 5.54 05/05/2022    HGB 13.1 05/05/2022    HCT 38.7 05/05/2022    MCV 83 05/05/2022     05/05/2022     Sodium   Date Value Ref Range Status   10/20/2022 141 136 - 145 mmol/L Final     Potassium   Date Value Ref Range Status   10/20/2022 4.7 3.5 - 5.1 mmol/L Final     Chloride   Date Value Ref Range Status   10/20/2022 108 95 - 110 mmol/L Final     CO2    Date Value Ref Range Status   10/20/2022 28 23 - 29 mmol/L Final     Glucose   Date Value Ref Range Status   10/20/2022 94 70 - 110 mg/dL Final     BUN   Date Value Ref Range Status   10/20/2022 11 8 - 23 mg/dL Final     Creatinine   Date Value Ref Range Status   10/20/2022 0.9 0.5 - 1.4 mg/dL Final     Calcium   Date Value Ref Range Status   10/20/2022 10.2 8.7 - 10.5 mg/dL Final     Total Protein   Date Value Ref Range Status   05/11/2022 6.9 6.0 - 8.4 g/dL Final     Albumin   Date Value Ref Range Status   10/20/2022 3.5 3.5 - 5.2 g/dL Final     Total Bilirubin   Date Value Ref Range Status   05/11/2022 0.6 0.1 - 1.0 mg/dL Final     Comment:     For infants and newborns, interpretation of results should be based  on gestational age, weight and in agreement with clinical  observations.    Premature Infant recommended reference ranges:  Up to 24 hours.............<8.0 mg/dL  Up to 48 hours............<12.0 mg/dL  3-5 days..................<15.0 mg/dL  6-29 days.................<15.0 mg/dL       Alkaline Phosphatase   Date Value Ref Range Status   05/11/2022 67 55 - 135 U/L Final     AST   Date Value Ref Range Status   05/11/2022 22 10 - 40 U/L Final     ALT   Date Value Ref Range Status   05/11/2022 24 10 - 44 U/L Final     Anion Gap   Date Value Ref Range Status   10/20/2022 5 (L) 8 - 16 mmol/L Final     eGFR if    Date Value Ref Range Status   05/11/2022 >60.0 >60 mL/min/1.73 m^2 Final     eGFR if non    Date Value Ref Range Status   05/11/2022 54.9 (A) >60 mL/min/1.73 m^2 Final     Comment:     Calculation used to obtain the estimated glomerular filtration  rate (eGFR) is the CKD-EPI equation.            Lab Results   Component Value Date    TSH 1.170 10/20/2022         RADIOLOGY EVALUATION    11-     CT L-Spine L4-L5, L5-S1 DDD and B/I Sacroiliitis            NEUROPHYSIOLOGY EVALUATION       12-    NCS/EMG BUE Mild-Moderate RT CTS.      Reviewed the neuroimaging  independently       Assessment:       1. Multiple neurological symptoms    2. Essential hypertension    3. Mild vitamin D deficiency    4. ICD (implantable cardioverter-defibrillator) in place    5. History of sudden cardiac arrest successfully resuscitated    6. Osteopenia, unspecified location    7. Adjustment insomnia    8. Anxiety    9. Mixed hyperlipidemia    10. 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 Gastroesophageal reflux disease without esophagitis    13. Decreased strength, endurance, and mobility    14. Presence of artificial shoulder joint, left    15. Chronic bilateral low back pain with left-sided sciatica    16. Gait abnormality    17. Left hip pain    18. Lumbosacral radiculopathy at S1    19. Paresthesia of both hands    20. Bilateral carpal tunnel syndrome          Plan:           HAND PAIN AND PARESTHESIAS, CARPAL TUNNEL SYNDROMES (CTS)        NCS/EMG BUE.    CTS Exercises.    CTS Brace.          LEFT S1 RADICULOPATHY       Baclofen 10 mg QHS PRN.    D/C GBP.    Continue PT.    Pain Management evaluation.     Avoid heavy lifting and strenuous exercises and sudden changes in position.     Sleep on hard mattress.    Improve posture.     Call 911 for sudden severe pain, sudden weakness or sudden numbness and sudden bowel or bladder incontinence.       MEDICAL/SURGICAL COMORBIDITIES     All relevant medical comorbidities noted and managed by primary care physician and medical care team.          HEALTHY LIFESTYLE AND PREVENTATIVE CARE    The patient to adhere to the age-appropriate health maintenance guidelines including screening tests and vaccinations. The patient to adhere to  healthy lifestyle, optimal weight, exercise, healthy diet, good sleep hygiene and avoiding drugs including smoking, alcohol and recreational drugs.          Theresa Leonard MD, FAAN    Attending Neurologist/Epileptologist         Diplomate, American Board of Psychiatry and Neurology    Diplomate, American Board of  Clinical Neurophysiology     Fellow, American Academy of Neurology             I spent a total of 60 minutes on the day of the visit.  This includes face to face time and non-face to face time preparing to see the patient (eg, review of tests), obtaining and/or reviewing separately obtained history, documenting clinical information in the electronic or other health record, independently interpreting results and communicating results to the patient/family/caregiver, or care coordinator.

## 2022-11-30 ENCOUNTER — TELEPHONE (OUTPATIENT)
Dept: PAIN MEDICINE | Facility: CLINIC | Age: 77
End: 2022-11-30
Payer: MEDICARE

## 2022-11-30 NOTE — TELEPHONE ENCOUNTER
Reached out to patient to schedule appointment from messages. Apt has been made.   Pt understand. All questions answered.     Bryce Springer  Medical Assistant

## 2022-12-05 ENCOUNTER — CLINICAL SUPPORT (OUTPATIENT)
Dept: REHABILITATION | Facility: HOSPITAL | Age: 77
End: 2022-12-05
Payer: MEDICARE

## 2022-12-05 ENCOUNTER — OFFICE VISIT (OUTPATIENT)
Dept: OPHTHALMOLOGY | Facility: CLINIC | Age: 77
End: 2022-12-05
Payer: MEDICARE

## 2022-12-05 ENCOUNTER — TELEPHONE (OUTPATIENT)
Dept: INTERNAL MEDICINE | Facility: CLINIC | Age: 77
End: 2022-12-05
Payer: MEDICARE

## 2022-12-05 DIAGNOSIS — H52.03 HYPEROPIA OF BOTH EYES WITH REGULAR ASTIGMATISM AND PRESBYOPIA: ICD-10-CM

## 2022-12-05 DIAGNOSIS — R26.9 GAIT ABNORMALITY: Primary | ICD-10-CM

## 2022-12-05 DIAGNOSIS — R68.89 DECREASED STRENGTH, ENDURANCE, AND MOBILITY: ICD-10-CM

## 2022-12-05 DIAGNOSIS — M25.552 LEFT HIP PAIN: ICD-10-CM

## 2022-12-05 DIAGNOSIS — R53.1 DECREASED STRENGTH, ENDURANCE, AND MOBILITY: ICD-10-CM

## 2022-12-05 DIAGNOSIS — H52.4 HYPEROPIA OF BOTH EYES WITH REGULAR ASTIGMATISM AND PRESBYOPIA: ICD-10-CM

## 2022-12-05 DIAGNOSIS — Z74.09 DECREASED STRENGTH, ENDURANCE, AND MOBILITY: ICD-10-CM

## 2022-12-05 DIAGNOSIS — Z12.31 ENCOUNTER FOR SCREENING MAMMOGRAM FOR MALIGNANT NEOPLASM OF BREAST: Primary | ICD-10-CM

## 2022-12-05 DIAGNOSIS — H25.13 NUCLEAR SCLEROTIC CATARACT OF BOTH EYES: Primary | ICD-10-CM

## 2022-12-05 DIAGNOSIS — H52.223 HYPEROPIA OF BOTH EYES WITH REGULAR ASTIGMATISM AND PRESBYOPIA: ICD-10-CM

## 2022-12-05 PROCEDURE — 99999 PR PBB SHADOW E&M-EST. PATIENT-LVL III: ICD-10-PCS | Mod: PBBFAC,,, | Performed by: OPTOMETRIST

## 2022-12-05 PROCEDURE — 1126F PR PAIN SEVERITY QUANTIFIED, NO PAIN PRESENT: ICD-10-PCS | Mod: CPTII,S$GLB,, | Performed by: OPTOMETRIST

## 2022-12-05 PROCEDURE — 1160F RVW MEDS BY RX/DR IN RCRD: CPT | Mod: CPTII,S$GLB,, | Performed by: OPTOMETRIST

## 2022-12-05 PROCEDURE — 97140 MANUAL THERAPY 1/> REGIONS: CPT

## 2022-12-05 PROCEDURE — 1126F AMNT PAIN NOTED NONE PRSNT: CPT | Mod: CPTII,S$GLB,, | Performed by: OPTOMETRIST

## 2022-12-05 PROCEDURE — 99213 PR OFFICE/OUTPT VISIT, EST, LEVL III, 20-29 MIN: ICD-10-PCS | Mod: S$GLB,,, | Performed by: OPTOMETRIST

## 2022-12-05 PROCEDURE — 1159F MED LIST DOCD IN RCRD: CPT | Mod: CPTII,S$GLB,, | Performed by: OPTOMETRIST

## 2022-12-05 PROCEDURE — 99213 OFFICE O/P EST LOW 20 MIN: CPT | Mod: S$GLB,,, | Performed by: OPTOMETRIST

## 2022-12-05 PROCEDURE — 1160F PR REVIEW ALL MEDS BY PRESCRIBER/CLIN PHARMACIST DOCUMENTED: ICD-10-PCS | Mod: CPTII,S$GLB,, | Performed by: OPTOMETRIST

## 2022-12-05 PROCEDURE — 97110 THERAPEUTIC EXERCISES: CPT

## 2022-12-05 PROCEDURE — 97112 NEUROMUSCULAR REEDUCATION: CPT

## 2022-12-05 PROCEDURE — 1159F PR MEDICATION LIST DOCUMENTED IN MEDICAL RECORD: ICD-10-PCS | Mod: CPTII,S$GLB,, | Performed by: OPTOMETRIST

## 2022-12-05 PROCEDURE — 99999 PR PBB SHADOW E&M-EST. PATIENT-LVL III: CPT | Mod: PBBFAC,,, | Performed by: OPTOMETRIST

## 2022-12-05 NOTE — PROGRESS NOTES
[facility]   Physical Therapy Treatment Note     Name: Leticia Grimes  Clinic Number: [id number]    Therapy Diagnosis:   Encounter Diagnoses   Name Primary?    Gait abnormality Yes    Left hip pain     Decreased strength, endurance, and mobility          Physician: Ben Marie MD    Visit Date: 12/5/2022    Physician Orders: PT Eval and Treat   Medical Diagnosis from Referral:    Chronic bilateral low back pain with left-sided sciatica      Evaluation Date: 11/15/2022  Authorization Period Expiration: 12/30/2022  Plan of Care Expiration: 2/15/2023  Progress Note Due: 12/15/2022  Visit # / Visits authorized: 4/12  FOTO: 1/ 3      Precautions: Standard    PTA Visit #: 2/5     Time In: 10:15 am  Time Out: 11:00 am  Total Billable Time: 39  minutes    SUBJECTIVE     Pt reports: no pain today in her lower back, has been having pain in her left leg at night.  And it is stiff in her leg in the morning. Pain is worse if she lays on left side. Suggested pillow between knees.    She was compliant with home exercise program.    Response to previous treatment: felt good after manual therapy    Functional change: able to lay on her left side for a little while    Pain: 0/10, at worst at night 8/10  Location: low back/left lower extremity    OBJECTIVE     Objective Measures updated at progress report unless specified.     Treatment     Leticia received the treatments listed below:      therapeutic exercises to develop strength, endurance, ROM, flexibility, posture, and core stabilization for 17  minutes including:    Nu step with handles for endurance level 1, 7 minutes  Sidelying hip abduction   x10 each bilateral  Lower trunk rotations 3 minutes  Piriformis stretch 3 x 30 second each  Sciatic neural glide 5x ankle pumps, 5 sets     manual therapy techniques: Myofacial release and Soft tissue Mobilization were applied to the: left hip for 10 minutes, including:  Soft tissue mobilization to  gluteals, piriformis, tensor fasciae latae and obturators/gemelli muscle's.  Muscle energy techniques for + SI provocation    Leticia participated in neuromuscular re-education activities to improve: Balance, Coordination, Proprioception, and Posture for 12 minutes. The following activities were included:    Bridges with abdominal brace 3x10  Supine hip abduction with green band 3 minutes  Supine ball squeezes 3 minutes; 5 second holds  Sit to stands 10x/ 2 sets no hands (add weight next visit)  Standing hip abduction 10x each    Patient Education and Home Exercises     Home Exercises Provided and Patient Education Provided     Education provided: 11/21/2022: added home exercise program to my chart    Written Home Exercises Provided: Patient instructed to cont prior HEP. Exercises were reviewed and Leticia was able to demonstrate them prior to the end of the session.  Leticia demonstrated good  understanding of the education provided. See EMR under Patient Instructions for exercises provided during therapy sessions    ASSESSMENT     Patient tolerated all treatment well and was able to progress with therapeutic exercise and neuromuscular re-education. She requires extensive cues for therapeutic exercise and neuromuscular re-education today.    Leticia Is progressing well towards her goals.   Pt prognosis is Good.     Pt will continue to benefit from skilled outpatient physical therapy to address the deficits listed in the problem list box on initial evaluation, provide pt/family education and to maximize pt's level of independence in the home and community environment.     Pt's spiritual, cultural and educational needs considered and pt agreeable to plan of care and goals.     Anticipated barriers to physical therapy: osteopenia, HTN, anxiety, defibrillator placement, MI, left total shoulder arthroplasty    Goals:   Reviewed: 12/5/2022     Short Term Goals: In 4 weeks   1.Patient to be educated on HEP.  2. Patient  to  increase lumbar ROM to WFL's without pain, in order to improve available range of motion for ADL's.    3. Patient  to increase hip AROM to ER 70 deg, in order to assist with more normalized gait pattern.  4. Patient  to increase B LE strength to 4-/5, in order to perform gait without compensation  5. Patient   to have pain less than 2/10 at worst, to improve QOL.  6. Patient  to improve score on the FOTO, to improve QOL.  7. Patient  to be educated on postural/body mechanics awareness.     Long Term Goals: In 8 weeks  1.Patient to improve score on the FOTO to improve QOL.  2. Patient to demo increase in LE strength to 4/5, n order to perform gait without compensation  3. Patient to have decreased pain to 2/10 at worst, to improve QOL.  4. Patient to demo increase lumbar ROM to B Rot 100% and left sidebending 30 deg, in order to improve available range of motion for ADL's.  .  5. Patient to increase hip AROM to ER 70 deg, in order to assist with more normalized gait pattern.  6. Patient to perform daily activities including sleeping, ascending/descending stairs, squatting without increased symptoms.    PLAN     Plan of care Certification: 11/15/2022 to 2/15/2023.    Continue Plan of Care (POC) and progress per patient tolerance.     Elizabeth Garcia, PT

## 2022-12-05 NOTE — TELEPHONE ENCOUNTER
----- Message from Josselyn Dixon sent at 12/5/2022  9:02 AM CST -----  Contact: Leticia  Type:  Mammogram    Caller is requesting to schedule their annual mammogram appointment.  Order is not listed in EPIC.  Please enter order and contact patient to schedule.  Name of Caller:Leticia   Where would they like the mammogram performed?[facility]   Would the patient rather a call back or a response via [facility]? Call back   Best Call Back Number:[phone]  Additional Information: Fax Number:[phone] , Phone number [phone]    Thanks  CF

## 2022-12-09 ENCOUNTER — CLINICAL SUPPORT (OUTPATIENT)
Dept: REHABILITATION | Facility: HOSPITAL | Age: 77
End: 2022-12-09
Payer: MEDICARE

## 2022-12-09 DIAGNOSIS — R26.9 GAIT ABNORMALITY: Primary | ICD-10-CM

## 2022-12-09 DIAGNOSIS — R53.1 DECREASED STRENGTH, ENDURANCE, AND MOBILITY: ICD-10-CM

## 2022-12-09 DIAGNOSIS — R68.89 DECREASED STRENGTH, ENDURANCE, AND MOBILITY: ICD-10-CM

## 2022-12-09 DIAGNOSIS — M25.552 LEFT HIP PAIN: ICD-10-CM

## 2022-12-09 DIAGNOSIS — Z74.09 DECREASED STRENGTH, ENDURANCE, AND MOBILITY: ICD-10-CM

## 2022-12-09 PROCEDURE — 97110 THERAPEUTIC EXERCISES: CPT | Mod: CQ

## 2022-12-09 PROCEDURE — 97112 NEUROMUSCULAR REEDUCATION: CPT | Mod: CQ

## 2022-12-09 NOTE — PROGRESS NOTES
[facility]   Physical Therapy Treatment Note     Name: Leticia Grimes  Clinic Number: [id number]    Therapy Diagnosis:   Encounter Diagnoses   Name Primary?    Gait abnormality Yes    Left hip pain     Decreased strength, endurance, and mobility            Physician: Ben Marie MD    Visit Date: 12/9/2022    Physician Orders: PT Eval and Treat   Medical Diagnosis from Referral:    Chronic bilateral low back pain with left-sided sciatica      Evaluation Date: 11/15/2022  Authorization Period Expiration: 12/30/2022  Plan of Care Expiration: 2/15/2023  Progress Note Due: 12/15/2022  Visit # / Visits authorized: 5/12  FOTO: 1/ 3      Precautions: Standard    PTA Visit #: 1/5     Time In: 9:55 am  Time Out: 10:45 am  Total Billable Time: 40  minutes    SUBJECTIVE     Pt reports: no pain today in her lower back. Forgot to try to sleep with a pillow between knees. She reports that she is doing better in general since starting physical therapy. Less sciatica pain overall.     She was compliant with home exercise program.    Response to previous treatment: felt good after manual therapy    Functional change: continuing to be able to lay on her left side for a little longer, sleeping better    Pain: 0/10, at worst at night 8/10  Location: low back/left lower extremity    OBJECTIVE     Objective Measures updated at progress report unless specified.     Treatment     Leticia received the treatments listed below:      therapeutic exercises to develop strength, endurance, ROM, flexibility, posture, and core stabilization for 25  minutes including:    Nu step with handles for endurance level 3, 6 minutes  Side lying hip abduction 2 x 8 each bilateral  Lower trunk rotations 3 minutes  Piriformis stretch 3 x 30 second hold bilateral  Sciatic neural glide 5 x ankle pumps, 5 sets with cues for technique    manual therapy techniques: Myofacial release and Soft tissue Mobilization were applied to  the: left hip for 0 minutes, including:  Soft tissue mobilization to gluteals, piriformis, tensor fasciae latae and obturators/gemelli muscle's.  Muscle energy techniques for + SI provocation    Leticia participated in neuromuscular re-education activities to improve: Balance, Coordination, Proprioception, and Posture for 15 minutes. The following activities were included:    Bridges with abdominal brace 3x10  Supine hip abduction with green band 3 minutes  Supine ball squeezes 3 minutes; 5 second holds  Sit to stands 10x/ 2 sets no hands tan mat 5 pound kettle bell   Standing hip abduction 10x each held today    Patient Education and Home Exercises     Home Exercises Provided and Patient Education Provided     Education provided: 11/21/2022: added home exercise program to my chart    Written Home Exercises Provided: Patient instructed to cont prior HEP. Exercises were reviewed and Leticia was able to demonstrate them prior to the end of the session.  Leticia demonstrated good  understanding of the education provided. See EMR under Patient Instructions for exercises provided during therapy sessions        ASSESSMENT     Patient was progressed with increasing resistance with nustep with no complaints. She tolerated all treatment well. She required less cues for exercise performed today. Her FOTO score has indicated that she is less functional compared to her initial FOTO.     Leticia Is progressing well towards her goals.   Pt prognosis is Good.     Pt will continue to benefit from skilled outpatient physical therapy to address the deficits listed in the problem list box on initial evaluation, provide pt/family education and to maximize pt's level of independence in the home and community environment.     Pt's spiritual, cultural and educational needs considered and pt agreeable to plan of care and goals.     Anticipated barriers to physical therapy: osteopenia, HTN, anxiety, defibrillator placement, MI, left total shoulder  arthroplasty    Goals:   Reviewed: 12/9/2022     Short Term Goals: In 4 weeks   1.Patient to be educated on HEP.  2. Patient  to increase lumbar ROM to WFL's without pain, in order to improve available range of motion for ADL's.    3. Patient  to increase hip AROM to ER 70 deg, in order to assist with more normalized gait pattern.  4. Patient  to increase B LE strength to 4-/5, in order to perform gait without compensation  5. Patient   to have pain less than 2/10 at worst, to improve QOL.  6. Patient  to improve score on the FOTO, to improve QOL.  7. Patient  to be educated on postural/body mechanics awareness.     Long Term Goals: In 8 weeks  1.Patient to improve score on the FOTO to improve QOL.  2. Patient to demo increase in LE strength to 4/5, n order to perform gait without compensation  3. Patient to have decreased pain to 2/10 at worst, to improve QOL.  4. Patient to demo increase lumbar ROM to B Rot 100% and left sidebending 30 deg, in order to improve available range of motion for ADL's.  .  5. Patient to increase hip AROM to ER 70 deg, in order to assist with more normalized gait pattern.  6. Patient to perform daily activities including sleeping, ascending/descending stairs, squatting without increased symptoms.    PLAN     Plan of care Certification: 11/15/2022 to 2/15/2023.    Continue Plan of Care (POC) and progress per patient tolerance.     Isaac Morel, PTA

## 2022-12-12 ENCOUNTER — CLINICAL SUPPORT (OUTPATIENT)
Dept: CARDIOLOGY | Facility: HOSPITAL | Age: 77
End: 2022-12-12
Payer: MEDICARE

## 2022-12-12 DIAGNOSIS — Z95.810 PRESENCE OF AUTOMATIC (IMPLANTABLE) CARDIAC DEFIBRILLATOR: ICD-10-CM

## 2022-12-12 PROCEDURE — 93295 DEV INTERROG REMOTE 1/2/MLT: CPT | Mod: S$GLB,,, | Performed by: INTERNAL MEDICINE

## 2022-12-12 PROCEDURE — 93295 CARDIAC DEVICE CHECK - REMOTE: ICD-10-PCS | Mod: S$GLB,,, | Performed by: INTERNAL MEDICINE

## 2022-12-12 PROCEDURE — 93296 REM INTERROG EVL PM/IDS: CPT | Performed by: INTERNAL MEDICINE

## 2022-12-13 ENCOUNTER — PATIENT MESSAGE (OUTPATIENT)
Dept: INTERNAL MEDICINE | Facility: CLINIC | Age: 77
End: 2022-12-13
Payer: MEDICARE

## 2022-12-13 ENCOUNTER — TELEPHONE (OUTPATIENT)
Dept: NEUROLOGY | Facility: CLINIC | Age: 77
End: 2022-12-13
Payer: MEDICARE

## 2022-12-13 ENCOUNTER — CLINICAL SUPPORT (OUTPATIENT)
Dept: REHABILITATION | Facility: HOSPITAL | Age: 77
End: 2022-12-13
Payer: MEDICARE

## 2022-12-13 DIAGNOSIS — R26.9 GAIT ABNORMALITY: Primary | ICD-10-CM

## 2022-12-13 DIAGNOSIS — Z74.09 DECREASED STRENGTH, ENDURANCE, AND MOBILITY: ICD-10-CM

## 2022-12-13 DIAGNOSIS — Z12.31 ENCOUNTER FOR SCREENING MAMMOGRAM FOR MALIGNANT NEOPLASM OF BREAST: Primary | ICD-10-CM

## 2022-12-13 DIAGNOSIS — R68.89 DECREASED STRENGTH, ENDURANCE, AND MOBILITY: ICD-10-CM

## 2022-12-13 DIAGNOSIS — R53.1 DECREASED STRENGTH, ENDURANCE, AND MOBILITY: ICD-10-CM

## 2022-12-13 DIAGNOSIS — M25.552 LEFT HIP PAIN: ICD-10-CM

## 2022-12-13 PROCEDURE — 97110 THERAPEUTIC EXERCISES: CPT

## 2022-12-13 PROCEDURE — 97112 NEUROMUSCULAR REEDUCATION: CPT

## 2022-12-13 NOTE — TELEPHONE ENCOUNTER
----- Message from Sunni Sotomayor sent at 12/13/2022 10:41 AM CST -----  Contact: self/[phone]  Type:  Patient Returning Call    Who Called:Leticia Grimes  Who Left Message for Patient:Kaley Wells  Does the patient know what this is regarding?:yes  Would the patient rather a call back or a response via [facility]? Call back   Best Call Back Number:[phone]  Additional Information:

## 2022-12-13 NOTE — TELEPHONE ENCOUNTER
Pt was calling in regards to rescheduling EMG appt. Patient has been transfer to our [facility] for further assisting.

## 2022-12-13 NOTE — TELEPHONE ENCOUNTER
----- Message from Lee Ann Chinchilla sent at 12/13/2022  9:28 AM CST -----  States she would like the nurse to give her a call regarding her appt tomorrow. Please call pt [phone]. Thank you

## 2022-12-16 ENCOUNTER — CLINICAL SUPPORT (OUTPATIENT)
Dept: REHABILITATION | Facility: HOSPITAL | Age: 77
End: 2022-12-16
Payer: MEDICARE

## 2022-12-16 DIAGNOSIS — R68.89 DECREASED STRENGTH, ENDURANCE, AND MOBILITY: ICD-10-CM

## 2022-12-16 DIAGNOSIS — M25.552 LEFT HIP PAIN: ICD-10-CM

## 2022-12-16 DIAGNOSIS — Z74.09 DECREASED STRENGTH, ENDURANCE, AND MOBILITY: ICD-10-CM

## 2022-12-16 DIAGNOSIS — R26.9 GAIT ABNORMALITY: Primary | ICD-10-CM

## 2022-12-16 DIAGNOSIS — R53.1 DECREASED STRENGTH, ENDURANCE, AND MOBILITY: ICD-10-CM

## 2022-12-16 PROCEDURE — 97112 NEUROMUSCULAR REEDUCATION: CPT | Mod: CQ

## 2022-12-16 PROCEDURE — 97110 THERAPEUTIC EXERCISES: CPT | Mod: CQ

## 2022-12-19 ENCOUNTER — DOCUMENTATION ONLY (OUTPATIENT)
Dept: REHABILITATION | Facility: HOSPITAL | Age: 77
End: 2022-12-19
Payer: MEDICARE

## 2022-12-21 ENCOUNTER — TELEPHONE (OUTPATIENT)
Dept: NEUROLOGY | Facility: CLINIC | Age: 77
End: 2022-12-21
Payer: MEDICARE

## 2022-12-21 NOTE — TELEPHONE ENCOUNTER
----- Message from Lee Ann Chinchilla sent at 12/21/2022 12:44 PM CST -----  Type:  Patient Returning Call    Who Called:pt   Who Left Message for Patient:nurse  Does the patient know what this is regarding?:an appt  Would the patient rather a call back or a response via [facility]? call  Best Call Back Number:[phone]  Additional Information: States its very important that she speak to the nurse today. States its urgent.    Thank you

## 2022-12-22 ENCOUNTER — OFFICE VISIT (OUTPATIENT)
Dept: INTERNAL MEDICINE | Facility: CLINIC | Age: 77
End: 2022-12-22
Payer: MEDICARE

## 2022-12-22 DIAGNOSIS — F41.9 ANXIETY: Primary | Chronic | ICD-10-CM

## 2022-12-22 DIAGNOSIS — F51.02 INSOMNIA DUE TO STRESS: ICD-10-CM

## 2022-12-22 PROCEDURE — 1159F PR MEDICATION LIST DOCUMENTED IN MEDICAL RECORD: ICD-10-PCS | Mod: CPTII,95,, | Performed by: FAMILY MEDICINE

## 2022-12-22 PROCEDURE — 99213 OFFICE O/P EST LOW 20 MIN: CPT | Mod: 95,,, | Performed by: FAMILY MEDICINE

## 2022-12-22 PROCEDURE — 1159F MED LIST DOCD IN RCRD: CPT | Mod: CPTII,95,, | Performed by: FAMILY MEDICINE

## 2022-12-22 PROCEDURE — 99213 PR OFFICE/OUTPT VISIT, EST, LEVL III, 20-29 MIN: ICD-10-PCS | Mod: 95,,, | Performed by: FAMILY MEDICINE

## 2022-12-22 PROCEDURE — 1160F PR REVIEW ALL MEDS BY PRESCRIBER/CLIN PHARMACIST DOCUMENTED: ICD-10-PCS | Mod: CPTII,95,, | Performed by: FAMILY MEDICINE

## 2022-12-22 PROCEDURE — 1160F RVW MEDS BY RX/DR IN RCRD: CPT | Mod: CPTII,95,, | Performed by: FAMILY MEDICINE

## 2022-12-22 RX ORDER — LORAZEPAM 0.5 MG/1
0.5 TABLET ORAL NIGHTLY PRN
Qty: 30 TABLET | Refills: 0 | Status: SHIPPED | OUTPATIENT
Start: 2022-12-22 | End: 2023-02-14 | Stop reason: SDUPTHER

## 2022-12-22 NOTE — PROGRESS NOTES
TELEMEDICINE VIRTUAL VISIT  12/22/22  9:30 AM CST    Visit Details: This visit was a telemedicine virtual visit with synchronous audio and video. Leticia represented that her location at the time of this visit was in the [facility]. Leticia chose and consented to receive these medical services by telemedicine.    CHIEF COMPLAINT: Anxiety    She reports recurrence of anxiety, insomnia, and nightmares, exacerbated by holiday stresses and continued grief over her 's death. She was treated by psychiatrist with lorazepam, and she says that this provided good relief without excess sedation. She denies suicidal thoughts or thoughts that she would be better off dead. We discussed risks and benefits of treatment options. It was agreed to re-initiate lorazepam, and she would schedule F/U with her PCP or psychiatrist to address any refills needed.    ASSESSMENT & PLAN  1. Anxiety  -     LORazepam (ATIVAN) 0.5 MG tablet; Take 1 tablet (0.5 mg total) by mouth nightly as needed (for anxiety or insomnia).  Dispense: 30 tablet; Refill: 0    2. Insomnia due to stress  -     LORazepam (ATIVAN) 0.5 MG tablet; Take 1 tablet (0.5 mg total) by mouth nightly as needed (for anxiety or insomnia).  Dispense: 30 tablet; Refill: 0      Unless noted herein, any chronic conditions are represented as and appear compensated/controlled and stable, and no other significant complaints or concerns were reported.    Follow up for re-evaluation with PCP or psychiatrist before needing refills.   Future Appointments   Date Time Provider Department Center   12/29/2022  9:00 AM PROCEDURE ROOM, ON NEUROLOGY [facility] NEURO [facility]   12/30/2022  9:15 AM Moises Harper PT [facility] [place]   1/3/2023  9:30 AM Moises Harper PT HGV [facility] [place]   1/5/2023  9:00 AM Isaac Lozoya PTA [facility] [place]   1/9/2023  9:15 AM Moises Harper PT [facility]GFA[facility] [place]   1/17/2023  9:30 AM Moises Harper PT [facility] [place]   1/23/2023  9:15 AM [facility]   1/27/2023  9:20 AM PACEMAKER CLINIC [facility] ARR PRO [facility]   1/27/2023 10:00 AM Martín Pearl MD [facility] ARRHYTH [facility]   1/30/2023  9:15 AM Moises Harper, PT [facility]   2/13/2023  9:00 AM Rebecca Ge PA-C [facility] INT BREE [facility]   3/12/2023 10:00 AM HOME MONITOR DEVICE CHECK [facility] ARR PRO [facility]   5/5/2023  7:45 AM [facility] XR2 [facility] XRAY [facility]   5/5/2023  8:00 AM Tomas Taylor MD [facility] ORTHO [facility]     PRESCRIPTION DRUG MANAGEMENT  Outpatient Medications Prior to Visit   Medication Sig Dispense Refill    acetaminophen (TYLENOL) 500 MG tablet Take 2 tablets (1,000 mg total) by mouth every 8 (eight) hours as needed for Pain. 60 tablet 0    alpha lipoic acid 200 mg Cap Take 1 capsule by mouth once daily.      amLODIPine (NORVASC) 5 MG tablet Take 1 tablet (5 mg total) by mouth once daily. 90 tablet 3    aspirin (ECOTRIN) 81 MG EC tablet Take 1 tablet (81 mg total) by mouth once daily.  0    baclofen (LIORESAL) 10 MG tablet Take 1 tablet (10 mg total) by mouth nightly as needed (LBP). 31 tablet 2    carvediloL (COREG) 12.5 MG tablet Take 1 tablet (12.5 mg total) by mouth 2 (two) times daily with meals. 180 tablet 3    cloNIDine (CATAPRES) 0.1 MG tablet Take 0.1 mg by mouth as needed. If SBP > 200 mmHg      lisinopriL (PRINIVIL,ZESTRIL) 20 MG tablet Take 1 tablet (20 mg total) by mouth once daily. 90 tablet 3    mv-mn/folic/lutein/herbal 293 (ALIVE WOMEN'S 50 PLUS ORAL) Take 1 tablet by mouth once daily.      omega 3-dha-epa-fish oil (FISH OIL) 1,000 mg (120 mg-180 mg) Cap Take 1 capsule by mouth 2 (two) times daily with meals. 180 capsule 3     No facility-administered medications prior to visit.   There are no discontinued medications.  Medications Ordered This Encounter   Medications    LORazepam (ATIVAN) 0.5 MG tablet     Sig: Take 1 tablet (0.5 mg total) by mouth nightly as needed (for  anxiety or insomnia).     Dispense:  30 tablet     Refill:  0     Review of Systems   Constitutional:  Negative for activity change and unexpected weight change.   HENT:  Negative for rhinorrhea and trouble swallowing.    Eyes:  Negative for discharge and visual disturbance.   Respiratory:  Negative for chest tightness and wheezing.    Cardiovascular:  Negative for chest pain and palpitations.   Gastrointestinal:  Negative for blood in stool, constipation, diarrhea and vomiting.   Endocrine: Negative for polydipsia and polyuria.   Genitourinary:  Negative for difficulty urinating, dysuria, hematuria and menstrual problem.   Musculoskeletal:  Negative for joint swelling and neck pain.   Neurological:  Negative for headaches.   Psychiatric/Behavioral:  Negative for confusion.    PHYSICAL EXAM  CONSTITUTIONAL: No apparent distress. Appears comfortable. Does not appear acutely ill or septic. Appears adequately hydrated.  PULMONARY: Breathing unlabored. No audible wheezes. Easily speaks in full sentences.  PSYCHIATRIC: Alert and conversant and grossly oriented. Mood is grossly neutral. Affect appropriate. Judgment and insight grossly intact.    Orders Placed This Encounter    LORazepam (ATIVAN) 0.5 MG tablet     TOTAL TIME evaluating and managing this patient for this encounter was greater than or equal to 21 minutes. This time was spent personally by me on the following activities: review of patient's past medical history, assessing age-appropriate health maintenance needs, review of any interval history, medication reconciliation, obtaining history from the patient, examination of the patient, reviewing consulting specialist notes, review of [place] Board of Pharmacy Controlled Prescription Drug Monitoring database records for the patient, managing and/or ordering prescription medications, educating patient and answering their questions about treatment plan, goals of treatment, and follow-up, and final documentation  "of the visit. This time was exclusive of any separately billable procedures for this patient and exclusive of time spent treating any other patients.    Documentation entered by me for this encounter may have been done in part using speech-recognition technology. Although I have made an effort to ensure accuracy, "sound like" errors may exist and should be interpreted in context.    Each patient to whom medical services are provided by telemedicine is: (1) informed of the relationship between the physician and patient and the respective role of any other health care provider with respect to management of the patient; and (2) notified that he or she may decline to receive medical services by telemedicine and may withdraw from such care at any time.   "

## 2022-12-22 NOTE — Clinical Note
I gave her a month's refill of her lorazepam and told her to schedule F/U with one of you for re-evaluation and any refills. This is just FYI only. No action required unless you need to contact the patient.

## 2022-12-29 ENCOUNTER — PROCEDURE VISIT (OUTPATIENT)
Dept: NEUROLOGY | Facility: CLINIC | Age: 77
End: 2022-12-29
Payer: MEDICARE

## 2022-12-29 ENCOUNTER — TELEPHONE (OUTPATIENT)
Dept: NEUROLOGY | Facility: CLINIC | Age: 77
End: 2022-12-29

## 2022-12-29 DIAGNOSIS — M54.17 LUMBOSACRAL RADICULOPATHY AT S1: ICD-10-CM

## 2022-12-29 DIAGNOSIS — G56.03 BILATERAL CARPAL TUNNEL SYNDROME: ICD-10-CM

## 2022-12-29 DIAGNOSIS — R20.2 PARESTHESIA OF BOTH HANDS: Primary | ICD-10-CM

## 2022-12-29 PROCEDURE — 95910 NRV CNDJ TEST 7-8 STUDIES: CPT | Mod: S$GLB,,, | Performed by: PSYCHIATRY & NEUROLOGY

## 2022-12-29 PROCEDURE — 95910 PR NERVE CONDUCTION STUDY; 7-8 STUDIES: ICD-10-PCS | Mod: S$GLB,,, | Performed by: PSYCHIATRY & NEUROLOGY

## 2022-12-29 NOTE — TELEPHONE ENCOUNTER
----- Message from Theresa Leonard MD sent at 12/29/2022 12:14 PM CST -----    12-    NCS/EMG BUE Mild-Moderate RT CTS.

## 2022-12-29 NOTE — PROCEDURES
[facility]   Michelle Uriostegui  Department of Neurology  [address] [place] [address] [place]  [id number]  Phone [phone]     Fax  [phone]        Full Name: Leticia Grimes Gender: Female  Patient ID: [id number] YOB: 1945      Visit Date: 12/29/2022 8:49 AM  Age: 77 Years  Examining Physician: Theresa Leonard M.D.  Referring Physician: Theresa Leonard MD  History: CTS workup. C/O: Paroxysmal bilateral hand numbness, tingling, and pain since 5/2022, R>L.  The symptoms involve all the fingers. PMHX: Cardiac arrest, defibrillator placement, anxiety, Vitamin D deficiency, HTN, GERD, osteopenia, adjustment insomnia.    SUMMARY     Nerve conduction studies were performed in the right and left upper extremities. The right median motor study recording the abductor pollicis brevis showed normal amplitude, prolonged distal latency and normal conduction velocity.     The right median sensory response recording digit two showed normal amplitude, prolonged latency and slow conduction velocity. The right ulnar sensory response recording digit five showed a normal amplitude, latency, and conduction velocity. The right radial sensory response recording over the extensor snuff box showed a normal amplitude, latency, and conduction velocity.     The left median motor study recording the abductor pollicis brevis showed a normal amplitude, normal distal latency and normal conduction velocity. The left ulnar motor study recording the abductor digiti minimi showed a normal amplitude, normal distal latency, and normal conduction velocity. No conduction block or focal slowing was present across the elbow.     The left median sensory response recording digit two showed a normal amplitude, latency, and conduction velocity. The left ulnar sensory response recording digit five showed a normal amplitude, latency, and conduction velocity. The left radial sensory response recording over the extensor snuff box showed a normal  amplitude, latency, and conduction velocity. Left median-ulnar mixed palmar latencies showed normal median latency compared to the ulnar.       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     ---------------------------------             Theresa Leonard M.D., [email or web address]      Diplomate, American Board of Psychiatry and Neurology  Diplomate, American Board of Clinical Neurophysiology  Fellow, American Academy of Neurology             SENSORY NCS      Nerve / Sites Rec. Site Peak PP Amp Dist Darrin     ms µV cm m/s   L Median - Dig II      Wrist II 3.38 7.6 13 46.6      Ref.  3.80 10.0  48.0   L Ulnar - Dig V      Wrist Dig V 2.88 7.9 11 48.0      Ref.  3.20 5.0  48.0   L Median - Ulnar - Palmar      Median Wrist 2.44 16.6 8 40.9      Ulnar Wrist 2.15 20.4 8 240.0   L Radial - Snuff box      Forearm Snuff box 2.50 32.6 10 49.0      Ref.  2.80 10.0     R Median - Dig II      Wrist II 5.77 12.3 13 29.7      Ref.  3.80 10.0  48.0       MOTOR NCS      Nerve / Sites Rec. Site Lat Amp Dist Darrin     ms mV cm m/s   L Median - APB      Wrist APB 3.81 4.0 8       Ref.  4.00 5.0        Elbow APB 7.69 3.4 21 54.2      Ref.     45.0   L Ulnar - ADM      Wrist ADM 2.88 11.6 8       Ref.  3.10 7.0        B.Elbow ADM 6.15 10.5 21 64.2      Ref.     50.0      A.Elbow ADM 7.90 10.4 10 57.1   R Median - APB      Wrist APB 4.94 4.8 8       Ref.  4.00 5.0        Elbow APB 8.94 4.6 19.5 48.8      Ref.     45.0                     ---------------------------------             Theresa Leonard M.D., F.A.AEdieSTORMY      Diplomate, American Board of Psychiatry and Neurology  Diplomate, American Board of Clinical Neurophysiology  Fellow, American Academy of Neurology

## 2022-12-30 ENCOUNTER — PATIENT MESSAGE (OUTPATIENT)
Dept: INTERNAL MEDICINE | Facility: CLINIC | Age: 77
End: 2022-12-30
Payer: MEDICARE

## 2022-12-30 ENCOUNTER — PATIENT MESSAGE (OUTPATIENT)
Dept: NEUROLOGY | Facility: CLINIC | Age: 77
End: 2022-12-30
Payer: MEDICARE

## 2023-01-02 ENCOUNTER — PATIENT MESSAGE (OUTPATIENT)
Dept: NEUROLOGY | Facility: CLINIC | Age: 78
End: 2023-01-02
Payer: MEDICARE

## 2023-01-03 ENCOUNTER — TELEPHONE (OUTPATIENT)
Dept: PAIN MEDICINE | Facility: CLINIC | Age: 78
End: 2023-01-03
Payer: MEDICARE

## 2023-01-03 ENCOUNTER — TELEPHONE (OUTPATIENT)
Dept: INTERNAL MEDICINE | Facility: CLINIC | Age: 78
End: 2023-01-03
Payer: MEDICARE

## 2023-01-03 DIAGNOSIS — M54.17 LUMBOSACRAL RADICULOPATHY AT S1: Primary | ICD-10-CM

## 2023-01-03 NOTE — TELEPHONE ENCOUNTER
----- Message from Kaley Wells MA sent at 1/3/2023  1:48 PM CST -----  Contact: [phone]  Hi, patient is retuning your call.   ----- Message -----  From: Jimmy Esqueda  Sent: 1/3/2023  12:44 PM CST  To: Horacio Cardenas Staff, Joo Fernandez Staff    Type:  Patient Returning Call    Who Called:Leticia   Who Left Message for Patient:Bryce   Does the patient know what this is regarding?:sooner appt/ virtual   Would the patient rather a call back or a response via [facility]? Call back   Best Call Back Number:[phone]   Additional Information:

## 2023-01-03 NOTE — PROGRESS NOTES
Chronic Pain-Tele-Medicine-Established Note (Follow up visit)    The patient location is:  [place]  The chief complaint leading to consultation is:  Follow-up injection  Visit type: Virtual visit with synchronous audio and video  Total time spent with patient:  5-10 minutes  Each patient to whom he or she provides medical services by telemedicine is: (1) informed of the relationship between the physician and patient and the respective role of any other health care provider with respect to management of the patient; and (2) notified that he or she may decline to receive medical services by telemedicine and may withdraw from such care at any time.    Notes:    SUBJECTIVE:    Interval History (01/04/2022):  Leticia Grimes presents today for follow-up visit.  Patient was last seen on 04/15/2021. She was referred back to our clinic by Neurology. She reports her primary complaint is thoracic back pain between her shoulder blades along her bra line. She reports this began about 1 month ago, but has been worsening over the past week. She reports increased frequency of spasms that interrupt her sleep. She has been taking tizanidine, using a heating pad, and began physical therapy, with limited relief. History of left shoulder total reversal 05/2022. She reports some pain in the left shoulder and some stiffness and limitations to ROM. Her thoracic pain is worsened with moving her upper extremities. She also reports low back pain in a band like distribution across the lumbosacral region moreso on the left side that began 1 month ago. She reports at first it stopped above the knee, but is now intermittently radiating into her left leg into her foot. This pain is worsened by laying on her left side. She reports this has somewhat improved with massage, heat, and physical therapy. She was recently prescribed Baclofen by Dr. Leonard for muscle spasms to replace the Tizanidine, but she has not yet started this medication. Patient  reports pain as 6/10 today.      Interval HPI 04/15/2021  Leticia Grimes presents to tele-medicine appointment for chief complaint bilateral hand pain.  This is a new complaint as we previously treated her left shoulder pain with glenohumeral joint injection out was successful.  She locates this neuropathic pain to the bilateral hands, right worse than left.  She also reports having some neck pain.  She has had previous epidurals that did provide relief of these symptoms.  Since the last visit, Leticia Grimes states the pain has been improving. Current pain intensity is 6/10.    Patient denies night fever/night sweats, urinary incontinence, bowel incontinence, significant weight loss, significant motor weakness and loss of sensations.    Interval HPI 04/09/2021:  Leticia Grimes presents to tele-medicine appointment for a follow-up appointment for left shoulder pain.  She was last seen for procedure on 03/02/2021 and underwent left-sided glenohumeral joint injection.  She reports that this resulted in 95% relief.  Since the last visit, Leticia Grimes states the pain has been improving. Current pain intensity is 0/10.    Initial HPI 02/01/2021:  Leticia Grimes is a 75 y.o. female who presents to the clinic for the evaluation of left shoulder pain.  She was referred by the Orthopedics Department for further evaluation and management of this pain.  She has past medical history of anxiety, hypertension, status post ICD, hyperlipidemia, GERD, osteopenia, multiple other medical comorbidities as listed in her chart.  The pain started several years ago without any inciting event or trauma and symptoms have been worsening.The pain is located in the left cervical myofascial area and radiates to the left upper extremity.  She reports that the left shoulder source of the pain however.  The pain is described as Aching, numbness, tingling, squeezing, tightness and is rated as 5/10. The pain is rated with a score of  4/10  on the BEST day and a score of 9/10 on the WORST day.  Symptoms interfere with daily activity. The pain is exacerbated by sleeping on her left side, movement of the left shoulder.  The pain is mitigated by sitting, medications, heat. The patient reports spending 2-4 hours per day reclining. The patient reports 5-7 hours of uninterrupted sleep per night.       Non-Pharmacologic Treatments:  Physical Therapy/Home Exercise: yes  Ice/Heat:yes  TENS: no  Acupuncture: no  Massage: no  Chiropractic: no    Other: no        Pain Medications:  - Opioids: None  - Adjuvant Medications: Lorazepam, Lyrica, Diazepam, Baclofen  - Anti-Coagulants: Aspirin      report:  Reviewed and consistent with medication use as prescribed.          Pain Procedures:   -previous cervical epidural steroid injection, moderate relief  -03/02/2021:  Left glenohumeral joint injection, 95% relief        Imaging:   X-ray left shoulder 10/12/2020:  No fracture or dislocation.  Prominent glenohumeral degenerative findings present including complete joint space loss and large inferior osteophyte formation.  Mild AC joint degenerative changes.  Lung parenchyma clear.        X-ray cervical spine 12/16/2015:  No fracture or dislocation is demonstrated.  At the C5-6 level there   is some mild disc space narrowing with associated spurring.  Uncovertebral   joint spurring results in some moderate encroachment upon the neuroforamina   bilaterally at this level The odontoid appears intact and in good alignment.         PMHx,PSHx, Social history, and Family history:  I have reviewed the patient's medical, surgical, social, and family history in detail and updated the computerized patient record.        Review of patient's allergies indicates:   Allergen Reactions    Codeine     Morphine Other (See Comments)    Statins-hmg-coa reductase inhibitors        Current Outpatient Medications   Medication Sig    acetaminophen (TYLENOL) 500 MG tablet Take 2 tablets (1,000  mg total) by mouth every 8 (eight) hours as needed for Pain.    alpha lipoic acid 200 mg Cap Take 1 capsule by mouth once daily.    amLODIPine (NORVASC) 5 MG tablet Take 1 tablet (5 mg total) by mouth once daily.    aspirin (ECOTRIN) 81 MG EC tablet Take 1 tablet (81 mg total) by mouth once daily.    baclofen (LIORESAL) 10 MG tablet Take 1 tablet (10 mg total) by mouth nightly as needed (LBP).    carvediloL (COREG) 12.5 MG tablet Take 1 tablet (12.5 mg total) by mouth 2 (two) times daily with meals.    cloNIDine (CATAPRES) 0.1 MG tablet Take 0.1 mg by mouth as needed. If SBP > 200 mmHg    lisinopriL (PRINIVIL,ZESTRIL) 20 MG tablet Take 1 tablet (20 mg total) by mouth once daily.    LORazepam (ATIVAN) 0.5 MG tablet Take 1 tablet (0.5 mg total) by mouth nightly as needed (for anxiety or insomnia).    methylPREDNISolone (MEDROL DOSEPACK) 4 mg tablet use as directed    mv-mn/folic/lutein/herbal 293 (ALIVE WOMEN'S 50 PLUS ORAL) Take 1 tablet by mouth once daily.    omega 3-dha-epa-fish oil (FISH OIL) 1,000 mg (120 mg-180 mg) Cap Take 1 capsule by mouth 2 (two) times daily with meals.     No current facility-administered medications for this visit.     Telemedicine Exam  There were no vitals filed for this visit.  There is no height or weight on file to calculate BMI.   (reviewed on 1/4/2023)     GENERAL: Well appearing, in no acute distress, alert and oriented x3.  Cooperative.  PSYCH:  Mood and affect appropriate.  SKIN: Skin color & texture with no obvious abnormalities.    HEAD/FACE:  Normocephalic, atraumatic.    PULM:  No difficulty breathing. No nasal flaring. No obvious wheezing.  EXTREMITIES: No obvious deformities. Moving all extremities well, appears to have symmetric strength throughout.  MUSCULOSKELETAL: No obvious atrophy abnormalities are noted.   Patient performed exercises with provider guidance  SIJ testing:  - TTP over SI joint: Absent  - Zac's/ Genaro's: Absent   - No tenderness along bilateral GT  bursa    -lumbar flexion and extension reproduces thoracic back pain  -Pain with rotation of thoracic spine and lifting of arms      NEURO: No obvious neurologic deficit.   GAIT: sitting.     Physical Exam: last in clinic visit:    REVIEW OF SYSTEMS:    GENERAL:  No weight loss, malaise or fevers.  HEENT:   No recent changes in vision or hearing  NECK:  Negative for lumps, no difficulty with swallowing.  RESPIRATORY:  Negative for cough, wheezing or shortness of breath, patient denies any recent URI.  CARDIOVASCULAR:  Negative for chest pain, leg swelling or palpitations.  GI:  Negative for abdominal discomfort, blood in stools or black stools or change in bowel habits.  MUSCULOSKELETAL:  See HPI.  SKIN:  Negative for lesions, rash, and itching.  PSYCH:  No mood disorder or recent psychosocial stressors.  Patients sleep is not disturbed secondary to pain.  HEMATOLOGY/LYMPHOLOGY:  Negative for prolonged bleeding, bruising easily or swollen nodes.  Patient is currently taking anti-coagulants - ASA  NEURO:   No history of headaches, syncope, paralysis, seizures or tremors.  All other reviewed and negative other than HPI.    OBJECTIVE:  Physical exam:  GENERAL: Well appearing, in no acute distress, alert and oriented x3.    PSYCH:  Mood and affect appropriate.  SKIN: Skin color, texture, turgor normal, no rashes or lesions.  HEAD/FACE:  Normocephalic, atraumatic. Cranial nerves grossly intact.  CV:  No peripheral edema noted  PULM:  No difficulty breathing     Neuro/MSK:  NECK:  Minimal pain with neck flexion, extension, or lateral flexion.  Spurling's (self performed), equivocal  EXTREMITIES: No deformities, edema, or skin discoloration.   MUSCULOSKELETAL:  No atrophy or tone abnormalities are noted.  GAIT: normal.        LABS:  Lab Results   Component Value Date    WBC 5.54 05/05/2022    HGB 13.1 05/05/2022    HCT 38.7 05/05/2022    MCV 83 05/05/2022     05/05/2022       CMP  Sodium   Date Value Ref Range  Status   10/20/2022 141 136 - 145 mmol/L Final     Potassium   Date Value Ref Range Status   10/20/2022 4.7 3.5 - 5.1 mmol/L Final     Chloride   Date Value Ref Range Status   10/20/2022 108 95 - 110 mmol/L Final     CO2   Date Value Ref Range Status   10/20/2022 28 23 - 29 mmol/L Final     Glucose   Date Value Ref Range Status   10/20/2022 94 70 - 110 mg/dL Final     BUN   Date Value Ref Range Status   10/20/2022 11 8 - 23 mg/dL Final     Creatinine   Date Value Ref Range Status   10/20/2022 0.9 0.5 - 1.4 mg/dL Final     Calcium   Date Value Ref Range Status   10/20/2022 10.2 8.7 - 10.5 mg/dL Final     Total Protein   Date Value Ref Range Status   05/11/2022 6.9 6.0 - 8.4 g/dL Final     Albumin   Date Value Ref Range Status   10/20/2022 3.5 3.5 - 5.2 g/dL Final     Total Bilirubin   Date Value Ref Range Status   05/11/2022 0.6 0.1 - 1.0 mg/dL Final     Comment:     For infants and newborns, interpretation of results should be based  on gestational age, weight and in agreement with clinical  observations.    Premature Infant recommended reference ranges:  Up to 24 hours.............<8.0 mg/dL  Up to 48 hours............<12.0 mg/dL  3-5 days..................<15.0 mg/dL  6-29 days.................<15.0 mg/dL       Alkaline Phosphatase   Date Value Ref Range Status   05/11/2022 67 55 - 135 U/L Final     AST   Date Value Ref Range Status   05/11/2022 22 10 - 40 U/L Final     ALT   Date Value Ref Range Status   05/11/2022 24 10 - 44 U/L Final     Anion Gap   Date Value Ref Range Status   10/20/2022 5 (L) 8 - 16 mmol/L Final     eGFR if    Date Value Ref Range Status   05/11/2022 >60.0 >60 mL/min/1.73 m^2 Final     eGFR if non    Date Value Ref Range Status   05/11/2022 54.9 (A) >60 mL/min/1.73 m^2 Final     Comment:     Calculation used to obtain the estimated glomerular filtration  rate (eGFR) is the CKD-EPI equation.          Lab Results   Component Value Date    HGBA1C 5.3 10/05/2021              ASSESSMENT: 77 y.o. year old female with left shoulder pain, consistent with     1. Thoracic spine pain  X-Ray Thoracic Spine AP Lateral              PLAN:   - Interventions:  None at this time, consider thoracic TPI vs MBB, will first review thoracic x-rays.  Consider SIJ vs. OMAR, we will try conservative measures and re-evaluate patient in person     - Anticoagulation use: yes aspirin    - Medications: I have stressed the importance of physical activity and a home exercise plan to help with pain and improve health. and Patient can continue with medications for now since they are providing benefits, using them appropriately, and without side effects.   advised patient continue with Tylenol p.r.n. as her primary pain relieving medication.    -Discontinue Tizanidine and Begin Baclofen 10 mg nightly for muscle spasms  - Will prescribe Medrol Dose pack with instructions:  Day 1: 2 tablets before breakfast, 1 tablet after lunch, 1 tablet after dinner, 2 tablets at bedtime   Day 2: 1 tablet before breakfast, 1 tablet after lunch, 1 tablet after dinner, 2 tablets at bedtime   Day 3: 1 tablet before breakfast, 1 tablet after lunch, 1 tablet after dinner, 1 tablet at bedtime   Day 4: 1 tablet before breakfast, 1 tablet after lunch, 1 tablet at bedtime   Day 5: 1 tablet before breakfast, 1 tablet at bedtime   Day 6: 1 tablet before breakfast.       - Therapy:  Advised patient continue with activities and exercises as tolerated, patient currently enrolled in physical therapy, continue     - Psychological:  Discussed coping mechanisms to help address chronic pain issues     - Labs:  Reviewed     - Imaging: Reviewed available imaging, x-ray of thoracic spine ordered to further evaluate for axial pain     - Consults/Referrals:  None at this time     - Records:    Reviewed/Obtain old records from outside physicians and imaging    - Follow up visit: return to clinic 4 weeks for further evaluation     - Counseled  patient regarding the importance of activity modification and physical therapy     - This condition does not require this patient to take time off of work, and the primary goal of our Pain Management services is to improve the patient's functional capacity.     - Patient Questions: Answered all of the patient's questions regarding diagnosis, therapy, and treatment    The above plan and management options were discussed at length with patient. Patient is in agreement with the above and verbalized understanding.      Rebecca Ge PA-C  Interventional Pain Management  [facility]    Disclaimer:  This note was prepared using voice recognition system and is likely to have sound alike errors that may have been overlooked even after proof reading.  Please call me with any questions

## 2023-01-04 ENCOUNTER — TELEPHONE (OUTPATIENT)
Dept: INTERNAL MEDICINE | Facility: CLINIC | Age: 78
End: 2023-01-04
Payer: MEDICARE

## 2023-01-04 ENCOUNTER — OFFICE VISIT (OUTPATIENT)
Dept: PAIN MEDICINE | Facility: CLINIC | Age: 78
End: 2023-01-04
Payer: MEDICARE

## 2023-01-04 DIAGNOSIS — M54.6 THORACIC SPINE PAIN: Primary | ICD-10-CM

## 2023-01-04 PROCEDURE — 1160F RVW MEDS BY RX/DR IN RCRD: CPT | Mod: HCNC,CPTII,95, | Performed by: PHYSICIAN ASSISTANT

## 2023-01-04 PROCEDURE — 1159F MED LIST DOCD IN RCRD: CPT | Mod: HCNC,CPTII,95, | Performed by: PHYSICIAN ASSISTANT

## 2023-01-04 PROCEDURE — 99214 OFFICE O/P EST MOD 30 MIN: CPT | Mod: HCNC,95,, | Performed by: PHYSICIAN ASSISTANT

## 2023-01-04 PROCEDURE — 99214 PR OFFICE/OUTPT VISIT, EST, LEVL IV, 30-39 MIN: ICD-10-PCS | Mod: HCNC,95,, | Performed by: PHYSICIAN ASSISTANT

## 2023-01-04 PROCEDURE — 1160F PR REVIEW ALL MEDS BY PRESCRIBER/CLIN PHARMACIST DOCUMENTED: ICD-10-PCS | Mod: HCNC,CPTII,95, | Performed by: PHYSICIAN ASSISTANT

## 2023-01-04 PROCEDURE — 1159F PR MEDICATION LIST DOCUMENTED IN MEDICAL RECORD: ICD-10-PCS | Mod: HCNC,CPTII,95, | Performed by: PHYSICIAN ASSISTANT

## 2023-01-04 RX ORDER — METHYLPREDNISOLONE 4 MG/1
TABLET ORAL
Qty: 1 EACH | Refills: 0 | Status: SHIPPED | OUTPATIENT
Start: 2023-01-04 | End: 2023-02-14

## 2023-01-04 NOTE — TELEPHONE ENCOUNTER
Spoke with pt; pt states she's been having back spasm and wanted to schedule an appt with Dr Marie but was told nothing was available; pt states she has a virtual scheduled for today with a pain management provider; MA advised pt if anything changes to give PCP office a callback; pt verbalized understanding  /LD

## 2023-01-04 NOTE — TELEPHONE ENCOUNTER
----- Message from Poly Lorenzo sent at 1/3/2023  1:13 PM CST -----  Pt stated she is having very bad back spasms and is requesting a virtual appt for today. Call back number [phone] thx

## 2023-01-05 ENCOUNTER — CLINICAL SUPPORT (OUTPATIENT)
Dept: REHABILITATION | Facility: HOSPITAL | Age: 78
End: 2023-01-05
Payer: MEDICARE

## 2023-01-05 DIAGNOSIS — R68.89 DECREASED STRENGTH, ENDURANCE, AND MOBILITY: ICD-10-CM

## 2023-01-05 DIAGNOSIS — R53.1 DECREASED STRENGTH, ENDURANCE, AND MOBILITY: ICD-10-CM

## 2023-01-05 DIAGNOSIS — M25.552 LEFT HIP PAIN: ICD-10-CM

## 2023-01-05 DIAGNOSIS — R26.9 GAIT ABNORMALITY: Primary | ICD-10-CM

## 2023-01-05 DIAGNOSIS — Z74.09 DECREASED STRENGTH, ENDURANCE, AND MOBILITY: ICD-10-CM

## 2023-01-05 PROCEDURE — 97112 NEUROMUSCULAR REEDUCATION: CPT | Mod: HCNC,CQ

## 2023-01-05 PROCEDURE — 97110 THERAPEUTIC EXERCISES: CPT | Mod: HCNC,CQ

## 2023-01-05 NOTE — PROGRESS NOTES
[facility]   Physical Therapy Treatment Note     Name: Leticia Grimes  Clinic Number: [id number]    Therapy Diagnosis:   Encounter Diagnoses   Name Primary?    Gait abnormality Yes    Left hip pain     Decreased strength, endurance, and mobility        Physician: Ben Marie MD    Visit Date: 1/5/2023    Physician Orders: PT Eval and Treat   Medical Diagnosis from Referral:    Chronic bilateral low back pain with left-sided sciatica    Evaluation Date: 11/15/2022  Authorization Period Expiration: 12/30/2022  Plan of Care Expiration: 2/15/2023  Progress Note Due: 1/15/2023  Visit # / Visits authorized: 7/12 (+1 eval)  FOTO: 1/3      Precautions: Standard    PTA Visit #: 2/5     Time In: 9:00 am  Time Out: 9:45 am  Total Billable Time: 40 minutes    SUBJECTIVE     Pt reports: her pain has moved to her upper back and into her shoulders. Reports seeing pain management since her last PT appointment and was prescribed muscle relaxers. She had to stay in bed over the holidays due to her increase pain level. She took muscle relaxers this morning.       She was compliant with home exercise program.    Response to previous treatment: felt good after manual therapy    Functional change: continuing to be able to lay on her left side for a little longer, sleeping better    Pain: 3/10, at worst at night 6-7/10  Location: low back and left lower extremity into hamstring    OBJECTIVE     Objective Measures updated at progress report unless specified.     Treatment     Leticia received the treatments listed below:      therapeutic exercises to develop strength, endurance, ROM, flexibility, posture, and core stabilization for 25 minutes including:    Nu step with handles for endurance level 2, 5 minutes (decreased)  Side lying hip abduction 10x each bilateral  Lower trunk rotations 3 minutes  Piriformis stretch 3 x 30 second hold bilateral held today  Sciatic neural glide x 30 seconds ankle  pumps, 3 sets       manual therapy techniques: Myofacial release and Soft tissue Mobilization were applied to the: left hip for 0 minutes, including:  Soft tissue mobilization to gluteals, piriformis, tensor fasciae latae and obturators/gemelli muscle's.  (-) SI provocation today    Leticia participated in neuromuscular re-education activities to improve: Balance, Coordination, Proprioception, and Posture for 15 minutes. The following activities were included:    Bridges with abdominal brace 3 x 10  Supine hip abduction with green band 3 minutes   Supine ball squeezes 3 minutes; 5 second holds held today  Sit to stands 10x/ 2 sets no hands tan mat 5 pound kettle bell  held today  Standing hip abduction 10x each held today  Straight leg raise with abdominal brace 3 x 8 bilateral      Patient Education and Home Exercises     Home Exercises Provided and Patient Education Provided     Education provided: 11/21/2022: added home exercise program to my chart    Written Home Exercises Provided: Patient instructed to cont prior HEP. Exercises were reviewed and Leticia was able to demonstrate them prior to the end of the session.  Leticia demonstrated good  understanding of the education provided. See EMR under Patient Instructions for exercises provided during therapy sessions    ASSESSMENT     Patient tolerates this session with less aggressive exercises due to her increased pain over the holiday but all exercises were performed today with good quality and with only minimal cues. She reports feeling good after this session.     Leticia Is progressing well towards her goals.   Pt prognosis is Good.     Pt will continue to benefit from skilled outpatient physical therapy to address the deficits listed in the problem list box on initial evaluation, provide pt/family education and to maximize pt's level of independence in the home and community environment.     Pt's spiritual, cultural and educational needs considered and pt agreeable  to plan of care and goals.     Anticipated barriers to physical therapy: osteopenia, HTN, anxiety, defibrillator placement, MI, left total shoulder arthroplasty    Goals:   Reviewed: 1/5/2023     Short Term Goals: In 4 weeks   1.Patient to be educated on HEP. Ongoing 12/13/2022  2. Patient  to increase lumbar ROM to WFL's without pain, in order to improve available range of motion for ADL's.  12/13/2022 Progressing  3. Patient  to increase hip AROM to ER 70 deg, in order to assist with more normalized gait pattern. 12/13/2022 Not tested  4. Patient  to increase B LE strength to 4-/5, in order to perform gait without compensation 12/13/2022 MET  5. Patient   to have pain less than 2/10 at worst, to improve QOL. 12/13/2022 progressing  6. Patient  to improve score on the FOTO, to improve QOL. 12/13/2022 Met   7. Patient  to be educated on postural/body mechanics awareness. 12/13/2022 ongoing     Long Term Goals: In 8 weeks  1.Patient to improve score on the FOTO to improve QOL. 12/13/2022 MET  2. Patient to demo increase in LE strength to 4/5, n order to perform gait without compensation 12/13/2022 progressing  3. Patient to have decreased pain to 2/10 at worst, to improve QOL. 12/13/2022 progressing  4. Patient to demo increase lumbar ROM to B Rot 100% and left sidebending 30 deg, in order to improve available range of motion for ADL's.  12/13/2022 progressing  5. Patient to increase hip AROM to ER 70 deg, in order to assist with more normalized gait pattern. 12/13/2022 progressing  6. Patient to perform daily activities including sleeping, ascending/descending stairs, squatting without increased symptoms. 12/13/2022 progressing    PLAN     Plan of care Certification: 11/15/2022 to 2/15/2023.    Continue Plan of Care (POC) and progress per patient tolerance.     Isaac Morel, PTA

## 2023-01-09 ENCOUNTER — CLINICAL SUPPORT (OUTPATIENT)
Dept: REHABILITATION | Facility: HOSPITAL | Age: 78
End: 2023-01-09
Payer: MEDICARE

## 2023-01-09 DIAGNOSIS — R53.1 DECREASED STRENGTH, ENDURANCE, AND MOBILITY: ICD-10-CM

## 2023-01-09 DIAGNOSIS — R26.9 GAIT ABNORMALITY: Primary | ICD-10-CM

## 2023-01-09 DIAGNOSIS — Z74.09 DECREASED STRENGTH, ENDURANCE, AND MOBILITY: ICD-10-CM

## 2023-01-09 DIAGNOSIS — M25.552 LEFT HIP PAIN: ICD-10-CM

## 2023-01-09 DIAGNOSIS — R68.89 DECREASED STRENGTH, ENDURANCE, AND MOBILITY: ICD-10-CM

## 2023-01-09 PROCEDURE — 97110 THERAPEUTIC EXERCISES: CPT | Mod: HCNC

## 2023-01-09 PROCEDURE — 97140 MANUAL THERAPY 1/> REGIONS: CPT | Mod: HCNC

## 2023-01-09 NOTE — PROGRESS NOTES
[facility]   Physical Therapy Treatment Note     Name: Leticia Grimes  Clinic Number: [id number]    Therapy Diagnosis:   Encounter Diagnoses   Name Primary?    Gait abnormality Yes    Left hip pain     Decreased strength, endurance, and mobility        Physician: Ben Marie MD    Visit Date: 1/9/2023    Physician Orders: PT Eval and Treat   Medical Diagnosis from Referral:    Chronic bilateral low back pain with left-sided sciatica    Evaluation Date: 11/15/2022  Authorization Period Expiration: 12/30/2022  Plan of Care Expiration: 2/15/2023  Progress Note Due: 1/15/2023  Visit # / Visits authorized: 7/12 (+1 eval)  FOTO: 1/3      Precautions: Standard    PTA Visit #: 0/5     Time In: 0915  Time Out: 1000  Total Billable Time: 45 minutes    SUBJECTIVE     Pt reports: she experiences decreased pain this week.  She does report that she started a steroid dose pack last week which did result in significant pain relief.     She was compliant with home exercise program.    Response to previous treatment:     Functional change: sleeping better and walking better    Pain: 2/10  Location: left gluteal/lateral thigh    OBJECTIVE     Objective Measures updated at progress report unless specified.     Treatment     Leticia received the treatments listed below:      therapeutic exercises to develop strength, endurance, ROM, flexibility, posture, and core stabilization for 30 minutes including:    Nu step with handles for endurance level 2, 5 minutes (decreased)  Side lying hip abduction 2x10 bilaterally  Lower trunk rotations 10 second holds x 2 minutes  Piriformis stretch 10 second holds x 2 minutes bilaterally  Pelvic tilts - 10 second holds x 2 minutes  Supine HS stretch with stretch rite - 10 second holds x 2 minutes bilaterally        manual therapy techniques: Myofacial release and Soft tissue Mobilization were applied to the: left hip for 0 minutes, including:  Soft tissue  mobilization to gluteals, piriformis, tensor fasciae latae and obturators/gemelli muscle's.      Leticia participated in neuromuscular re-education activities to improve: Balance, Coordination, Proprioception, and Posture for 15 minutes. The following activities were included:    Bridges with abdominal brace 2x10  Sit to stands 10x/ 2 sets no hands  Straight leg raise with abdominal brace 2x10 bilateral      Patient Education and Home Exercises     Home Exercises Provided and Patient Education Provided     Education provided: 11/21/2022: added home exercise program to my chart    Written Home Exercises Provided: Patient instructed to cont prior HEP. Exercises were reviewed and Leticia was able to demonstrate them prior to the end of the session.  Leticia demonstrated good  understanding of the education provided. See EMR under Patient Instructions for exercises provided during therapy sessions    ASSESSMENT     Patient reports that pain is significantly decreased.  Therefore worked on multiple stretching and strengthening exercises.  Did still perform strengthening with light intensity.  There was still some pain at the left lateral thigh on palpation of the glut med, quadratus femoris, and hip rotator musculature.  Asked the patient if she'd like to trial FDN for pain relief.  She refused at this time due to fear of needles.  Will continue to work further into stretching and strengthening.    Leticia Is progressing well towards her goals.   Pt prognosis is Good.     Pt will continue to benefit from skilled outpatient physical therapy to address the deficits listed in the problem list box on initial evaluation, provide pt/family education and to maximize pt's level of independence in the home and community environment.     Pt's spiritual, cultural and educational needs considered and pt agreeable to plan of care and goals.     Anticipated barriers to physical therapy: osteopenia, HTN, anxiety, defibrillator placement, MI,  left total shoulder arthroplasty    Goals:   Reviewed: 1/9/2023     Short Term Goals: In 4 weeks   1.Patient to be educated on HEP. Ongoing 12/13/2022  2. Patient  to increase lumbar ROM to WFL's without pain, in order to improve available range of motion for ADL's.  12/13/2022 Progressing  3. Patient  to increase hip AROM to ER 70 deg, in order to assist with more normalized gait pattern. 12/13/2022 Not tested  4. Patient  to increase B LE strength to 4-/5, in order to perform gait without compensation 12/13/2022 MET  5. Patient   to have pain less than 2/10 at worst, to improve QOL. 12/13/2022 progressing  6. Patient  to improve score on the FOTO, to improve QOL. 12/13/2022 Met   7. Patient  to be educated on postural/body mechanics awareness. 12/13/2022 ongoing     Long Term Goals: In 8 weeks  1.Patient to improve score on the FOTO to improve QOL. 12/13/2022 MET  2. Patient to demo increase in LE strength to 4/5, n order to perform gait without compensation 12/13/2022 progressing  3. Patient to have decreased pain to 2/10 at worst, to improve QOL. 12/13/2022 progressing  4. Patient to demo increase lumbar ROM to B Rot 100% and left sidebending 30 deg, in order to improve available range of motion for ADL's.  12/13/2022 progressing  5. Patient to increase hip AROM to ER 70 deg, in order to assist with more normalized gait pattern. 12/13/2022 progressing  6. Patient to perform daily activities including sleeping, ascending/descending stairs, squatting without increased symptoms. 12/13/2022 progressing    PLAN     Plan of care Certification: 11/15/2022 to 2/15/2023.    Continue Plan of Care (POC) and progress per patient tolerance.     Moises Harper, PT

## 2023-01-17 ENCOUNTER — CLINICAL SUPPORT (OUTPATIENT)
Dept: REHABILITATION | Facility: HOSPITAL | Age: 78
End: 2023-01-17
Payer: MEDICARE

## 2023-01-17 DIAGNOSIS — R68.89 DECREASED STRENGTH, ENDURANCE, AND MOBILITY: ICD-10-CM

## 2023-01-17 DIAGNOSIS — R26.9 GAIT ABNORMALITY: Primary | ICD-10-CM

## 2023-01-17 DIAGNOSIS — R53.1 DECREASED STRENGTH, ENDURANCE, AND MOBILITY: ICD-10-CM

## 2023-01-17 DIAGNOSIS — M25.552 LEFT HIP PAIN: ICD-10-CM

## 2023-01-17 DIAGNOSIS — Z74.09 DECREASED STRENGTH, ENDURANCE, AND MOBILITY: ICD-10-CM

## 2023-01-17 PROCEDURE — 97110 THERAPEUTIC EXERCISES: CPT | Mod: HCNC

## 2023-01-17 NOTE — PROGRESS NOTES
[facility]   Physical Therapy Treatment Note     Name: Leticia Grimes  Clinic Number: [id number]    Therapy Diagnosis:   Encounter Diagnoses   Name Primary?    Gait abnormality Yes    Left hip pain     Decreased strength, endurance, and mobility        Physician: Ben Marie MD    Visit Date: 1/17/2023    Physician Orders: PT Eval and Treat   Medical Diagnosis from Referral:    Chronic bilateral low back pain with left-sided sciatica    Evaluation Date: 11/15/2022  Authorization Period Expiration: 12/30/2022  Plan of Care Expiration: 2/15/2023  Progress Note Due: 2/15/2023  Visit # / Visits authorized: 9/12 (+1 eval)  FOTO: 1/3      Precautions: Standard    PTA Visit #: 0/5     Time In: 0930  Time Out: 1015  Total Billable Time: 45 minutes    SUBJECTIVE     Pt reports: she experiences decreased pain this week.  She does report that she started a steroid dose pack last week which did result in significant pain relief.     She was compliant with home exercise program.    Response to previous treatment:     Functional change: sleeping better and walking better    Pain: 2/10  Location: left gluteal/lateral thigh    OBJECTIVE     Objective Measures updated at progress report unless specified.     Thoracolumbar  (single inclinometer at L4/5) AROM  (degrees)  11/15/22 AROM  1/17/2023   Flexion 70  78   Extension 18  24   Right side bending 30  30   Left side bending 22 30   Right rotation 66%  66   Left rotation 66%  75     Hip Left  11/15/2022 Left  1/17/2023   Hip flex 120  118   Hip Abd       Hip ER (supine) 52  70   Hip IR (Supine) 22  34   Hip Extn (sidelying) 18 deg  +14   PSLR 72  82       Strength:                                  L/E MMT Right  11/15/22 Left  11/15/22 Right  1/17/23 Left  1/17/23   Hip Flexion 5/5 4+/5  5/5 5/5   Hip Extension 3+/5 3+/5  4+/5 4+/5   Hip Abd 4/5 3+/5  4-/5 3+/5   Hip Add 4/5 4/5  4+/5 4/5   Hip IR 4-/5 4-/5  4/5 4/5   Hip ER 4+/5 4+/5  4+/5  4+/5   Knee Flexion 5/5 5/5  5/5 5/5   Knee Extension 5/5 5/5  5/5 5/5   Ankle DF 5/5 5/5  5/5 5/5   Ankle PF 5/5 4/5 5/5 5/5   ASLR  4-/5 4-/5  5/5 5/5         Treatment     Leticia received the treatments listed below:      therapeutic exercises to develop strength, endurance, ROM, flexibility, posture, and core stabilization for 45 minutes including:    Elliptical - x 7 minutes - quickstart  Sitting Hip ER/piriformis stretch - 10 second holds x 2 mintues  Lower trunk rotations 10 second holds x 2 minutes  Supine HS stretch with stretch rite - 10 second holds x 2 minutes bilaterally  Sit to stand from 18 inch - 2x10  Green Theraband - side steps - 2x10  Monster walks - Green Theraband - 2x10  Star balance - 1        manual therapy techniques: Myofacial release and Soft tissue Mobilization were applied to the: left hip for 0 minutes, including:  Soft tissue mobilization to gluteals, piriformis, tensor fasciae latae and obturators/gemelli muscle's.      Leticia participated in neuromuscular re-education activities to improve: Balance, Coordination, Proprioception, and Posture for 15 minutes. The following activities were included:    Bridges with abdominal brace 2x10  Sit to stands 10x/ 2 sets no hands  Straight leg raise with abdominal brace 2x10 bilateral      Patient Education and Home Exercises     Home Exercises Provided and Patient Education Provided     Education provided: 11/21/2022: added home exercise program to my chart    Written Home Exercises Provided: Patient instructed to cont prior HEP. Exercises were reviewed and Leticia was able to demonstrate them prior to the end of the session.  Leticia demonstrated good  understanding of the education provided. See EMR under Patient Instructions for exercises provided during therapy sessions    ASSESSMENT     Patient has demonstrated significant ROM deficits at the lumbar spine and left hip.  Therefore, have started to work on strengthening/stabilization and balance  activities.  Good performance with those.  Still some difficulty with balance activities.  Will progress those with continued treatment.  Nearing completion of PT and will update home exercises for the patient.    Leticia Is progressing well towards her goals.   Pt prognosis is Good.     Pt will continue to benefit from skilled outpatient physical therapy to address the deficits listed in the problem list box on initial evaluation, provide pt/family education and to maximize pt's level of independence in the home and community environment.     Pt's spiritual, cultural and educational needs considered and pt agreeable to plan of care and goals.     Anticipated barriers to physical therapy: osteopenia, HTN, anxiety, defibrillator placement, MI, left total shoulder arthroplasty    Goals:   Reviewed: 1/17/2023     Short Term Goals: In 4 weeks   1.Patient to be educated on HEP. MET but also modifying 1/17/2023  2. Patient  to increase lumbar ROM to WFL's without pain, in order to improve available range of motion for ADL's.  1/17/2023 MET  3. Patient  to increase hip AROM to ER 70 deg, in order to assist with more normalized gait pattern. 1/17/2023 met  4. Patient  to increase B LE strength to 4-/5, in order to perform gait without compensation 1/17/2023 MET  5. Patient   to have pain less than 2/10 at worst, to improve QOL. 1/17/2023 met  6. Patient  to improve score on the FOTO, to improve QOL. 12/13/2022 Met   7. Patient  to be educated on postural/body mechanics awareness. 1/17/2023 met     Long Term Goals: In 8 weeks  1.Patient to improve score on the FOTO to improve QOL. 12/13/2022 MET  2. Patient to demo increase in LE strength to 4/5, n order to perform gait without compensation 1/17/2023 met  3. Patient to have decreased pain to 2/10 at worst, to improve QOL. 1/17/2023 met  4. Patient to demo increase lumbar ROM to B Rot 100% and left sidebending 30 deg, in order to improve available range of motion for ADL's.   1/17/2022 partially met  5. Patient to increase hip AROM to ER 70 deg, in order to assist with more normalized gait pattern. 1/17/2023 met  6. Patient to perform daily activities including sleeping, ascending/descending stairs, squatting without increased symptoms. 1/17/2023 met    PLAN     Plan of care Certification: 11/15/2022 to 2/15/2023.    Continue Plan of Care (POC) and progress per patient tolerance.     Moises Harper, PT

## 2023-01-19 ENCOUNTER — TELEPHONE (OUTPATIENT)
Dept: CARDIOLOGY | Facility: CLINIC | Age: 78
End: 2023-01-19
Payer: MEDICARE

## 2023-01-19 NOTE — TELEPHONE ENCOUNTER
Contacted the pt she rs her appt until April 21st. She will needs a device check on that day as well pb    ----- Message from Cammie Aggarwal sent at 1/19/2023 11:11 AM CST -----  Contact: [phone]  Patient would like to consult with a nurse in regards to her scheduled appt. Please give her a call back at [phone]. Thanks guera

## 2023-01-31 ENCOUNTER — IMMUNIZATION (OUTPATIENT)
Dept: PHARMACY | Facility: CLINIC | Age: 78
End: 2023-01-31
Payer: MEDICARE

## 2023-01-31 ENCOUNTER — CLINICAL SUPPORT (OUTPATIENT)
Dept: AUDIOLOGY | Facility: CLINIC | Age: 78
End: 2023-01-31
Payer: MEDICARE

## 2023-01-31 ENCOUNTER — OFFICE VISIT (OUTPATIENT)
Dept: OTOLARYNGOLOGY | Facility: CLINIC | Age: 78
End: 2023-01-31
Payer: MEDICARE

## 2023-01-31 VITALS
TEMPERATURE: 98 F | BODY MASS INDEX: 31.12 KG/M2 | HEART RATE: 86 BPM | WEIGHT: 164.69 LBS | SYSTOLIC BLOOD PRESSURE: 149 MMHG | DIASTOLIC BLOOD PRESSURE: 69 MMHG

## 2023-01-31 DIAGNOSIS — H61.23 BILATERAL IMPACTED CERUMEN: Primary | ICD-10-CM

## 2023-01-31 DIAGNOSIS — Z46.1 HEARING AID FITTING OR ADJUSTMENT: Primary | ICD-10-CM

## 2023-01-31 PROCEDURE — 3077F PR MOST RECENT SYSTOLIC BLOOD PRESSURE >= 140 MM HG: ICD-10-PCS | Mod: HCNC,CPTII,S$GLB, | Performed by: PHYSICIAN ASSISTANT

## 2023-01-31 PROCEDURE — 99999 PR PBB SHADOW E&M-EST. PATIENT-LVL III: CPT | Mod: PBBFAC,HCNC,, | Performed by: PHYSICIAN ASSISTANT

## 2023-01-31 PROCEDURE — 3078F DIAST BP <80 MM HG: CPT | Mod: HCNC,CPTII,S$GLB, | Performed by: PHYSICIAN ASSISTANT

## 2023-01-31 PROCEDURE — 3078F PR MOST RECENT DIASTOLIC BLOOD PRESSURE < 80 MM HG: ICD-10-PCS | Mod: HCNC,CPTII,S$GLB, | Performed by: PHYSICIAN ASSISTANT

## 2023-01-31 PROCEDURE — 99499 NO LOS: ICD-10-PCS | Mod: HCNC,S$GLB,, | Performed by: PHYSICIAN ASSISTANT

## 2023-01-31 PROCEDURE — 1101F PR PT FALLS ASSESS DOC 0-1 FALLS W/OUT INJ PAST YR: ICD-10-PCS | Mod: HCNC,CPTII,S$GLB, | Performed by: PHYSICIAN ASSISTANT

## 2023-01-31 PROCEDURE — 69210 REMOVE IMPACTED EAR WAX UNI: CPT | Mod: HCNC,S$GLB,, | Performed by: PHYSICIAN ASSISTANT

## 2023-01-31 PROCEDURE — 3288F FALL RISK ASSESSMENT DOCD: CPT | Mod: HCNC,CPTII,S$GLB, | Performed by: PHYSICIAN ASSISTANT

## 2023-01-31 PROCEDURE — 3288F PR FALLS RISK ASSESSMENT DOCUMENTED: ICD-10-PCS | Mod: HCNC,CPTII,S$GLB, | Performed by: PHYSICIAN ASSISTANT

## 2023-01-31 PROCEDURE — 1159F PR MEDICATION LIST DOCUMENTED IN MEDICAL RECORD: ICD-10-PCS | Mod: HCNC,CPTII,S$GLB, | Performed by: PHYSICIAN ASSISTANT

## 2023-01-31 PROCEDURE — 99999 PR PBB SHADOW E&M-EST. PATIENT-LVL III: ICD-10-PCS | Mod: PBBFAC,HCNC,, | Performed by: PHYSICIAN ASSISTANT

## 2023-01-31 PROCEDURE — 1101F PT FALLS ASSESS-DOCD LE1/YR: CPT | Mod: HCNC,CPTII,S$GLB, | Performed by: PHYSICIAN ASSISTANT

## 2023-01-31 PROCEDURE — 1159F MED LIST DOCD IN RCRD: CPT | Mod: HCNC,CPTII,S$GLB, | Performed by: PHYSICIAN ASSISTANT

## 2023-01-31 PROCEDURE — 3077F SYST BP >= 140 MM HG: CPT | Mod: HCNC,CPTII,S$GLB, | Performed by: PHYSICIAN ASSISTANT

## 2023-01-31 PROCEDURE — 99499 UNLISTED E&M SERVICE: CPT | Mod: HCNC,S$GLB,, | Performed by: PHYSICIAN ASSISTANT

## 2023-01-31 PROCEDURE — 69210 PR REMOVAL IMPACTED CERUMEN REQUIRING INSTRUMENTATION, UNILATERAL: ICD-10-PCS | Mod: HCNC,S$GLB,, | Performed by: PHYSICIAN ASSISTANT

## 2023-01-31 NOTE — PROGRESS NOTES
Leticia Grimes was seen 01/31/2023 for a hearing aid follow-up appointment.  She has not been hearing well with her hearing aids lately. Specifically, her left ear and she has also had some mild ache in her left ear as well.     Otoscopy revealed bilateral cerumen impaction, worse on the left side.     Aids were cleaned and checked and are in good working order. Filter was replaced on the left side and domes were replaced on both aids.    After her ear cleaning she is hearing so much better. She will come in more regularly for ear cleanings.      Patient will call for any future hearing aid follow-up appointments as needed.

## 2023-01-31 NOTE — PROGRESS NOTES
Subjective:   Patient ID: Leticia Grimes is a 77 y.o. female.    Chief Complaint: Ear Fullness ( Left ear worse )    Patient is here to see me today for evaluation of a possible wax impaction in bilateral ears.  She has complaints of hearing loss in the affected ears, but denies pain or drainage.  This has been an issue in the past.  The patient has not been using any sort of ear drop to soften the wax.     Review of patient's allergies indicates:   Allergen Reactions    Codeine     Morphine Other (See Comments)    Statins-hmg-coa reductase inhibitors            Review of Systems   Constitutional:  Negative for chills, fatigue, fever and unexpected weight change.   HENT:  Positive for hearing loss. Negative for congestion, dental problem, ear discharge, ear pain, facial swelling, nosebleeds, postnasal drip, rhinorrhea, sinus pressure, sneezing, sore throat, tinnitus, trouble swallowing and voice change.    Eyes:  Negative for redness, itching and visual disturbance.   Respiratory:  Negative for cough, choking, shortness of breath and wheezing.    Cardiovascular:  Negative for chest pain and palpitations.   Gastrointestinal:  Negative for abdominal pain.        No reflux.   Musculoskeletal:  Negative for gait problem.   Skin:  Negative for rash.   Neurological:  Negative for dizziness, light-headedness and headaches.       Objective:   BP (!) 149/69 (BP Location: Left arm, Patient Position: Sitting)   Pulse 86   Temp 97.7 °F (36.5 °C)   Wt 74.7 kg (164 lb 10.9 oz)   BMI 31.12 kg/m²     Physical Exam  HENT:      Right Ear: There is impacted cerumen.      Left Ear: There is impacted cerumen.        Procedure Note    CHIEF COMPLAINT:  Cerumen Impaction    Description:  The patient was seated in an exam chair.  An ear speculum was placed in the right EAC and was examined under the microscope.  Suction and/or loop curettes were used to remove a large cerumen impaction.  The tympanic membrane was visualized and was  normal in appearance.  The procedure was repeated on the left side in a similar fashion.  The TM was intact and normal on this side as well.  The patient tolerated the procedure well.     Assessment:     1. Bilateral impacted cerumen        Plan:     Bilateral impacted cerumen       Cerumen impaction:  Removed today without difficulty.  I would recommend the use of a wax softening drop, either over the counter Debrox or mineral oil, on a weekly basis.  I also instructed the patient to avoid Qtips.

## 2023-02-07 ENCOUNTER — TELEPHONE (OUTPATIENT)
Dept: PAIN MEDICINE | Facility: CLINIC | Age: 78
End: 2023-02-07
Payer: MEDICARE

## 2023-02-07 ENCOUNTER — TELEPHONE (OUTPATIENT)
Dept: INTERNAL MEDICINE | Facility: CLINIC | Age: 78
End: 2023-02-07
Payer: MEDICARE

## 2023-02-07 ENCOUNTER — PATIENT MESSAGE (OUTPATIENT)
Dept: PAIN MEDICINE | Facility: CLINIC | Age: 78
End: 2023-02-07
Payer: MEDICARE

## 2023-02-07 DIAGNOSIS — Z00.00 ENCOUNTER FOR MEDICARE ANNUAL WELLNESS EXAM: ICD-10-CM

## 2023-02-07 NOTE — TELEPHONE ENCOUNTER
Spoke with pt; pt stated she doesn't mind scheduling a visit but asking can the visit be done virtually /LD

## 2023-02-07 NOTE — TELEPHONE ENCOUNTER
Spoke with pt; MA advised pt her next appt can be virtual to discuss med refill; MA scheduled appt with pt; pt verbalized understanding /LD

## 2023-02-07 NOTE — TELEPHONE ENCOUNTER
----- Message from Rachell Lara sent at 2/7/2023  1:29 PM CST -----  Contact: Leticia Kelly would like to be contacted at [phone] ([phone])      Thanks     
Returned call to patient, she canceled appt with Rebecca for 2/13/23 at 9:00 am but now wants to know if Rebecca will give her pain medication.  Informed patient that she has to be evaluated and it is the provider decision on which medication is given and that we are IPM and do not provide opioid based medication.  Patient said that she will keep her virtual with PCP who will give her pain medication, and will call at later date to be evaluated for injection.  
none...

## 2023-02-07 NOTE — TELEPHONE ENCOUNTER
----- Message from Ben Marie MD sent at 2/6/2023  5:06 PM CST -----  Contact: Leticia  Cannot be approved without a visit   Please schedule  ----- Message -----  From: Elisa Gregorio MA  Sent: 2/6/2023   4:12 PM CST  To: Ben Marie MD      ----- Message -----  From: Radha Davis  Sent: 2/6/2023   3:46 PM CST  To: Frank Bermudez    Leticia is needing a call to be placed to her pharmacy approving her Lorazepam with the pharmacy. Please call her back at [phone].      [facility] [address] [address]  Phone: [phone] Fax: [phone]

## 2023-02-09 DIAGNOSIS — Z00.00 ENCOUNTER FOR MEDICARE ANNUAL WELLNESS EXAM: ICD-10-CM

## 2023-02-13 ENCOUNTER — OFFICE VISIT (OUTPATIENT)
Dept: INTERNAL MEDICINE | Facility: CLINIC | Age: 78
End: 2023-02-13
Payer: MEDICARE

## 2023-02-13 ENCOUNTER — TELEPHONE (OUTPATIENT)
Dept: INTERNAL MEDICINE | Facility: CLINIC | Age: 78
End: 2023-02-13
Payer: MEDICARE

## 2023-02-13 DIAGNOSIS — F41.9 ANXIETY: Primary | ICD-10-CM

## 2023-02-13 DIAGNOSIS — F51.02 ADJUSTMENT INSOMNIA: Chronic | ICD-10-CM

## 2023-02-13 PROCEDURE — 99499 UNLISTED E&M SERVICE: CPT | Mod: HCNC,95,, | Performed by: FAMILY MEDICINE

## 2023-02-13 PROCEDURE — 99499 NO LOS: ICD-10-PCS | Mod: HCNC,95,, | Performed by: FAMILY MEDICINE

## 2023-02-13 NOTE — TELEPHONE ENCOUNTER
----- Message from Poly Lorenzo sent at 2/13/2023  9:46 AM CST -----  Pt is requesting a call she stated she is online for her virtual visit but no one has come on. Call back number [phone]  Thx

## 2023-02-13 NOTE — TELEPHONE ENCOUNTER
Spoke with pt; MA explained to pt office computers were down but provider would be logging in virtual in a few minutes; pt verbalized understanding /LD

## 2023-02-14 ENCOUNTER — OFFICE VISIT (OUTPATIENT)
Dept: INTERNAL MEDICINE | Facility: CLINIC | Age: 78
End: 2023-02-14
Payer: MEDICARE

## 2023-02-14 DIAGNOSIS — F41.9 ANXIETY: Primary | ICD-10-CM

## 2023-02-14 DIAGNOSIS — F51.02 INSOMNIA DUE TO STRESS: ICD-10-CM

## 2023-02-14 PROCEDURE — 1159F MED LIST DOCD IN RCRD: CPT | Mod: HCNC,CPTII,95, | Performed by: FAMILY MEDICINE

## 2023-02-14 PROCEDURE — 99214 PR OFFICE/OUTPT VISIT, EST, LEVL IV, 30-39 MIN: ICD-10-PCS | Mod: HCNC,95,, | Performed by: FAMILY MEDICINE

## 2023-02-14 PROCEDURE — 1159F PR MEDICATION LIST DOCUMENTED IN MEDICAL RECORD: ICD-10-PCS | Mod: HCNC,CPTII,95, | Performed by: FAMILY MEDICINE

## 2023-02-14 PROCEDURE — 1160F PR REVIEW ALL MEDS BY PRESCRIBER/CLIN PHARMACIST DOCUMENTED: ICD-10-PCS | Mod: HCNC,CPTII,95, | Performed by: FAMILY MEDICINE

## 2023-02-14 PROCEDURE — 99214 OFFICE O/P EST MOD 30 MIN: CPT | Mod: HCNC,95,, | Performed by: FAMILY MEDICINE

## 2023-02-14 PROCEDURE — 1160F RVW MEDS BY RX/DR IN RCRD: CPT | Mod: HCNC,CPTII,95, | Performed by: FAMILY MEDICINE

## 2023-02-14 RX ORDER — LORAZEPAM 0.5 MG/1
0.5 TABLET ORAL NIGHTLY PRN
Qty: 30 TABLET | Refills: 0 | Status: SHIPPED | OUTPATIENT
Start: 2023-02-14 | End: 2023-10-17

## 2023-02-14 NOTE — PROGRESS NOTES
Subjective:   Patient ID: Leticia Grimes is a 77 y.o. female.  Chief Complaint:  Anxiety    The patient location is: Home  The chief complaint leading to consultation is: Anxiety    Visit type: audiovisual    Face to Face time with patient: 20 minutes  30 minutes of total time spent on the encounter, which includes face to face time and non-face to face time preparing to see the patient (eg, review of tests), Obtaining and/or reviewing separately obtained history, Documenting clinical information in the electronic or other health record, Independently interpreting results (not separately reported) and communicating results to the patient/family/caregiver, or Care coordination (not separately reported).     Each patient to whom he or she provides medical services by telemedicine is:  (1) informed of the relationship between the physician and patient and the respective role of any other health care provider with respect to management of the patient; and (2) notified that he or she may decline to receive medical services by telemedicine and may withdraw from such care at any time.    Anxiety follow-up  Patient seen 12/22/2022 by Dr. Moreno for increased anxiety symptoms and sleep difficulty   Based on previous success with Ativan, prescribed 0.5 mg nightly as needed and dispensed for 30 pills  Patient reports does not take every day, but currently is out of medication and does need refills  Average use be every other day   Reports effective for symptom control   Relieve symptoms are increased due to her current chronic low back pain issues for which he is seen interventional pain management  Also feels this is contributing to her fluctuating blood pressures  Denies any side effects.      Tolerating current treatment well.   Happy with medication.    Wants to continue.  No behaviors to suggest inappropriate use of prescribed medications.  [place] Board of Pharmacy Controlled Prescription Drug Monitoring database  was queried and showed no activity to suggest abuse, diversion, or other inappropriate use of prescription medications.  If a  Review of Systems   Constitutional:  Positive for fatigue. Negative for activity change, appetite change and unexpected weight change.   HENT:  Positive for hearing loss. Negative for rhinorrhea and trouble swallowing.    Eyes:  Negative for discharge and visual disturbance.   Respiratory:  Positive for wheezing. Negative for chest tightness and shortness of breath.    Cardiovascular:  Negative for chest pain and palpitations.   Gastrointestinal:  Negative for abdominal pain, blood in stool, constipation, diarrhea, nausea and vomiting.   Endocrine: Negative for polydipsia and polyuria.   Genitourinary:  Negative for difficulty urinating, dysuria, hematuria and menstrual problem.   Musculoskeletal:  Positive for arthralgias, back pain and neck pain. Negative for joint swelling and myalgias.   Skin:  Negative for rash.   Neurological:  Positive for weakness. Negative for dizziness, tremors, light-headedness, numbness and headaches.   Psychiatric/Behavioral:  Positive for decreased concentration, dysphoric mood and sleep disturbance. Negative for agitation, behavioral problems, confusion, hallucinations, self-injury and suicidal ideas. The patient is nervous/anxious. The patient is not hyperactive.      Objective:     Physical Exam  Constitutional:       Appearance: Normal appearance.   Neurological:      Mental Status: She is alert.   Psychiatric:         Attention and Perception: She is inattentive. She does not perceive auditory or visual hallucinations.         Mood and Affect: Mood and affect normal. Mood is not anxious or depressed.         Speech: She is communicative. Speech is tangential. Speech is not rapid and pressured, delayed or slurred.         Behavior: Behavior normal. Behavior is cooperative.         Thought Content: Thought content normal.     Assessment:       ICD-10-CM  ICD-9-CM   1. Anxiety  F41.9 300.00   2. 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  Dispense: 30 tablet; Refill: 0    Reviewed/discussed for as needed use, 30 pills need to last approximately 90 day duration  If requires more frequent use than that, be more chronic in needs to consider additional daily preventative medication   For now continue Ativan 0.5 mg nightly as needed and monitor frequency  Return to clinic 3 months for face-to-face visit in to include annual physical exam and yearly lab work   Otherwise follow-up sooner if needed    30+ minutes of total time spent on the encounter, which includes face to face time and non-face to face time preparing to see the patient (eg, review of tests), Obtaining and/or reviewing separately obtained history, documenting clinical information in the electronic or other health record, independently interpreting results (not separately reported) and communicating results to the patient/family/caregiver, or Care coordination (not separately reported).

## 2023-02-20 ENCOUNTER — TELEPHONE (OUTPATIENT)
Dept: ADMINISTRATIVE | Facility: HOSPITAL | Age: 78
End: 2023-02-20
Payer: MEDICARE

## 2023-03-06 PROBLEM — N18.31 STAGE 3A CHRONIC KIDNEY DISEASE: Status: ACTIVE | Noted: 2023-03-06

## 2023-03-06 PROBLEM — E78.1 HYPERTRIGLYCERIDEMIA: Status: ACTIVE | Noted: 2023-03-06

## 2023-03-06 PROBLEM — E04.1 THYROID NODULE: Status: ACTIVE | Noted: 2023-03-06

## 2023-03-12 ENCOUNTER — CLINICAL SUPPORT (OUTPATIENT)
Dept: CARDIOLOGY | Facility: HOSPITAL | Age: 78
End: 2023-03-12
Payer: MEDICARE

## 2023-03-12 DIAGNOSIS — Z95.810 PRESENCE OF AUTOMATIC (IMPLANTABLE) CARDIAC DEFIBRILLATOR: ICD-10-CM

## 2023-03-12 PROCEDURE — 93296 REM INTERROG EVL PM/IDS: CPT | Mod: HCNC | Performed by: INTERNAL MEDICINE

## 2023-03-13 ENCOUNTER — TELEPHONE (OUTPATIENT)
Dept: INTERNAL MEDICINE | Facility: CLINIC | Age: 78
End: 2023-03-13
Payer: MEDICARE

## 2023-03-13 NOTE — TELEPHONE ENCOUNTER
Called pt to reschedule appt due to  not seeing pts over 65. Pt appt rescheduled with another provider. //DM

## 2023-03-14 ENCOUNTER — OFFICE VISIT (OUTPATIENT)
Dept: INTERNAL MEDICINE | Facility: CLINIC | Age: 78
End: 2023-03-14
Payer: MEDICARE

## 2023-03-14 ENCOUNTER — PATIENT OUTREACH (OUTPATIENT)
Dept: ADMINISTRATIVE | Facility: OTHER | Age: 78
End: 2023-03-14
Payer: MEDICARE

## 2023-03-14 VITALS
HEIGHT: 62 IN | RESPIRATION RATE: 20 BRPM | SYSTOLIC BLOOD PRESSURE: 142 MMHG | HEART RATE: 64 BPM | WEIGHT: 160.5 LBS | BODY MASS INDEX: 29.53 KG/M2 | DIASTOLIC BLOOD PRESSURE: 70 MMHG

## 2023-03-14 DIAGNOSIS — G89.29 CHRONIC BILATERAL LOW BACK PAIN WITH LEFT-SIDED SCIATICA: ICD-10-CM

## 2023-03-14 DIAGNOSIS — R26.9 GAIT ABNORMALITY: ICD-10-CM

## 2023-03-14 DIAGNOSIS — M25.552 LEFT HIP PAIN: ICD-10-CM

## 2023-03-14 DIAGNOSIS — H26.9 CATARACT, UNSPECIFIED CATARACT TYPE, UNSPECIFIED LATERALITY: ICD-10-CM

## 2023-03-14 DIAGNOSIS — H91.90 HEARING LOSS, UNSPECIFIED HEARING LOSS TYPE, UNSPECIFIED LATERALITY: ICD-10-CM

## 2023-03-14 DIAGNOSIS — E55.9 MILD VITAMIN D DEFICIENCY: Chronic | ICD-10-CM

## 2023-03-14 DIAGNOSIS — M85.80 OSTEOPENIA, UNSPECIFIED LOCATION: Chronic | ICD-10-CM

## 2023-03-14 DIAGNOSIS — E78.2 MIXED HYPERLIPIDEMIA: Chronic | ICD-10-CM

## 2023-03-14 DIAGNOSIS — R68.89 DECREASED STRENGTH, ENDURANCE, AND MOBILITY: ICD-10-CM

## 2023-03-14 DIAGNOSIS — R29.90 MULTIPLE NEUROLOGICAL SYMPTOMS: ICD-10-CM

## 2023-03-14 DIAGNOSIS — I10 ESSENTIAL HYPERTENSION: Chronic | ICD-10-CM

## 2023-03-14 DIAGNOSIS — M54.42 CHRONIC BILATERAL LOW BACK PAIN WITH LEFT-SIDED SCIATICA: ICD-10-CM

## 2023-03-14 DIAGNOSIS — Z74.09 DECREASED STRENGTH, ENDURANCE, AND MOBILITY: ICD-10-CM

## 2023-03-14 DIAGNOSIS — R20.2 PARESTHESIA OF BOTH HANDS: ICD-10-CM

## 2023-03-14 DIAGNOSIS — Z96.612 PRESENCE OF ARTIFICIAL SHOULDER JOINT, LEFT: ICD-10-CM

## 2023-03-14 DIAGNOSIS — R53.1 DECREASED STRENGTH, ENDURANCE, AND MOBILITY: ICD-10-CM

## 2023-03-14 DIAGNOSIS — F51.02 ADJUSTMENT INSOMNIA: Chronic | ICD-10-CM

## 2023-03-14 DIAGNOSIS — E04.1 THYROID NODULE: ICD-10-CM

## 2023-03-14 DIAGNOSIS — Z86.74 HISTORY OF SUDDEN CARDIAC ARREST SUCCESSFULLY RESUSCITATED: Chronic | ICD-10-CM

## 2023-03-14 DIAGNOSIS — E78.1 HYPERTRIGLYCERIDEMIA: ICD-10-CM

## 2023-03-14 DIAGNOSIS — Z95.810 ICD (IMPLANTABLE CARDIOVERTER-DEFIBRILLATOR) IN PLACE: Chronic | ICD-10-CM

## 2023-03-14 DIAGNOSIS — N18.31 STAGE 3A CHRONIC KIDNEY DISEASE: ICD-10-CM

## 2023-03-14 DIAGNOSIS — Z00.00 ENCOUNTER FOR PREVENTATIVE ADULT HEALTH CARE EXAMINATION: Primary | ICD-10-CM

## 2023-03-14 DIAGNOSIS — G56.03 BILATERAL CARPAL TUNNEL SYNDROME: ICD-10-CM

## 2023-03-14 DIAGNOSIS — F41.9 ANXIETY: Chronic | ICD-10-CM

## 2023-03-14 DIAGNOSIS — M54.17 LUMBOSACRAL RADICULOPATHY AT S1: ICD-10-CM

## 2023-03-14 PROCEDURE — G0439 PR MEDICARE ANNUAL WELLNESS SUBSEQUENT VISIT: ICD-10-PCS | Mod: HCNC,S$GLB,, | Performed by: NURSE PRACTITIONER

## 2023-03-14 PROCEDURE — 1159F MED LIST DOCD IN RCRD: CPT | Mod: HCNC,CPTII,S$GLB, | Performed by: NURSE PRACTITIONER

## 2023-03-14 PROCEDURE — 1101F PT FALLS ASSESS-DOCD LE1/YR: CPT | Mod: HCNC,CPTII,S$GLB, | Performed by: NURSE PRACTITIONER

## 2023-03-14 PROCEDURE — 1159F PR MEDICATION LIST DOCUMENTED IN MEDICAL RECORD: ICD-10-PCS | Mod: HCNC,CPTII,S$GLB, | Performed by: NURSE PRACTITIONER

## 2023-03-14 PROCEDURE — 3077F PR MOST RECENT SYSTOLIC BLOOD PRESSURE >= 140 MM HG: ICD-10-PCS | Mod: HCNC,CPTII,S$GLB, | Performed by: NURSE PRACTITIONER

## 2023-03-14 PROCEDURE — 1160F RVW MEDS BY RX/DR IN RCRD: CPT | Mod: HCNC,CPTII,S$GLB, | Performed by: NURSE PRACTITIONER

## 2023-03-14 PROCEDURE — 1101F PR PT FALLS ASSESS DOC 0-1 FALLS W/OUT INJ PAST YR: ICD-10-PCS | Mod: HCNC,CPTII,S$GLB, | Performed by: NURSE PRACTITIONER

## 2023-03-14 PROCEDURE — 3077F SYST BP >= 140 MM HG: CPT | Mod: HCNC,CPTII,S$GLB, | Performed by: NURSE PRACTITIONER

## 2023-03-14 PROCEDURE — 99999 PR PBB SHADOW E&M-EST. PATIENT-LVL IV: CPT | Mod: PBBFAC,HCNC,, | Performed by: NURSE PRACTITIONER

## 2023-03-14 PROCEDURE — G0439 PPPS, SUBSEQ VISIT: HCPCS | Mod: HCNC,S$GLB,, | Performed by: NURSE PRACTITIONER

## 2023-03-14 PROCEDURE — 1160F PR REVIEW ALL MEDS BY PRESCRIBER/CLIN PHARMACIST DOCUMENTED: ICD-10-PCS | Mod: HCNC,CPTII,S$GLB, | Performed by: NURSE PRACTITIONER

## 2023-03-14 PROCEDURE — 1170F FXNL STATUS ASSESSED: CPT | Mod: HCNC,CPTII,S$GLB, | Performed by: NURSE PRACTITIONER

## 2023-03-14 PROCEDURE — 3078F DIAST BP <80 MM HG: CPT | Mod: HCNC,CPTII,S$GLB, | Performed by: NURSE PRACTITIONER

## 2023-03-14 PROCEDURE — 3288F FALL RISK ASSESSMENT DOCD: CPT | Mod: HCNC,CPTII,S$GLB, | Performed by: NURSE PRACTITIONER

## 2023-03-14 PROCEDURE — 99999 PR PBB SHADOW E&M-EST. PATIENT-LVL IV: ICD-10-PCS | Mod: PBBFAC,HCNC,, | Performed by: NURSE PRACTITIONER

## 2023-03-14 PROCEDURE — 1170F PR FUNCTIONAL STATUS ASSESSED: ICD-10-PCS | Mod: HCNC,CPTII,S$GLB, | Performed by: NURSE PRACTITIONER

## 2023-03-14 PROCEDURE — 3078F PR MOST RECENT DIASTOLIC BLOOD PRESSURE < 80 MM HG: ICD-10-PCS | Mod: HCNC,CPTII,S$GLB, | Performed by: NURSE PRACTITIONER

## 2023-03-14 PROCEDURE — 3288F PR FALLS RISK ASSESSMENT DOCUMENTED: ICD-10-PCS | Mod: HCNC,CPTII,S$GLB, | Performed by: NURSE PRACTITIONER

## 2023-03-14 NOTE — PATIENT INSTRUCTIONS
Counseling and Referral of Other Preventative  (Italic type indicates deductible and co-insurance are waived)    Patient Name: Leticia Grimes  Today's Date: 3/14/2023    Health Maintenance       Date Due Completion Date    TETANUS VACCINE Never done ---    DEXA Scan Never done ---    Shingles Vaccine (2 of 2) 03/28/2023 1/31/2023    Lipid Panel 05/11/2027 5/11/2022        No orders of the defined types were placed in this encounter.    The following information is provided to all patients.  This information is to help you find resources for any of the problems found today that may be affecting your health:                Living healthy guide: www.[facility].[place].[facility]      Understanding Diabetes: www.diabetes.org      Eating healthy: www.cdc.gov/healthyweight      CDC home safety checklist: www.cdc.gov/steadi/patient.html      Agency on Aging: www.goea.[place].[facility]      Alcoholics anonymous (AA): www.aa.org      Physical Activity: www.saeid.nih.gov/ws7vxhj      Tobacco use: www.quitwithusla.org

## 2023-03-14 NOTE — PROGRESS NOTES
"  Leticia Grimes presented for a  Medicare AWV and comprehensive Health Risk Assessment today. The following components were reviewed and updated:    Medical history  Family History  Social history  Allergies and Current Medications  Health Risk Assessment  Health Maintenance  Care Team         ** See Completed Assessments for Annual Wellness Visit within the encounter summary.**         The following assessments were completed:  Living Situation  CAGE  Depression Screening  Timed Get Up and Go  Whisper Test  Cognitive Function Screening  Nutrition Screening  ADL Screening  PAQ Screening        Vitals:    03/14/23 0918   BP: (!) 142/70   BP Location: Right arm   Patient Position: Sitting   Pulse: 64   Resp: 20   Weight: 72.8 kg (160 lb 7.9 oz)   Height:    Pain scale 5' 1.5" (1.562 m)    1-2/5 chronic LUE, LLE pain       Daughter, Lidia, here with patient for visit      Body mass index is 29.83 kg/m².  Physical Exam  Constitutional:       General: She is not in acute distress.     Appearance: She is not ill-appearing or diaphoretic.   HENT:      Head: Normocephalic and atraumatic.      Mouth/Throat:      Mouth: Mucous membranes are moist.   Eyes:      General:         Right eye: No discharge.         Left eye: No discharge.      Extraocular Movements: Extraocular movements intact.      Conjunctiva/sclera: Conjunctivae normal.   Cardiovascular:      Rate and Rhythm: Normal rate and regular rhythm.   Pulmonary:      Effort: Pulmonary effort is normal. No respiratory distress.   Skin:     General: Skin is warm and dry.   Neurological:      Mental Status: She is alert and oriented to person, place, and time. Mental status is at baseline.   Psychiatric:         Mood and Affect: Mood normal.         Behavior: Behavior normal.         Thought Content: Thought content normal.         Judgment: Judgment normal.           Diagnoses and health risks identified today and associated recommendations/orders:    1. Encounter for " preventative adult health care examination  Review for opioid screening: Patient does not have Rx for Opioids  Review for substance use disorder: Patient does not use substance per chart    Patient states she does not drink any alcohol    I offered to discuss advanced care planning, including how to pick a person who would make decisions for you if you were unable to make them for yourself, called a health care power of , and what kind of decisions you might make such as use of life sustaining treatments such as ventilators and tube feeding when faced with a life limiting illness recorded on a living will that they will need to know. (How you want to be cared for as you near the end of your natural life)     X Patient is interested in learning more about how to make advanced directives.  I provided them paperwork and offered to discuss this with them.    - Ambulatory referral/consult to Outpatient Case Management- Patient is interested in the [employer] program    2. Stage 3a chronic kidney disease  Monitor  Follow up with PCP    3. Multiple neurological symptoms, Bilateral carpal tunnel syndrome,Lumbosacral radiculopathy at S1,  Chronic bilateral low back pain with left-sided sciatica,  Left hip pain, Presence of artificial shoulder joint, left, Paresthesia of both hands, Gait abnormality, Decreased strength, endurance, and mobility  Monitor  Follow up with Neurology, pain management specialist  Continue home tylenol, baclofen    4. Cataract, unspecified cataract type, unspecified laterality  Monitor  Follow up with Optho as directed     5. Hearing loss, unspecified hearing loss type, unspecified laterality   Monitor  Continue home hearing aids    6. Anxiety, Adjustment insomnia  Monitor  Stable  Follow up with PCP  Continue home ativan    7. Mixed hyperlipidemia, Hypertriglyceridemia  Monitor  Stable  Continue home fish oils    8. History of sudden cardiac arrest successfully resuscitated, ICD  (implantable cardioverter-defibrillator) in place  Monitor  Follow up with Cardiology as directed    9. Thyroid nodule   Monitor  Follow up with PCP    10. Essential hypertension  Monitor  Stable  Continue home norvasc, coreg, prn catapres, lisinopril    11. Mild vitamin D deficiency,   MonitorOsteopenia, unspecified location  Follow up with PCP  Continue home MVI           Provided Leticia with a 5-10 year written screening schedule and personal prevention plan. Recommendations were developed using the USPSTF age appropriate recommendations. Education, counseling, and referrals were provided as needed. After Visit Summary printed and given to patient which includes a list of additional screenings\tests needed.    Follow up in about 1 year (around 3/14/2024) for Annual wellness visit.    YUMIKO Moss

## 2023-03-14 NOTE — PROGRESS NOTES
CHW - Initial Contact    This Community Health Worker VERIFIED the Social Determinant of Health questionnaire with patient via telephone today.      Pt identified barriers of most importance are: food and utility    Food: Pt stated she was denied food stamps due to income and she goes to one food pantry currently but stopped going because the food had mold and was past expiration date printed on labels.Pt did call the food pantry to express her concerns but nothing changed.    Utility: Pt stated she would like information about utility assistance.    Referrals to community agencies completed with patient/caregiver consent outside of [facility] include: none      Referrals were put through [facility] - no    Support and Services:     1. [facility] [employer] [facility] (Utility assistance + meals on wheels)    [address] [facility]  [address]  [place] [address]    Phone: [phone]    2. [phone] (Utility assistance + meals on wheels)    [address] [place] [address]    Phone: [phone]    3. [facility] (liheap + food boxes)    [address] [place] [address]      Phone: [phone]    Other information discussed the patient needs / wants help with: none  Follow up required: yes  Follow-up Outreach - Due: 3/21/2023

## 2023-03-23 ENCOUNTER — OUTPATIENT CASE MANAGEMENT (OUTPATIENT)
Dept: ADMINISTRATIVE | Facility: OTHER | Age: 78
End: 2023-03-23
Payer: MEDICARE

## 2023-03-23 NOTE — PROGRESS NOTES
This LCSW contacted patient or caregiver regarding referral.  Patient/Caregiver stated there were no needs at this time. Pt reports being previously contacted by CHW and discussing available resources for utility assistance. Pt reports she is not currently facing disconnection nor does she have overdue balance. Pt advised that utility programs in her area do require proof including, disconnection. Pt reports no further needs to be addressed. Pt advised to communicate with PCP regarding any needs that may arise in future.

## 2023-03-24 NOTE — PROGRESS NOTES
CHW - Case Closure    This Community Health Worker spoke to patient via telephone today.     Pt/Caregiver reported: Pt stated those resources for utility assistance didn't help but if CHW finds other resources will contact her     Pt/Caregiver denied any additional needs at this time and agrees with episode closure at this time.  Provided patient with Community Health Worker's contact information and encouraged him/her to contact this Community Health Worker if additional needs arise.

## 2023-03-27 ENCOUNTER — TELEPHONE (OUTPATIENT)
Dept: INTERNAL MEDICINE | Facility: CLINIC | Age: 78
End: 2023-03-27
Payer: MEDICARE

## 2023-03-27 ENCOUNTER — OFFICE VISIT (OUTPATIENT)
Dept: INTERNAL MEDICINE | Facility: CLINIC | Age: 78
End: 2023-03-27
Payer: MEDICARE

## 2023-03-27 VITALS
TEMPERATURE: 97 F | HEIGHT: 62 IN | OXYGEN SATURATION: 98 % | BODY MASS INDEX: 29.41 KG/M2 | WEIGHT: 159.81 LBS | SYSTOLIC BLOOD PRESSURE: 136 MMHG | HEART RATE: 60 BPM | DIASTOLIC BLOOD PRESSURE: 84 MMHG

## 2023-03-27 DIAGNOSIS — M54.17 LUMBOSACRAL RADICULOPATHY AT S1: Primary | ICD-10-CM

## 2023-03-27 DIAGNOSIS — F41.9 ANXIETY: Primary | Chronic | ICD-10-CM

## 2023-03-27 DIAGNOSIS — F32.A DEPRESSION, UNSPECIFIED DEPRESSION TYPE: ICD-10-CM

## 2023-03-27 DIAGNOSIS — F44.0 DISSOCIATIVE AMNESIA: Chronic | ICD-10-CM

## 2023-03-27 DIAGNOSIS — G47.00 INSOMNIA, UNSPECIFIED TYPE: ICD-10-CM

## 2023-03-27 DIAGNOSIS — Z86.74 HISTORY OF SUDDEN CARDIAC ARREST SUCCESSFULLY RESUSCITATED: Chronic | ICD-10-CM

## 2023-03-27 PROCEDURE — 1126F AMNT PAIN NOTED NONE PRSNT: CPT | Mod: HCNC,CPTII,S$GLB, | Performed by: INTERNAL MEDICINE

## 2023-03-27 PROCEDURE — 99999 PR PBB SHADOW E&M-EST. PATIENT-LVL IV: ICD-10-PCS | Mod: PBBFAC,HCNC,, | Performed by: INTERNAL MEDICINE

## 2023-03-27 PROCEDURE — 1101F PR PT FALLS ASSESS DOC 0-1 FALLS W/OUT INJ PAST YR: ICD-10-PCS | Mod: HCNC,CPTII,S$GLB, | Performed by: INTERNAL MEDICINE

## 2023-03-27 PROCEDURE — 1101F PT FALLS ASSESS-DOCD LE1/YR: CPT | Mod: HCNC,CPTII,S$GLB, | Performed by: INTERNAL MEDICINE

## 2023-03-27 PROCEDURE — 3079F DIAST BP 80-89 MM HG: CPT | Mod: HCNC,CPTII,S$GLB, | Performed by: INTERNAL MEDICINE

## 2023-03-27 PROCEDURE — 99214 OFFICE O/P EST MOD 30 MIN: CPT | Mod: HCNC,S$GLB,, | Performed by: INTERNAL MEDICINE

## 2023-03-27 PROCEDURE — 3288F FALL RISK ASSESSMENT DOCD: CPT | Mod: HCNC,CPTII,S$GLB, | Performed by: INTERNAL MEDICINE

## 2023-03-27 PROCEDURE — 3075F SYST BP GE 130 - 139MM HG: CPT | Mod: HCNC,CPTII,S$GLB, | Performed by: INTERNAL MEDICINE

## 2023-03-27 PROCEDURE — 3288F PR FALLS RISK ASSESSMENT DOCUMENTED: ICD-10-PCS | Mod: HCNC,CPTII,S$GLB, | Performed by: INTERNAL MEDICINE

## 2023-03-27 PROCEDURE — 99214 PR OFFICE/OUTPT VISIT, EST, LEVL IV, 30-39 MIN: ICD-10-PCS | Mod: HCNC,S$GLB,, | Performed by: INTERNAL MEDICINE

## 2023-03-27 PROCEDURE — 3079F PR MOST RECENT DIASTOLIC BLOOD PRESSURE 80-89 MM HG: ICD-10-PCS | Mod: HCNC,CPTII,S$GLB, | Performed by: INTERNAL MEDICINE

## 2023-03-27 PROCEDURE — 3075F PR MOST RECENT SYSTOLIC BLOOD PRESS GE 130-139MM HG: ICD-10-PCS | Mod: HCNC,CPTII,S$GLB, | Performed by: INTERNAL MEDICINE

## 2023-03-27 PROCEDURE — 1126F PR PAIN SEVERITY QUANTIFIED, NO PAIN PRESENT: ICD-10-PCS | Mod: HCNC,CPTII,S$GLB, | Performed by: INTERNAL MEDICINE

## 2023-03-27 PROCEDURE — 1159F PR MEDICATION LIST DOCUMENTED IN MEDICAL RECORD: ICD-10-PCS | Mod: HCNC,CPTII,S$GLB, | Performed by: INTERNAL MEDICINE

## 2023-03-27 PROCEDURE — 1159F MED LIST DOCD IN RCRD: CPT | Mod: HCNC,CPTII,S$GLB, | Performed by: INTERNAL MEDICINE

## 2023-03-27 PROCEDURE — 99999 PR PBB SHADOW E&M-EST. PATIENT-LVL IV: CPT | Mod: PBBFAC,HCNC,, | Performed by: INTERNAL MEDICINE

## 2023-03-27 RX ORDER — TRAZODONE HYDROCHLORIDE 50 MG/1
TABLET ORAL
Qty: 30 TABLET | Refills: 0 | Status: SHIPPED | OUTPATIENT
Start: 2023-03-27 | End: 2023-08-10 | Stop reason: SDUPTHER

## 2023-03-27 RX ORDER — FLUOXETINE 10 MG/1
10 CAPSULE ORAL DAILY
Qty: 90 CAPSULE | Refills: 0 | Status: SHIPPED | OUTPATIENT
Start: 2023-03-27 | End: 2023-06-09

## 2023-03-27 NOTE — ASSESSMENT & PLAN NOTE
Reports no ativan in at least 6 weeks.    reviewed. Appears pt did not fill her refills of Ativan multiple months in 2021.  She reports that she does have some left over from her December refill and has not opened the bottle from February 14, 2023 refill.
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
## 2023-04-04 ENCOUNTER — IMMUNIZATION (OUTPATIENT)
Dept: PHARMACY | Facility: CLINIC | Age: 78
End: 2023-04-04
Payer: MEDICARE

## 2023-04-12 ENCOUNTER — PATIENT MESSAGE (OUTPATIENT)
Dept: INTERNAL MEDICINE | Facility: CLINIC | Age: 78
End: 2023-04-12
Payer: MEDICARE

## 2023-04-13 ENCOUNTER — CLINICAL SUPPORT (OUTPATIENT)
Dept: AUDIOLOGY | Facility: CLINIC | Age: 78
End: 2023-04-13
Payer: MEDICARE

## 2023-04-13 DIAGNOSIS — Z46.1 HEARING AID FITTING OR ADJUSTMENT: Primary | ICD-10-CM

## 2023-04-13 NOTE — PROGRESS NOTES
Leticia Grimes was seen 04/13/2023 for a hearing aid follow-up appointment.  She has not been hearing well for the past several weeks. She feels very stopped up and she also feels as if the amplification is increasing on its own. The right aid is dead. Once filter was replaced, the aid is now in good working order. We reviewed the process on replacing the filter and she understands. Otoscopy was unremarkable today. She still feels like she has a head cold and finds the left aid uncomfortable. She has very small ear canals and the small vented domes are fitting very tightly in her ears. I do not think custom molds would be a good option for her at this time. Aids were represcribed to small open domes. She immediately felt better with regard to comfort on the left side and is no longer feeling stopped up/in a barrel.     She will try these changes for the next 2 weeks and I will call her at that time to check on her.       Patient will call for any future hearing aid follow-up appointments as needed.

## 2023-04-18 ENCOUNTER — PATIENT MESSAGE (OUTPATIENT)
Dept: CARDIOLOGY | Facility: CLINIC | Age: 78
End: 2023-04-18
Payer: MEDICARE

## 2023-04-18 DIAGNOSIS — I10 ESSENTIAL HYPERTENSION: Primary | ICD-10-CM

## 2023-04-18 DIAGNOSIS — Z95.810 ICD (IMPLANTABLE CARDIOVERTER-DEFIBRILLATOR) IN PLACE: ICD-10-CM

## 2023-04-18 DIAGNOSIS — Z86.74 HISTORY OF SUDDEN CARDIAC ARREST SUCCESSFULLY RESUSCITATED: ICD-10-CM

## 2023-04-20 ENCOUNTER — TELEPHONE (OUTPATIENT)
Dept: INTERNAL MEDICINE | Facility: CLINIC | Age: 78
End: 2023-04-20
Payer: MEDICARE

## 2023-04-20 ENCOUNTER — OFFICE VISIT (OUTPATIENT)
Dept: PAIN MEDICINE | Facility: CLINIC | Age: 78
End: 2023-04-20
Payer: MEDICARE

## 2023-04-20 ENCOUNTER — TELEPHONE (OUTPATIENT)
Dept: CARDIOLOGY | Facility: CLINIC | Age: 78
End: 2023-04-20
Payer: MEDICARE

## 2023-04-20 DIAGNOSIS — M54.16 LUMBAR RADICULOPATHY, CHRONIC: Primary | ICD-10-CM

## 2023-04-20 DIAGNOSIS — Z95.810 ICD (IMPLANTABLE CARDIOVERTER-DEFIBRILLATOR) IN PLACE: Primary | ICD-10-CM

## 2023-04-20 DIAGNOSIS — Z86.74 HISTORY OF SUDDEN CARDIAC ARREST SUCCESSFULLY RESUSCITATED: ICD-10-CM

## 2023-04-20 DIAGNOSIS — M54.17 LUMBOSACRAL RADICULOPATHY AT S1: ICD-10-CM

## 2023-04-20 PROCEDURE — 99214 OFFICE O/P EST MOD 30 MIN: CPT | Mod: HCNC,95,, | Performed by: PHYSICIAN ASSISTANT

## 2023-04-20 PROCEDURE — 1160F RVW MEDS BY RX/DR IN RCRD: CPT | Mod: HCNC,CPTII,95, | Performed by: PHYSICIAN ASSISTANT

## 2023-04-20 PROCEDURE — 1159F PR MEDICATION LIST DOCUMENTED IN MEDICAL RECORD: ICD-10-PCS | Mod: HCNC,CPTII,95, | Performed by: PHYSICIAN ASSISTANT

## 2023-04-20 PROCEDURE — 1159F MED LIST DOCD IN RCRD: CPT | Mod: HCNC,CPTII,95, | Performed by: PHYSICIAN ASSISTANT

## 2023-04-20 PROCEDURE — 1160F PR REVIEW ALL MEDS BY PRESCRIBER/CLIN PHARMACIST DOCUMENTED: ICD-10-PCS | Mod: HCNC,CPTII,95, | Performed by: PHYSICIAN ASSISTANT

## 2023-04-20 PROCEDURE — 99214 PR OFFICE/OUTPT VISIT, EST, LEVL IV, 30-39 MIN: ICD-10-PCS | Mod: HCNC,95,, | Performed by: PHYSICIAN ASSISTANT

## 2023-04-20 NOTE — TELEPHONE ENCOUNTER
Patient requesting a call back to reschedule her device check on tomorrow due to expected inclement weather.     36.8

## 2023-04-20 NOTE — TELEPHONE ENCOUNTER
----- Message from Noe Esqueda sent at 4/20/2023  9:44 AM CDT -----  Contact: Leticia Kelly is needing a call back in regards to rescheduling her EKG and her appts on 04/21. She stated that she normally gets the test done right before she sees the provider the same day. Please give her a call back at [phone]

## 2023-04-20 NOTE — TELEPHONE ENCOUNTER
----- Message from Marie Olson sent at 4/20/2023 10:32 AM CDT -----  Contact: Leticia  .Type:  Patient Returning Call    Who Called: Leticia   Who Left Message for Patient: Myah   Does the patient know what this is regarding? Scheduling   Would the patient rather a call back or a response via [facility]? Call  Best Call Back Number:.[phone]        Thanks

## 2023-04-25 ENCOUNTER — TELEPHONE (OUTPATIENT)
Dept: PAIN MEDICINE | Facility: CLINIC | Age: 78
End: 2023-04-25
Payer: MEDICARE

## 2023-04-25 NOTE — TELEPHONE ENCOUNTER
Spoke with patient regarding CT scan.  Spoke with [facility].  They need a authorization for Leticia Grimes's CT scan.  Called our Authorizationdept to start auth process on CT scan.

## 2023-04-25 NOTE — TELEPHONE ENCOUNTER
----- Message from Danitza Pro sent at 4/25/2023 12:54 PM CDT -----  Pt would like a call back regarding information on CT scan she have to take. Call back number is .[phone]. Thx .EL

## 2023-04-26 ENCOUNTER — OFFICE VISIT (OUTPATIENT)
Dept: OPHTHALMOLOGY | Facility: CLINIC | Age: 78
End: 2023-04-26
Payer: MEDICARE

## 2023-04-26 DIAGNOSIS — H52.223 HYPEROPIA OF BOTH EYES WITH REGULAR ASTIGMATISM AND PRESBYOPIA: ICD-10-CM

## 2023-04-26 DIAGNOSIS — H52.03 HYPEROPIA OF BOTH EYES WITH REGULAR ASTIGMATISM AND PRESBYOPIA: ICD-10-CM

## 2023-04-26 DIAGNOSIS — H11.31 SUBCONJUNCTIVAL HEMORRHAGE OF RIGHT EYE: Primary | ICD-10-CM

## 2023-04-26 DIAGNOSIS — H25.13 NUCLEAR SCLEROTIC CATARACT OF BOTH EYES: ICD-10-CM

## 2023-04-26 DIAGNOSIS — H52.4 HYPEROPIA OF BOTH EYES WITH REGULAR ASTIGMATISM AND PRESBYOPIA: ICD-10-CM

## 2023-04-26 PROCEDURE — 1159F PR MEDICATION LIST DOCUMENTED IN MEDICAL RECORD: ICD-10-PCS | Mod: HCNC,CPTII,S$GLB, | Performed by: OPTOMETRIST

## 2023-04-26 PROCEDURE — 1160F PR REVIEW ALL MEDS BY PRESCRIBER/CLIN PHARMACIST DOCUMENTED: ICD-10-PCS | Mod: HCNC,CPTII,S$GLB, | Performed by: OPTOMETRIST

## 2023-04-26 PROCEDURE — 1160F RVW MEDS BY RX/DR IN RCRD: CPT | Mod: HCNC,CPTII,S$GLB, | Performed by: OPTOMETRIST

## 2023-04-26 PROCEDURE — 99999 PR PBB SHADOW E&M-EST. PATIENT-LVL II: CPT | Mod: PBBFAC,HCNC,, | Performed by: OPTOMETRIST

## 2023-04-26 PROCEDURE — 99213 OFFICE O/P EST LOW 20 MIN: CPT | Mod: HCNC,S$GLB,, | Performed by: OPTOMETRIST

## 2023-04-26 PROCEDURE — 99213 PR OFFICE/OUTPT VISIT, EST, LEVL III, 20-29 MIN: ICD-10-PCS | Mod: HCNC,S$GLB,, | Performed by: OPTOMETRIST

## 2023-04-26 PROCEDURE — 99999 PR PBB SHADOW E&M-EST. PATIENT-LVL II: ICD-10-PCS | Mod: PBBFAC,HCNC,, | Performed by: OPTOMETRIST

## 2023-04-26 PROCEDURE — 1159F MED LIST DOCD IN RCRD: CPT | Mod: HCNC,CPTII,S$GLB, | Performed by: OPTOMETRIST

## 2023-04-26 NOTE — PROGRESS NOTES
HPI    Patient here today for red irritated OD  C/O dryness with itchy eyes  Symptoms began a few days ago, Redness started this morning  No drops        1. NSC OU  Last edited by Peyton Lundy, PCT on 4/26/2023  1:40 PM.            Assessment /Plan     For exam results, see Encounter Report.    Subconjunctival hemorrhage of right eye  Benign nature of subconjunctival hemorrhage was discussed with patient and/or patient's family. Reassurance provided. Symptoms should improve or resolve in the next 7-10 days. RTC PRN if symptoms worsen or persist x 1-2 weeks.  Discussed above and answered questions.     Nuclear sclerotic cataract of both eyes  OD>OS consider CE/IOL eval if patient still having difficulty with Mrx.     Hyperopia of both eyes with regular astigmatism and presbyopia  Eyeglass Final Rx       Eyeglass Final Rx         Sphere Cylinder Axis Add    Right +2.50 +1.25 006 +2.50    Left +1.50 +1.50 002 +2.50      Type: PAL    Expiration Date: 12/05/23                  RTC as scheduled for annual exam with gOCT, sooner if any changes to vision or worsening symptoms.

## 2023-04-27 ENCOUNTER — TELEPHONE (OUTPATIENT)
Dept: PAIN MEDICINE | Facility: CLINIC | Age: 78
End: 2023-04-27
Payer: MEDICARE

## 2023-04-27 NOTE — TELEPHONE ENCOUNTER
Patient called in regarding authorization for CT Scan at [facility].  I called [facility] Authorization [facility].  Spoke with Georgiana and received the auth. #[id number].  Called [facility] and gave the auth #.  Called patient gifty MOLINA.

## 2023-05-15 ENCOUNTER — TELEPHONE (OUTPATIENT)
Dept: NEUROLOGY | Facility: CLINIC | Age: 78
End: 2023-05-15
Payer: MEDICARE

## 2023-05-15 NOTE — TELEPHONE ENCOUNTER
----- Message from Theresa Leonard MD sent at 5/15/2023  3:31 PM CDT -----  Contact: self [phone]    Ok but watch out for drowsiness.      ----- Message -----  From: Sarah Elliott MA  Sent: 5/15/2023   3:24 PM CDT  To: Theresa Leonard MD    Id it ok ?   ----- Message -----  From: Apolonia Elliott  Sent: 5/15/2023   1:18 PM CDT  To: Horacio Cardenas Staff    Patient called in this afternoon asking if is okay if she takes a muscle relaxer and the same time she is taking gabapentin. She wants to make sure its safe. Please call back [phone]

## 2023-05-15 NOTE — TELEPHONE ENCOUNTER
Spoke with pt advised her that it is ok but to watch out for drowsiness . Pt verbalized understanding.

## 2023-06-06 DIAGNOSIS — M54.17 LUMBOSACRAL RADICULOPATHY AT S1: ICD-10-CM

## 2023-06-07 RX ORDER — BACLOFEN 10 MG/1
TABLET ORAL
Qty: 31 TABLET | Refills: 0 | Status: SHIPPED | OUTPATIENT
Start: 2023-06-07 | End: 2023-06-29

## 2023-06-09 ENCOUNTER — OFFICE VISIT (OUTPATIENT)
Dept: CARDIOLOGY | Facility: CLINIC | Age: 78
End: 2023-06-09
Payer: MEDICARE

## 2023-06-09 ENCOUNTER — HOSPITAL ENCOUNTER (OUTPATIENT)
Dept: CARDIOLOGY | Facility: HOSPITAL | Age: 78
Discharge: HOME | End: 2023-06-09
Attending: INTERNAL MEDICINE
Payer: MEDICARE

## 2023-06-09 ENCOUNTER — DOCUMENTATION ONLY (OUTPATIENT)
Dept: AUDIOLOGY | Facility: CLINIC | Age: 78
End: 2023-06-09
Payer: MEDICARE

## 2023-06-09 ENCOUNTER — PATIENT MESSAGE (OUTPATIENT)
Dept: AUDIOLOGY | Facility: CLINIC | Age: 78
End: 2023-06-09
Payer: MEDICARE

## 2023-06-09 ENCOUNTER — CLINICAL SUPPORT (OUTPATIENT)
Dept: CARDIOLOGY | Facility: HOSPITAL | Age: 78
End: 2023-06-09
Attending: INTERNAL MEDICINE
Payer: MEDICARE

## 2023-06-09 VITALS
BODY MASS INDEX: 30.4 KG/M2 | HEIGHT: 61 IN | WEIGHT: 161.63 LBS | BODY MASS INDEX: 30.04 KG/M2 | DIASTOLIC BLOOD PRESSURE: 80 MMHG | WEIGHT: 161 LBS | HEART RATE: 59 BPM | SYSTOLIC BLOOD PRESSURE: 126 MMHG

## 2023-06-09 DIAGNOSIS — I10 ESSENTIAL HYPERTENSION: ICD-10-CM

## 2023-06-09 DIAGNOSIS — Z95.810 ICD (IMPLANTABLE CARDIOVERTER-DEFIBRILLATOR) IN PLACE: ICD-10-CM

## 2023-06-09 DIAGNOSIS — I10 ESSENTIAL HYPERTENSION: Chronic | ICD-10-CM

## 2023-06-09 DIAGNOSIS — Z86.74 HISTORY OF SUDDEN CARDIAC ARREST SUCCESSFULLY RESUSCITATED: ICD-10-CM

## 2023-06-09 DIAGNOSIS — N18.31 STAGE 3A CHRONIC KIDNEY DISEASE: ICD-10-CM

## 2023-06-09 DIAGNOSIS — E78.2 MIXED HYPERLIPIDEMIA: Chronic | ICD-10-CM

## 2023-06-09 DIAGNOSIS — Z86.74 PERSONAL HISTORY OF SUDDEN CARDIAC DEATH (SCD) RESUSCITATED: Primary | ICD-10-CM

## 2023-06-09 DIAGNOSIS — Z86.74 HISTORY OF SUDDEN CARDIAC ARREST SUCCESSFULLY RESUSCITATED: Chronic | ICD-10-CM

## 2023-06-09 DIAGNOSIS — Z95.810 ICD (IMPLANTABLE CARDIOVERTER-DEFIBRILLATOR) IN PLACE: Chronic | ICD-10-CM

## 2023-06-09 DIAGNOSIS — M54.17 LUMBOSACRAL RADICULOPATHY AT S1: ICD-10-CM

## 2023-06-09 PROCEDURE — 93282 PRGRMG EVAL IMPLANTABLE DFB: CPT | Mod: 26,,, | Performed by: INTERNAL MEDICINE

## 2023-06-09 PROCEDURE — 1160F RVW MEDS BY RX/DR IN RCRD: CPT | Mod: CPTII,S$GLB,, | Performed by: INTERNAL MEDICINE

## 2023-06-09 PROCEDURE — 1101F PT FALLS ASSESS-DOCD LE1/YR: CPT | Mod: CPTII,S$GLB,, | Performed by: INTERNAL MEDICINE

## 2023-06-09 PROCEDURE — 3074F PR MOST RECENT SYSTOLIC BLOOD PRESSURE < 130 MM HG: ICD-10-PCS | Mod: CPTII,S$GLB,, | Performed by: INTERNAL MEDICINE

## 2023-06-09 PROCEDURE — 99215 OFFICE O/P EST HI 40 MIN: CPT | Mod: S$GLB,,, | Performed by: INTERNAL MEDICINE

## 2023-06-09 PROCEDURE — 99999 PR PBB SHADOW E&M-EST. PATIENT-LVL I: CPT | Mod: PBBFAC,,,

## 2023-06-09 PROCEDURE — 1159F PR MEDICATION LIST DOCUMENTED IN MEDICAL RECORD: ICD-10-PCS | Mod: CPTII,S$GLB,, | Performed by: INTERNAL MEDICINE

## 2023-06-09 PROCEDURE — 93282 CARDIAC DEVICE CHECK - IN CLINIC & HOSPITAL: ICD-10-PCS | Mod: 26,,, | Performed by: INTERNAL MEDICINE

## 2023-06-09 PROCEDURE — 93282 PRGRMG EVAL IMPLANTABLE DFB: CPT

## 2023-06-09 PROCEDURE — 1125F AMNT PAIN NOTED PAIN PRSNT: CPT | Mod: CPTII,S$GLB,, | Performed by: INTERNAL MEDICINE

## 2023-06-09 PROCEDURE — 1159F MED LIST DOCD IN RCRD: CPT | Mod: CPTII,S$GLB,, | Performed by: INTERNAL MEDICINE

## 2023-06-09 PROCEDURE — 3074F SYST BP LT 130 MM HG: CPT | Mod: CPTII,S$GLB,, | Performed by: INTERNAL MEDICINE

## 2023-06-09 PROCEDURE — 3288F FALL RISK ASSESSMENT DOCD: CPT | Mod: CPTII,S$GLB,, | Performed by: INTERNAL MEDICINE

## 2023-06-09 PROCEDURE — 1101F PR PT FALLS ASSESS DOC 0-1 FALLS W/OUT INJ PAST YR: ICD-10-PCS | Mod: CPTII,S$GLB,, | Performed by: INTERNAL MEDICINE

## 2023-06-09 PROCEDURE — 93005 ELECTROCARDIOGRAM TRACING: CPT | Mod: 59

## 2023-06-09 PROCEDURE — 99999 PR PBB SHADOW E&M-EST. PATIENT-LVL IV: CPT | Mod: PBBFAC,,, | Performed by: INTERNAL MEDICINE

## 2023-06-09 PROCEDURE — 99999 PR PBB SHADOW E&M-EST. PATIENT-LVL I: ICD-10-PCS | Mod: PBBFAC,,,

## 2023-06-09 PROCEDURE — 3079F PR MOST RECENT DIASTOLIC BLOOD PRESSURE 80-89 MM HG: ICD-10-PCS | Mod: CPTII,S$GLB,, | Performed by: INTERNAL MEDICINE

## 2023-06-09 PROCEDURE — 93010 ELECTROCARDIOGRAM REPORT: CPT | Mod: ,,, | Performed by: INTERNAL MEDICINE

## 2023-06-09 PROCEDURE — 99999 PR PBB SHADOW E&M-EST. PATIENT-LVL IV: ICD-10-PCS | Mod: PBBFAC,,, | Performed by: INTERNAL MEDICINE

## 2023-06-09 PROCEDURE — 99215 PR OFFICE/OUTPT VISIT, EST, LEVL V, 40-54 MIN: ICD-10-PCS | Mod: S$GLB,,, | Performed by: INTERNAL MEDICINE

## 2023-06-09 PROCEDURE — 3288F PR FALLS RISK ASSESSMENT DOCUMENTED: ICD-10-PCS | Mod: CPTII,S$GLB,, | Performed by: INTERNAL MEDICINE

## 2023-06-09 PROCEDURE — 1125F PR PAIN SEVERITY QUANTIFIED, PAIN PRESENT: ICD-10-PCS | Mod: CPTII,S$GLB,, | Performed by: INTERNAL MEDICINE

## 2023-06-09 PROCEDURE — 3079F DIAST BP 80-89 MM HG: CPT | Mod: CPTII,S$GLB,, | Performed by: INTERNAL MEDICINE

## 2023-06-09 PROCEDURE — 93010 EKG 12-LEAD: ICD-10-PCS | Mod: ,,, | Performed by: INTERNAL MEDICINE

## 2023-06-09 PROCEDURE — 1160F PR REVIEW ALL MEDS BY PRESCRIBER/CLIN PHARMACIST DOCUMENTED: ICD-10-PCS | Mod: CPTII,S$GLB,, | Performed by: INTERNAL MEDICINE

## 2023-06-09 RX ORDER — GABAPENTIN 400 MG/1
400 CAPSULE ORAL NIGHTLY
COMMUNITY
Start: 2023-06-08 | End: 2023-07-17

## 2023-06-09 NOTE — PROGRESS NOTES
Subjective:   Patient ID:  Leticia Grimes is a 77 y.o. female     Chief complaint:history of sudden cardiac arrest successfully resuscitated      HPI  First seen by me on 12/14/2020 upon consultation by Dr Arthur  76 yo female, left arm pain  PMH HTN, OA    H/o cardiac arrest. sent to [facility]. H/o cath normal and normal cardiac function per pt.   S/P st omid ICD on 08/05/2020.  Feels dizziness when BP high  No chest pain, dyspnea, orthopnea  Occasional weakness.   No smoking/drinking/drug  EKG today NSR PAC and fusion beats V pacing     C/o left arm pain and hand swelling for 3 days and improved today. No erythema and weakness     Reported to have no CAD and a normal heart   ECG here shows normal QT   ICD in good repair    >>  Continue as is   RTC 1 year  CXR PA and lateral  Decrease V pacing rate to 35 bpm  BP too high, working with PCP, Suggest digital BP clinic - will refer to dr Marie     Update 10/5/2021:  Did not get enrolled in Dig med  BP still variable per Epic data  icd eval today RV paced<1% - PPM set at VVI 35  Battery life 9 years  HR curve normal  Coreview - no fluid retention   Capture .75/.5     Clinically:  Doing well - no CV c/os  C/o neck and shoulder joint pains  L arm swelling has subsided       Update 06/28/2023 :  She is doing well without any cardiac complaints.  She had surgery for shoulder replacement on 05/05/2023.  During that time, the device detected I ventricular rate/VF tracings.  Analysis shows get these were due to noise/FRANCISCA detection.  I have reviewed the actual image of the ECG tracing obtained today and it shows NSR with normal intervals    Current Outpatient Medications   Medication Sig    acetaminophen (TYLENOL) 500 MG tablet Take 2 tablets (1,000 mg total) by mouth every 8 (eight) hours as needed for Pain.    baclofen (LIORESAL) 10 MG tablet TAKE 1 TABLET BY MOUTH NIGHTLY AS NEEDED    carvediloL (COREG) 12.5 MG tablet Take 1 tablet  (12.5 mg total) by mouth 2 (two) times daily with meals.    cloNIDine (CATAPRES) 0.1 MG tablet Take 0.1 mg by mouth as needed. If SBP > 200 mmHg    gabapentin (NEURONTIN) 400 MG capsule Take 400 mg by mouth every evening.    LORazepam (ATIVAN) 0.5 MG tablet Take 1 tablet (0.5 mg total) by mouth nightly as needed for Anxiety (or Insomnia).    mv-mn/folic/lutein/herbal 293 (ALIVE WOMEN'S 50 PLUS ORAL) Take 1 tablet by mouth once daily.    omega 3-dha-epa-fish oil (FISH OIL) 1,000 mg (120 mg-180 mg) Cap Take 1 capsule by mouth 2 (two) times daily with meals.    amLODIPine (NORVASC) 5 MG tablet Take 1 tablet (5 mg total) by mouth once daily.    lisinopriL (PRINIVIL,ZESTRIL) 40 MG tablet Take 1 tablet (40 mg total) by mouth once daily.    traZODone (DESYREL) 50 MG tablet 1 to 2 tabs po qhs prn sleep (Patient not taking: Reported on 6/9/2023)     No current facility-administered medications for this visit.     Review of Systems     Constitutional: Reviewed  for decreased appetite, weight gain and weight loss.   HENT: Reviewed for nosebleeds.    Eyes:  Reviewed for blurred vision and visual disturbance.   Cardiovascular: Reviewed for chest pain, claudication, cyanosis,dyspnea on exertion, leg swelling, orthopnea,paroxysmal nocturnal dyspnearregular heartbeats, palpitations, near-syncope, and syncope.   Respiratory: Reviewed for cough, shortness of breath, wheezing, sleep disturbances due to breathing and snoring, .    Endocrine: Reviewed for heat intolerance.   Hematologic/Lymphatic: Reviewed for easy bruisability/bleeding.   Skin: Reviewed for rash.   Musculoskeletal: Reviewed for muscle weakness and myalgias.   Gastrointestinal: Reviewed for abdominal pain, anorexia, melena, nausea and vomiting.   Genitourinary: Reviewed for menorrhagia, frequency, nocturia and incontinence.   Neurological: Reviewed for excessive daytime sleepiness, dizziness, vertigo, weakness, headaches, loss of balance and seizures,    Psychiatric/Behavioral:  Reviewed for insomnia, altered mental status, depression, anxiety and nervousness.       All symptoms reviewed above were negative except for hearing loss, loss of balance, numbness and tingling.  There are also multiple arthritic complaints and some anxiety.       Social History     Tobacco Use   Smoking Status Never   Smokeless Tobacco Never        Objective:     Physical Exam  Vitals and nursing note reviewed.   Constitutional:       Appearance: She is well-developed.   HENT:      Head: Normocephalic and atraumatic.      Right Ear: External ear normal.      Left Ear: External ear normal.   Eyes:      General: No scleral icterus.        Left eye: No discharge.      Conjunctiva/sclera: Conjunctivae normal.      Pupils: Pupils are equal, round, and reactive to light.   Neck:      Thyroid: No thyromegaly.   Cardiovascular:      Rate and Rhythm: Normal rate and regular rhythm.      Pulses: Intact distal pulses.           Carotid pulses are 2+ on the right side and 2+ on the left side.       Radial pulses are 2+ on the right side and 2+ on the left side.        Dorsalis pedis pulses are 2+ on the right side and 2+ on the left side.        Posterior tibial pulses are 2+ on the right side and 2+ on the left side.      Heart sounds: Normal heart sounds. No midsystolic click and no opening snap. No murmur heard.    No friction rub. No gallop. No S3 or S4 sounds.   Pulmonary:      Effort: Pulmonary effort is normal.      Breath sounds: Normal breath sounds.   Chest:      Comments: Device pocket is in good repair.  Abdominal:      General: There is no distension.      Palpations: Abdomen is soft. There is no hepatomegaly.      Tenderness: There is no abdominal tenderness. There is no guarding.   Musculoskeletal:      Cervical back: Normal range of motion and neck supple.      Right lower leg: No swelling.      Left lower leg: No swelling.      Right ankle: No swelling.      Left ankle: No swelling.  "  Skin:     General: Skin is warm and dry.      Findings: No rash.      Nails: There is no clubbing.   Neurological:      Mental Status: She is alert and oriented to person, place, and time.      Cranial Nerves: No cranial nerve deficit.      Gait: Gait normal.   Psychiatric:         Speech: Speech normal.         Behavior: Behavior normal.         Thought Content: Thought content normal.     /80   Pulse (!) 59   Ht 5' 1" (1.549 m)   Wt 73 kg (161 lb)   BMI 30.42 kg/m²       No results found for this or any previous visit.    WBC   Date Value Ref Range Status   2022 5.54 3.90 - 12.70 K/uL Final     Hematocrit   Date Value Ref Range Status   2022 38.7 37.0 - 48.5 % Final     Hemoglobin   Date Value Ref Range Status   2022 13.1 12.0 - 16.0 g/dL Final     Lab Results   Component Value Date     2022     Lab Results   Component Value Date    CREATININE 0.9 10/20/2022    EGFRNORACEVR >60.0 10/20/2022    K 4.7 10/20/2022     Lab Results   Component Value Date    BNP 33 2022            reports no history of alcohol use.  Past Medical History:   Diagnosis Date    Adjustment insomnia 2020    Anxiety 2020    GERD (gastroesophageal reflux disease)     History of sudden cardiac arrest successfully resuscitated 2020    Hypertension     ICD (implantable cardioverter-defibrillator) in place 2020    Left arm swelling 9/3/2020    Mild vitamin D deficiency 2013    Osteopenia 2020    Vitamin D deficiency disease      Past Surgical History:   Procedure Laterality Date     SECTION, CLASSIC      HYSTERECTOMY      INJECTION OF JOINT Left 2021    Procedure: Left shoulder Joint injection;  Surgeon: Main Mcgraw MD;  Location: [facility];  Service: Pain Management;  Laterality: Left;    INSERTION OF BIVENTRICULAR IMPLANTABLE CARDIOVERTER-DEFIBRILLATOR (ICD)      REVERSE TOTAL SHOULDER ARTHROPLASTY Left 2022    Procedure: ARTHROPLASTY, " SHOULDER, TOTAL, REVERSE;  Surgeon: Tomas Taylor MD;  Location: [facility];  Service: Orthopedics;  Laterality: Left;    TUBAL LIGATION       Family History   Problem Relation Age of Onset    Hypertension Mother     Arthritis Mother     Diabetes Father     Heart disease Father     Cancer Sister        Assessment:   Doing well, no cardiac symptoms at this point.  1. Personal history of sudden cardiac death (SCD) resuscitated    2. History of sudden cardiac arrest successfully resuscitated    3. ICD (implantable cardioverter-defibrillator) in place    4. Essential hypertension    5. Mixed hyperlipidemia    6. Stage 3a chronic kidney disease    7. Lumbosacral radiculopathy at S1        Plan:      I discussed routine device follow up including quarterly to bi-annual device checks for device function as well as yearly follow up in the EP clinic. The patient  was advised to call with any concerns regarding their device. Device clinic follow up as scheduled. RTC 1y          Orders Placed This Encounter   Procedures    Echo     Standing Status:   Future     Standing Expiration Date:   6/9/2024     Order Specific Question:   Release to patient     Answer:   Immediate       Follow up in about 1 year (around 6/9/2024), or if symptoms worsen or fail to improve.    Medications Discontinued During This Encounter   Medication Reason    FLUoxetine 10 MG capsule             Medication List with Changes/Refills   Current Medications    ACETAMINOPHEN (TYLENOL) 500 MG TABLET    Take 2 tablets (1,000 mg total) by mouth every 8 (eight) hours as needed for Pain.    BACLOFEN (LIORESAL) 10 MG TABLET    TAKE 1 TABLET BY MOUTH NIGHTLY AS NEEDED    CARVEDILOL (COREG) 12.5 MG TABLET    Take 1 tablet (12.5 mg total) by mouth 2 (two) times daily with meals.    CLONIDINE (CATAPRES) 0.1 MG TABLET    Take 0.1 mg by mouth as needed. If SBP > 200 mmHg    GABAPENTIN (NEURONTIN) 400 MG CAPSULE    Take 400 mg by mouth every evening.     LISINOPRIL (PRINIVIL,ZESTRIL) 40 MG TABLET    Take 1 tablet (40 mg total) by mouth once daily.    LORAZEPAM (ATIVAN) 0.5 MG TABLET    Take 1 tablet (0.5 mg total) by mouth nightly as needed for Anxiety (or Insomnia).    MV-MN/FOLIC/LUTEIN/HERBAL 293 (ALIVE WOMEN'S 50 PLUS ORAL)    Take 1 tablet by mouth once daily.    OMEGA 3-DHA-EPA-FISH OIL (FISH OIL) 1,000 MG (120 MG-180 MG) CAP    Take 1 capsule by mouth 2 (two) times daily with meals.    TRAZODONE (DESYREL) 50 MG TABLET    1 to 2 tabs po qhs prn sleep   Changed and/or Refilled Medications    Modified Medication Previous Medication    AMLODIPINE (NORVASC) 5 MG TABLET amLODIPine (NORVASC) 5 MG tablet       Take 1 tablet (5 mg total) by mouth once daily.    Take 1 tablet (5 mg total) by mouth once daily.   Discontinued Medications    FLUOXETINE 10 MG CAPSULE    Take 1 capsule (10 mg total) by mouth once daily.        This note is at least partially dictated using the M*Modal Fluency Direct word recognition program. There are word recognition mistakes that are occasionally missed on review.

## 2023-06-10 ENCOUNTER — CLINICAL SUPPORT (OUTPATIENT)
Dept: CARDIOLOGY | Facility: HOSPITAL | Age: 78
End: 2023-06-10
Payer: MEDICARE

## 2023-06-10 DIAGNOSIS — Z95.810 PRESENCE OF AUTOMATIC (IMPLANTABLE) CARDIAC DEFIBRILLATOR: ICD-10-CM

## 2023-06-10 PROCEDURE — 93295 CARDIAC DEVICE CHECK - REMOTE: ICD-10-PCS | Mod: S$GLB,,, | Performed by: INTERNAL MEDICINE

## 2023-06-10 PROCEDURE — 93296 REM INTERROG EVL PM/IDS: CPT | Performed by: INTERNAL MEDICINE

## 2023-06-10 PROCEDURE — 93295 DEV INTERROG REMOTE 1/2/MLT: CPT | Mod: S$GLB,,, | Performed by: INTERNAL MEDICINE

## 2023-06-11 DIAGNOSIS — I10 ESSENTIAL HYPERTENSION: ICD-10-CM

## 2023-06-12 RX ORDER — AMLODIPINE BESYLATE 5 MG/1
TABLET ORAL
Qty: 30 TABLET | Refills: 0 | Status: SHIPPED | OUTPATIENT
Start: 2023-06-12 | End: 2023-06-14

## 2023-06-14 RX ORDER — AMLODIPINE BESYLATE 5 MG/1
5 TABLET ORAL DAILY
Qty: 90 TABLET | Refills: 3 | Status: SHIPPED | OUTPATIENT
Start: 2023-06-14 | End: 2024-06-13

## 2023-06-29 DIAGNOSIS — M54.17 LUMBOSACRAL RADICULOPATHY AT S1: ICD-10-CM

## 2023-06-29 RX ORDER — BACLOFEN 10 MG/1
TABLET ORAL
Qty: 90 TABLET | Refills: 3 | Status: SHIPPED | OUTPATIENT
Start: 2023-06-29 | End: 2023-09-26 | Stop reason: SDUPTHER

## 2023-06-29 NOTE — TELEPHONE ENCOUNTER
LOV:11/29/2022   [Takes medication as prescribed] : takes [None] : Patient does not have any barriers to medication adherence

## 2023-06-30 ENCOUNTER — TELEPHONE (OUTPATIENT)
Dept: PAIN MEDICINE | Facility: CLINIC | Age: 78
End: 2023-06-30
Payer: MEDICARE

## 2023-06-30 NOTE — TELEPHONE ENCOUNTER
Called patient to inform her that her request to change the 7/17/23in clinic appt to virtual has been done. Patient agreed with change per her request.

## 2023-06-30 NOTE — TELEPHONE ENCOUNTER
----- Message from Sujatha Luis sent at 6/30/2023 10:26 AM CDT -----  Contact: Leticia Kelly is calling to speak to the nurse regarding her scheduled appointment on 07/17, she would like to know if the appointment can be changed to virtual. Please give her a call back at [phone]    Thanks  LJ

## 2023-07-03 ENCOUNTER — TELEPHONE (OUTPATIENT)
Dept: PSYCHIATRY | Facility: CLINIC | Age: 78
End: 2023-07-03
Payer: MEDICARE

## 2023-07-03 DIAGNOSIS — I10 ESSENTIAL HYPERTENSION: ICD-10-CM

## 2023-07-03 NOTE — TELEPHONE ENCOUNTER
----- Message from Sujatha Luis sent at 7/3/2023 12:14 PM CDT -----  Contact: Leticia Kelly is calling to speak to the nurse to reschedule a appointment that she had scheduled on 06/02. Please give her a call back at [phone]    Thanks  LJ

## 2023-07-03 NOTE — TELEPHONE ENCOUNTER
No care due was identified.  [facility] Embedded Care Due Messages. Reference number: [id number].   7/03/2023 6:44:41 PM CDT

## 2023-07-04 RX ORDER — LISINOPRIL 40 MG/1
TABLET ORAL
Qty: 90 TABLET | Refills: 1 | Status: SHIPPED | OUTPATIENT
Start: 2023-07-04 | End: 2024-04-01 | Stop reason: SDUPTHER

## 2023-07-04 NOTE — TELEPHONE ENCOUNTER
Refill Routing Note   Medication(s) are not appropriate for processing by [facility] for the following reason(s):      No active prescription written by PCP: previously prescribed by Ben Marie MD     [facility] action(s):  Defer Care Due:  None identified          Appointments  past 12m or future 3m with PCP    Date Provider   Last Visit   3/27/2023 Joseph Sargent MD   Next Visit   Visit date not found Joseph Sargent MD   ED visits in past 90 days: 0        Note composed:7:21 PM 07/03/2023

## 2023-07-17 ENCOUNTER — NURSE TRIAGE (OUTPATIENT)
Dept: ADMINISTRATIVE | Facility: CLINIC | Age: 78
End: 2023-07-17
Payer: MEDICARE

## 2023-07-17 ENCOUNTER — TELEPHONE (OUTPATIENT)
Dept: PAIN MEDICINE | Facility: CLINIC | Age: 78
End: 2023-07-17

## 2023-07-17 ENCOUNTER — OFFICE VISIT (OUTPATIENT)
Dept: PAIN MEDICINE | Facility: CLINIC | Age: 78
End: 2023-07-17
Payer: MEDICARE

## 2023-07-17 ENCOUNTER — TELEPHONE (OUTPATIENT)
Dept: PAIN MEDICINE | Facility: CLINIC | Age: 78
End: 2023-07-17
Payer: MEDICARE

## 2023-07-17 DIAGNOSIS — M54.16 LUMBAR RADICULOPATHY, CHRONIC: Primary | ICD-10-CM

## 2023-07-17 DIAGNOSIS — N28.89 LEFT KIDNEY MASS: ICD-10-CM

## 2023-07-17 PROCEDURE — 99214 OFFICE O/P EST MOD 30 MIN: CPT | Mod: 95,,, | Performed by: PHYSICIAN ASSISTANT

## 2023-07-17 PROCEDURE — 99214 PR OFFICE/OUTPT VISIT, EST, LEVL IV, 30-39 MIN: ICD-10-PCS | Mod: 95,,, | Performed by: PHYSICIAN ASSISTANT

## 2023-07-17 PROCEDURE — 1159F MED LIST DOCD IN RCRD: CPT | Mod: HCNC,CPTII,95, | Performed by: PHYSICIAN ASSISTANT

## 2023-07-17 PROCEDURE — 1159F PR MEDICATION LIST DOCUMENTED IN MEDICAL RECORD: ICD-10-PCS | Mod: HCNC,CPTII,95, | Performed by: PHYSICIAN ASSISTANT

## 2023-07-17 PROCEDURE — 1160F PR REVIEW ALL MEDS BY PRESCRIBER/CLIN PHARMACIST DOCUMENTED: ICD-10-PCS | Mod: HCNC,CPTII,95, | Performed by: PHYSICIAN ASSISTANT

## 2023-07-17 PROCEDURE — 1160F RVW MEDS BY RX/DR IN RCRD: CPT | Mod: HCNC,CPTII,95, | Performed by: PHYSICIAN ASSISTANT

## 2023-07-17 RX ORDER — PREGABALIN 75 MG/1
75 CAPSULE ORAL NIGHTLY
Qty: 30 CAPSULE | Refills: 2 | Status: SHIPPED | OUTPATIENT
Start: 2023-07-17 | End: 2023-09-11 | Stop reason: SDUPTHER

## 2023-07-17 NOTE — TELEPHONE ENCOUNTER
Returned pt call. Pt states that she need to speak with her daughter prior to scheduling. Pt states she will call the radiology department to schedule. All questions answered.

## 2023-07-17 NOTE — TELEPHONE ENCOUNTER
----- Message from Eli Wadsworth sent at 7/17/2023  3:27 PM CDT -----  Regarding: pt call back  Name of Who is Calling:JUNIOR HERNÁNDEZ [[id number]]          What is the request in detail: pt returned ma call          Can the clinic reply by [facility]: no           What Number to Call Back if not in [facility]: [phone] (home)

## 2023-07-17 NOTE — TELEPHONE ENCOUNTER
Attempt to reach patient to schedule US Retroperitoneal  appointment. The patient did not answer. Left voice message on patients voice box to call back at earliest convenience to schedule apt.     Bryce Springer  Medical Assistant

## 2023-07-18 NOTE — TELEPHONE ENCOUNTER
Pt calling to report two high blood pressures tonight. 160/84 then minutes later 195/203. Blood pressures were prior to taking her second dose of blood pressure medication. She was given advice, per protocol and will call back with any questions/concerns or worsening symptoms.     Reason for Disposition   Systolic BP  >= 160 OR Diastolic >= 100    Additional Information   Negative: Difficult to awaken or acting confused (e.g., disoriented, slurred speech)   Negative: SEVERE difficulty breathing (e.g., struggling for each breath, speaks in single words)   Negative: [1] Weakness of the face, arm or leg on one side of the body AND [2] new-onset   Negative: [1] Numbness (i.e., loss of sensation) of the face, arm or leg on one side of the body AND [2] new-onset   Negative: [1] Chest pain lasts > 5 minutes AND [2] history of heart disease (i.e., heart attack, bypass surgery, angina, angioplasty, CHF)   Negative: [1] Chest pain AND [2] took nitrogylcerin AND [3] pain was not relieved   Negative: Sounds like a life-threatening emergency to the triager   Negative: [1] Systolic BP  >= 160 OR Diastolic >= 100 AND [2] cardiac (e.g., breathing difficulty, chest pain) or neurologic symptoms (e.g., new-onset blurred or double vision, unsteady gait)   Negative: [1] Pregnant 20 or more weeks (or postpartum < 6 weeks) AND [2] new hand or face swelling   Negative: [1] Pregnant 20 or more weeks (or postpartum < 6 weeks) AND [2] Systolic BP >= 160 OR Diastolic >= 110   Negative: [1] Systolic BP  >= 200 OR Diastolic >= 120 AND [2] having NO cardiac or neurologic symptoms   Negative: [1] Pregnant 20 or more weeks (or postpartum < 6 weeks) AND [2] Systolic BP  >= 140 OR Diastolic >= 90   Negative: [1] Systolic BP  >= 180 OR Diastolic >= 110 AND [2] missed most recent dose of blood pressure medication   Negative: Systolic BP  >= 180 OR Diastolic >= 110   Negative: Ran out of BP medications    Protocols used: [facility]

## 2023-07-19 ENCOUNTER — TELEPHONE (OUTPATIENT)
Dept: PAIN MEDICINE | Facility: CLINIC | Age: 78
End: 2023-07-19
Payer: MEDICARE

## 2023-07-19 NOTE — TELEPHONE ENCOUNTER
----- Message from Sydnie Harris MA sent at 7/19/2023  4:03 PM CDT -----  Contact: Leticia    ----- Message -----  From: Aniya Pickens  Sent: 7/19/2023   3:37 PM CDT  To: [facility]oo Fernandez Staff    Pt was returning the phone call. Please call her back at [phone].    Thanks  TS

## 2023-07-19 NOTE — TELEPHONE ENCOUNTER
Returned pt call. Pt wants to know if she can have her ultrasound done at [facility]. Informed her that I will ask the provider. Pt verbalized understanding .

## 2023-07-20 ENCOUNTER — TELEPHONE (OUTPATIENT)
Dept: NEUROSURGERY | Facility: CLINIC | Age: 78
End: 2023-07-20
Payer: MEDICARE

## 2023-07-20 NOTE — TELEPHONE ENCOUNTER
Called pt to inform her that I faxed over her ultrasound orders to [facility]. Informed her that they will call her to schedule. Pt verbalized understanding. All questions answered.

## 2023-07-25 ENCOUNTER — TELEPHONE (OUTPATIENT)
Dept: INTERNAL MEDICINE | Facility: CLINIC | Age: 78
End: 2023-07-25
Payer: MEDICARE

## 2023-07-25 NOTE — TELEPHONE ENCOUNTER
Spoke with pt she stated that the pharmacist has resolved her concerns.    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

## 2023-07-26 ENCOUNTER — TELEPHONE (OUTPATIENT)
Dept: INTERNAL MEDICINE | Facility: CLINIC | Age: 78
End: 2023-07-26
Payer: MEDICARE

## 2023-07-26 ENCOUNTER — TELEPHONE (OUTPATIENT)
Dept: PAIN MEDICINE | Facility: CLINIC | Age: 78
End: 2023-07-26
Payer: MEDICARE

## 2023-07-26 NOTE — TELEPHONE ENCOUNTER
----- Message from Janell Birmingham sent at 7/26/2023  9:15 AM CDT -----  Contact: Self  .Type:  Sooner Apoointment Request    Caller is requesting a sooner appointment.  Caller declined first available appointment listed below.  Caller will not accept being placed on the waitlist and is requesting a message be sent to doctor.  Name of Caller:Leticia   When is the first available appointment?8/1/23  Symptoms:Severe Back pain   Would the patient rather a call back or a response via [facility]? Phone call   Best Call Back Number:[phone]  Additional Information: Patient is ok with seeing a NP if possible

## 2023-07-26 NOTE — TELEPHONE ENCOUNTER
----- Message from Janell Birmingham sent at 7/26/2023  9:09 AM CDT -----  Contact: Self  .Type:  Same Day Appointment Request    Caller is requesting a same day appointment.  Caller declined first available appointment listed below.    Name of Caller:Leticia  When is the first available appointment?8/1/2023  Symptoms:Extreme leg pain   Best Call Back Number:[phone]   Additional Information: Patient cant walk

## 2023-07-26 NOTE — TELEPHONE ENCOUNTER
Spoke with patient, informed patient that none of the pain providers have any available appointments today. Informed patient that if she is in a lot of pain, she might want to go to the EMR. Patient verbalized understanding. MANUEL

## 2023-07-27 ENCOUNTER — OFFICE VISIT (OUTPATIENT)
Dept: INTERNAL MEDICINE | Facility: CLINIC | Age: 78
End: 2023-07-27
Payer: MEDICARE

## 2023-07-27 ENCOUNTER — TELEPHONE (OUTPATIENT)
Dept: INTERNAL MEDICINE | Facility: CLINIC | Age: 78
End: 2023-07-27

## 2023-07-27 VITALS
OXYGEN SATURATION: 98 % | BODY MASS INDEX: 30.3 KG/M2 | DIASTOLIC BLOOD PRESSURE: 64 MMHG | TEMPERATURE: 97 F | HEART RATE: 77 BPM | HEIGHT: 61 IN | WEIGHT: 160.5 LBS | SYSTOLIC BLOOD PRESSURE: 120 MMHG

## 2023-07-27 DIAGNOSIS — M54.17 LUMBOSACRAL RADICULOPATHY AT S1: Primary | ICD-10-CM

## 2023-07-27 PROCEDURE — 99213 PR OFFICE/OUTPT VISIT, EST, LEVL III, 20-29 MIN: ICD-10-PCS | Mod: HCNC,S$GLB,, | Performed by: FAMILY MEDICINE

## 2023-07-27 PROCEDURE — 3288F PR FALLS RISK ASSESSMENT DOCUMENTED: ICD-10-PCS | Mod: HCNC,CPTII,S$GLB, | Performed by: FAMILY MEDICINE

## 2023-07-27 PROCEDURE — 99999 PR PBB SHADOW E&M-EST. PATIENT-LVL III: CPT | Mod: PBBFAC,HCNC,, | Performed by: FAMILY MEDICINE

## 2023-07-27 PROCEDURE — 1160F PR REVIEW ALL MEDS BY PRESCRIBER/CLIN PHARMACIST DOCUMENTED: ICD-10-PCS | Mod: HCNC,CPTII,S$GLB, | Performed by: FAMILY MEDICINE

## 2023-07-27 PROCEDURE — 1125F AMNT PAIN NOTED PAIN PRSNT: CPT | Mod: HCNC,CPTII,S$GLB, | Performed by: FAMILY MEDICINE

## 2023-07-27 PROCEDURE — 99213 OFFICE O/P EST LOW 20 MIN: CPT | Mod: HCNC,S$GLB,, | Performed by: FAMILY MEDICINE

## 2023-07-27 PROCEDURE — 1160F RVW MEDS BY RX/DR IN RCRD: CPT | Mod: HCNC,CPTII,S$GLB, | Performed by: FAMILY MEDICINE

## 2023-07-27 PROCEDURE — 3078F PR MOST RECENT DIASTOLIC BLOOD PRESSURE < 80 MM HG: ICD-10-PCS | Mod: HCNC,CPTII,S$GLB, | Performed by: FAMILY MEDICINE

## 2023-07-27 PROCEDURE — 3074F SYST BP LT 130 MM HG: CPT | Mod: HCNC,CPTII,S$GLB, | Performed by: FAMILY MEDICINE

## 2023-07-27 PROCEDURE — 99999 PR PBB SHADOW E&M-EST. PATIENT-LVL III: ICD-10-PCS | Mod: PBBFAC,HCNC,, | Performed by: FAMILY MEDICINE

## 2023-07-27 PROCEDURE — 1101F PR PT FALLS ASSESS DOC 0-1 FALLS W/OUT INJ PAST YR: ICD-10-PCS | Mod: HCNC,CPTII,S$GLB, | Performed by: FAMILY MEDICINE

## 2023-07-27 PROCEDURE — 3288F FALL RISK ASSESSMENT DOCD: CPT | Mod: HCNC,CPTII,S$GLB, | Performed by: FAMILY MEDICINE

## 2023-07-27 PROCEDURE — 1159F MED LIST DOCD IN RCRD: CPT | Mod: HCNC,CPTII,S$GLB, | Performed by: FAMILY MEDICINE

## 2023-07-27 PROCEDURE — 1159F PR MEDICATION LIST DOCUMENTED IN MEDICAL RECORD: ICD-10-PCS | Mod: HCNC,CPTII,S$GLB, | Performed by: FAMILY MEDICINE

## 2023-07-27 PROCEDURE — 1101F PT FALLS ASSESS-DOCD LE1/YR: CPT | Mod: HCNC,CPTII,S$GLB, | Performed by: FAMILY MEDICINE

## 2023-07-27 PROCEDURE — 3078F DIAST BP <80 MM HG: CPT | Mod: HCNC,CPTII,S$GLB, | Performed by: FAMILY MEDICINE

## 2023-07-27 PROCEDURE — 3074F PR MOST RECENT SYSTOLIC BLOOD PRESSURE < 130 MM HG: ICD-10-PCS | Mod: HCNC,CPTII,S$GLB, | Performed by: FAMILY MEDICINE

## 2023-07-27 PROCEDURE — 1125F PR PAIN SEVERITY QUANTIFIED, PAIN PRESENT: ICD-10-PCS | Mod: HCNC,CPTII,S$GLB, | Performed by: FAMILY MEDICINE

## 2023-07-27 RX ORDER — METHYLPREDNISOLONE 4 MG/1
TABLET ORAL
Qty: 1 EACH | Refills: 0 | Status: SHIPPED | OUTPATIENT
Start: 2023-07-27 | End: 2023-08-10

## 2023-07-27 NOTE — TELEPHONE ENCOUNTER
Pt is in clinic to address leg pain.  Pt states that she was taken off rosuvastatin but digital medicine put her back on it as of 2 days ago.  Pt requests that Dr. Sargent review this and make sure the medication change is appropriate for pts needs.

## 2023-08-03 ENCOUNTER — PATIENT MESSAGE (OUTPATIENT)
Dept: PAIN MEDICINE | Facility: CLINIC | Age: 78
End: 2023-08-03
Payer: MEDICARE

## 2023-08-05 ENCOUNTER — PATIENT MESSAGE (OUTPATIENT)
Dept: INTERNAL MEDICINE | Facility: CLINIC | Age: 78
End: 2023-08-05
Payer: MEDICARE

## 2023-08-07 ENCOUNTER — TELEPHONE (OUTPATIENT)
Dept: PAIN MEDICINE | Facility: CLINIC | Age: 78
End: 2023-08-07
Payer: MEDICARE

## 2023-08-07 NOTE — TELEPHONE ENCOUNTER
----- Message from Poly Medrano LPN sent at 7/20/2023  1:53 PM CDT -----  Contact: Leticia Estrada to see if she has had ultrasound.   ----- Message -----  From: Rebecca Ge PA-C  Sent: 7/20/2023  12:01 PM CDT  To: Poly Medrano LPN    Are you able to get it scheduled for her? Also if she does have it there, make she lets us know when it is done so we can request the results    Rebecca  ----- Message -----  From: Poly Medrano LPN  Sent: 7/19/2023   4:23 PM CDT  To: Rebecca Ge PA-C    Pt wants to know if she can have her ultrasound done at [facility].   ----- Message -----  From: Sydnie Harris MA  Sent: 7/19/2023   4:03 PM CDT  To: Poly Medrano LPN      ----- Message -----  From: Aniya Pickens  Sent: 7/19/2023   3:37 PM CDT  To: [facility] Fernandez Staff    Pt was returning the phone call. Please call her back at [phone].    Thanks  TS

## 2023-08-07 NOTE — TELEPHONE ENCOUNTER
Called pt to informed her that we have her ultrasound results. Appointment schedule for 08/15 to review results. Pt verbalized understanding. All questions answered.

## 2023-08-10 ENCOUNTER — OFFICE VISIT (OUTPATIENT)
Dept: INTERNAL MEDICINE | Facility: CLINIC | Age: 78
End: 2023-08-10
Payer: MEDICARE

## 2023-08-10 VITALS
WEIGHT: 163.13 LBS | HEART RATE: 54 BPM | HEIGHT: 61 IN | BODY MASS INDEX: 30.8 KG/M2 | SYSTOLIC BLOOD PRESSURE: 110 MMHG | OXYGEN SATURATION: 98 % | TEMPERATURE: 98 F | DIASTOLIC BLOOD PRESSURE: 78 MMHG

## 2023-08-10 DIAGNOSIS — F41.9 ANXIETY: ICD-10-CM

## 2023-08-10 DIAGNOSIS — F32.A DEPRESSION, UNSPECIFIED DEPRESSION TYPE: ICD-10-CM

## 2023-08-10 DIAGNOSIS — F51.02 INSOMNIA DUE TO STRESS: ICD-10-CM

## 2023-08-10 DIAGNOSIS — I10 ESSENTIAL HYPERTENSION: Primary | Chronic | ICD-10-CM

## 2023-08-10 DIAGNOSIS — G47.00 INSOMNIA, UNSPECIFIED TYPE: ICD-10-CM

## 2023-08-10 DIAGNOSIS — E78.2 MIXED HYPERLIPIDEMIA: Chronic | ICD-10-CM

## 2023-08-10 PROCEDURE — 1126F AMNT PAIN NOTED NONE PRSNT: CPT | Mod: HCNC,CPTII,S$GLB, | Performed by: INTERNAL MEDICINE

## 2023-08-10 PROCEDURE — 1159F MED LIST DOCD IN RCRD: CPT | Mod: HCNC,CPTII,S$GLB, | Performed by: INTERNAL MEDICINE

## 2023-08-10 PROCEDURE — 99999 PR PBB SHADOW E&M-EST. PATIENT-LVL IV: CPT | Mod: PBBFAC,HCNC,, | Performed by: INTERNAL MEDICINE

## 2023-08-10 PROCEDURE — 3288F PR FALLS RISK ASSESSMENT DOCUMENTED: ICD-10-PCS | Mod: HCNC,CPTII,S$GLB, | Performed by: INTERNAL MEDICINE

## 2023-08-10 PROCEDURE — 1159F PR MEDICATION LIST DOCUMENTED IN MEDICAL RECORD: ICD-10-PCS | Mod: HCNC,CPTII,S$GLB, | Performed by: INTERNAL MEDICINE

## 2023-08-10 PROCEDURE — 3078F PR MOST RECENT DIASTOLIC BLOOD PRESSURE < 80 MM HG: ICD-10-PCS | Mod: HCNC,CPTII,S$GLB, | Performed by: INTERNAL MEDICINE

## 2023-08-10 PROCEDURE — 1101F PT FALLS ASSESS-DOCD LE1/YR: CPT | Mod: HCNC,CPTII,S$GLB, | Performed by: INTERNAL MEDICINE

## 2023-08-10 PROCEDURE — 99999 PR PBB SHADOW E&M-EST. PATIENT-LVL IV: ICD-10-PCS | Mod: PBBFAC,HCNC,, | Performed by: INTERNAL MEDICINE

## 2023-08-10 PROCEDURE — 1126F PR PAIN SEVERITY QUANTIFIED, NO PAIN PRESENT: ICD-10-PCS | Mod: HCNC,CPTII,S$GLB, | Performed by: INTERNAL MEDICINE

## 2023-08-10 PROCEDURE — 1101F PR PT FALLS ASSESS DOC 0-1 FALLS W/OUT INJ PAST YR: ICD-10-PCS | Mod: HCNC,CPTII,S$GLB, | Performed by: INTERNAL MEDICINE

## 2023-08-10 PROCEDURE — 3288F FALL RISK ASSESSMENT DOCD: CPT | Mod: HCNC,CPTII,S$GLB, | Performed by: INTERNAL MEDICINE

## 2023-08-10 PROCEDURE — 3074F PR MOST RECENT SYSTOLIC BLOOD PRESSURE < 130 MM HG: ICD-10-PCS | Mod: HCNC,CPTII,S$GLB, | Performed by: INTERNAL MEDICINE

## 2023-08-10 PROCEDURE — 99214 OFFICE O/P EST MOD 30 MIN: CPT | Mod: HCNC,S$GLB,, | Performed by: INTERNAL MEDICINE

## 2023-08-10 PROCEDURE — 3074F SYST BP LT 130 MM HG: CPT | Mod: HCNC,CPTII,S$GLB, | Performed by: INTERNAL MEDICINE

## 2023-08-10 PROCEDURE — 3078F DIAST BP <80 MM HG: CPT | Mod: HCNC,CPTII,S$GLB, | Performed by: INTERNAL MEDICINE

## 2023-08-10 PROCEDURE — 99214 PR OFFICE/OUTPT VISIT, EST, LEVL IV, 30-39 MIN: ICD-10-PCS | Mod: HCNC,S$GLB,, | Performed by: INTERNAL MEDICINE

## 2023-08-10 RX ORDER — MINOXIDIL 2.5 MG/1
2.5 TABLET ORAL DAILY
COMMUNITY
Start: 2023-08-08

## 2023-08-10 RX ORDER — TRAZODONE HYDROCHLORIDE 50 MG/1
TABLET ORAL
Qty: 30 TABLET | Refills: 0 | Status: SHIPPED | OUTPATIENT
Start: 2023-08-10

## 2023-08-10 RX ORDER — CARVEDILOL 12.5 MG/1
12.5 TABLET ORAL 2 TIMES DAILY WITH MEALS
Qty: 180 TABLET | Refills: 3 | Status: SHIPPED | OUTPATIENT
Start: 2023-08-10 | End: 2024-08-09

## 2023-08-10 RX ORDER — ROSUVASTATIN CALCIUM 10 MG/1
10 TABLET, COATED ORAL DAILY
Qty: 90 TABLET | Refills: 1 | Status: SHIPPED | OUTPATIENT
Start: 2023-08-10 | End: 2024-08-09

## 2023-08-10 NOTE — PROGRESS NOTES
"Subjective:      Patient ID: Leticia Grimes is a 77 y.o. female.    Chief Complaint: Follow-up    HPI    76 yo with   Patient Active Problem List   Diagnosis    Gastroesophageal reflux disease without esophagitis    Essential hypertension    Mild vitamin D deficiency    ICD (implantable cardioverter-defibrillator) in place    History of sudden cardiac arrest successfully resuscitated    Osteopenia    Adjustment insomnia    Anxiety    Mixed hyperlipidemia    Statin intolerance    Decreased strength, endurance, and mobility    Dissociative amnesia    Presence of artificial shoulder joint, left    Chronic bilateral low back pain with left-sided sciatica    Gait abnormality    Left hip pain    Lumbosacral radiculopathy at S1    Paresthesia of both hands    Bilateral carpal tunnel syndrome    Multiple neurological symptoms    Stage 3a chronic kidney disease    Hypertriglyceridemia    Thyroid nodule    Hearing loss    Cataract     Past Medical History:   Diagnosis Date    Adjustment insomnia 9/1/2020    Anxiety 9/1/2020    GERD (gastroesophageal reflux disease)     History of sudden cardiac arrest successfully resuscitated 8/31/2020    Hypertension     ICD (implantable cardioverter-defibrillator) in place 8/28/2020    Left arm swelling 9/3/2020    Mild vitamin D deficiency 6/14/2013    Osteopenia 9/1/2020    Vitamin D deficiency disease      Here today for management of mult med problems as outlined below.  Compliant with meds without significant side effects. Energy and appetite good.       Review of Systems   Constitutional:  Negative for chills and fever.   Respiratory:  Negative for cough.    Cardiovascular:  Negative for chest pain.   Gastrointestinal:  Negative for abdominal pain.     Objective:   /78 (BP Location: Right arm, Patient Position: Sitting, BP Method: Medium (Manual))   Pulse (!) 54   Temp 97.6 °F (36.4 °C) (Oral)   Ht 5' 1" (1.549 m)   Wt 74 kg (163 lb 2.3 oz)   SpO2 98%   BMI 30.83 kg/m² "     Physical Exam  Constitutional:       General: She is not in acute distress.     Appearance: She is well-developed. She is not ill-appearing.   Cardiovascular:      Rate and Rhythm: Normal rate.   Pulmonary:      Effort: Pulmonary effort is normal.   Skin:     General: Skin is warm and dry.   Neurological:      Mental Status: She is alert.   Psychiatric:         Behavior: Behavior normal.         Lab Results   Component Value Date    WBC 5.54 05/05/2022    HGB 13.1 05/05/2022    HGB 13.3 10/05/2021    HCT 38.7 05/05/2022    MCV 83 05/05/2022    MCV 83 10/05/2021     05/05/2022    CHOL 256 (A) 07/07/2023    TRIG 123 07/07/2023    HDL 75 07/07/2023    LDLCALC 156 07/07/2023    LDLCALC 131.8 05/11/2022    LDLCALC 126.2 03/02/2022    ALT 24 05/11/2022    AST 22 05/11/2022     07/07/2023    K 3.7 07/07/2023    CALCIUM 9.8 07/07/2023     (A) 07/07/2023    CO2 28 (A) 07/07/2023    BUN 12 07/07/2023    CREATININE 0.9 07/07/2023    CREATININE 0.9 10/20/2022    CREATININE 1.0 05/11/2022    EGFRNORACEVR >60.0 10/20/2022    TSH 1.170 10/20/2022    TSH 0.828 10/05/2021     07/07/2023    HGBA1C 5.3 10/05/2021    BXNWCNEW23SZ 17 (L) 10/20/2022    BNP 33 05/11/2022          The 10-year ASCVD risk score (Fatmata CASTILLO, et al., 2019) is: 19.9%    Values used to calculate the score:      Age: 77 years      Sex: Female      Is Non- : Yes      Diabetic: No      Tobacco smoker: No      Systolic Blood Pressure: 110 mmHg      Is BP treated: Yes      HDL Cholesterol: 75 mg/dL      Total Cholesterol: 256 mg/dL     Assessment:     1. Essential hypertension    2. Depression, unspecified depression type    3. Anxiety    4. Insomnia due to stress    5. Mixed hyperlipidemia    6. Insomnia, unspecified type      Plan:   1. Essential hypertension  Controlled. Cont current meds.     -     carvediloL (COREG) 12.5 MG tablet; Take 1 tablet (12.5 mg total) by mouth 2 (two) times daily with meals.   Dispense: 180 tablet; Refill: 3    2. Depression, unspecified depression type  Stable. And improved. Declines daily meds. Counseling helping.   3. Anxiety  Overview:  Began after stressful event with myra 2020. Treated with ativan per pcp due to anxiety attacks and nightmares.     Stable. And improved. Declines daily meds. Counseling helping.     4. Insomnia due to stress  Reports doing well on prn trazodone.     5. Mixed hyperlipidemia  -     rosuvastatin (CRESTOR) 10 MG tablet; Take 1 tablet (10 mg total) by mouth once daily.  Dispense: 90 tablet; Refill: 1    6. Insomnia, unspecified type    Reports doing well on prn trazodone.   -     traZODone (DESYREL) 50 MG tablet; 1 to 2 tabs po qhs prn sleep  Dispense: 30 tablet; Refill: 0        Patient Instructions   Covid vaccine phone number is [phone].     Future Appointments   Date Time Provider Department Center   8/15/2023 11:40 AM Rebecca Ge PA-C [facility]   9/8/2023 10:00 AM HOME MONITOR DEVICE CHECK [facility] AR[facility] PRO [place]   10/6/2023  9:00 AM Taisha Soria LCSW [facility]   11/16/2023  2:40 PM Joseph Sargent MD [facility] IM [place]   5/10/2024  1:10 PM PACEMAKER CLINIC [facility] [place]   5/10/2024  1:20 PM Rola[facility]Martín Perez MD [facility] [place]       Lab Frequency Next Occurrence   DXA Bone Density Spine And Hip Once 08/24/2022   X-Ray Shoulder 2 or More Views Left Once 10/04/2022   Ambulatory referral/consult to Pain Clinic Once 12/06/2022   Mammo Digital Screening Bilat w/ Timmy Once 12/13/2022   Ambulatory referral/consult to Pain Clinic Once 01/10/2023   X-Ray Thoracic Spine AP Lateral Once 01/04/2023   Basic Metabolic Panel Once 01/19/2023   Lipid Panel Once 01/19/2023   Ambulatory referral/consult to Psychiatry Once 04/03/2023   CT Lumbar Spine Without Contrast Once 04/20/2023   Ambulatory referral/consult to Urology Once 07/24/2023   US Retroperitoneal Complete Once 07/17/2023   Cardiac  device check - [facility]     Cardiac device check - Remote     Cardiac device check - In Clinic [facility] Hospital         Follow up in about 3 months (around 11/10/2023), or if symptoms worsen or fail to improve.

## 2023-08-15 ENCOUNTER — OFFICE VISIT (OUTPATIENT)
Dept: PAIN MEDICINE | Facility: CLINIC | Age: 78
End: 2023-08-15
Payer: MEDICARE

## 2023-08-15 ENCOUNTER — TELEPHONE (OUTPATIENT)
Dept: UROLOGY | Facility: CLINIC | Age: 78
End: 2023-08-15
Payer: MEDICARE

## 2023-08-15 DIAGNOSIS — N28.89 OTHER SPECIFIED DISORDERS OF KIDNEY AND URETER: Primary | ICD-10-CM

## 2023-08-15 PROCEDURE — 1160F PR REVIEW ALL MEDS BY PRESCRIBER/CLIN PHARMACIST DOCUMENTED: ICD-10-PCS | Mod: HCNC,CPTII,95, | Performed by: PHYSICIAN ASSISTANT

## 2023-08-15 PROCEDURE — 1160F RVW MEDS BY RX/DR IN RCRD: CPT | Mod: HCNC,CPTII,95, | Performed by: PHYSICIAN ASSISTANT

## 2023-08-15 PROCEDURE — 1159F PR MEDICATION LIST DOCUMENTED IN MEDICAL RECORD: ICD-10-PCS | Mod: HCNC,CPTII,95, | Performed by: PHYSICIAN ASSISTANT

## 2023-08-15 PROCEDURE — 99214 PR OFFICE/OUTPT VISIT, EST, LEVL IV, 30-39 MIN: ICD-10-PCS | Mod: HCNC,95,, | Performed by: PHYSICIAN ASSISTANT

## 2023-08-15 PROCEDURE — 1159F MED LIST DOCD IN RCRD: CPT | Mod: HCNC,CPTII,95, | Performed by: PHYSICIAN ASSISTANT

## 2023-08-15 PROCEDURE — 99214 OFFICE O/P EST MOD 30 MIN: CPT | Mod: HCNC,95,, | Performed by: PHYSICIAN ASSISTANT

## 2023-08-15 NOTE — PROGRESS NOTES
Chronic Pain-Tele-Medicine-Established Note (Follow up visit)    The patient location is:  [place]  The chief complaint leading to consultation is:  Follow-up injection  Visit type: Virtual visit with synchronous audio and video  Total time spent with patient:  5-10 minutes  Each patient to whom he or she provides medical services by telemedicine is: (1) informed of the relationship between the physician and patient and the respective role of any other health care provider with respect to management of the patient; and (2) notified that he or she may decline to receive medical services by telemedicine and may withdraw from such care at any time.    Notes:    SUBJECTIVE:    Interval History (8/15/2023):  Leticia Grimes presents today for follow-up visit via telemed for ultrasound review.  Patient was last seen on 4/20/2023.     Ultrasound retroperitoneal kidney      Interval History (7/17/2023):  Leticia Grimes presents today for follow-up visit for CT review.  Patient was last seen on 04/20/2023. She continues to report  She reports  lower back pain with radiation into her left lower extremity along the lateral and posterior aspect extending into the left calf. Taking Gabapentin 400 mg nightly with limited relief. Patient reports pain as 7/10 today.    CT lumbar spine      Interval History (4/20/2023):  Leticia Grimes presents today via telemed for follow-up visit.  Patient was last seen on 1/4/2023. Referred back to our clinic by Dr. Leonard for lumbar radiculopathy. Patient reports pain as 6/10 today. She has completed over 6 weeks of outpatient rehab from 11/15/2022 to 1/17/2023. She has continued with a home exercise program at home with limited relief. She reports pain is worsened with laying down, improved with heat. She reports continued lower back pain with radiation into her left lower extremity along the lateral and posterior aspect extending into the left calf. Taking Gabapentin 400 mg nightly with  limited relief.    She also reports continued pain in left shoulder/shoulder blade. History of previous left shoulder total reversal 05/2022. She reports some pain in the left shoulder and some stiffness and limitations to ROM. Her thoracic pain is worsened with moving her upper extremities.     Interval History (01/04/2022):  Leticia Grimes presents today for follow-up visit.  Patient was last seen on 04/15/2021. She was referred back to our clinic by Neurology. She reports her primary complaint is thoracic back pain between her shoulder blades along her bra line. She reports this began about 1 month ago, but has been worsening over the past week. She reports increased frequency of spasms that interrupt her sleep. She has been taking tizanidine, using a heating pad, and began physical therapy, with limited relief. History of left shoulder total reversal 05/2022. She reports some pain in the left shoulder and some stiffness and limitations to ROM. Her thoracic pain is worsened with moving her upper extremities. She also reports low back pain in a band like distribution across the lumbosacral region moreso on the left side that began 1 month ago. She reports at first it stopped above the knee, but is now intermittently radiating into her left leg into her foot. This pain is worsened by laying on her left side. She reports this has somewhat improved with massage, heat, and physical therapy. She was recently prescribed Baclofen by Dr. Leonard for muscle spasms to replace the Tizanidine, but she has not yet started this medication. Patient reports pain as 6/10 today.      Interval HPI 04/15/2021  Leticia Grimes presents to tele-medicine appointment for chief complaint bilateral hand pain.  This is a new complaint as we previously treated her left shoulder pain with glenohumeral joint injection out was successful.  She locates this neuropathic pain to the bilateral hands, right worse than left.  She also reports having  some neck pain.  She has had previous epidurals that did provide relief of these symptoms.  Since the last visit, Leticia Grimes states the pain has been improving. Current pain intensity is 6/10.    Patient denies night fever/night sweats, urinary incontinence, bowel incontinence, significant weight loss, significant motor weakness and loss of sensations.    Interval HPI 04/09/2021:  Leticia Grimes presents to tele-medicine appointment for a follow-up appointment for left shoulder pain.  She was last seen for procedure on 03/02/2021 and underwent left-sided glenohumeral joint injection.  She reports that this resulted in 95% relief.  Since the last visit, Leticia Grimes states the pain has been improving. Current pain intensity is 0/10.    Initial HPI 02/01/2021:  Leticia Grimes is a 75 y.o. female who presents to the clinic for the evaluation of left shoulder pain.  She was referred by the Orthopedics Department for further evaluation and management of this pain.  She has past medical history of anxiety, hypertension, status post ICD, hyperlipidemia, GERD, osteopenia, multiple other medical comorbidities as listed in her chart.  The pain started several years ago without any inciting event or trauma and symptoms have been worsening.The pain is located in the left cervical myofascial area and radiates to the left upper extremity.  She reports that the left shoulder source of the pain however.  The pain is described as Aching, numbness, tingling, squeezing, tightness and is rated as 5/10. The pain is rated with a score of  4/10 on the BEST day and a score of 9/10 on the WORST day.  Symptoms interfere with daily activity. The pain is exacerbated by sleeping on her left side, movement of the left shoulder.  The pain is mitigated by sitting, medications, heat. The patient reports spending 2-4 hours per day reclining. The patient reports 5-7 hours of uninterrupted sleep per night.       Non-Pharmacologic  Treatments:  Physical Therapy/Home Exercise: yes  Ice/Heat:yes  TENS: no  Acupuncture: no  Massage: no  Chiropractic: no    Other: no        Pain Medications:  - Opioids: None  - Adjuvant Medications: Lorazepam, Lyrica, Diazepam, Baclofen  - Anti-Coagulants: Aspirin      report:  Reviewed and consistent with medication use as prescribed.          Pain Procedures:   -previous cervical epidural steroid injection, moderate relief  -03/02/2021:  Left glenohumeral joint injection, 95% relief        Imaging:   X-ray left shoulder 10/12/2020:  No fracture or dislocation.  Prominent glenohumeral degenerative findings present including complete joint space loss and large inferior osteophyte formation.  Mild AC joint degenerative changes.  Lung parenchyma clear.        X-ray cervical spine 12/16/2015:  No fracture or dislocation is demonstrated.  At the C5-6 level there   is some mild disc space narrowing with associated spurring.  Uncovertebral   joint spurring results in some moderate encroachment upon the neuroforamina   bilaterally at this level The odontoid appears intact and in good alignment.         PMHx,PSHx, Social history, and Family history:  I have reviewed the patient's medical, surgical, social, and family history in detail and updated the computerized patient record.        Review of patient's allergies indicates:   Allergen Reactions    Codeine     Morphine Other (See Comments)       Current Outpatient Medications   Medication Sig    acetaminophen (TYLENOL) 500 MG tablet Take 2 tablets (1,000 mg total) by mouth every 8 (eight) hours as needed for Pain.    amLODIPine (NORVASC) 5 MG tablet Take 1 tablet (5 mg total) by mouth once daily.    baclofen (LIORESAL) 10 MG tablet TAKE 1 TABLET BY MOUTH NIGHTLY AS NEEDED    carvediloL (COREG) 12.5 MG tablet Take 1 tablet (12.5 mg total) by mouth 2 (two) times daily with meals.    cloNIDine (CATAPRES) 0.1 MG tablet Take 0.1 mg by mouth as needed. If SBP > 200 mmHg     lisinopriL (PRINIVIL,ZESTRIL) 40 MG tablet Take 1 tablet by mouth once daily    LORazepam (ATIVAN) 0.5 MG tablet Take 1 tablet (0.5 mg total) by mouth nightly as needed for Anxiety (or Insomnia).    minoxidiL (LONITEN) 2.5 MG tablet Take 2.5 mg by mouth once daily.    mv-mn/folic/lutein/herbal 293 (ALIVE WOMEN'S 50 PLUS ORAL) Take 1 tablet by mouth once daily.    omega 3-dha-epa-fish oil (FISH OIL) 1,000 mg (120 mg-180 mg) Cap Take 1 capsule by mouth 2 (two) times daily with meals.    pregabalin (LYRICA) 75 MG capsule Take 1 capsule (75 mg total) by mouth every evening.    rosuvastatin (CRESTOR) 10 MG tablet Take 1 tablet (10 mg total) by mouth once daily.    traZODone (DESYREL) 50 MG tablet 1 to 2 tabs po qhs prn sleep     No current facility-administered medications for this visit.     Telemedicine Exam  There were no vitals filed for this visit.  There is no height or weight on file to calculate BMI.   (reviewed on 8/15/2023)     GENERAL: Well appearing, in no acute distress, alert and oriented x3.  Cooperative.  PSYCH:  Mood and affect appropriate.  SKIN: Skin color & texture with no obvious abnormalities.    HEAD/FACE:  Normocephalic, atraumatic.    PULM:  No difficulty breathing. No nasal flaring. No obvious wheezing.  EXTREMITIES: No obvious deformities. Moving all extremities well, appears to have symmetric strength throughout.  MUSCULOSKELETAL: No obvious atrophy abnormalities are noted.   Patient performed exercises with provider guidance  SIJ testing:  - TTP over SI joint: Absent  - Zac's/ Genrao's: Absent   - No tenderness along bilateral GT bursa    -lumbar flexion and extension reproduces thoracic back pain  -Pain with rotation of thoracic spine and lifting of arms      NEURO: No obvious neurologic deficit.   GAIT: sitting.     Physical Exam: last in clinic visit:    REVIEW OF SYSTEMS:    GENERAL:  No weight loss, malaise or fevers.  HEENT:   No recent changes in vision or hearing  NECK:  Negative  for lumps, no difficulty with swallowing.  RESPIRATORY:  Negative for cough, wheezing or shortness of breath, patient denies any recent URI.  CARDIOVASCULAR:  Negative for chest pain, leg swelling or palpitations.  GI:  Negative for abdominal discomfort, blood in stools or black stools or change in bowel habits.  MUSCULOSKELETAL:  See HPI.  SKIN:  Negative for lesions, rash, and itching.  PSYCH:  No mood disorder or recent psychosocial stressors.  Patients sleep is not disturbed secondary to pain.  HEMATOLOGY/LYMPHOLOGY:  Negative for prolonged bleeding, bruising easily or swollen nodes.  Patient is currently taking anti-coagulants - ASA  NEURO:   No history of headaches, syncope, paralysis, seizures or tremors.  All other reviewed and negative other than HPI.    OBJECTIVE:  Physical exam:  GENERAL: Well appearing, in no acute distress, alert and oriented x3.    PSYCH:  Mood and affect appropriate.  SKIN: Skin color, texture, turgor normal, no rashes or lesions.  HEAD/FACE:  Normocephalic, atraumatic. Cranial nerves grossly intact.  CV:  No peripheral edema noted  PULM:  No difficulty breathing     Neuro/MSK:  NECK:  Minimal pain with neck flexion, extension, or lateral flexion.  Spurling's (self performed), equivocal  EXTREMITIES: No deformities, edema, or skin discoloration.   MUSCULOSKELETAL:  No atrophy or tone abnormalities are noted.  GAIT: normal.        LABS:  Lab Results   Component Value Date    WBC 5.54 05/05/2022    HGB 13.1 05/05/2022    HCT 38.7 05/05/2022    MCV 83 05/05/2022     05/05/2022       CMP  Sodium   Date Value Ref Range Status   07/07/2023 140 137 - 147 mmol/L Final     Potassium   Date Value Ref Range Status   07/07/2023 3.7 3.4 - 5.3 mmol/L Final     Chloride   Date Value Ref Range Status   07/07/2023 112 (A) 99 - 108 mmol/L Final     CO2   Date Value Ref Range Status   07/07/2023 28 (A) 13 - 22 mmol/L Final     Glucose   Date Value Ref Range Status   07/07/2023 100 mg/dL Final      BUN   Date Value Ref Range Status   07/07/2023 12 4 - 21 mg/dL Final     Creatinine   Date Value Ref Range Status   07/07/2023 0.9 0.5 - 1.1 mg/dL Final     Calcium   Date Value Ref Range Status   07/07/2023 9.8 8.7 - 10.7 mg/dL Final     Total Protein   Date Value Ref Range Status   05/11/2022 6.9 6.0 - 8.4 g/dL Final     Albumin   Date Value Ref Range Status   10/20/2022 3.5 3.5 - 5.2 g/dL Final     Total Bilirubin   Date Value Ref Range Status   05/11/2022 0.6 0.1 - 1.0 mg/dL Final     Comment:     For infants and newborns, interpretation of results should be based  on gestational age, weight and in agreement with clinical  observations.    Premature Infant recommended reference ranges:  Up to 24 hours.............<8.0 mg/dL  Up to 48 hours............<12.0 mg/dL  3-5 days..................<15.0 mg/dL  6-29 days.................<15.0 mg/dL       Alkaline Phosphatase   Date Value Ref Range Status   05/11/2022 67 55 - 135 U/L Final     AST   Date Value Ref Range Status   05/11/2022 22 10 - 40 U/L Final     ALT   Date Value Ref Range Status   05/11/2022 24 10 - 44 U/L Final     Anion Gap   Date Value Ref Range Status   07/07/2023 0 30 mmol/L Final     eGFR if    Date Value Ref Range Status   05/11/2022 >60.0 >60 mL/min/1.73 m^2 Final     eGFR if non    Date Value Ref Range Status   07/07/2023 67.0 mL/min/1.73 m2 Final       Lab Results   Component Value Date    HGBA1C 5.3 10/05/2021             ASSESSMENT: 77 y.o. year old female with left shoulder pain, consistent with     1. Other specified disorders of kidney and ureter  Ambulatory referral/consult to Urology                  PLAN:   - Interventions: None at this time, we discussed considering L4/5 IL OMAR with left paramedian approach vs. Left L4/5 +L5/S1 TF OMAR, patient defers at this time    - Anticoagulation use: yes aspirin    - Medications: I have stressed the importance of physical activity and a home exercise plan to  help with pain and improve health. and Patient can continue with medications for now since they are providing benefits, using them appropriately, and without side effects.   advised patient continue with Tylenol p.r.n. as her primary pain relieving medication.      -Continue  Baclofen 10 mg nightly for muscle spasms  - Start Lyrica 75mg nightly.  Consider further Increase if no improvement after 1-3 weeks.  Patient informed not to stop taking if she does not find significant pain relief.   -She has previously tried and failed Gabapentin 400 mg nightly     - Therapy:  Advised patient continue with activities and exercises as tolerated, patient currently enrolled in physical therapy, continue     - Psychological:  Discussed coping mechanisms to help address chronic pain issues     - Labs:  Reviewed     - Imaging: Reviewed available imaging, CT of lumbar spine reviewed with patient - renal mass noted  Ultrasound of kidneys ordered    - Consults/Referrals:  Consults/referral: Refer to urology for further evaluation of renal mass found incidentally on lumbar CT    - Records:    Reviewed/Obtain old records from outside physicians and imaging    - Follow up visit: 4-8 weeks after starting new med     - Counseled patient regarding the importance of activity modification and physical therapy     - This condition does not require this patient to take time off of work, and the primary goal of our Pain Management services is to improve the patient's functional capacity.     - Patient Questions: Answered all of the patient's questions regarding diagnosis, therapy, and treatment    The above plan and management options were discussed at length with patient. Patient is in agreement with the above and verbalized understanding.      Rebecca Ge PA-C  Interventional Pain Management  [facility]    Disclaimer:  This note was prepared using voice recognition system and is likely to have sound alike errors that may  have been overlooked even after proof reading.  Please call me with any questions

## 2023-08-15 NOTE — TELEPHONE ENCOUNTER
Pt has appt with Joelle On 8/22      ----- Message from Rachell Lara sent at 8/15/2023 12:30 PM CDT -----  Regarding: NP REferral Appt  Contact: Leticia  Type:  Sooner Apoointment Request    Caller is requesting a sooner appointment.  Caller declined first available appointment listed below.  Caller will not accept being placed on the waitlist and is requesting a message be sent to doctor.  Name of Caller: Leticia  When is the first available appointment? 09/01/2023  Symptoms:  Would the patient rather a call back or a response via [facility]? call  Best Call Back Number: [phone] (home)    Additional Information:  Leticia has a referral in [facility] to a Urologist and is ready to be scheduled at the [facility] this month please.

## 2023-08-22 ENCOUNTER — LAB VISIT (OUTPATIENT)
Dept: LAB | Facility: HOSPITAL | Age: 78
End: 2023-08-22
Attending: NURSE PRACTITIONER
Payer: MEDICARE

## 2023-08-22 ENCOUNTER — OFFICE VISIT (OUTPATIENT)
Dept: UROLOGY | Facility: CLINIC | Age: 78
End: 2023-08-22
Payer: MEDICARE

## 2023-08-22 VITALS
SYSTOLIC BLOOD PRESSURE: 150 MMHG | HEART RATE: 72 BPM | HEIGHT: 61 IN | BODY MASS INDEX: 30.78 KG/M2 | DIASTOLIC BLOOD PRESSURE: 79 MMHG | WEIGHT: 163 LBS

## 2023-08-22 DIAGNOSIS — N28.89 OTHER SPECIFIED DISORDERS OF KIDNEY AND URETER: ICD-10-CM

## 2023-08-22 DIAGNOSIS — N28.89 LEFT KIDNEY MASS: ICD-10-CM

## 2023-08-22 PROCEDURE — 1160F RVW MEDS BY RX/DR IN RCRD: CPT | Mod: HCNC,CPTII,S$GLB, | Performed by: NURSE PRACTITIONER

## 2023-08-22 PROCEDURE — 99999 PR PBB SHADOW E&M-EST. PATIENT-LVL IV: ICD-10-PCS | Mod: PBBFAC,HCNC,, | Performed by: NURSE PRACTITIONER

## 2023-08-22 PROCEDURE — 81002 URINALYSIS NONAUTO W/O SCOPE: CPT | Mod: HCNC,S$GLB,, | Performed by: NURSE PRACTITIONER

## 2023-08-22 PROCEDURE — 3078F DIAST BP <80 MM HG: CPT | Mod: HCNC,CPTII,S$GLB, | Performed by: NURSE PRACTITIONER

## 2023-08-22 PROCEDURE — 99204 PR OFFICE/OUTPT VISIT, NEW, LEVL IV, 45-59 MIN: ICD-10-PCS | Mod: HCNC,S$GLB,, | Performed by: NURSE PRACTITIONER

## 2023-08-22 PROCEDURE — 1126F PR PAIN SEVERITY QUANTIFIED, NO PAIN PRESENT: ICD-10-PCS | Mod: HCNC,CPTII,S$GLB, | Performed by: NURSE PRACTITIONER

## 2023-08-22 PROCEDURE — 99999 PR PBB SHADOW E&M-EST. PATIENT-LVL IV: CPT | Mod: PBBFAC,HCNC,, | Performed by: NURSE PRACTITIONER

## 2023-08-22 PROCEDURE — 1126F AMNT PAIN NOTED NONE PRSNT: CPT | Mod: HCNC,CPTII,S$GLB, | Performed by: NURSE PRACTITIONER

## 2023-08-22 PROCEDURE — 3078F PR MOST RECENT DIASTOLIC BLOOD PRESSURE < 80 MM HG: ICD-10-PCS | Mod: HCNC,CPTII,S$GLB, | Performed by: NURSE PRACTITIONER

## 2023-08-22 PROCEDURE — 3077F SYST BP >= 140 MM HG: CPT | Mod: HCNC,CPTII,S$GLB, | Performed by: NURSE PRACTITIONER

## 2023-08-22 PROCEDURE — 36415 COLL VENOUS BLD VENIPUNCTURE: CPT | Mod: HCNC | Performed by: NURSE PRACTITIONER

## 2023-08-22 PROCEDURE — 82565 ASSAY OF CREATININE: CPT | Mod: HCNC | Performed by: NURSE PRACTITIONER

## 2023-08-22 PROCEDURE — 81002 POCT URINE DIPSTICK WITHOUT MICROSCOPE: ICD-10-PCS | Mod: HCNC,S$GLB,, | Performed by: NURSE PRACTITIONER

## 2023-08-22 PROCEDURE — 99204 OFFICE O/P NEW MOD 45 MIN: CPT | Mod: HCNC,S$GLB,, | Performed by: NURSE PRACTITIONER

## 2023-08-22 PROCEDURE — 1159F PR MEDICATION LIST DOCUMENTED IN MEDICAL RECORD: ICD-10-PCS | Mod: HCNC,CPTII,S$GLB, | Performed by: NURSE PRACTITIONER

## 2023-08-22 PROCEDURE — 1160F PR REVIEW ALL MEDS BY PRESCRIBER/CLIN PHARMACIST DOCUMENTED: ICD-10-PCS | Mod: HCNC,CPTII,S$GLB, | Performed by: NURSE PRACTITIONER

## 2023-08-22 PROCEDURE — 1159F MED LIST DOCD IN RCRD: CPT | Mod: HCNC,CPTII,S$GLB, | Performed by: NURSE PRACTITIONER

## 2023-08-22 PROCEDURE — 3077F PR MOST RECENT SYSTOLIC BLOOD PRESSURE >= 140 MM HG: ICD-10-PCS | Mod: HCNC,CPTII,S$GLB, | Performed by: NURSE PRACTITIONER

## 2023-08-22 NOTE — PROGRESS NOTES
Chief Complaint:   Renal mass    HPI:   Patient is a 78-year-old female that is presenting with an incidental finding of bilateral renal mass.  Patient had a recent renal ultrasound, outside [facility], indicate a multi septated right renal mass with thickened projections measuring up to 7.3 cm.  No hydronephrosis no renal stones.  Left kidney indicates irregular cystic lesion measuring 2.1 cm no hydronephrosis or stones.  Patient is unable to have MRI, history of pacemaker.  Denies  cancers in her family.  Denies gross hematuria.  Allergies:  Codeine and Morphine    Medications:  has a current medication list which includes the following prescription(s): acetaminophen, amlodipine, baclofen, carvedilol, clonidine, lisinopril, minoxidil, mv-mn/folic/lutein/herbal 293, pregabalin, rosuvastatin, trazodone, lorazepam, and omega 3-dha-epa-fish oil.    Review of Systems:  General: No fever, chills, fatigability, or weight loss.  Skin: No rashes, itching, or changes in color or texture of skin.  Chest: Denies WILKINS, cyanosis, wheezing, cough, and sputum production.  Abdomen: Appetite fine. No weight loss. Denies diarrhea, abdominal pain, hematemesis, or blood in stool.  Musculoskeletal: No joint stiffness or swelling. Denies back pain.  : As above.  All other review of systems negative.    PMH:   has a past medical history of Adjustment insomnia (2020), Anxiety (2020), GERD (gastroesophageal reflux disease), History of sudden cardiac arrest successfully resuscitated (2020), Hypertension, ICD (implantable cardioverter-defibrillator) in place (2020), Left arm swelling (9/3/2020), Mild vitamin D deficiency (2013), Osteopenia (2020), and Vitamin D deficiency disease.    PSH:   has a past surgical history that includes Hysterectomy; Tubal ligation;  section, classic; Insertion of biventricular implantable cardioverter-defibrillator (ICD); Injection of joint (Left, 2021); and  Reverse total shoulder arthroplasty (Left, 05/05/2022).    FamHx: family history includes Arthritis in her mother; Cancer in her sister; Diabetes in her father; Heart disease in her father; Hypertension in her mother.    SocHx:  reports that she has never smoked. She has never used smokeless tobacco. She reports that she does not drink alcohol and does not use drugs.      Physical Exam:  Vitals:    08/22/23 1502   BP: (!) 150/79   Pulse: 72     General: A&Ox3, no apparent distress, no deformities  Neck: No masses, normal thyroid  Lungs: normal inspiration, no use of accessory muscles  Heart: normal pulse, no arrhythmias  Abdomen: Soft, NT, ND, no masses, no hernias, no hepatosplenomegaly  Lymphatic: Neck and groin nodes negative    Labs/Studies:   See HPI    Impression/Plan:   Incidental finding bilateral renal mass  Will proceed with CT scan renal mass protocol and patient to return to clinic with MD to discuss results.

## 2023-08-23 ENCOUNTER — TELEPHONE (OUTPATIENT)
Dept: INTERNAL MEDICINE | Facility: CLINIC | Age: 78
End: 2023-08-23
Payer: MEDICARE

## 2023-08-23 ENCOUNTER — PATIENT MESSAGE (OUTPATIENT)
Dept: INTERNAL MEDICINE | Facility: CLINIC | Age: 78
End: 2023-08-23
Payer: MEDICARE

## 2023-08-23 LAB
BILIRUB SERPL-MCNC: NORMAL MG/DL
BLOOD URINE, POC: NORMAL
CLARITY, POC UA: CLEAR
COLOR, POC UA: YELLOW
CREAT SERPL-MCNC: 0.9 MG/DL (ref 0.5–1.4)
EST. GFR  (NO RACE VARIABLE): >60 ML/MIN/1.73 M^2
GLUCOSE UR QL STRIP: NORMAL
KETONES UR QL STRIP: NORMAL
LEUKOCYTE ESTERASE URINE, POC: NORMAL
NITRITE, POC UA: NORMAL
PH, POC UA: 6.5
PROTEIN, POC: NORMAL
SPECIFIC GRAVITY, POC UA: 1.02
UROBILINOGEN, POC UA: 0.2

## 2023-08-23 NOTE — TELEPHONE ENCOUNTER
S She has seen nurse practitioner Lena. he has an appt with Dr. Neumann scheduled for sept 6. Is she saying she does not want to see him?

## 2023-08-23 NOTE — TELEPHONE ENCOUNTER
----- Message from Marie Olson sent at 8/23/2023 11:19 AM CDT -----  Contact: Leticia Mendez is requesting a call back as soon as possible to discuss concerns with nurse. Please call .[phone]           Xiomara

## 2023-08-23 NOTE — TELEPHONE ENCOUNTER
----- Message from Marie Olson sent at 8/23/2023 11:19 AM CDT -----  Contact: Leticia Mendez is requesting a call back as soon as possible to discuss concerns with nurse. Please call [phone]           Xiomara

## 2023-09-06 ENCOUNTER — HOSPITAL ENCOUNTER (OUTPATIENT)
Dept: CARDIOLOGY | Facility: HOSPITAL | Age: 78
Discharge: HOME | End: 2023-09-06
Attending: INTERNAL MEDICINE
Payer: MEDICARE

## 2023-09-06 ENCOUNTER — HOSPITAL ENCOUNTER (OUTPATIENT)
Dept: RADIOLOGY | Facility: HOSPITAL | Age: 78
Discharge: HOME | End: 2023-09-06
Attending: NURSE PRACTITIONER
Payer: MEDICARE

## 2023-09-06 VITALS
BODY MASS INDEX: 30.78 KG/M2 | SYSTOLIC BLOOD PRESSURE: 110 MMHG | HEIGHT: 61 IN | WEIGHT: 163 LBS | DIASTOLIC BLOOD PRESSURE: 78 MMHG

## 2023-09-06 DIAGNOSIS — Z86.74 PERSONAL HISTORY OF SUDDEN CARDIAC DEATH (SCD) RESUSCITATED: ICD-10-CM

## 2023-09-06 DIAGNOSIS — N28.89 OTHER SPECIFIED DISORDERS OF KIDNEY AND URETER: ICD-10-CM

## 2023-09-06 LAB
AORTIC ROOT ANNULUS: 3.02 CM
AV INDEX (PROSTH): 0.64
AV MEAN GRADIENT: 6 MMHG
AV PEAK GRADIENT: 10 MMHG
AV REGURGITATION PRESSURE HALF TIME: 1028.02 MS
AV VALVE AREA BY VELOCITY RATIO: 2.17 CM²
AV VALVE AREA: 2.11 CM²
AV VELOCITY RATIO: 0.66
BSA FOR ECHO PROCEDURE: 1.78 M2
CV ECHO LV RWT: 0.5 CM
DOP CALC AO PEAK VEL: 1.61 M/S
DOP CALC AO VTI: 40.9 CM
DOP CALC LVOT AREA: 3.3 CM2
DOP CALC LVOT DIAMETER: 2.05 CM
DOP CALC LVOT PEAK VEL: 1.06 M/S
DOP CALC LVOT STROKE VOLUME: 86.43 CM3
DOP CALCLVOT PEAK VEL VTI: 26.2 CM
E WAVE DECELERATION TIME: 215.07 MSEC
E/A RATIO: 1.13
E/E' RATIO: 9.89 M/S
ECHO LV POSTERIOR WALL: 1.12 CM (ref 0.6–1.1)
EJECTION FRACTION: 60 %
FRACTIONAL SHORTENING: 35 % (ref 28–44)
INTERVENTRICULAR SEPTUM: 0.97 CM (ref 0.6–1.1)
IVRT: 85.63 MSEC
LA MAJOR: 4.47 CM
LA MINOR: 4.75 CM
LA WIDTH: 2.6 CM
LEFT ATRIUM SIZE: 3.2 CM
LEFT ATRIUM VOLUME INDEX MOD: 22.4 ML/M2
LEFT ATRIUM VOLUME INDEX: 18.8 ML/M2
LEFT ATRIUM VOLUME MOD: 38.74 CM3
LEFT ATRIUM VOLUME: 32.57 CM3
LEFT INTERNAL DIMENSION IN SYSTOLE: 2.92 CM (ref 2.1–4)
LEFT VENTRICLE DIASTOLIC VOLUME INDEX: 52.38 ML/M2
LEFT VENTRICLE DIASTOLIC VOLUME: 90.62 ML
LEFT VENTRICLE MASS INDEX: 93 G/M2
LEFT VENTRICLE SYSTOLIC VOLUME INDEX: 18.9 ML/M2
LEFT VENTRICLE SYSTOLIC VOLUME: 32.78 ML
LEFT VENTRICULAR INTERNAL DIMENSION IN DIASTOLE: 4.46 CM (ref 3.5–6)
LEFT VENTRICULAR MASS: 160.59 G
LV LATERAL E/E' RATIO: 10.44 M/S
LV SEPTAL E/E' RATIO: 9.4 M/S
LVOT MG: 2.17 MMHG
LVOT MV: 0.68 CM/S
MV PEAK A VEL: 0.83 M/S
MV PEAK E VEL: 0.94 M/S
PISA AR MAX VEL: 3.28 M/S
PISA TR MAX VEL: 3.44 M/S
PULM VEIN S/D RATIO: 1.14
PV MV: 0.74 M/S
PV PEAK D VEL: 0.57 M/S
PV PEAK GRADIENT: 4 MMHG
PV PEAK S VEL: 0.65 M/S
PV PEAK VELOCITY: 1.05 M/S
RA MAJOR: 5.13 CM
RA PRESSURE ESTIMATED: 3 MMHG
RA WIDTH: 2.32 CM
RIGHT VENTRICULAR END-DIASTOLIC DIMENSION: 1.98 CM
RV TB RVSP: 6 MMHG
SINUS: 2.56 CM
STJ: 2.32 CM
TDI LATERAL: 0.09 M/S
TDI SEPTAL: 0.1 M/S
TDI: 0.1 M/S
TR MAX PG: 47 MMHG
TRICUSPID ANNULAR PLANE SYSTOLIC EXCURSION: 2.3 CM
TV REST PULMONARY ARTERY PRESSURE: 50 MMHG
Z-SCORE OF LEFT VENTRICULAR DIMENSION IN END DIASTOLE: -0.72
Z-SCORE OF LEFT VENTRICULAR DIMENSION IN END SYSTOLE: -0.13

## 2023-09-06 PROCEDURE — 93306 ECHO (CUPID ONLY): ICD-10-PCS | Mod: 26,HCNC,, | Performed by: INTERNAL MEDICINE

## 2023-09-06 PROCEDURE — 74170 CT ABDOMEN W WO CONTRAST: ICD-10-PCS | Mod: 26,HCNC,, | Performed by: RADIOLOGY

## 2023-09-06 PROCEDURE — 74170 CT ABD WO CNTRST FLWD CNTRST: CPT | Mod: 26,HCNC,, | Performed by: RADIOLOGY

## 2023-09-06 PROCEDURE — 93306 TTE W/DOPPLER COMPLETE: CPT | Mod: HCNC

## 2023-09-06 PROCEDURE — 74170 CT ABD WO CNTRST FLWD CNTRST: CPT | Mod: TC,HCNC

## 2023-09-06 PROCEDURE — 93306 TTE W/DOPPLER COMPLETE: CPT | Mod: 26,HCNC,, | Performed by: INTERNAL MEDICINE

## 2023-09-06 PROCEDURE — 25500020 PHARM REV CODE 255: Mod: HCNC | Performed by: NURSE PRACTITIONER

## 2023-09-06 RX ADMIN — IOHEXOL 100 ML: 350 INJECTION, SOLUTION INTRAVENOUS at 02:09

## 2023-09-07 ENCOUNTER — TELEPHONE (OUTPATIENT)
Dept: INTERNAL MEDICINE | Facility: CLINIC | Age: 78
End: 2023-09-07
Payer: MEDICARE

## 2023-09-07 DIAGNOSIS — K86.2 PANCREAS CYST: ICD-10-CM

## 2023-09-07 DIAGNOSIS — N28.1 RENAL CYST: Primary | ICD-10-CM

## 2023-09-07 NOTE — TELEPHONE ENCOUNTER
----- Message from Apolonia Elliott sent at 9/7/2023 12:09 PM CDT -----  Contact: self [phone]  Patient called in this morning stating she may have COVID and would like the doctor to call something in for her. Please call back [phone]. Thanks tpw          [facility] [address] [facility] [address]  [address] [address]  Phone: [phone] Fax: [phone]

## 2023-09-08 ENCOUNTER — CLINICAL SUPPORT (OUTPATIENT)
Dept: CARDIOLOGY | Facility: HOSPITAL | Age: 78
End: 2023-09-08
Payer: MEDICARE

## 2023-09-08 DIAGNOSIS — Z95.810 PRESENCE OF AUTOMATIC (IMPLANTABLE) CARDIAC DEFIBRILLATOR: ICD-10-CM

## 2023-09-08 PROCEDURE — 93296 REM INTERROG EVL PM/IDS: CPT | Mod: HCNC | Performed by: INTERNAL MEDICINE

## 2023-09-11 ENCOUNTER — TELEPHONE (OUTPATIENT)
Dept: CARDIOLOGY | Facility: CLINIC | Age: 78
End: 2023-09-11
Payer: MEDICARE

## 2023-09-11 ENCOUNTER — PATIENT MESSAGE (OUTPATIENT)
Dept: PAIN MEDICINE | Facility: CLINIC | Age: 78
End: 2023-09-11
Payer: MEDICARE

## 2023-09-11 DIAGNOSIS — M54.16 LUMBAR RADICULOPATHY, CHRONIC: ICD-10-CM

## 2023-09-11 RX ORDER — PREGABALIN 75 MG/1
75 CAPSULE ORAL 2 TIMES DAILY
Qty: 60 CAPSULE | Refills: 1 | Status: SHIPPED | OUTPATIENT
Start: 2023-09-11 | End: 2024-02-08 | Stop reason: ALTCHOICE

## 2023-09-11 NOTE — TELEPHONE ENCOUNTER
Pt was contacted about results:     Please call patient     ECHO reviewed   EF 60%, normal Diastolic fn.   Elevated Pulm HTN noted   Consider Pulm eval if any WILKINS or sleep apnea signs.     Thanks   Talya        Pt verbalized understanding with no questions or concerns.

## 2023-09-11 NOTE — TELEPHONE ENCOUNTER
----- Message from YUMIKO Caruso-C sent at 9/11/2023 11:53 AM CDT -----  Please call patient    ECHO reviewed  EF 60%, normal Diastolic fn.  Elevated Pulm HTN noted  Consider Pulm eval if any WILKINS or sleep apnea signs.     Thanks  aTlya

## 2023-09-11 NOTE — TELEPHONE ENCOUNTER
Called pt as requested. Pt states that she is still having that sciatic pain and back pain. Informed pt that we can get her scheduled with Dr. Mcgraw for a follow up. Asked pt how she was currently taking her lyrica and if it was helping. Pt states that she takes 1 capsule at night and it does not help. Directed pt to increase Lyrica 75 mg qhs to 75 mg BID per GUILLERMO Keene. Pt verbalized understanding. Informed her that a new prescriptions will be sent to her [facility] pharmacy. All questions answered.

## 2023-09-13 DIAGNOSIS — N28.89 LEFT KIDNEY MASS: Primary | ICD-10-CM

## 2023-09-22 ENCOUNTER — TELEPHONE (OUTPATIENT)
Dept: DERMATOLOGY | Facility: CLINIC | Age: 78
End: 2023-09-22
Payer: MEDICARE

## 2023-09-22 NOTE — TELEPHONE ENCOUNTER
Called and spoke to patient. Pt scheduled for soonest appointment available. Pt verbalized understanding.   ----- Message from Raquel Gregorio sent at 9/21/2023  4:07 PM CDT -----  Contact: JUNIOR HERNÁNDEZ [[id number]]  .Type:  Sooner Apoointment Request    Caller is requesting a sooner appointment.  Caller declined first available appointment listed below.  Caller will not accept being placed on the waitlist and is requesting a message be sent to doctor.  Name of Caller: JUNIOR HERNÁNDEZ [[id number]]  When is the first available appointment? 12/2023  Symptoms: Pimple on ear  Would the patient rather a call back or a response via [facility]?  call  Best Call Back Number: [phone]   Additional Information:

## 2023-09-23 ENCOUNTER — NURSE TRIAGE (OUTPATIENT)
Dept: ADMINISTRATIVE | Facility: CLINIC | Age: 78
End: 2023-09-23
Payer: MEDICARE

## 2023-09-23 NOTE — TELEPHONE ENCOUNTER
Pt calling states she had COVID 2.5 weeks ago and tested negative. States she just started feeling some cold like symptoms with some congestion. Denies any trouble breathing or fever. Per protocol advised to HOME CARE. verbalized understanding. Denies any further questions or concerns at this time, advised to call back if they have any that come up. Advised pt to call back with any other concerns or worsening symptoms. Verbalized understanding and will route message to provider.     Reason for Disposition   [1] Sinus congestion as part of a cold AND [2] present < 10 days   Common cold with no complications    Additional Information   Negative: SEVERE difficulty breathing (e.g., struggling for each breath, speaks in single words)   Negative: Sounds like a life-threatening emergency to the triager   Negative: [1] Difficulty breathing AND [2] not from stuffy nose (e.g., not relieved by cleaning out the nose)   Negative: [1] SEVERE headache AND [2] fever   Negative: [1] Redness or swelling on the cheek, forehead or around the eye AND [2] fever   Negative: Fever > 104 F (40 C)   Negative: Patient sounds very sick or weak to the triager   Negative: [1] SEVERE pain AND [2] not improved 2 hours after pain medicine   Negative: [1] Redness or swelling on the cheek, forehead or around the eye AND [2] no fever   Negative: [1] Fever > 101 F (38.3 C) AND [2] age > 60 years   Negative: [1] Fever > 100.0 F (37.8 C) AND [2] bedridden (e.g., CVA, chronic illness, recovering from surgery)   Negative: [1] Fever > 100.0 F (37.8 C) AND [2] diabetes mellitus or weak immune system (e.g., HIV positive, cancer chemo, splenectomy, organ transplant, chronic steroids)   Negative: Fever present > 3 days (72 hours)   Negative: [1] Fever returns after gone for over 24 hours AND [2] symptoms worse or not improved   Negative: [1] Sinus pain (not just congestion) AND [2] fever   Negative: Earache   Negative: [1] Sinus congestion (pressure, fullness)  AND [2] present > 10 days   Negative: [1] Nasal discharge AND [2] present > 10 days   Negative: [1] Using nasal washes and pain medicine > 24 hours AND [2] sinus pain (around cheekbone or eye) persists   Negative: Lots of coughing   Negative: SEVERE difficulty breathing (e.g., struggling for each breath, speaks in single words)   Negative: Sounds like a life-threatening emergency to the triager   Negative: Fever > 104 F (40 C)   Negative: Patient sounds very sick or weak to the triager   Negative: [1] Fever > 101 F (38.3 C) AND [2] age > 60 years   Negative: [1] Fever > 100.0 F (37.8 C) AND [2] bedridden (e.g., CVA, chronic illness, recovering from surgery)   Negative: [1] Fever > 100.0 F (37.8 C) AND [2] diabetes mellitus or weak immune system (e.g., HIV positive, cancer chemo, splenectomy, organ transplant, chronic steroids)   Negative: Fever present > 3 days (72 hours)   Negative: [1] Fever returns after gone for over 24 hours AND [2] symptoms worse or not improved   Negative: [1] Sinus pain (not just congestion) AND [2] fever   Negative: Earache   Negative: [1] SEVERE sore throat AND [2] present > 24 hours   Negative: [1] Sinus congestion (pressure, fullness) AND [2] present > 10 days   Negative: [1] Nasal discharge AND [2] present > 10 days   Negative: [1] Using nasal washes and pain medicine > 24 hours AND [2] sinus pain (lower forehead, cheekbone, or eye) persists   Negative: Sores with yellow scabs around the nasal opening    Protocols used: Sinus Pain or Congestion-A-AH, Common Cold-A-AH

## 2023-09-26 ENCOUNTER — TELEPHONE (OUTPATIENT)
Dept: PAIN MEDICINE | Facility: CLINIC | Age: 78
End: 2023-09-26
Payer: MEDICARE

## 2023-09-26 ENCOUNTER — PATIENT MESSAGE (OUTPATIENT)
Dept: PAIN MEDICINE | Facility: CLINIC | Age: 78
End: 2023-09-26
Payer: MEDICARE

## 2023-09-26 DIAGNOSIS — M54.17 LUMBOSACRAL RADICULOPATHY AT S1: ICD-10-CM

## 2023-09-26 NOTE — TELEPHONE ENCOUNTER
Spoke with patient, patient is having pain on left side back and leg and wants an appt.  Patient asked patient which meds she is taking Lyrica -stated not working, baclofen- she hasn't been taking.  Informed patient to continue taking prescribed meds until appt to see provider.  Patient has concerns about over medicating, that why she stopped taking baclofen.  Patient will resume taking all meds as directed until appt is scheduled .  Appt date and time is 10/25/23 at 7:15 am with Dr. Washburn will inform patient.

## 2023-09-26 NOTE — TELEPHONE ENCOUNTER
----- Message from Tiffani Merino sent at 9/26/2023  4:22 PM CDT -----  Contact: Leticia Resendez is calling in regards to getting an appt due to having leg and back pain. Pt stated medication she is taking not helping. Pt stated wanting to be seen before November and December. Please call back at [phone]                          Thanks  KT

## 2023-09-27 ENCOUNTER — TELEPHONE (OUTPATIENT)
Dept: PAIN MEDICINE | Facility: CLINIC | Age: 78
End: 2023-09-27
Payer: MEDICARE

## 2023-09-27 ENCOUNTER — PATIENT MESSAGE (OUTPATIENT)
Dept: INTERNAL MEDICINE | Facility: CLINIC | Age: 78
End: 2023-09-27
Payer: MEDICARE

## 2023-09-27 RX ORDER — BACLOFEN 10 MG/1
10 TABLET ORAL NIGHTLY PRN
Qty: 30 TABLET | Refills: 3 | Status: SHIPPED | OUTPATIENT
Start: 2023-09-27 | End: 2024-02-08 | Stop reason: ALTCHOICE

## 2023-09-27 NOTE — TELEPHONE ENCOUNTER
Reached out to the patient to schedule an appointment with one of our providers. Canceled virtual per her request. I offered a sooner appt. with a PA but she declined.

## 2023-09-27 NOTE — TELEPHONE ENCOUNTER
----- Message from Bryce Springer MA sent at 9/27/2023  3:31 PM CDT -----  Contact: Leticia  10/25/23 is our soonest appt   Does the pt want to see a PA for a sooner appt ?  ----- Message -----  From: Marie Olson  Sent: 9/27/2023   3:14 PM CDT  To: Wu Quigley Staff    Patient is requesting a call back from nurse to discuss appointment, please call back at [phone]              Thanks

## 2023-09-28 ENCOUNTER — OFFICE VISIT (OUTPATIENT)
Dept: INTERNAL MEDICINE | Facility: CLINIC | Age: 78
End: 2023-09-28
Payer: MEDICARE

## 2023-09-28 ENCOUNTER — LAB VISIT (OUTPATIENT)
Dept: LAB | Facility: HOSPITAL | Age: 78
End: 2023-09-28
Attending: INTERNAL MEDICINE
Payer: MEDICARE

## 2023-09-28 VITALS
SYSTOLIC BLOOD PRESSURE: 136 MMHG | HEART RATE: 64 BPM | DIASTOLIC BLOOD PRESSURE: 82 MMHG | WEIGHT: 160.5 LBS | HEIGHT: 61 IN | OXYGEN SATURATION: 98 % | BODY MASS INDEX: 30.3 KG/M2 | TEMPERATURE: 97 F

## 2023-09-28 DIAGNOSIS — L72.3 SEBACEOUS CYST: ICD-10-CM

## 2023-09-28 DIAGNOSIS — R42 LIGHTHEADED: ICD-10-CM

## 2023-09-28 DIAGNOSIS — I10 ESSENTIAL HYPERTENSION: Chronic | ICD-10-CM

## 2023-09-28 DIAGNOSIS — R42 LIGHTHEADED: Primary | ICD-10-CM

## 2023-09-28 PROCEDURE — 3075F PR MOST RECENT SYSTOLIC BLOOD PRESS GE 130-139MM HG: ICD-10-PCS | Mod: HCNC,CPTII,S$GLB, | Performed by: INTERNAL MEDICINE

## 2023-09-28 PROCEDURE — 80053 COMPREHEN METABOLIC PANEL: CPT | Mod: HCNC | Performed by: INTERNAL MEDICINE

## 2023-09-28 PROCEDURE — 99214 OFFICE O/P EST MOD 30 MIN: CPT | Mod: HCNC,S$GLB,, | Performed by: INTERNAL MEDICINE

## 2023-09-28 PROCEDURE — 99214 PR OFFICE/OUTPT VISIT, EST, LEVL IV, 30-39 MIN: ICD-10-PCS | Mod: HCNC,S$GLB,, | Performed by: INTERNAL MEDICINE

## 2023-09-28 PROCEDURE — 99999 PR PBB SHADOW E&M-EST. PATIENT-LVL V: ICD-10-PCS | Mod: PBBFAC,HCNC,, | Performed by: INTERNAL MEDICINE

## 2023-09-28 PROCEDURE — 1159F PR MEDICATION LIST DOCUMENTED IN MEDICAL RECORD: ICD-10-PCS | Mod: HCNC,CPTII,S$GLB, | Performed by: INTERNAL MEDICINE

## 2023-09-28 PROCEDURE — 36415 COLL VENOUS BLD VENIPUNCTURE: CPT | Mod: HCNC | Performed by: INTERNAL MEDICINE

## 2023-09-28 PROCEDURE — 84443 ASSAY THYROID STIM HORMONE: CPT | Mod: HCNC | Performed by: INTERNAL MEDICINE

## 2023-09-28 PROCEDURE — 1125F AMNT PAIN NOTED PAIN PRSNT: CPT | Mod: HCNC,CPTII,S$GLB, | Performed by: INTERNAL MEDICINE

## 2023-09-28 PROCEDURE — 99999 PR PBB SHADOW E&M-EST. PATIENT-LVL V: CPT | Mod: PBBFAC,HCNC,, | Performed by: INTERNAL MEDICINE

## 2023-09-28 PROCEDURE — 1125F PR PAIN SEVERITY QUANTIFIED, PAIN PRESENT: ICD-10-PCS | Mod: HCNC,CPTII,S$GLB, | Performed by: INTERNAL MEDICINE

## 2023-09-28 PROCEDURE — 3288F FALL RISK ASSESSMENT DOCD: CPT | Mod: HCNC,CPTII,S$GLB, | Performed by: INTERNAL MEDICINE

## 2023-09-28 PROCEDURE — 3079F DIAST BP 80-89 MM HG: CPT | Mod: HCNC,CPTII,S$GLB, | Performed by: INTERNAL MEDICINE

## 2023-09-28 PROCEDURE — 1101F PT FALLS ASSESS-DOCD LE1/YR: CPT | Mod: HCNC,CPTII,S$GLB, | Performed by: INTERNAL MEDICINE

## 2023-09-28 PROCEDURE — 1159F MED LIST DOCD IN RCRD: CPT | Mod: HCNC,CPTII,S$GLB, | Performed by: INTERNAL MEDICINE

## 2023-09-28 PROCEDURE — 3288F PR FALLS RISK ASSESSMENT DOCUMENTED: ICD-10-PCS | Mod: HCNC,CPTII,S$GLB, | Performed by: INTERNAL MEDICINE

## 2023-09-28 PROCEDURE — 3079F PR MOST RECENT DIASTOLIC BLOOD PRESSURE 80-89 MM HG: ICD-10-PCS | Mod: HCNC,CPTII,S$GLB, | Performed by: INTERNAL MEDICINE

## 2023-09-28 PROCEDURE — 3075F SYST BP GE 130 - 139MM HG: CPT | Mod: HCNC,CPTII,S$GLB, | Performed by: INTERNAL MEDICINE

## 2023-09-28 PROCEDURE — 85025 COMPLETE CBC W/AUTO DIFF WBC: CPT | Mod: HCNC | Performed by: INTERNAL MEDICINE

## 2023-09-28 PROCEDURE — 1101F PR PT FALLS ASSESS DOC 0-1 FALLS W/OUT INJ PAST YR: ICD-10-PCS | Mod: HCNC,CPTII,S$GLB, | Performed by: INTERNAL MEDICINE

## 2023-09-28 RX ORDER — SULFAMETHOXAZOLE AND TRIMETHOPRIM 800; 160 MG/1; MG/1
1 TABLET ORAL 2 TIMES DAILY
Qty: 20 TABLET | Refills: 0 | Status: SHIPPED | OUTPATIENT
Start: 2023-09-28 | End: 2023-10-08

## 2023-09-28 NOTE — PROGRESS NOTES
Subjective:      Patient ID: Leticia Grimes is a 78 y.o. female.    Chief Complaint: Dizziness    HPI    77 yo with   Patient Active Problem List   Diagnosis    Gastroesophageal reflux disease without esophagitis    Essential hypertension    Mild vitamin D deficiency    ICD (implantable cardioverter-defibrillator) in place    History of sudden cardiac arrest successfully resuscitated    Osteopenia    Adjustment insomnia    Anxiety    Mixed hyperlipidemia    Statin intolerance    Decreased strength, endurance, and mobility    Dissociative amnesia    Presence of artificial shoulder joint, left    Chronic bilateral low back pain with left-sided sciatica    Gait abnormality    Left hip pain    Lumbosacral radiculopathy at S1    Paresthesia of both hands    Bilateral carpal tunnel syndrome    Multiple neurological symptoms    Stage 3a chronic kidney disease    Hypertriglyceridemia    Thyroid nodule    Hearing loss    Cataract     Past Medical History:   Diagnosis Date    Adjustment insomnia 9/1/2020    Anxiety 9/1/2020    GERD (gastroesophageal reflux disease)     History of sudden cardiac arrest successfully resuscitated 8/31/2020    Hypertension     ICD (implantable cardioverter-defibrillator) in place 8/28/2020    Left arm swelling 9/3/2020    Mild vitamin D deficiency 6/14/2013    Osteopenia 9/1/2020    Vitamin D deficiency disease      C/o light headed for 5 days.(Started Monday) Mild improvement today. No dizziness/room spinning.   Not off balance. No falls. No trauma.  No weakness  No chest pain.  No shortness of breath.  No wheezing.  Worse with standing from seated position.  Pt stopped lyrica last one month ago but resumed this Tuesday at advise of pain clinic.     Home bp 130s/ 70s.     Patient also complain of Tenderness to cyst to left temporal region.  No discharge.  No trauma to the area.  Cyst has been there for 6 months.  Has been evaluated by Dermatology without treatment plan.  She has another  "appointment with Dermatology set up for November.        Review of Systems   Constitutional:  Negative for chills, diaphoresis and fever.   HENT:  Negative for congestion, ear pain and sore throat.    Respiratory:  Negative for cough, shortness of breath and wheezing.    Cardiovascular:  Negative for chest pain, palpitations and leg swelling.   Gastrointestinal:  Negative for abdominal pain, blood in stool, nausea and vomiting.   Genitourinary:  Negative for dysuria and hematuria.   Skin:  Negative for wound.   Neurological:  Positive for light-headedness. Negative for dizziness, tremors, seizures, syncope, facial asymmetry, speech difficulty, weakness, numbness and headaches.     Objective:   /82 (BP Location: Right arm, Patient Position: Sitting, BP Method: Medium (Manual))   Pulse 64   Temp 96.5 °F (35.8 °C) (Tympanic)   Ht 5' 0.98" (1.549 m)   Wt 72.8 kg (160 lb 7.9 oz)   SpO2 98%   BMI 30.34 kg/m²     Physical Exam  Constitutional:       General: She is awake. She is not in acute distress.     Appearance: Normal appearance. She is well-developed. She is not ill-appearing.   HENT:      Head: Normocephalic and atraumatic.      Mouth/Throat:      Mouth: Mucous membranes are moist.      Pharynx: Oropharynx is clear.   Eyes:      Conjunctiva/sclera: Conjunctivae normal.   Cardiovascular:      Rate and Rhythm: Normal rate.   Pulmonary:      Effort: Pulmonary effort is normal.   Musculoskeletal:         General: No swelling.      Cervical back: Normal range of motion. No rigidity.   Skin:     General: Skin is warm and dry.   Neurological:      General: No focal deficit present.      Mental Status: She is alert. Mental status is at baseline.      Gait: Gait normal.   Psychiatric:         Mood and Affect: Mood normal.         Behavior: Behavior normal. Behavior is cooperative.         Thought Content: Thought content normal.         Judgment: Judgment normal.         Tender movable cyst to left temporal " area.  No discharge.  No redness or warmth.      Lab Results   Component Value Date    WBC 5.54 05/05/2022    HGB 13.1 05/05/2022    HGB 13.3 10/05/2021    HCT 38.7 05/05/2022    MCV 83 05/05/2022    MCV 83 10/05/2021     05/05/2022    CHOL 256 (A) 07/07/2023    TRIG 123 07/07/2023    HDL 75 07/07/2023    LDLCALC 156 07/07/2023    LDLCALC 131.8 05/11/2022    LDLCALC 126.2 03/02/2022    ALT 24 05/11/2022    AST 22 05/11/2022     07/07/2023    K 3.7 07/07/2023    CALCIUM 9.8 07/07/2023     (A) 07/07/2023    CO2 28 (A) 07/07/2023    BUN 12 07/07/2023    CREATININE 0.9 08/22/2023    CREATININE 0.9 07/07/2023    CREATININE 0.9 10/20/2022    EGFRNORACEVR >60.0 08/22/2023    EGFRNORACEVR >60.0 10/20/2022    TSH 1.170 10/20/2022    TSH 0.828 10/05/2021     07/07/2023    HGBA1C 5.3 10/05/2021    ULAWRJWE49GN 17 (L) 10/20/2022    BNP 33 05/11/2022          The 10-year ASCVD risk score (Fatmata DK, et al., 2019) is: 27%    Values used to calculate the score:      Age: 78 years      Sex: Female      Is Non- : Yes      Diabetic: No      Tobacco smoker: No      Systolic Blood Pressure: 136 mmHg      Is BP treated: Yes      HDL Cholesterol: 75 mg/dL      Total Cholesterol: 256 mg/dL     Assessment:     1. Lightheaded    2. Essential hypertension    3. Sebaceous cyst      Plan:   1. Lightheaded  Orthostatic features  Advised adequate hydration.  Consider decrease amlodipine if no resolution    -     CBC Auto Differential; Future; Expected date: 09/28/2023  -     Comprehensive Metabolic Panel; Future; Expected date: 09/28/2023  -     EKG 12-lead; Future    2. Essential hypertension  -     TSH; Future; Expected date: 09/28/2023    3. Sebaceous cyst  Possibly developing infection to the area.  -     sulfamethoxazole-trimethoprim 800-160mg (BACTRIM DS) 800-160 mg Tab; Take 1 tablet by mouth 2 (two) times daily. for 10 days  Dispense: 20 tablet; Refill: 0  -     Ambulatory  referral/consult to Dermatology; Future; Expected date: 10/05/2023        Patient Instructions   Make sure to drink plenty of water.   8 glasses daily.    Future Appointments   Date Time Provider Department Center   9/29/2023 11:20 AM EKG, [facility] SPECCPR [facility]   10/6/2023  9:00 AM Taisha Soria LCSW [facility] PSYCH [facility]   11/16/2023  8:00 AM LABORATORY, [facility] LAB [facility]   11/16/2023  1:40 PM Alfreda Griffin, GUILLERMO [facility] DERM [facility]   11/16/2023  2:40 PM Joseph Sargent MD [facility] IM [facility]   12/7/2023 10:00 AM HOME MONITOR DEVICE CHECK [facility] ARR PRO [facility]   5/10/2024  1:10 PM PACEMAKER CLINIC [facility] ARR PRO [facility]   5/10/2024  1:20 PM Martín Pearl MD [facility] ARRHYTH [facility]   8/28/2024 10:00 AM [facility] US3 [facility] ULSOUND [facility]   9/5/2024 10:40 AM Joelle Paredes NP [facility] UROLOGY [facility]       Lab Frequency Next Occurrence   X-Ray Shoulder 2 or More Views Left Once 10/04/2022   Ambulatory referral/consult to Pain Clinic Once 12/06/2022   Mammo Digital Screening Bilat w/ Timmy Once 12/13/2022   Ambulatory referral/consult to Pain Clinic Once 01/10/2023   X-Ray Thoracic Spine AP Lateral Once 01/04/2023   Basic Metabolic Panel Once 01/19/2023   Lipid Panel Once 01/19/2023   Ambulatory referral/consult to Psychiatry Once 04/03/2023   CT Lumbar Spine Without Contrast Once 04/20/2023   US Retroperitoneal Complete Once 09/07/2023   Ambulatory referral/consult to Gastroenterology Once 09/14/2023   US Retroperitoneal Complete Once 09/13/2023   Cardiac device check - [facility]     Cardiac device check - Remote     Cardiac device check - In [facility]         Follow up if symptoms worsen or fail to improve.

## 2023-09-29 ENCOUNTER — HOSPITAL ENCOUNTER (OUTPATIENT)
Dept: CARDIOLOGY | Facility: HOSPITAL | Age: 78
Discharge: HOME | End: 2023-09-29
Attending: INTERNAL MEDICINE
Payer: MEDICARE

## 2023-09-29 DIAGNOSIS — R42 LIGHTHEADED: ICD-10-CM

## 2023-09-29 LAB
ALBUMIN SERPL BCP-MCNC: 3.7 G/DL (ref 3.5–5.2)
ALP SERPL-CCNC: 69 U/L (ref 55–135)
ALT SERPL W/O P-5'-P-CCNC: 21 U/L (ref 10–44)
ANION GAP SERPL CALC-SCNC: 9 MMOL/L (ref 8–16)
AST SERPL-CCNC: 22 U/L (ref 10–40)
BASOPHILS # BLD AUTO: 0.03 K/UL (ref 0–0.2)
BASOPHILS NFR BLD: 0.6 % (ref 0–1.9)
BILIRUB SERPL-MCNC: 0.5 MG/DL (ref 0.1–1)
BUN SERPL-MCNC: 13 MG/DL (ref 8–23)
CALCIUM SERPL-MCNC: 10.1 MG/DL (ref 8.7–10.5)
CHLORIDE SERPL-SCNC: 111 MMOL/L (ref 95–110)
CO2 SERPL-SCNC: 24 MMOL/L (ref 23–29)
CREAT SERPL-MCNC: 0.9 MG/DL (ref 0.5–1.4)
DIFFERENTIAL METHOD: ABNORMAL
EOSINOPHIL # BLD AUTO: 0.2 K/UL (ref 0–0.5)
EOSINOPHIL NFR BLD: 3.4 % (ref 0–8)
ERYTHROCYTE [DISTWIDTH] IN BLOOD BY AUTOMATED COUNT: 14.7 % (ref 11.5–14.5)
EST. GFR  (NO RACE VARIABLE): >60 ML/MIN/1.73 M^2
GLUCOSE SERPL-MCNC: 79 MG/DL (ref 70–110)
HCT VFR BLD AUTO: 39.5 % (ref 37–48.5)
HGB BLD-MCNC: 13 G/DL (ref 12–16)
IMM GRANULOCYTES # BLD AUTO: 0 K/UL (ref 0–0.04)
IMM GRANULOCYTES NFR BLD AUTO: 0 % (ref 0–0.5)
LYMPHOCYTES # BLD AUTO: 1.6 K/UL (ref 1–4.8)
LYMPHOCYTES NFR BLD: 32 % (ref 18–48)
MCH RBC QN AUTO: 28.6 PG (ref 27–31)
MCHC RBC AUTO-ENTMCNC: 32.9 G/DL (ref 32–36)
MCV RBC AUTO: 87 FL (ref 82–98)
MONOCYTES # BLD AUTO: 0.6 K/UL (ref 0.3–1)
MONOCYTES NFR BLD: 11.1 % (ref 4–15)
NEUTROPHILS # BLD AUTO: 2.6 K/UL (ref 1.8–7.7)
NEUTROPHILS NFR BLD: 52.9 % (ref 38–73)
NRBC BLD-RTO: 0 /100 WBC
PLATELET # BLD AUTO: 214 K/UL (ref 150–450)
PMV BLD AUTO: 12 FL (ref 9.2–12.9)
POTASSIUM SERPL-SCNC: 4.3 MMOL/L (ref 3.5–5.1)
PROT SERPL-MCNC: 7.2 G/DL (ref 6–8.4)
RBC # BLD AUTO: 4.55 M/UL (ref 4–5.4)
SODIUM SERPL-SCNC: 144 MMOL/L (ref 136–145)
TSH SERPL DL<=0.005 MIU/L-ACNC: 0.5 UIU/ML (ref 0.4–4)
WBC # BLD AUTO: 4.97 K/UL (ref 3.9–12.7)

## 2023-09-29 PROCEDURE — 93010 EKG 12-LEAD: ICD-10-PCS | Mod: HCNC,,, | Performed by: INTERNAL MEDICINE

## 2023-09-29 PROCEDURE — 93010 ELECTROCARDIOGRAM REPORT: CPT | Mod: HCNC,,, | Performed by: INTERNAL MEDICINE

## 2023-09-29 PROCEDURE — 93005 ELECTROCARDIOGRAM TRACING: CPT | Mod: HCNC

## 2023-10-01 DIAGNOSIS — R94.31 ABNORMAL EKG: ICD-10-CM

## 2023-10-01 DIAGNOSIS — R42 LIGHTHEADED: Primary | ICD-10-CM

## 2023-10-02 ENCOUNTER — TELEPHONE (OUTPATIENT)
Dept: INTERNAL MEDICINE | Facility: CLINIC | Age: 78
End: 2023-10-02
Payer: MEDICARE

## 2023-10-02 NOTE — TELEPHONE ENCOUNTER
----- Message from Sujatha Luis sent at 10/2/2023  9:37 AM CDT -----  Contact: Leticia  Type:  Patient Returning Call    Who Called:Leticia  Who Left Message for Patient:Nurse  Does the patient know what this is regarding?:lab work  Would the patient rather a call back or a response via [facility]? Call back  Best Call Back Number:[phone]  Additional Information: Leticia missed a call and would like a call back    Thanks  LIN

## 2023-10-03 ENCOUNTER — PATIENT MESSAGE (OUTPATIENT)
Dept: INTERNAL MEDICINE | Facility: CLINIC | Age: 78
End: 2023-10-03
Payer: MEDICARE

## 2023-10-04 ENCOUNTER — OFFICE VISIT (OUTPATIENT)
Dept: CARDIOLOGY | Facility: CLINIC | Age: 78
End: 2023-10-04
Payer: MEDICARE

## 2023-10-04 VITALS
BODY MASS INDEX: 32.25 KG/M2 | DIASTOLIC BLOOD PRESSURE: 60 MMHG | OXYGEN SATURATION: 97 % | HEIGHT: 60 IN | SYSTOLIC BLOOD PRESSURE: 130 MMHG | HEART RATE: 60 BPM | WEIGHT: 164.25 LBS

## 2023-10-04 DIAGNOSIS — Z95.810 ICD (IMPLANTABLE CARDIOVERTER-DEFIBRILLATOR) IN PLACE: Chronic | ICD-10-CM

## 2023-10-04 DIAGNOSIS — I10 ESSENTIAL HYPERTENSION: Primary | Chronic | ICD-10-CM

## 2023-10-04 DIAGNOSIS — R94.31 ABNORMAL EKG: ICD-10-CM

## 2023-10-04 DIAGNOSIS — R42 LIGHTHEADED: ICD-10-CM

## 2023-10-04 DIAGNOSIS — E78.2 MIXED HYPERLIPIDEMIA: Chronic | ICD-10-CM

## 2023-10-04 DIAGNOSIS — Z86.74 HISTORY OF SUDDEN CARDIAC ARREST SUCCESSFULLY RESUSCITATED: Chronic | ICD-10-CM

## 2023-10-04 PROCEDURE — 1126F PR PAIN SEVERITY QUANTIFIED, NO PAIN PRESENT: ICD-10-PCS | Mod: HCNC,CPTII,S$GLB, | Performed by: INTERNAL MEDICINE

## 2023-10-04 PROCEDURE — 1101F PT FALLS ASSESS-DOCD LE1/YR: CPT | Mod: HCNC,CPTII,S$GLB, | Performed by: INTERNAL MEDICINE

## 2023-10-04 PROCEDURE — 99214 PR OFFICE/OUTPT VISIT, EST, LEVL IV, 30-39 MIN: ICD-10-PCS | Mod: HCNC,S$GLB,, | Performed by: INTERNAL MEDICINE

## 2023-10-04 PROCEDURE — 1160F PR REVIEW ALL MEDS BY PRESCRIBER/CLIN PHARMACIST DOCUMENTED: ICD-10-PCS | Mod: HCNC,CPTII,S$GLB, | Performed by: INTERNAL MEDICINE

## 2023-10-04 PROCEDURE — 1160F RVW MEDS BY RX/DR IN RCRD: CPT | Mod: HCNC,CPTII,S$GLB, | Performed by: INTERNAL MEDICINE

## 2023-10-04 PROCEDURE — 3078F DIAST BP <80 MM HG: CPT | Mod: HCNC,CPTII,S$GLB, | Performed by: INTERNAL MEDICINE

## 2023-10-04 PROCEDURE — 1101F PR PT FALLS ASSESS DOC 0-1 FALLS W/OUT INJ PAST YR: ICD-10-PCS | Mod: HCNC,CPTII,S$GLB, | Performed by: INTERNAL MEDICINE

## 2023-10-04 PROCEDURE — 3288F PR FALLS RISK ASSESSMENT DOCUMENTED: ICD-10-PCS | Mod: HCNC,CPTII,S$GLB, | Performed by: INTERNAL MEDICINE

## 2023-10-04 PROCEDURE — 99214 OFFICE O/P EST MOD 30 MIN: CPT | Mod: HCNC,S$GLB,, | Performed by: INTERNAL MEDICINE

## 2023-10-04 PROCEDURE — 3288F FALL RISK ASSESSMENT DOCD: CPT | Mod: HCNC,CPTII,S$GLB, | Performed by: INTERNAL MEDICINE

## 2023-10-04 PROCEDURE — 99999 PR PBB SHADOW E&M-EST. PATIENT-LVL V: CPT | Mod: PBBFAC,HCNC,, | Performed by: INTERNAL MEDICINE

## 2023-10-04 PROCEDURE — 3075F SYST BP GE 130 - 139MM HG: CPT | Mod: HCNC,CPTII,S$GLB, | Performed by: INTERNAL MEDICINE

## 2023-10-04 PROCEDURE — 3075F PR MOST RECENT SYSTOLIC BLOOD PRESS GE 130-139MM HG: ICD-10-PCS | Mod: HCNC,CPTII,S$GLB, | Performed by: INTERNAL MEDICINE

## 2023-10-04 PROCEDURE — 1126F AMNT PAIN NOTED NONE PRSNT: CPT | Mod: HCNC,CPTII,S$GLB, | Performed by: INTERNAL MEDICINE

## 2023-10-04 PROCEDURE — 1159F PR MEDICATION LIST DOCUMENTED IN MEDICAL RECORD: ICD-10-PCS | Mod: HCNC,CPTII,S$GLB, | Performed by: INTERNAL MEDICINE

## 2023-10-04 PROCEDURE — 99999 PR PBB SHADOW E&M-EST. PATIENT-LVL V: ICD-10-PCS | Mod: PBBFAC,HCNC,, | Performed by: INTERNAL MEDICINE

## 2023-10-04 PROCEDURE — 3078F PR MOST RECENT DIASTOLIC BLOOD PRESSURE < 80 MM HG: ICD-10-PCS | Mod: HCNC,CPTII,S$GLB, | Performed by: INTERNAL MEDICINE

## 2023-10-04 PROCEDURE — 1159F MED LIST DOCD IN RCRD: CPT | Mod: HCNC,CPTII,S$GLB, | Performed by: INTERNAL MEDICINE
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
## 2023-10-06 ENCOUNTER — OFFICE VISIT (OUTPATIENT)
Dept: PSYCHIATRY | Facility: CLINIC | Age: 78
End: 2023-10-06
Payer: MEDICARE

## 2023-10-06 DIAGNOSIS — F32.A DEPRESSION, UNSPECIFIED DEPRESSION TYPE: ICD-10-CM

## 2023-10-06 DIAGNOSIS — F43.21 GRIEF: Primary | ICD-10-CM

## 2023-10-06 DIAGNOSIS — F41.9 ANXIETY: Chronic | ICD-10-CM

## 2023-10-06 PROCEDURE — 90837 PSYTX W PT 60 MINUTES: CPT | Mod: HCNC,95,, | Performed by: SOCIAL WORKER

## 2023-10-06 PROCEDURE — 90837 PR PSYCHOTHERAPY W/PATIENT, 60 MIN: ICD-10-PCS | Mod: HCNC,95,, | Performed by: SOCIAL WORKER

## 2023-10-07 ENCOUNTER — PATIENT MESSAGE (OUTPATIENT)
Dept: DERMATOLOGY | Facility: CLINIC | Age: 78
End: 2023-10-07
Payer: MEDICARE

## 2023-10-09 ENCOUNTER — TELEPHONE (OUTPATIENT)
Dept: DERMATOLOGY | Facility: CLINIC | Age: 78
End: 2023-10-09
Payer: MEDICARE

## 2023-10-09 NOTE — TELEPHONE ENCOUNTER
Called and spoke to patient. Pt scheduled to see dr. Ramos tomorrow at the [facility] location for 230. Directions given. Pt verbalized understanding

## 2023-10-10 ENCOUNTER — OFFICE VISIT (OUTPATIENT)
Dept: DERMATOLOGY | Facility: CLINIC | Age: 78
End: 2023-10-10
Payer: MEDICARE

## 2023-10-10 DIAGNOSIS — L72.3 SEBACEOUS CYST: ICD-10-CM

## 2023-10-10 DIAGNOSIS — R22.9 SUBCUTANEOUS NODULE: Primary | ICD-10-CM

## 2023-10-10 PROCEDURE — 99999 PR PBB SHADOW E&M-EST. PATIENT-LVL IV: ICD-10-PCS | Mod: PBBFAC,HCNC,, | Performed by: DERMATOLOGY

## 2023-10-10 PROCEDURE — 99213 PR OFFICE/OUTPT VISIT, EST, LEVL III, 20-29 MIN: ICD-10-PCS | Mod: HCNC,S$GLB,, | Performed by: DERMATOLOGY

## 2023-10-10 PROCEDURE — 1101F PT FALLS ASSESS-DOCD LE1/YR: CPT | Mod: HCNC,CPTII,S$GLB, | Performed by: DERMATOLOGY

## 2023-10-10 PROCEDURE — 1159F PR MEDICATION LIST DOCUMENTED IN MEDICAL RECORD: ICD-10-PCS | Mod: HCNC,CPTII,S$GLB, | Performed by: DERMATOLOGY

## 2023-10-10 PROCEDURE — 1160F PR REVIEW ALL MEDS BY PRESCRIBER/CLIN PHARMACIST DOCUMENTED: ICD-10-PCS | Mod: HCNC,CPTII,S$GLB, | Performed by: DERMATOLOGY

## 2023-10-10 PROCEDURE — 1126F PR PAIN SEVERITY QUANTIFIED, NO PAIN PRESENT: ICD-10-PCS | Mod: HCNC,CPTII,S$GLB, | Performed by: DERMATOLOGY

## 2023-10-10 PROCEDURE — 1101F PR PT FALLS ASSESS DOC 0-1 FALLS W/OUT INJ PAST YR: ICD-10-PCS | Mod: HCNC,CPTII,S$GLB, | Performed by: DERMATOLOGY

## 2023-10-10 PROCEDURE — 1126F AMNT PAIN NOTED NONE PRSNT: CPT | Mod: HCNC,CPTII,S$GLB, | Performed by: DERMATOLOGY

## 2023-10-10 PROCEDURE — 1159F MED LIST DOCD IN RCRD: CPT | Mod: HCNC,CPTII,S$GLB, | Performed by: DERMATOLOGY

## 2023-10-10 PROCEDURE — 1160F RVW MEDS BY RX/DR IN RCRD: CPT | Mod: HCNC,CPTII,S$GLB, | Performed by: DERMATOLOGY

## 2023-10-10 PROCEDURE — 99999 PR PBB SHADOW E&M-EST. PATIENT-LVL IV: CPT | Mod: PBBFAC,HCNC,, | Performed by: DERMATOLOGY

## 2023-10-10 PROCEDURE — 3288F PR FALLS RISK ASSESSMENT DOCUMENTED: ICD-10-PCS | Mod: HCNC,CPTII,S$GLB, | Performed by: DERMATOLOGY

## 2023-10-10 PROCEDURE — 3288F FALL RISK ASSESSMENT DOCD: CPT | Mod: HCNC,CPTII,S$GLB, | Performed by: DERMATOLOGY

## 2023-10-10 PROCEDURE — 99213 OFFICE O/P EST LOW 20 MIN: CPT | Mod: HCNC,S$GLB,, | Performed by: DERMATOLOGY

## 2023-10-10 RX ORDER — DOXYCYCLINE 100 MG/1
100 CAPSULE ORAL 2 TIMES DAILY
Qty: 28 CAPSULE | Refills: 0 | Status: SHIPPED | OUTPATIENT
Start: 2023-10-10 | End: 2023-10-24

## 2023-10-10 NOTE — PROGRESS NOTES
Subjective:      Patient ID:  Leticia Grimes is a 78 y.o. female who presents for     Last seen 1/19/22 by Dr. Fleming for skin tag.  Referred for cyst L temple, started in July and then grew, got tender a few weeks ago, not painful today, sometimes itches. No drainage.    prior tx:  bactrim from PCP 9/28/23 - no change  Topical OTC cream    Denies personal/fam h/o skin cancer        Review of Systems   Skin:  Negative for itching.       Objective:   Physical Exam   Constitutional: She appears well-developed and well-nourished. No distress.   Neurological: She is alert and oriented to person, place, and time. She is not disoriented.   Psychiatric: She has a normal mood and affect.   Skin:   Areas Examined (abnormalities noted in diagram):   Scalp / Hair Palpated and Inspected            Diagram Legend     Erythematous scaling macule/papule c/w actinic keratosis       Vascular papule c/w angioma      Pigmented verrucoid papule/plaque c/w seborrheic keratosis      Yellow umbilicated papule c/w sebaceous hyperplasia      Irregularly shaped tan macule c/w lentigo     1-2 mm smooth white papules consistent with Milia      Movable subcutaneous cyst with punctum c/w epidermal inclusion cyst      Subcutaneous movable cyst c/w pilar cyst      Firm pink to brown papule c/w dermatofibroma      Pedunculated fleshy papule(s) c/w skin tag(s)      Evenly pigmented macule c/w junctional nevus     Mildly variegated pigmented, slightly irregular-bordered macule c/w mildly atypical nevus      Flesh colored to evenly pigmented papule c/w intradermal nevus       Pink pearly papule/plaque c/w basal cell carcinoma      Erythematous hyperkeratotic cursted plaque c/w SCC      Surgical scar with no sign of skin cancer recurrence      Open and closed comedones      Inflammatory papules and pustules      Verrucoid papule consistent consistent with wart     Erythematous eczematous patches and plaques     Dystrophic onycholytic nail with  subungual debris c/w onychomycosis     Umbilicated papule    Erythematous-base heme-crusted tan verrucoid plaque consistent with inflamed seborrheic keratosis     Erythematous Silvery Scaling Plaque c/w Psoriasis     See annotation        Assessment / Plan:        Subcutaneous nodule  - suspect inflamed EIC vs less likely lipoma. Discussed suspected etiologies and likely benign nature of lesion, but that I cannot exclude malignancy with 100% certainty unless it is excised and sent for pathologic exam. Recommend eval by head/neck surgery for excision given size and location.     -     doxycycline (MONODOX) 100 MG capsule; Take 1 capsule (100 mg total) by mouth 2 (two) times daily. With food and water. Avoid lying down for 1 hour after taking. Avoid the sun. for 14 days  Dispense: 28 capsule; Refill: 0  -     Ambulatory referral/consult to ENT; Future; Expected date: 10/17/2023    - doxycycline: Side effect profile of doxy reviewed including increased sun sensitivity and upset stomach, esophageal inflammation.  Patient was instructed to take with food and water and to avoid lying down for 1 hour after taking. Patient was instructed to not become pregnant while on medication to effects on dental development in fetus; she acknowledged understanding of risks involved.                    LOS NUMBER AND COMPLEXITY OF PROBLEMS    COMPLEXITY OF DATA RISK TOTAL TIME (m)   54067  77618 [] 1 self-limited or minor problem [x] Minimal to none [] No treatment recommended or patient to monitor. Reassurance.  15-29  10-19   89064  03876 Low  [] 2 or more self limited or minor problems  [] 1 stable chronic illness  [x] 1 acute, uncomplicated illness or injury Limited (2)  [] Prior external notes from each unique source  [] Review result of each unique test  [] Order each unique test  OR [] Independent historian Low  []  OTC medications   []  Discussed/Decision for minor skin surgery (no risk factors) 30-44 20-29   82561  73589  Moderate  []  1 or more chronic unstable illness (not at goal or progression or exacerbation) or SE of treatment  []  2 or more stable chronic illnesses  []  1 acute illness with systemic symptoms  []  1 acute complicated injury  []  1 undiagnosed new problem with uncertain prognosis Moderate (1/3 below)  []  3 or more data items        *Now includes independent historian  []  Independent interpretation of a test  []  Discuss management/test with another provider Moderate  [x]  Prescription drug mgmt  []  Discussed/Decision for Minor surgery with risk factors  []  Mgmt limited by social determinates 45-59  30-39   14178  57075 High  []  1 or more chronic illness with severe exacerbation, progression or SE of treatment  []  1 acute or chronic illness/injury that poses a threat to life or bodily function Extensive (2/3 below)  []  3 or more data items        *Now includes independent historian.  []  Independent interpretation of a test  []  Discuss management/test with another provider High  []  Major surgery with risk discussed  []  Drug therapy requiring intensive monitoring for toxicity  []  Hospitalization  []  Decision for DNR 60-74  40-54

## 2023-10-16 ENCOUNTER — OFFICE VISIT (OUTPATIENT)
Dept: OTOLARYNGOLOGY | Facility: CLINIC | Age: 78
End: 2023-10-16
Payer: MEDICARE

## 2023-10-16 VITALS — BODY MASS INDEX: 31.34 KG/M2 | HEIGHT: 60 IN | TEMPERATURE: 98 F | WEIGHT: 159.63 LBS

## 2023-10-16 DIAGNOSIS — F41.9 ANXIETY: ICD-10-CM

## 2023-10-16 DIAGNOSIS — R22.9 SUBCUTANEOUS NODULE: ICD-10-CM

## 2023-10-16 DIAGNOSIS — F51.02 INSOMNIA DUE TO STRESS: ICD-10-CM

## 2023-10-16 PROCEDURE — 3288F PR FALLS RISK ASSESSMENT DOCUMENTED: ICD-10-PCS | Mod: HCNC,CPTII,S$GLB, | Performed by: STUDENT IN AN ORGANIZED HEALTH CARE EDUCATION/TRAINING PROGRAM

## 2023-10-16 PROCEDURE — 1101F PR PT FALLS ASSESS DOC 0-1 FALLS W/OUT INJ PAST YR: ICD-10-PCS | Mod: HCNC,CPTII,S$GLB, | Performed by: STUDENT IN AN ORGANIZED HEALTH CARE EDUCATION/TRAINING PROGRAM

## 2023-10-16 PROCEDURE — 99999 PR PBB SHADOW E&M-EST. PATIENT-LVL IV: ICD-10-PCS | Mod: PBBFAC,HCNC,, | Performed by: STUDENT IN AN ORGANIZED HEALTH CARE EDUCATION/TRAINING PROGRAM

## 2023-10-16 PROCEDURE — 1126F AMNT PAIN NOTED NONE PRSNT: CPT | Mod: HCNC,CPTII,S$GLB, | Performed by: STUDENT IN AN ORGANIZED HEALTH CARE EDUCATION/TRAINING PROGRAM

## 2023-10-16 PROCEDURE — 99214 OFFICE O/P EST MOD 30 MIN: CPT | Mod: HCNC,S$GLB,, | Performed by: STUDENT IN AN ORGANIZED HEALTH CARE EDUCATION/TRAINING PROGRAM

## 2023-10-16 PROCEDURE — 1159F PR MEDICATION LIST DOCUMENTED IN MEDICAL RECORD: ICD-10-PCS | Mod: HCNC,CPTII,S$GLB, | Performed by: STUDENT IN AN ORGANIZED HEALTH CARE EDUCATION/TRAINING PROGRAM

## 2023-10-16 PROCEDURE — 99999 PR PBB SHADOW E&M-EST. PATIENT-LVL IV: CPT | Mod: PBBFAC,HCNC,, | Performed by: STUDENT IN AN ORGANIZED HEALTH CARE EDUCATION/TRAINING PROGRAM

## 2023-10-16 PROCEDURE — 1126F PR PAIN SEVERITY QUANTIFIED, NO PAIN PRESENT: ICD-10-PCS | Mod: HCNC,CPTII,S$GLB, | Performed by: STUDENT IN AN ORGANIZED HEALTH CARE EDUCATION/TRAINING PROGRAM

## 2023-10-16 PROCEDURE — 99214 PR OFFICE/OUTPT VISIT, EST, LEVL IV, 30-39 MIN: ICD-10-PCS | Mod: HCNC,S$GLB,, | Performed by: STUDENT IN AN ORGANIZED HEALTH CARE EDUCATION/TRAINING PROGRAM

## 2023-10-16 PROCEDURE — 1101F PT FALLS ASSESS-DOCD LE1/YR: CPT | Mod: HCNC,CPTII,S$GLB, | Performed by: STUDENT IN AN ORGANIZED HEALTH CARE EDUCATION/TRAINING PROGRAM

## 2023-10-16 PROCEDURE — 1159F MED LIST DOCD IN RCRD: CPT | Mod: HCNC,CPTII,S$GLB, | Performed by: STUDENT IN AN ORGANIZED HEALTH CARE EDUCATION/TRAINING PROGRAM

## 2023-10-16 PROCEDURE — 3288F FALL RISK ASSESSMENT DOCD: CPT | Mod: HCNC,CPTII,S$GLB, | Performed by: STUDENT IN AN ORGANIZED HEALTH CARE EDUCATION/TRAINING PROGRAM

## 2023-10-16 RX ORDER — MUPIROCIN 20 MG/G
OINTMENT TOPICAL 3 TIMES DAILY
Qty: 30 G | Refills: 1 | Status: SHIPPED | OUTPATIENT
Start: 2023-10-16

## 2023-10-16 NOTE — PROGRESS NOTES
"Referring Provider:    Brenda Ramos Md  [address] [facility]  [place] [address]  Subjective:   Patient: Leticia Grimes [id number], :1945   Visit date:10/16/2023 2:33 PM    Chief Complaint:  Other (Tumor on L temple)    HPI:    Prior notes reviewed by myself.  Clinical documentation obtained by nursing staff reviewed.     75 y/o female with complaints of 1 week of left sided neck/shoulder pain as well as an enlarged "gland" on the left side.  She has a long history of severe neck and left shoulder arthritis.  Initially, she attributed this pain to her arthritis but then she noticed that this neck mass had enlarged in size and was tender.  She has had no fever, unintentional weight loss, night sweats.  She did have a workup for this neck mass in  as well as a fine-needle aspiration biopsy which was read as benign.    3/16/22 update:  Possible decrease in size, no other changes.      10/16/23 update: First noted a hard bump just above her ear about 3-4 months. Since that time it has grown. Then it started to drain. Then treated with doxycycline and has improved. Not finished with treatment yet. No longer drainage and has shrunk, but not resolve. Denies pain.   Denies prior pits or cysts in the region.       Objective:     Physical Exam:  Vitals:  Temp 98.1 °F (36.7 °C) (Temporal)   Ht 5' (1.524 m)   Wt 72.4 kg (159 lb 9.8 oz)   BMI 31.17 kg/m²   General appearance:  Well developed, well nourished    Ears:  Otoscopy of external auditory canals and tympanic membranes was normal, clinical speech reception thresholds grossly intact, no mass/lesion of auricle.    Soft, 2 cm mass with crusting at the left temple with overlying erythema, non tender         Nose:  No masses/lesions of external nose, nasal mucosa, septum, and turbinates were within normal limits.    Mouth:  No mass/lesion of lips, teeth, gums, hard/soft palate, tongue, tonsils, or oropharynx.      Neck & Lymphatics:  No cervical " lymphadenopathy, trachea is midline, no thyroid enlargement/tenderness/mass.         [x]  Data Reviewed:    Lab Results   Component Value Date    WBC 4.97 09/28/2023    HGB 13.0 09/28/2023    HCT 39.5 09/28/2023    MCV 87 09/28/2023    EOSINOPHIL 3.4 09/28/2023       Pathology 2014      US neck 2014      CT Soft Tissue Neck With Contrast 2014  Order: [id number]  Status: Final result    Visible to patient: Yes (seen)    Next appt: 03/01/2022 at 03:00 PM in Internal Medicine (Sylvester Mercedes, EMIL[facility])    Dx: Mass of left side of neck    0 Result Notes    Details    Reading Physician Reading Date Result Priority   Lissette Ramos MD  [phone] 5/21/2014      Narrative & Impression  Findings: No prior CT scans for comparison. 60 cc of Visipaque 270 was used for IV contrast. Limited evaluation of the lung apices is unremarkable.  There are a couple of subcentimeter hypodense nodules within the lower pole of the left lobe of the   thyroid gland.  No abnormalities seen pertaining to the vocal cords.  Mild asymmetry in the left supraglottic space.  Correlate with laryngoscopic findings.  There is some shotty sub-centimeter bilateral supraclavicular adenopathy.  Additional bilateral   level 2 subcentimeter nodes are present.  The submandibular glands are normal in appearance.  There is a 20 x 8 millimeter right submandibular node.  Additional 18-mm left submandibular node and a 12-mm right submandibular nodes are present.  The parotid   glands are normal in appearance.  No abnormality seen in the oropharynx or nasopharynx.  Visualized paranasal sinuses and master air cells appear to be normally aerated.  IMPRESSION:    Hypodense nodules in the left lobe of the thyroid gland.  Cervical, supraclavicular and submandibular adenopathy as detailed above.  Mild asymmetry in the left supraglottic region.  Please correlate clinically.        Electronically signed by: LISSETTE RAMOS MD  Date:                                             05/21/14  Time:                                           10:10        Small enhancing node/cyst noted at helical root without inflammation       Assessment & Plan:   Subcutaneous nodule  -     Ambulatory referral/consult to ENT  -     US Soft Tissue Head Neck Thyroid; Future; Expected date: 10/16/2023    Other orders  -     mupirocin (BACTROBAN) 2 % ointment; Apply topically 3 (three) times daily.  Dispense: 30 g; Refill: 1        We reviewed her symptoms and exam in detail.  She does have a a left submandibular and a submental enlarged lymph node/neck mass.  These are tender to palpation.  She also has tenderness along her posterior cervical musculature and her shoulder area the left side.  I explained that her pain very well may be coming from her existing arthritis and muscular inflammation, but, given the enlargement in her left submandibular lymph node, I think we should treat it with antibiotics.  She did undergo workup for this enlarged lymph node in 2014 including a needle biopsy which was benign.  There is no mention of this submental lymph node at that time.  We will re-evaluate in 2 weeks and discuss further management at that point.    3/16/22 update:  Possible interval decrease in size but both lymph nodes/neck masses are still palpable.  Discussed options including excisional biopsy versus FNA versus continued observation.  We agreed to observe for another 4 weeks and revisit this again at that point.  She understands that there is always a risk of missed malignancy with an enlarged lymph node that does not resolve with antibiotics    10/16/23 update:suspect inflamed EIC, improving with antibiotics, but not resolved. We discussed finishing treatment, adding mupirocin and returning in 2-3 weeks with US before. Discussed likely excision if not resolved.

## 2023-10-16 NOTE — TELEPHONE ENCOUNTER
No care due was identified.  [facility] Embedded Care Due Messages. Reference number: [id number].   10/16/2023 6:59:47 PM CDT

## 2023-10-17 RX ORDER — LORAZEPAM 0.5 MG/1
0.5 TABLET ORAL NIGHTLY PRN
Qty: 15 TABLET | Refills: 0 | Status: SHIPPED | OUTPATIENT
Start: 2023-10-17 | End: 2024-01-16

## 2023-10-17 NOTE — TELEPHONE ENCOUNTER
Uterus not letting me send this prescription electronically.  It states that patient has a PO box for address and needs street address in order to send electronically

## 2023-10-23 ENCOUNTER — TELEPHONE (OUTPATIENT)
Dept: PAIN MEDICINE | Facility: CLINIC | Age: 78
End: 2023-10-23
Payer: MEDICARE

## 2023-10-23 ENCOUNTER — TELEPHONE (OUTPATIENT)
Dept: NEUROLOGY | Facility: CLINIC | Age: 78
End: 2023-10-23
Payer: MEDICARE

## 2023-10-23 ENCOUNTER — PATIENT MESSAGE (OUTPATIENT)
Dept: INTERNAL MEDICINE | Facility: CLINIC | Age: 78
End: 2023-10-23
Payer: MEDICARE

## 2023-10-23 ENCOUNTER — PATIENT MESSAGE (OUTPATIENT)
Dept: NEUROLOGY | Facility: CLINIC | Age: 78
End: 2023-10-23
Payer: MEDICARE

## 2023-10-23 DIAGNOSIS — M54.50 ACUTE LEFT-SIDED LOW BACK PAIN WITHOUT SCIATICA: Primary | ICD-10-CM

## 2023-10-23 NOTE — TELEPHONE ENCOUNTER
----- Message from Wyatt Esquivel MA sent at 10/23/2023  2:33 PM CDT -----  The patient trying to establish care with pain management again due to her last provider no longer being with [facility]. Please give her a call back at [phone].    Says she is in a lot of pain and can not sleep at night.

## 2023-10-23 NOTE — TELEPHONE ENCOUNTER
----- Message from Danitza Pro sent at 10/23/2023  9:11 AM CDT -----  .Type:  Sooner Apoointment Request    Caller is requesting a sooner appointment.  Caller declined first available appointment listed below.  Caller will not accept being placed on the waitlist and is requesting a message be sent to doctor.  Name of Caller:.Leticia Grimes   When is the first available appointment?2024  Symptoms:follow up, bad back pain all the way down side  Would the patient rather a call back or a response via [facility]? Call back  Best Call Back Number:.[phone]     Additional Information: she would like an appt this week

## 2023-10-23 NOTE — TELEPHONE ENCOUNTER
I spoke with patient in regards to wanting to schedule appointment within the week. I advised patient that we did not have anything available for this week for both providers. I also advised her that I am unable to override due to not having access. I then told patient I can schedule her for the next available and add her to the waiting list. Patient declined and stated she might need to look elsewhere. I apologized to patient for the unconvinced.

## 2023-10-31 ENCOUNTER — PATIENT MESSAGE (OUTPATIENT)
Dept: INTERNAL MEDICINE | Facility: CLINIC | Age: 78
End: 2023-10-31
Payer: MEDICARE

## 2023-10-31 NOTE — TELEPHONE ENCOUNTER
Rec pt get PT referral from neurologist or pain clinic if specialty clinic feel would be beneficial.

## 2023-11-01 ENCOUNTER — TELEPHONE (OUTPATIENT)
Dept: NEUROLOGY | Facility: CLINIC | Age: 78
End: 2023-11-01
Payer: MEDICARE

## 2023-11-01 NOTE — TELEPHONE ENCOUNTER
----- Message from Donavan Villa sent at 11/1/2023  1:31 PM CDT -----  Contact: [phone]  Type:  Sooner Apoointment Request    Caller is requesting a sooner appointment.  Caller declined first available appointment listed below.  Caller will not accept being placed on the waitlist and is requesting a message be sent to doctor.  Name of Caller:Leticia   When is the first available appointment?12/21  Symptoms: back pain   Would the patient rather a call back or a response via [facility]? Call back   Best Call Back Number:[phone]  Additional Information: n/a      Thanks KB

## 2023-11-01 NOTE — TELEPHONE ENCOUNTER
----- Message from Solange Kauffman sent at 11/1/2023 11:04 AM CDT -----  Contact: Leticai  .Patient is calling to speak with the nurse regarding appt . Reports having pain on the left side from the foot to the shoulder blade and back pain . Patient also states cant sleeping with the pain  . Please give patient a call back at .[phone]

## 2023-11-01 NOTE — TELEPHONE ENCOUNTER
Called pt to schedule appointment per neurology. Pt states she does not want to see pain management. She wants to see Dr. Leonard in neurology. Informed pt that I will send a message back to his office. Pt verbalized understanding.

## 2023-11-02 ENCOUNTER — TELEPHONE (OUTPATIENT)
Dept: INTERNAL MEDICINE | Facility: CLINIC | Age: 78
End: 2023-11-02
Payer: MEDICARE

## 2023-11-02 ENCOUNTER — TELEPHONE (OUTPATIENT)
Dept: NEUROSURGERY | Facility: CLINIC | Age: 78
End: 2023-11-02
Payer: MEDICARE

## 2023-11-02 DIAGNOSIS — M54.9 DORSALGIA, UNSPECIFIED: Primary | ICD-10-CM

## 2023-11-02 NOTE — TELEPHONE ENCOUNTER
----- Message from Rachell Lara sent at 11/2/2023  9:39 AM CDT -----  Regarding: Order  Contact: Leticia  Type:  Needs Medical Advice    Who Called:  Leticia   Symptoms (please be specific):    How long has patient had these symptoms:    Pharmacy name and phone #:    Would the patient rather a call back or a response via [facility]? call  Best Call Back Number:  [phone] (home)    Additional Information:  Leticia need an order for a CT Scan [facility]

## 2023-11-02 NOTE — TELEPHONE ENCOUNTER
----- Message from Bria Yu sent at 11/1/2023  3:17 PM CDT -----  Contact: Pt  Pt is calling rg following up on message sent for her sciatica and can't sleep due to her pain and wants to discuss before she schedules & can be reached at [phone]//thanks/dbw

## 2023-11-02 NOTE — TELEPHONE ENCOUNTER
Pt states that she is trying to get scheduled with a neurosurgeon, because Dr. Shrestha nurse said she should be seeing neurosurgery instead of neurology. Informed pt that she would need to speak with the neurologist to determine if that is correct or not and request a referral. She v/u

## 2023-11-02 NOTE — TELEPHONE ENCOUNTER
Returned patient call. Patient states her internal medicine doctor sent her to the wrong specialist. Patient states Dr. Leonard's nurse told her she would need to see a neurosurgeon. Informed patient that she would need a referral to see the neurosurgeon as well as some recent imaging. Patient verbalized understanding and stated she will reach out to Dr. Hernandez's office.
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
## 2023-11-16 ENCOUNTER — OFFICE VISIT (OUTPATIENT)
Dept: INTERNAL MEDICINE | Facility: CLINIC | Age: 78
End: 2023-11-16
Payer: MEDICARE

## 2023-11-16 ENCOUNTER — HOSPITAL ENCOUNTER (OUTPATIENT)
Dept: RADIOLOGY | Facility: HOSPITAL | Age: 78
Discharge: HOME | End: 2023-11-16
Attending: PSYCHIATRY & NEUROLOGY
Payer: MEDICARE

## 2023-11-16 VITALS
HEART RATE: 62 BPM | HEIGHT: 61 IN | TEMPERATURE: 97 F | SYSTOLIC BLOOD PRESSURE: 128 MMHG | RESPIRATION RATE: 16 BRPM | OXYGEN SATURATION: 99 % | DIASTOLIC BLOOD PRESSURE: 64 MMHG | BODY MASS INDEX: 29.18 KG/M2 | WEIGHT: 154.56 LBS

## 2023-11-16 DIAGNOSIS — M85.80 OSTEOPENIA, UNSPECIFIED LOCATION: Chronic | ICD-10-CM

## 2023-11-16 DIAGNOSIS — F51.02 ADJUSTMENT INSOMNIA: Chronic | ICD-10-CM

## 2023-11-16 DIAGNOSIS — Z00.00 ROUTINE GENERAL MEDICAL EXAMINATION AT A HEALTH CARE FACILITY: Primary | ICD-10-CM

## 2023-11-16 DIAGNOSIS — I10 ESSENTIAL HYPERTENSION: Chronic | ICD-10-CM

## 2023-11-16 DIAGNOSIS — Z78.0 ASYMPTOMATIC MENOPAUSAL STATE: ICD-10-CM

## 2023-11-16 DIAGNOSIS — E04.1 THYROID NODULE: ICD-10-CM

## 2023-11-16 DIAGNOSIS — N28.1 RENAL CYST: ICD-10-CM

## 2023-11-16 DIAGNOSIS — F41.9 ANXIETY: Chronic | ICD-10-CM

## 2023-11-16 DIAGNOSIS — M54.9 DORSALGIA, UNSPECIFIED: ICD-10-CM

## 2023-11-16 PROBLEM — Z78.9 STATIN INTOLERANCE: Chronic | Status: RESOLVED | Noted: 2020-10-15 | Resolved: 2023-11-16

## 2023-11-16 PROBLEM — N18.31 STAGE 3A CHRONIC KIDNEY DISEASE: Status: RESOLVED | Noted: 2023-03-06 | Resolved: 2023-11-16

## 2023-11-16 PROCEDURE — 72131 CT LUMBAR SPINE W/O DYE: CPT | Mod: TC,HCNC

## 2023-11-16 PROCEDURE — 3078F DIAST BP <80 MM HG: CPT | Mod: HCNC,CPTII,S$GLB, | Performed by: INTERNAL MEDICINE

## 2023-11-16 PROCEDURE — 1159F MED LIST DOCD IN RCRD: CPT | Mod: HCNC,CPTII,S$GLB, | Performed by: INTERNAL MEDICINE

## 2023-11-16 PROCEDURE — 1101F PR PT FALLS ASSESS DOC 0-1 FALLS W/OUT INJ PAST YR: ICD-10-PCS | Mod: HCNC,CPTII,S$GLB, | Performed by: INTERNAL MEDICINE

## 2023-11-16 PROCEDURE — 1159F PR MEDICATION LIST DOCUMENTED IN MEDICAL RECORD: ICD-10-PCS | Mod: HCNC,CPTII,S$GLB, | Performed by: INTERNAL MEDICINE

## 2023-11-16 PROCEDURE — 99397 PER PM REEVAL EST PAT 65+ YR: CPT | Mod: HCNC,S$GLB,, | Performed by: INTERNAL MEDICINE

## 2023-11-16 PROCEDURE — 72131 CT LUMBAR SPINE W/O DYE: CPT | Mod: 26,HCNC,, | Performed by: RADIOLOGY

## 2023-11-16 PROCEDURE — 72131 CT LUMBAR SPINE WITHOUT CONTRAST: ICD-10-PCS | Mod: 26,HCNC,, | Performed by: RADIOLOGY

## 2023-11-16 PROCEDURE — 1101F PT FALLS ASSESS-DOCD LE1/YR: CPT | Mod: HCNC,CPTII,S$GLB, | Performed by: INTERNAL MEDICINE

## 2023-11-16 PROCEDURE — 3074F PR MOST RECENT SYSTOLIC BLOOD PRESSURE < 130 MM HG: ICD-10-PCS | Mod: HCNC,CPTII,S$GLB, | Performed by: INTERNAL MEDICINE

## 2023-11-16 PROCEDURE — 1126F PR PAIN SEVERITY QUANTIFIED, NO PAIN PRESENT: ICD-10-PCS | Mod: HCNC,CPTII,S$GLB, | Performed by: INTERNAL MEDICINE

## 2023-11-16 PROCEDURE — 99999 PR PBB SHADOW E&M-EST. PATIENT-LVL IV: CPT | Mod: PBBFAC,HCNC,, | Performed by: INTERNAL MEDICINE

## 2023-11-16 PROCEDURE — 3074F SYST BP LT 130 MM HG: CPT | Mod: HCNC,CPTII,S$GLB, | Performed by: INTERNAL MEDICINE

## 2023-11-16 PROCEDURE — 3288F PR FALLS RISK ASSESSMENT DOCUMENTED: ICD-10-PCS | Mod: HCNC,CPTII,S$GLB, | Performed by: INTERNAL MEDICINE

## 2023-11-16 PROCEDURE — 99999 PR PBB SHADOW E&M-EST. PATIENT-LVL IV: ICD-10-PCS | Mod: PBBFAC,HCNC,, | Performed by: INTERNAL MEDICINE

## 2023-11-16 PROCEDURE — 1126F AMNT PAIN NOTED NONE PRSNT: CPT | Mod: HCNC,CPTII,S$GLB, | Performed by: INTERNAL MEDICINE

## 2023-11-16 PROCEDURE — 99397 PR PREVENTIVE VISIT,EST,65 & OVER: ICD-10-PCS | Mod: HCNC,S$GLB,, | Performed by: INTERNAL MEDICINE

## 2023-11-16 PROCEDURE — 3078F PR MOST RECENT DIASTOLIC BLOOD PRESSURE < 80 MM HG: ICD-10-PCS | Mod: HCNC,CPTII,S$GLB, | Performed by: INTERNAL MEDICINE

## 2023-11-16 PROCEDURE — 3288F FALL RISK ASSESSMENT DOCD: CPT | Mod: HCNC,CPTII,S$GLB, | Performed by: INTERNAL MEDICINE

## 2023-11-16 NOTE — PROGRESS NOTES
11-     CT L-Spine L4-L5, L5-S1 DDD and B/I Sacroiliitis     ----- Message from Primo Bruno MD sent at 4/23/2021 12:48 PM CDT -----  Labs stable  Continue all meds including metformin that I asked her to restart  Follow up in 3 months as directed

## 2023-11-16 NOTE — PROGRESS NOTES
Subjective:      Patient ID: Leticia Grimes is a 78 y.o. female.    Chief Complaint: Follow-up    HPI      78 y.o. with  Patient Active Problem List   Diagnosis    Gastroesophageal reflux disease without esophagitis    Essential hypertension    Mild vitamin D deficiency    ICD (implantable cardioverter-defibrillator) in place    History of sudden cardiac arrest successfully resuscitated    Osteopenia    Adjustment insomnia    Anxiety    Mixed hyperlipidemia    Decreased strength, endurance, and mobility    Dissociative amnesia    Presence of artificial shoulder joint, left    Chronic bilateral low back pain with left-sided sciatica    Gait abnormality    Left hip pain    Lumbosacral radiculopathy at S1    Paresthesia of both hands    Bilateral carpal tunnel syndrome    Multiple neurological symptoms    Hypertriglyceridemia    Thyroid nodule    Hearing loss    Cataract    Renal cyst     Past Medical History:   Diagnosis Date    Adjustment insomnia 2020    Anxiety 2020    GERD (gastroesophageal reflux disease)     History of sudden cardiac arrest successfully resuscitated 2020    Hypertension     ICD (implantable cardioverter-defibrillator) in place 2020    Left arm swelling 9/3/2020    Mild vitamin D deficiency 2013    Osteopenia 2020    Vitamin D deficiency disease        Here today for annual prev exam.  Compliant with meds without significant side effects. Energy and appetite are good.         Past Surgical History:   Procedure Laterality Date     SECTION, CLASSIC      HYSTERECTOMY      INJECTION OF JOINT Left 2021    Procedure: Left shoulder Joint injection;  Surgeon: Main Mcgraw MD;  Location: [facility];  Service: Pain Management;  Laterality: Left;    INSERTION OF BIVENTRICULAR IMPLANTABLE CARDIOVERTER-DEFIBRILLATOR (ICD)      REVERSE TOTAL SHOULDER ARTHROPLASTY Left 2022    Procedure: ARTHROPLASTY, SHOULDER, TOTAL, REVERSE;  Surgeon: Tomas ALEJANDRA  MD Brandon;  Location: [facility];  Service: Orthopedics;  Laterality: Left;    TUBAL LIGATION       Social History     Socioeconomic History    Marital status:    Occupational History    Occupation: retired school counselor   Tobacco Use    Smoking status: Never    Smokeless tobacco: Never   Substance and Sexual Activity    Alcohol use: No    Drug use: Never    Sexual activity: Not Currently     Partners: Male     Birth control/protection: Abstinence, None     Social Determinants of Health     Financial Resource Strain: Low Risk  (3/14/2023)    Overall Financial Resource Strain (CARDIA)     Difficulty of Paying Living Expenses: Not very hard   Food Insecurity: No Food Insecurity (3/14/2023)    Hunger Vital Sign     Worried About Running Out of Food in the Last Year: Never true     Ran Out of Food in the Last Year: Never true   Transportation Needs: No Transportation Needs (3/14/2023)    PRAPARE - Transportation     Lack of Transportation (Medical): No     Lack of Transportation (Non-Medical): No   Physical Activity: Inactive (3/14/2023)    Exercise Vital Sign     Days of Exercise per Week: 0 days     Minutes of Exercise per Session: 0 min   Stress: No Stress Concern Present (3/14/2023)    [place] [employer] - Occupational Stress Questionnaire     Feeling of Stress : Only a little   Social Connections: Moderately Integrated (3/14/2023)    Social Connection and Isolation Panel [NHANES]     Frequency of Communication with Friends and Family: More than three times a week     Frequency of Social Gatherings with Friends and Family: Twice a week     Attends [employer] Services: More than 4 times per year     Active Member of Clubs or Organizations: Yes     Attends Club or Organization Meetings: More than 4 times per year     Marital Status:    Housing Stability: Low Risk  (3/14/2023)    Housing Stability Vital Sign     Unable to Pay for Housing in the Last Year: No     Number of Places  "Lived in the Last Year: 1     Unstable Housing in the Last Year: No     family history includes Arthritis in her mother; Cancer in her sister; Diabetes in her father; Heart disease in her father; Hypertension in her mother.      Review of Systems   Constitutional:  Negative for chills and fever.   HENT:  Negative for ear pain and sore throat.    Respiratory:  Negative for cough.    Cardiovascular:  Negative for chest pain.   Gastrointestinal:  Negative for abdominal pain and blood in stool.   Genitourinary:  Negative for dysuria and hematuria.   Neurological:  Negative for seizures and syncope.     Objective:   /64 (BP Location: Left arm, Patient Position: Sitting, BP Method: Medium (Manual))   Pulse 62   Temp 96.8 °F (36 °C) (Tympanic)   Resp 16   Ht 5' 1" (1.549 m)   Wt 70.1 kg (154 lb 8.7 oz)   SpO2 99%   BMI 29.20 kg/m²     Physical Exam  Constitutional:       General: She is not in acute distress.     Appearance: She is well-developed.   HENT:      Head: Normocephalic and atraumatic.   Eyes:      Extraocular Movements: Extraocular movements intact.   Neck:      Thyroid: No thyromegaly.   Cardiovascular:      Rate and Rhythm: Normal rate and regular rhythm.   Pulmonary:      Breath sounds: Normal breath sounds. No wheezing or rales.   Abdominal:      General: Bowel sounds are normal.      Palpations: Abdomen is soft.      Tenderness: There is no abdominal tenderness.   Musculoskeletal:         General: No swelling.      Cervical back: Neck supple. No rigidity.   Lymphadenopathy:      Cervical: No cervical adenopathy.   Skin:     General: Skin is warm and dry.   Neurological:      Mental Status: She is alert and oriented to person, place, and time.   Psychiatric:         Behavior: Behavior normal.         Lab Results   Component Value Date    WBC 4.97 09/28/2023    HGB 13.0 09/28/2023    HGB 13.1 05/05/2022    HGB 13.3 10/05/2021    HCT 39.5 09/28/2023    MCV 87 09/28/2023    MCV 83 05/05/2022    MCV " 83 10/05/2021     09/28/2023    CHOL 256 (A) 07/07/2023    TRIG 123 07/07/2023    HDL 75 07/07/2023    LDLCALC 156 07/07/2023    LDLCALC 131.8 05/11/2022    LDLCALC 126.2 03/02/2022    ALT 21 09/28/2023    AST 22 09/28/2023     09/28/2023    K 4.3 09/28/2023    CALCIUM 10.1 09/28/2023     (H) 09/28/2023    CO2 24 09/28/2023    BUN 13 09/28/2023    CREATININE 0.9 09/28/2023    CREATININE 0.9 08/22/2023    CREATININE 0.9 07/07/2023    EGFRNORACEVR >60.0 09/28/2023    EGFRNORACEVR >60.0 08/22/2023    EGFRNORACEVR >60.0 10/20/2022    TSH 0.500 09/28/2023    TSH 1.170 10/20/2022    TSH 0.828 10/05/2021    GLU 79 09/28/2023    HGBA1C 5.3 10/05/2021    KNOVQOIQ32TE 17 (L) 10/20/2022    BNP 33 05/11/2022          The 10-year ASCVD risk score (Fatmata CASTILLO, et al., 2019) is: 25.3%    Values used to calculate the score:      Age: 78 years      Sex: Female      Is Non- : Yes      Diabetic: No      Tobacco smoker: No      Systolic Blood Pressure: 128 mmHg      Is BP treated: Yes      HDL Cholesterol: 75 mg/dL      Total Cholesterol: 256 mg/dL     Assessment:     1. Routine general medical examination at a health care facility    2. Essential hypertension    3. Osteopenia, unspecified location    4. Anxiety    5. Thyroid nodule    6. Asymptomatic menopausal state    7. Adjustment insomnia    8. Renal cyst      Plan:   1. Routine general medical examination at a health care facility  Heart healthy diet, regular exercise,   HM reviewed    2. Essential hypertension  Overview:  Controlled. Cont current meds.             3. Osteopenia, unspecified location  Check dxa    4. Anxiety  Overview:  Began after stressful event with grandson 2020. Treated with ativan per pcp due to anxiety attacks and nightmares.     Rare use of nighttime anxiety      7. Thyroid nodule  Overview:  Noted on ct.     Assessment & Plan:  Advised to complete US.      8. Asymptomatic menopausal state  -     DXA Bone Density  Axial Skeleton 1 or more sites; Future; Expected date: 11/16/2023    9. Adjustment insomnia  Overview:  Rare use of ativan at night      10. Renal cyst  Overview:  Cont recs and f/u as per urology.        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  Other secondary findings as noted.        Electronically signed by: Donaldo Gutierrez MD  Date:                                            09/06/2023  Time:                                           15:58                        Patient Instructions   Covid vaccine phone number is [phone].     Check with your pharmacy regarding RSV vaccine.     Future Appointments   Date Time Provider Department Center   11/17/2023 12:00 PM [facility] US1 [facility] ULSOUND [place]   12/7/2023 10:00 AM HOME MONITOR DEVICE CHECK [facility] ARR PRO [place]   12/19/2023  1:30 PM ON BMD1 ONL DEXABMD [facility]   12/21/2023  2:00 PM Fito Hill MD ON NEURSUR [facility]   5/10/2024  1:20 PM Martín Pearl MD [facility] ARRHYTH [place]   5/10/2024  1:30 PM PACEMAKER CLINIC [facility] ARR PRO [place]   8/28/2024 10:00 AM [facility] US3 HG ULSOUND [place]   9/5/2024 10:40 AM Joelle Paredes NP ON UROLOGY [facility]   10/8/2024 10:00 AM Luis Arthur MD [facility] CARDIO [place]       Lab Frequency Next Occurrence   Ambulatory referral/consult to Pain Clinic Once 12/06/2022   Mammo Digital Screening Bilat w/ Timmy Once 12/13/2022   Ambulatory referral/consult to Pain Clinic Once 01/10/2023   X-Ray Thoracic Spine AP Lateral Once 01/04/2023   Basic Metabolic Panel Once 01/19/2023   Lipid Panel Once 01/19/2023   CT Lumbar Spine Without Contrast Once 04/20/2023   US Retroperitoneal Complete Once 09/07/2023   Ambulatory referral/consult to Gastroenterology Once 09/14/2023   US Retroperitoneal Complete Once 09/13/2023   US Soft Tissue Head Neck Thyroid Once 10/16/2023   Ambulatory referral/consult to Neurology Once 10/30/2023   Cardiac device check - In  Clinic [facility] Hospital     Cardiac device check - Remote     Cardiac device check - In Clinic [facility] Hospital         No follow-ups on file.  Needs to schedule US thyroid as ordered by ENT.

## 2023-11-16 NOTE — PATIENT INSTRUCTIONS
Covid vaccine phone number is [phone].     Check with your pharmacy regarding RSV vaccine.

## 2023-11-16 NOTE — H&P (VIEW-ONLY)
Subjective:      Patient ID: Leticia Grimes is a 78 y.o. female.    Chief Complaint: Follow-up    HPI      78 y.o. with  Patient Active Problem List   Diagnosis    Gastroesophageal reflux disease without esophagitis    Essential hypertension    Mild vitamin D deficiency    ICD (implantable cardioverter-defibrillator) in place    History of sudden cardiac arrest successfully resuscitated    Osteopenia    Adjustment insomnia    Anxiety    Mixed hyperlipidemia    Decreased strength, endurance, and mobility    Dissociative amnesia    Presence of artificial shoulder joint, left    Chronic bilateral low back pain with left-sided sciatica    Gait abnormality    Left hip pain    Lumbosacral radiculopathy at S1    Paresthesia of both hands    Bilateral carpal tunnel syndrome    Multiple neurological symptoms    Hypertriglyceridemia    Thyroid nodule    Hearing loss    Cataract    Renal cyst     Past Medical History:   Diagnosis Date    Adjustment insomnia 2020    Anxiety 2020    GERD (gastroesophageal reflux disease)     History of sudden cardiac arrest successfully resuscitated 2020    Hypertension     ICD (implantable cardioverter-defibrillator) in place 2020    Left arm swelling 9/3/2020    Mild vitamin D deficiency 2013    Osteopenia 2020    Vitamin D deficiency disease        Here today for annual prev exam.  Compliant with meds without significant side effects. Energy and appetite are good.         Past Surgical History:   Procedure Laterality Date     SECTION, CLASSIC      HYSTERECTOMY      INJECTION OF JOINT Left 2021    Procedure: Left shoulder Joint injection;  Surgeon: Main Mcgraw MD;  Location: [facility];  Service: Pain Management;  Laterality: Left;    INSERTION OF BIVENTRICULAR IMPLANTABLE CARDIOVERTER-DEFIBRILLATOR (ICD)      REVERSE TOTAL SHOULDER ARTHROPLASTY Left 2022    Procedure: ARTHROPLASTY, SHOULDER, TOTAL, REVERSE;  Surgeon: Tomas ALEJANDRA  MD Brandon;  Location: [facility];  Service: Orthopedics;  Laterality: Left;    TUBAL LIGATION       Social History     Socioeconomic History    Marital status:    Occupational History    Occupation: retired school counselor   Tobacco Use    Smoking status: Never    Smokeless tobacco: Never   Substance and Sexual Activity    Alcohol use: No    Drug use: Never    Sexual activity: Not Currently     Partners: Male     Birth control/protection: Abstinence, None     Social Determinants of Health     Financial Resource Strain: Low Risk  (3/14/2023)    Overall Financial Resource Strain (CARDIA)     Difficulty of Paying Living Expenses: Not very hard   Food Insecurity: No Food Insecurity (3/14/2023)    Hunger Vital Sign     Worried About Running Out of Food in the Last Year: Never true     Ran Out of Food in the Last Year: Never true   Transportation Needs: No Transportation Needs (3/14/2023)    PRAPARE - Transportation     Lack of Transportation (Medical): No     Lack of Transportation (Non-Medical): No   Physical Activity: Inactive (3/14/2023)    Exercise Vital Sign     Days of Exercise per Week: 0 days     Minutes of Exercise per Session: 0 min   Stress: No Stress Concern Present (3/14/2023)    [employer] - Occupational Stress Questionnaire     Feeling of Stress : Only a little   Social Connections: Moderately Integrated (3/14/2023)    Social Connection and Isolation Panel [NHANES]     Frequency of Communication with Friends and Family: More than three times a week     Frequency of Social Gatherings with Friends and Family: Twice a week     Attends Jew Services: More than 4 times per year     Active Member of Clubs or Organizations: Yes     Attends Club or Organization Meetings: More than 4 times per year     Marital Status:    Housing Stability: Low Risk  (3/14/2023)    Housing Stability Vital Sign     Unable to Pay for Housing in the Last Year: No     Number of Places  "Lived in the Last Year: 1     Unstable Housing in the Last Year: No     family history includes Arthritis in her mother; Cancer in her sister; Diabetes in her father; Heart disease in her father; Hypertension in her mother.      Review of Systems   Constitutional:  Negative for chills and fever.   HENT:  Negative for ear pain and sore throat.    Respiratory:  Negative for cough.    Cardiovascular:  Negative for chest pain.   Gastrointestinal:  Negative for abdominal pain and blood in stool.   Genitourinary:  Negative for dysuria and hematuria.   Neurological:  Negative for seizures and syncope.     Objective:   /64 (BP Location: Left arm, Patient Position: Sitting, BP Method: Medium (Manual))   Pulse 62   Temp 96.8 °F (36 °C) (Tympanic)   Resp 16   Ht 5' 1" (1.549 m)   Wt 70.1 kg (154 lb 8.7 oz)   SpO2 99%   BMI 29.20 kg/m²     Physical Exam  Constitutional:       General: She is not in acute distress.     Appearance: She is well-developed.   HENT:      Head: Normocephalic and atraumatic.   Eyes:      Extraocular Movements: Extraocular movements intact.   Neck:      Thyroid: No thyromegaly.   Cardiovascular:      Rate and Rhythm: Normal rate and regular rhythm.   Pulmonary:      Breath sounds: Normal breath sounds. No wheezing or rales.   Abdominal:      General: Bowel sounds are normal.      Palpations: Abdomen is soft.      Tenderness: There is no abdominal tenderness.   Musculoskeletal:         General: No swelling.      Cervical back: Neck supple. No rigidity.   Lymphadenopathy:      Cervical: No cervical adenopathy.   Skin:     General: Skin is warm and dry.   Neurological:      Mental Status: She is alert and oriented to person, place, and time.   Psychiatric:         Behavior: Behavior normal.         Lab Results   Component Value Date    WBC 4.97 09/28/2023    HGB 13.0 09/28/2023    HGB 13.1 05/05/2022    HGB 13.3 10/05/2021    HCT 39.5 09/28/2023    MCV 87 09/28/2023    MCV 83 05/05/2022    MCV " 83 10/05/2021     09/28/2023    CHOL 256 (A) 07/07/2023    TRIG 123 07/07/2023    HDL 75 07/07/2023    LDLCALC 156 07/07/2023    LDLCALC 131.8 05/11/2022    LDLCALC 126.2 03/02/2022    ALT 21 09/28/2023    AST 22 09/28/2023     09/28/2023    K 4.3 09/28/2023    CALCIUM 10.1 09/28/2023     (H) 09/28/2023    CO2 24 09/28/2023    BUN 13 09/28/2023    CREATININE 0.9 09/28/2023    CREATININE 0.9 08/22/2023    CREATININE 0.9 07/07/2023    EGFRNORACEVR >60.0 09/28/2023    EGFRNORACEVR >60.0 08/22/2023    EGFRNORACEVR >60.0 10/20/2022    TSH 0.500 09/28/2023    TSH 1.170 10/20/2022    TSH 0.828 10/05/2021    GLU 79 09/28/2023    HGBA1C 5.3 10/05/2021    [id number] 17 (L) 10/20/2022    BNP 33 05/11/2022          The 10-year ASCVD risk score (Fatmata CASTILLO, et al., 2019) is: 25.3%    Values used to calculate the score:      Age: 78 years      Sex: Female      Is Non- : Yes      Diabetic: No      Tobacco smoker: No      Systolic Blood Pressure: 128 mmHg      Is BP treated: Yes      HDL Cholesterol: 75 mg/dL      Total Cholesterol: 256 mg/dL     Assessment:     1. Routine general medical examination at a health care facility    2. Essential hypertension    3. Osteopenia, unspecified location    4. Anxiety    5. Thyroid nodule    6. Asymptomatic menopausal state    7. Adjustment insomnia    8. Renal cyst      Plan:   1. Routine general medical examination at a health care facility  Heart healthy diet, regular exercise,   HM reviewed    2. Essential hypertension  Overview:  Controlled. Cont current meds.             3. Osteopenia, unspecified location  Check dxa    4. Anxiety  Overview:  Began after stressful event with grandson 2020. Treated with ativan per pcp due to anxiety attacks and nightmares.     Rare use of nighttime anxiety      7. Thyroid nodule  Overview:  Noted on ct.     Assessment & Plan:  Advised to complete US.      8. Asymptomatic menopausal state  -     DXA Bone Density  Axial Skeleton 1 or more sites; Future; Expected date: 11/16/2023    9. Adjustment insomnia  Overview:  Rare use of ativan at night      10. Renal cyst  Overview:  Cont recs and f/u as per urology.        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  Other secondary findings as noted.        Electronically signed by: Donaldo Gutierrez MD  Date:                                            09/06/2023  Time:                                           15:58                        Patient Instructions   Covid vaccine phone number is [phone].     Check with your pharmacy regarding RSV vaccine.     Future Appointments   Date Time Provider Department Center   11/17/2023 12:00 PM [facility] US1 [facility] ULSOUND [place]   12/7/2023 10:00 AM HOME MONITOR DEVICE CHECK [facility] ARR PRO [place]   12/19/2023  1:30 PM ON BMD1 ONL DEXABMD [facility]   12/21/2023  2:00 PM Fito Hill MD ON NEURSUR [facility]   5/10/2024  1:20 PM Martín Pearl MD [facility] ARRHYTH [place]   5/10/2024  1:30 PM PACEMAKER CLINIC [facility] ARR PRO [place]   8/28/2024 10:00 AM [facility] US3 HG ULSOUND [place]   9/5/2024 10:40 AM Joelle Paredes NP ON UROLOGY [facility]   10/8/2024 10:00 AM Luis Arthur MD [facility] CARDIO [place]       Lab Frequency Next Occurrence   Ambulatory referral/consult to Pain Clinic Once 12/06/2022   Mammo Digital Screening Bilat w/ Timmy Once 12/13/2022   Ambulatory referral/consult to Pain Clinic Once 01/10/2023   X-Ray Thoracic Spine AP Lateral Once 01/04/2023   Basic Metabolic Panel Once 01/19/2023   Lipid Panel Once 01/19/2023   CT Lumbar Spine Without Contrast Once 04/20/2023   US Retroperitoneal Complete Once 09/07/2023   Ambulatory referral/consult to Gastroenterology Once 09/14/2023   US Retroperitoneal Complete Once 09/13/2023   US Soft Tissue Head Neck Thyroid Once 10/16/2023   Ambulatory referral/consult to Neurology Once 10/30/2023   Cardiac device check - In  Clinic [facility] Hospital     Cardiac device check - Remote     Cardiac device check - In Clinic & Hospital         No follow-ups on file.  Needs to schedule US thyroid as ordered by ENT.

## 2023-11-17 ENCOUNTER — HOSPITAL ENCOUNTER (OUTPATIENT)
Dept: RADIOLOGY | Facility: HOSPITAL | Age: 78
Discharge: HOME | End: 2023-11-17
Attending: STUDENT IN AN ORGANIZED HEALTH CARE EDUCATION/TRAINING PROGRAM
Payer: MEDICARE

## 2023-11-17 DIAGNOSIS — R22.9 SUBCUTANEOUS NODULE: ICD-10-CM

## 2023-11-21 ENCOUNTER — PATIENT MESSAGE (OUTPATIENT)
Dept: INTERNAL MEDICINE | Facility: CLINIC | Age: 78
End: 2023-11-21
Payer: MEDICARE

## 2023-11-21 ENCOUNTER — TELEPHONE (OUTPATIENT)
Dept: OTOLARYNGOLOGY | Facility: CLINIC | Age: 78
End: 2023-11-21
Payer: MEDICARE

## 2023-11-21 ENCOUNTER — HOSPITAL ENCOUNTER (OUTPATIENT)
Dept: RADIOLOGY | Facility: HOSPITAL | Age: 78
Discharge: HOME | End: 2023-11-21
Attending: STUDENT IN AN ORGANIZED HEALTH CARE EDUCATION/TRAINING PROGRAM
Payer: MEDICARE

## 2023-11-21 DIAGNOSIS — E04.2 MULTINODULAR THYROID: Primary | ICD-10-CM

## 2023-11-21 DIAGNOSIS — E04.1 THYROID NODULE: Primary | ICD-10-CM

## 2023-11-21 PROCEDURE — 76536 US SOFT TISSUE HEAD NECK THYROID: ICD-10-PCS | Mod: 26,HCNC,, | Performed by: RADIOLOGY

## 2023-11-21 PROCEDURE — 76536 US EXAM OF HEAD AND NECK: CPT | Mod: TC,HCNC

## 2023-11-21 PROCEDURE — 76536 US EXAM OF HEAD AND NECK: CPT | Mod: 26,HCNC,, | Performed by: RADIOLOGY

## 2023-11-21 NOTE — TELEPHONE ENCOUNTER
----- Message from Madan Domingo MD sent at 11/21/2023  1:09 PM CST -----  Please let the patient know that her recent ultrasound shows an enlarged thyroid nodule that meets criteria for biopsy. Please have olivernet scheduled and then plan for follow up about 2 weeks after with me. If she would prefer to talk about the nodule before the biopsy then please schedule her an appt first. Thanks!

## 2023-11-22 ENCOUNTER — TELEPHONE (OUTPATIENT)
Dept: OTOLARYNGOLOGY | Facility: CLINIC | Age: 78
End: 2023-11-22
Payer: MEDICARE

## 2023-11-22 NOTE — TELEPHONE ENCOUNTER
----- Message from Donavan Villa sent at 11/22/2023  8:55 AM CST -----  Contact: [phone]  Type:  Patient Returning Call    Who Called:nancy   Who Left Message for Patient:daiana   Does the patient know what this is regarding?:yes   Would the patient rather a call back or a response via [facility]? Call back   Best Call Back Number:[phone]   Additional Information: n/a      Thanks KB

## 2023-12-06 ENCOUNTER — HOSPITAL ENCOUNTER (OUTPATIENT)
Dept: RADIOLOGY | Facility: HOSPITAL | Age: 78
Discharge: HOME | End: 2023-12-06
Attending: STUDENT IN AN ORGANIZED HEALTH CARE EDUCATION/TRAINING PROGRAM
Payer: MEDICARE

## 2023-12-06 DIAGNOSIS — E04.2 MULTINODULAR THYROID: ICD-10-CM

## 2023-12-06 PROCEDURE — 10005 FNA BX W/US GDN 1ST LES: CPT | Mod: HCNC

## 2023-12-06 PROCEDURE — 88173 PR  INTERPRETATION OF FNA SMEAR: ICD-10-PCS | Mod: 26,HCNC,, | Performed by: PATHOLOGY

## 2023-12-06 PROCEDURE — 10005 US FINE NEEDLE ASPIRATION THYROID, FIRST LESION: ICD-10-PCS | Mod: HCNC,,, | Performed by: RADIOLOGY

## 2023-12-06 PROCEDURE — 88173 CYTOPATH EVAL FNA REPORT: CPT | Mod: 26,HCNC,, | Performed by: PATHOLOGY

## 2023-12-06 PROCEDURE — 88173 CYTOPATH EVAL FNA REPORT: CPT | Mod: HCNC | Performed by: PATHOLOGY

## 2023-12-06 PROCEDURE — 10005 FNA BX W/US GDN 1ST LES: CPT | Mod: HCNC,,, | Performed by: RADIOLOGY

## 2023-12-06 NOTE — DISCHARGE SUMMARY
[facility])  Discharge Note  Short Stay    US FNA Thyroid, 1st Lesion      OUTCOME: Patient tolerated treatment/procedure well without complication and is now ready for discharge.    DISPOSITION: Home or Self Care    FINAL DIAGNOSIS:  <principal problem not specified>    FOLLOWUP: In clinic    DISCHARGE INSTRUCTIONS:  No discharge procedures on file.     TIME SPENT ON DISCHARGE: 15 minutes    Pre Op Diagnosis: Multinodular thyroid     Post Op Diagnosis: same     Procedure:  FNA thyroid nodule     Procedure performed by: Arely CHOPRA, Pallavi LI     Written Informed Consent Obtained: Yes     Specimen Removed:  yes     Estimated Blood Loss:  minimal     Findings: Local anesthesia     Sedation:  No     The patient tolerated the procedure well and there were no complications.      Disposition:  F/U in clinic or with ordering physician    Discharge instructions:  Light activity for 24 hours.  Remove band aid in 24 hours.  No baths (showers are appropriate).      Sterile technique was performed in the left neck, lidocaine was used as a local anesthetic.  Multiple samples taken percutaneously from the thyroid nodule.  Pt tolerated the procedure well without immediate complications.  Please see radiologist report for details. F/u with PCP and/or ordering physician.
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
## 2023-12-07 ENCOUNTER — CLINICAL SUPPORT (OUTPATIENT)
Dept: CARDIOLOGY | Facility: HOSPITAL | Age: 78
End: 2023-12-07
Payer: MEDICARE

## 2023-12-07 DIAGNOSIS — Z95.810 PRESENCE OF AUTOMATIC (IMPLANTABLE) CARDIAC DEFIBRILLATOR: ICD-10-CM

## 2023-12-07 PROCEDURE — 93296 REM INTERROG EVL PM/IDS: CPT | Mod: HCNC | Performed by: INTERNAL MEDICINE

## 2023-12-07 PROCEDURE — 93295 CARDIAC DEVICE CHECK - REMOTE: ICD-10-PCS | Mod: HCNC,S$GLB,, | Performed by: INTERNAL MEDICINE

## 2023-12-07 PROCEDURE — 93295 DEV INTERROG REMOTE 1/2/MLT: CPT | Mod: HCNC,S$GLB,, | Performed by: INTERNAL MEDICINE

## 2023-12-08 ENCOUNTER — CLINICAL SUPPORT (OUTPATIENT)
Dept: CARDIOLOGY | Facility: HOSPITAL | Age: 78
End: 2023-12-08
Attending: INTERNAL MEDICINE
Payer: MEDICARE

## 2023-12-08 DIAGNOSIS — Z95.810 PRESENCE OF AUTOMATIC (IMPLANTABLE) CARDIAC DEFIBRILLATOR: ICD-10-CM

## 2023-12-08 PROCEDURE — 93296 REM INTERROG EVL PM/IDS: CPT | Mod: HCNC | Performed by: INTERNAL MEDICINE

## 2023-12-13 LAB
FINAL PATHOLOGIC DIAGNOSIS: ABNORMAL
Lab: ABNORMAL
MICROSCOPIC EXAM: ABNORMAL

## 2023-12-15 ENCOUNTER — TELEPHONE (OUTPATIENT)
Dept: OTOLARYNGOLOGY | Facility: CLINIC | Age: 78
End: 2023-12-15
Payer: MEDICARE

## 2023-12-15 NOTE — TELEPHONE ENCOUNTER
----- Message from Madan Domingo MD sent at 12/14/2023  4:24 PM CST -----  Please let the patient know that the biopsy showed some atypical cells and we will send the specimen off for genetic testing to help clarify the diagnosis. Please have the left thyroid 2.1 cm nodule sent for genetic testing. Thanks!

## 2023-12-15 NOTE — TELEPHONE ENCOUNTER
Spoke with pt regarding pathology results and the need for further genetic testing.  Educated pt on reason for additional testing and the time frame we are looking at for the results.  Pt verbalizes understanding and will reach out to me if she has not received the results by 01/15/2024.  FNA f/u appt with Dr Domingo has been cancelled - will reschedule if needed after results are final.  Lab requisitions, progress notes, FNA report, pathology report, u/s report and [phone]

## 2023-12-18 ENCOUNTER — TELEPHONE (OUTPATIENT)
Dept: OTOLARYNGOLOGY | Facility: CLINIC | Age: 78
End: 2023-12-18
Payer: MEDICARE

## 2023-12-18 NOTE — TELEPHONE ENCOUNTER
----- Message from Sangeeta Quinones sent at 12/18/2023  3:38 PM CST -----  Contact: patient [phone]  Patient returning a call from ENT, regarding rescheduling an appt

## 2023-12-18 NOTE — TELEPHONE ENCOUNTER
Spoke to pt and reiterated what nurse Callahan said. Pt voiced understanding. Informed once results are received , we will reach out to her.  Voiced understanding.

## 2023-12-19 ENCOUNTER — TELEPHONE (OUTPATIENT)
Dept: OTOLARYNGOLOGY | Facility: CLINIC | Age: 78
End: 2023-12-19
Payer: MEDICARE

## 2023-12-19 NOTE — TELEPHONE ENCOUNTER
----- Message from Georgiana Flores LPN sent at 12/19/2023  1:23 PM CST -----  Contact: Tara/ Lesly    ----- Message -----  From: Sjuatha Luis  Sent: 12/19/2023   1:02 PM CST  To: Chayo Hager Staff    Tara is calling to speak to the nurse regarding the patient order sent over on yesterday 12/18, it was missing something. Please give her ac all at  or fax to , it was a test selection missing    Thanks  LJ

## 2023-12-19 NOTE — TELEPHONE ENCOUNTER
Error corrected on requisition. Check off appropriate test selection - [employer]. And faxed to number provided.

## 2023-12-27 LAB
OHS CV DC REMOTE DEVICE TYPE: NORMAL
OHS CV RV PACING PERCENT: 1 %

## 2024-01-02 ENCOUNTER — PATIENT MESSAGE (OUTPATIENT)
Dept: INTERNAL MEDICINE | Facility: CLINIC | Age: 79
End: 2024-01-02
Payer: MEDICARE

## 2024-01-03 ENCOUNTER — PATIENT MESSAGE (OUTPATIENT)
Dept: OTHER | Facility: OTHER | Age: 79
End: 2024-01-03
Payer: MEDICARE

## 2024-01-03 ENCOUNTER — TELEPHONE (OUTPATIENT)
Dept: PSYCHIATRY | Facility: CLINIC | Age: 79
End: 2024-01-03
Payer: MEDICARE

## 2024-01-04 ENCOUNTER — PATIENT MESSAGE (OUTPATIENT)
Dept: INTERNAL MEDICINE | Facility: CLINIC | Age: 79
End: 2024-01-04
Payer: MEDICARE

## 2024-01-09 DIAGNOSIS — F51.02 INSOMNIA DUE TO STRESS: ICD-10-CM

## 2024-01-09 DIAGNOSIS — F41.9 ANXIETY: ICD-10-CM

## 2024-01-10 NOTE — TELEPHONE ENCOUNTER
No care due was identified.  [facility] Embedded Care Due Messages. Reference number: [id number].   1/09/2024 6:33:42 PM CST

## 2024-01-12 ENCOUNTER — TELEPHONE (OUTPATIENT)
Dept: OTOLARYNGOLOGY | Facility: CLINIC | Age: 79
End: 2024-01-12
Payer: MEDICARE

## 2024-01-12 ENCOUNTER — TELEPHONE (OUTPATIENT)
Dept: INTERNAL MEDICINE | Facility: CLINIC | Age: 79
End: 2024-01-12
Payer: MEDICARE

## 2024-01-12 NOTE — TELEPHONE ENCOUNTER
----- Message from Raquel Gregorio sent at 1/12/2024 10:51 AM CST -----  Contact: JUNIOR HERNÁNDEZ [[id number]]  ..Type:  Patient Requesting Call    Who Called: JUNIOR HERNÁNDEZ [[id number]]  Does the patient know what this is regarding?: pt requesting refill for medication but doesn't know the name of meds, pt reports meds are for stress  Would the patient rather a call back or a response via [facility]? call  Best Call Back Number: .[phone] (home)   Additional Information:

## 2024-01-12 NOTE — TELEPHONE ENCOUNTER
Spoke to pt and informed per the thyroid genetic testing , has a very low risk (1%) of developing into cancer. Informed will send appt reminder to schedule f/u u/s in 1 year. Voiced understanding.

## 2024-01-12 NOTE — TELEPHONE ENCOUNTER
----- Message from Rachell Lara sent at 1/12/2024  1:17 PM CST -----  Regarding: Patient Returning Call  Contact: Leticia  .Type:  Patient Returning Call    Who Called: Leticia  Who Left Message for Patient:   Does the patient know what this is regarding?: no   Would the patient rather a call back or a response via [facility]? call  Best Call Back Number: [phone]  Additional Information:    Please call again today

## 2024-01-12 NOTE — TELEPHONE ENCOUNTER
----- Message from Madan Domingo MD sent at 1/12/2024 12:33 PM CST -----  Regarding: genetic  testing  Please let the patient know that the thyroid genetic testing was consistent with  very low risk of cancer (1%). 1 year US is recommended. Thanks!

## 2024-01-16 RX ORDER — LORAZEPAM 0.5 MG/1
0.5 TABLET ORAL NIGHTLY PRN
Qty: 15 TABLET | Refills: 0 | Status: SHIPPED | OUTPATIENT
Start: 2024-01-16

## 2024-01-24 ENCOUNTER — TELEPHONE (OUTPATIENT)
Dept: NEUROSURGERY | Facility: CLINIC | Age: 79
End: 2024-01-24
Payer: MEDICARE

## 2024-01-24 ENCOUNTER — PATIENT MESSAGE (OUTPATIENT)
Dept: ADMINISTRATIVE | Facility: OTHER | Age: 79
End: 2024-01-24
Payer: MEDICARE

## 2024-01-24 NOTE — TELEPHONE ENCOUNTER
----- Message from Poly Lorenzo sent at 1/24/2024 12:26 PM CST -----  Patient is requesting a call back concerning moving her appt closer. Call back at [phone]

## 2024-01-24 NOTE — TELEPHONE ENCOUNTER
----- Message from Leroy Lee sent at 1/24/2024 11:05 AM CST -----  .Type:  Sooner Apoointment Request    Caller is requesting a sooner appointment.  Caller declined first available appointment listed below.  Caller will not accept being placed on the waitlist and is requesting a message be sent to doctor.  Name of Caller:pt  When is the first available appointment?2/27  Symptoms:lower back//leg//sciatica  Would the patient rather a call back or a response via [facility]? Call back  Best Call Back Number:[phone]  Additional Information:

## 2024-01-24 NOTE — TELEPHONE ENCOUNTER
I have informed the patient that at this time we do not have any sooner appointments. The patient verbalized understanding.

## 2024-02-08 ENCOUNTER — OFFICE VISIT (OUTPATIENT)
Dept: PAIN MEDICINE | Facility: CLINIC | Age: 79
End: 2024-02-08
Payer: MEDICARE

## 2024-02-08 VITALS
WEIGHT: 157.75 LBS | HEART RATE: 54 BPM | SYSTOLIC BLOOD PRESSURE: 134 MMHG | DIASTOLIC BLOOD PRESSURE: 61 MMHG | HEIGHT: 61 IN | BODY MASS INDEX: 29.78 KG/M2

## 2024-02-08 DIAGNOSIS — M54.16 LUMBAR RADICULOPATHY, CHRONIC: Primary | ICD-10-CM

## 2024-02-08 DIAGNOSIS — G89.29 CHRONIC LEFT SHOULDER PAIN: ICD-10-CM

## 2024-02-08 DIAGNOSIS — G89.4 CHRONIC PAIN DISORDER: ICD-10-CM

## 2024-02-08 DIAGNOSIS — M25.512 CHRONIC LEFT SHOULDER PAIN: ICD-10-CM

## 2024-02-08 PROCEDURE — 3075F SYST BP GE 130 - 139MM HG: CPT | Mod: HCNC,CPTII,S$GLB, | Performed by: PHYSICIAN ASSISTANT

## 2024-02-08 PROCEDURE — 3078F DIAST BP <80 MM HG: CPT | Mod: HCNC,CPTII,S$GLB, | Performed by: PHYSICIAN ASSISTANT

## 2024-02-08 PROCEDURE — 1159F MED LIST DOCD IN RCRD: CPT | Mod: HCNC,CPTII,S$GLB, | Performed by: PHYSICIAN ASSISTANT

## 2024-02-08 PROCEDURE — 1101F PT FALLS ASSESS-DOCD LE1/YR: CPT | Mod: HCNC,CPTII,S$GLB, | Performed by: PHYSICIAN ASSISTANT

## 2024-02-08 PROCEDURE — 1125F AMNT PAIN NOTED PAIN PRSNT: CPT | Mod: HCNC,CPTII,S$GLB, | Performed by: PHYSICIAN ASSISTANT

## 2024-02-08 PROCEDURE — 3288F FALL RISK ASSESSMENT DOCD: CPT | Mod: HCNC,CPTII,S$GLB, | Performed by: PHYSICIAN ASSISTANT

## 2024-02-08 PROCEDURE — 99214 OFFICE O/P EST MOD 30 MIN: CPT | Mod: HCNC,S$GLB,, | Performed by: PHYSICIAN ASSISTANT

## 2024-02-08 PROCEDURE — 99999 PR PBB SHADOW E&M-EST. PATIENT-LVL III: CPT | Mod: PBBFAC,HCNC,, | Performed by: PHYSICIAN ASSISTANT

## 2024-02-08 RX ORDER — MELOXICAM 7.5 MG/1
7.5 TABLET ORAL EVERY MORNING
COMMUNITY
Start: 2024-01-20

## 2024-02-08 RX ORDER — GABAPENTIN 300 MG/1
CAPSULE ORAL
Qty: 53 CAPSULE | Refills: 0 | Status: SHIPPED | OUTPATIENT
Start: 2024-02-08 | End: 2024-03-05 | Stop reason: SDUPTHER

## 2024-02-08 RX ORDER — TIZANIDINE 4 MG/1
TABLET ORAL
Qty: 60 TABLET | Refills: 2 | Status: SHIPPED | OUTPATIENT
Start: 2024-02-08

## 2024-02-08 NOTE — PROGRESS NOTES
Chronic Pain-Established Note (Follow up visit)    PCP: Joseph Sargent    Chief Complaint: Pain on the Left side (upper, lower)    Notes:    SUBJECTIVE:    Interval History (2/8/2024):  Patient  presents today for follow-up visit.  Patient was last seen on 8/15/2023. At that visit, the plan was to start Lyrica and referral placed to urology due to abnormal US.  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 It was recommended that she follow up in a year with repeat US.   She reports issues all on the left side. She was told she needed to see neurosurgery and was scheduled initially in December but then her appointment was pushed back to end of February.   Whenever night time arrives, she cannot sleep due to being uncomfortable. She has tried several remedies, including Vapor rub and socks, with no relief.   Patient is interested in restarting Gabapentin again. Patient reports she has not taken this since last year.  Localizes her pain to L scapula --> buttocks--> upper thigh and foot. Also c/o burning and stabbing pain in feet. Patient reports pain as 3/10 today.   She is s/p L shoulder replacement with Dr. Taylor from 5/5/22.    Interval History (8/15/2023):  Leticia Caity Grimes presents today for follow-up visit via telemed for ultrasound review.  Patient was last seen on 4/20/2023.   Ultrasound retroperitoneal kidney      Interval History (7/17/2023):  Leticia Grimes presents today for follow-up visit for CT review.  Patient was last seen on 04/20/2023. She continues to report  She reports  lower back pain with radiation into her left lower extremity along the lateral and posterior aspect extending into the left calf. Taking Gabapentin 400 mg nightly with limited relief. Patient reports pain as 7/10 today.    CT lumbar spine      Interval History (4/20/2023):  Leticia Grimes presents  today via telemed for follow-up visit.  Patient was last seen on 1/4/2023. Referred back to our clinic by Dr. Leonard for lumbar radiculopathy. Patient reports pain as 6/10 today. She has completed over 6 weeks of outpatient rehab from 11/15/2022 to 1/17/2023. She has continued with a home exercise program at home with limited relief. She reports pain is worsened with laying down, improved with heat. She reports continued lower back pain with radiation into her left lower extremity along the lateral and posterior aspect extending into the left calf. Taking Gabapentin 400 mg nightly with limited relief.    She also reports continued pain in left shoulder/shoulder blade. History of previous left shoulder total reversal 05/2022. She reports some pain in the left shoulder and some stiffness and limitations to ROM. Her thoracic pain is worsened with moving her upper extremities.     Interval History (01/04/2022):  Leticia Grimes presents today for follow-up visit.  Patient was last seen on 04/15/2021. She was referred back to our clinic by Neurology. She reports her primary complaint is thoracic back pain between her shoulder blades along her bra line. She reports this began about 1 month ago, but has been worsening over the past week. She reports increased frequency of spasms that interrupt her sleep. She has been taking tizanidine, using a heating pad, and began physical therapy, with limited relief. History of left shoulder total reversal 05/2022. She reports some pain in the left shoulder and some stiffness and limitations to ROM. Her thoracic pain is worsened with moving her upper extremities. She also reports low back pain in a band like distribution across the lumbosacral region moreso on the left side that began 1 month ago. She reports at first it stopped above the knee, but is now intermittently radiating into her left leg into her foot. This pain is worsened by laying on her left side. She reports this has  somewhat improved with massage, heat, and physical therapy. She was recently prescribed Baclofen by Dr. Leonard for muscle spasms to replace the Tizanidine, but she has not yet started this medication. Patient reports pain as 6/10 today.      Interval HPI 04/15/2021  Leticia Grimes presents to tele-medicine appointment for chief complaint bilateral hand pain.  This is a new complaint as we previously treated her left shoulder pain with glenohumeral joint injection out was successful.  She locates this neuropathic pain to the bilateral hands, right worse than left.  She also reports having some neck pain.  She has had previous epidurals that did provide relief of these symptoms.  Since the last visit, Leticia Grimes states the pain has been improving. Current pain intensity is 6/10.    Patient denies night fever/night sweats, urinary incontinence, bowel incontinence, significant weight loss, significant motor weakness and loss of sensations.    Interval HPI 04/09/2021:  Leticia Grimes presents to tele-medicine appointment for a follow-up appointment for left shoulder pain.  She was last seen for procedure on 03/02/2021 and underwent left-sided glenohumeral joint injection.  She reports that this resulted in 95% relief.  Since the last visit, Leticia Grimes states the pain has been improving. Current pain intensity is 0/10.    Initial HPI 02/01/2021:  Leticia Grimes is a 75 y.o. female who presents to the clinic for the evaluation of left shoulder pain.  She was referred by the Orthopedics Department for further evaluation and management of this pain.  She has past medical history of anxiety, hypertension, status post ICD, hyperlipidemia, GERD, osteopenia, multiple other medical comorbidities as listed in her chart.  The pain started several years ago without any inciting event or trauma and symptoms have been worsening.The pain is located in the left cervical myofascial area and radiates to the left upper  extremity.  She reports that the left shoulder source of the pain however.  The pain is described as Aching, numbness, tingling, squeezing, tightness and is rated as 5/10. The pain is rated with a score of  4/10 on the BEST day and a score of 9/10 on the WORST day.  Symptoms interfere with daily activity. The pain is exacerbated by sleeping on her left side, movement of the left shoulder.  The pain is mitigated by sitting, medications, heat. The patient reports spending 2-4 hours per day reclining. The patient reports 5-7 hours of uninterrupted sleep per night.       Non-Pharmacologic Treatments:  Physical Therapy/Home Exercise: yes  Ice/Heat:yes  TENS: no  Acupuncture: no  Massage: no  Chiropractic: no    Other: no        Pain Medications:  - Opioids: None  - Adjuvant Medications: Lorazepam, Lyrica, Diazepam, Baclofen  - Anti-Coagulants: Aspirin     Pain Procedures:   -previous cervical epidural steroid injection, moderate relief  -03/02/2021:  Left glenohumeral joint injection, 95% relief        Imaging (Reviewed on 2/8/2024):    CT Lumbar spine 11/16/2023  EXAMINATION:  CT LUMBAR SPINE WITHOUT CONTRAST     CLINICAL HISTORY:  Low back pain, symptoms persist with > 6wks conservative treatment;  Dorsalgia, unspecified     TECHNIQUE:  Low-dose axial, sagittal and coronal reformations are obtained through the lumbar spine.  Contrast was not administered.     COMPARISON:  CT abdomen and pelvis dated 09/06/2023     FINDINGS:  Alignment: There is grade 1 anterolisthesis of L4 on L5.     Vertebrae: Normal heights.  No fracture.     Posterior elements: No fractures. Facet articulations appear within normal limits.     Discs: Mild disc height loss at L4-5.     Degenerative findings:     T11-12: Minimal generalized disc bulge.  No definite spinal canal or neuroforaminal stenosis.     T12-L1:  No definite spinal canal or neuroforaminal stenosis.     L1-L2:  No definite spinal canal or neuroforaminal stenosis.     L2-L3:  No  definite spinal canal or neuroforaminal stenosis.     L3-L4:  No definite spinal canal or neuroforaminal stenosis.     L4-L5: Severe bilateral facet arthropathy.  Uncovering of the intervertebral disc and mild thickening of the ligamentum flavum.  Suspect at least mild spinal canal stenosis with mild-to-moderate bilateral neuroforaminal narrowing.     L5-S1: Moderate bilateral facet arthropathy.  Small posterior disc bulge. No definite spinal canal or neuroforaminal stenosis.     Paraspinal muscles & soft tissues: There multiple bilateral renal cysts present, better characterized on previous CT.     Miscellaneous: There is suggestion of subarticular erosive changes present at the bilateral SI joints, raising concern for sacroiliitis.     Impression:     1. Grade 1 anterolisthesis of L4 on L5 secondary to facet arthropathy.  There is at least mild suspected associated spinal canal stenosis with mild-to-moderate bilateral neural foraminal narrowing at this level.  2. Other mild degenerative findings as above  3. Findings suggesting bilateral sacroiliitis.       X-ray left shoulder 10/12/2020:  No fracture or dislocation.  Prominent glenohumeral degenerative findings present including complete joint space loss and large inferior osteophyte formation.  Mild AC joint degenerative changes.  Lung parenchyma clear.        X-ray cervical spine 12/16/2015:  No fracture or dislocation is demonstrated.  At the C5-6 level there   is some mild disc space narrowing with associated spurring.  Uncovertebral   joint spurring results in some moderate encroachment upon the neuroforamina   bilaterally at this level The odontoid appears intact and in good alignment.         PMHx,PSHx, Social history, and Family history:  I have reviewed the patient's medical, surgical, social, and family history in detail and updated the computerized patient record.        Review of patient's allergies indicates:   Allergen Reactions    Codeine      Morphine Other (See Comments)       Current Outpatient Medications   Medication Sig    acetaminophen (TYLENOL) 500 MG tablet Take 2 tablets (1,000 mg total) by mouth every 8 (eight) hours as needed for Pain.    amLODIPine (NORVASC) 5 MG tablet Take 1 tablet (5 mg total) by mouth once daily.    baclofen (LIORESAL) 10 MG tablet TAKE 1 TABLET BY MOUTH NIGHTLY AS NEEDED    carvediloL (COREG) 12.5 MG tablet Take 1 tablet (12.5 mg total) by mouth 2 (two) times daily with meals.    cloNIDine (CATAPRES) 0.1 MG tablet Take 0.1 mg by mouth as needed. If SBP > 200 mmHg    lisinopriL (PRINIVIL,ZESTRIL) 40 MG tablet Take 1 tablet by mouth once daily    LORazepam (ATIVAN) 0.5 MG tablet Take 1 tablet (0.5 mg total) by mouth nightly as needed for Anxiety (or Insomnia).    minoxidiL (LONITEN) 2.5 MG tablet Take 2.5 mg by mouth once daily.    mv-mn/folic/lutein/herbal 293 (ALIVE WOMEN'S 50 PLUS ORAL) Take 1 tablet by mouth once daily.    omega 3-dha-epa-fish oil (FISH OIL) 1,000 mg (120 mg-180 mg) Cap Take 1 capsule by mouth 2 (two) times daily with meals.    pregabalin (LYRICA) 75 MG capsule Take 1 capsule (75 mg total) by mouth every evening.    rosuvastatin (CRESTOR) 10 MG tablet Take 1 tablet (10 mg total) by mouth once daily.    traZODone (DESYREL) 50 MG tablet 1 to 2 tabs po qhs prn sleep     No current facility-administered medications for this visit.     REVIEW OF SYSTEMS:    GENERAL:  No weight loss, malaise or fevers.  HEENT:   No recent changes in vision or hearing  NECK:  Negative for lumps, no difficulty with swallowing.  RESPIRATORY:  Negative for cough, wheezing or shortness of breath, patient denies any recent URI.  CARDIOVASCULAR:  Negative for chest pain, leg swelling or palpitations.  GI:  Negative for abdominal discomfort, blood in stools or black stools or change in bowel habits.  MUSCULOSKELETAL:  See HPI.  SKIN:  Negative for lesions, rash, and itching.  PSYCH:  No mood disorder or recent psychosocial  stressors.  Patients sleep is not disturbed secondary to pain.  HEMATOLOGY/LYMPHOLOGY:  Negative for prolonged bleeding, bruising easily or swollen nodes.  Patient is currently taking anti-coagulants - ASA  NEURO:   No history of headaches, syncope, paralysis, seizures or tremors.  All other reviewed and negative other than HPI.    OBJECTIVE:    Physical exam:    GENERAL: Well appearing, in no acute distress, alert and oriented x3.    PSYCH:  Mood and affect appropriate.  SKIN: Skin color, texture, turgor normal, no rashes or lesions.  HEAD/FACE:  Normocephalic, atraumatic. Cranial nerves grossly intact.  PULM:  No difficulty breathing     Neuro/MSK:  NECK:  Minimal pain with neck flexion, extension, or lateral flexion.  Spurling's - equivocal  Back: SLR negative. No pain with lumbar ROM.  EXTREMITIES: No deformities, edema, or skin discoloration.   MUSCULOSKELETAL:    Shoulder:left  - Pain on abduction: Present  - ROM:  Decreased secondary to pain  - TTP over the AC and GH joint: Present  - Neer's: Positive   - Hawkin's: Positive     GAIT: normal. Able to tip toe/ heel walk.         ASSESSMENT: 77 y.o. year old female with left shoulder pain, consistent with     1. Other specified disorders of kidney and ureter  Ambulatory referral/consult to Urology          PLAN:   - Interventions: None at this time.    - Anticoagulation use: yes aspirin    - Medications: I have stressed the importance of physical activity and a home exercise plan to help with pain and improve health.  Patient can continue with medications for now since they are providing benefits, using them appropriately, and without side effects.      - Will D/c  Baclofen 10 mg nightly for muscle spasms and start Zanaflex.  -- Start Zanaflex 4mg BID PRN muscle spasms (or can take 1/2 tablet). Patient understands this may cause drowsiness.    - No longer taking Lyrica 75mg ; patient states it made her feel weak and dizzy.  - Restart Gabapentin. Take 1 capsule  (300 mg total) by mouth every evening for 7 days, THEN 1 capsule (300 mg total) 2 (two) times daily     - Therapy:  Advised patient continue with activities and exercises as tolerated.    - Imaging: Reviewed available imaging.    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 (It was recommended that she     follow up in a year with repeat US)      Keep appointment with Neurosurgery.      - Follow up visit: 3-6 months or as needed.     - Counseled patient regarding the importance of activity modification and physical therapy     - This condition does not require this patient to take time off of work, and the primary goal of our Pain Management services is to improve the patient's functional capacity.     - Patient Questions: Answered all of the patient's questions regarding diagnosis, therapy, and treatment    The above plan and management options were discussed at length with patient. Patient is in agreement with the above and verbalized understanding.      Maria Alejandra Steve PA-C  Interventional Pain Management  [facility]

## 2024-02-09 ENCOUNTER — PATIENT MESSAGE (OUTPATIENT)
Dept: INTERNAL MEDICINE | Facility: CLINIC | Age: 79
End: 2024-02-09
Payer: MEDICARE

## 2024-02-13 ENCOUNTER — TELEPHONE (OUTPATIENT)
Dept: INTERNAL MEDICINE | Facility: CLINIC | Age: 79
End: 2024-02-13
Payer: MEDICARE

## 2024-02-13 DIAGNOSIS — H91.90 HEARING LOSS, UNSPECIFIED HEARING LOSS TYPE, UNSPECIFIED LATERALITY: Primary | ICD-10-CM

## 2024-02-13 NOTE — TELEPHONE ENCOUNTER
All Conversations: Referral .  (Newest Message First)  February 9, 2024  Mike Colon, LPN   to Leticia Grimes and proxy (Lidia K Cornelio)   AB      2/9/24 11:21 AM  Good morning. When I click on the appointment, it shows that a referral is not needed and that there is a $35.00 co-payment with your insurance.    This Patient Portal message has not been read.  Leticia Grimes   to [facility] Staff (supporting You)         2/9/24 11:13 AM  Good morning. I have an appointment for a hearing test on the 20th . Please be so kind to send a referral to that department for me.      Thank you.  Leticia Grimes  This encounter is not signed. The conversation may still be ongoin                            I placed a referral per your request.   Thank you,  Dr. Sargent

## 2024-02-13 NOTE — TELEPHONE ENCOUNTER
----- Message from Hany William, CCC-A sent at 2/13/2024 11:28 AM CST -----  Regarding: Audiology referral  [place] patient - scheduled audiogram for concern decline in hearing. History: SNHL H90.3. Can you please drop an audiology referral? Thank you, Ruslan

## 2024-02-15 ENCOUNTER — NURSE TRIAGE (OUTPATIENT)
Dept: ADMINISTRATIVE | Facility: CLINIC | Age: 79
End: 2024-02-15
Payer: MEDICARE

## 2024-02-15 ENCOUNTER — TELEPHONE (OUTPATIENT)
Dept: INTERNAL MEDICINE | Facility: CLINIC | Age: 79
End: 2024-02-15
Payer: MEDICARE

## 2024-02-15 ENCOUNTER — PATIENT MESSAGE (OUTPATIENT)
Dept: INTERNAL MEDICINE | Facility: CLINIC | Age: 79
End: 2024-02-15
Payer: MEDICARE

## 2024-02-15 NOTE — TELEPHONE ENCOUNTER
Spoke with pt who reports that her BP was  108/59 earlier today, and was concerned to due to lower BP, states that she had dizziness as well. Pt took BP while on phone with Nurse triage and it was 153/75 HR 64. Pt denies symptoms at this time. Advised to be seen in office within 2 weeks. Verbalized understanding.      Reason for Disposition   Systolic BP >= 130 OR Diastolic >= 80, and is taking BP medications    Additional Information   Negative: Sounds like a life-threatening emergency to the triager   Negative: Systolic BP >= 160 OR Diastolic >= 100, and any cardiac (e.g., breathing difficulty, chest pain) or neurologic symptoms (e.g., new-onset blurred or double vision)   Negative: Pregnant 20 or more weeks (or postpartum < 6 weeks) with new hand or face swelling   Negative: Pregnant 20 or more weeks (or postpartum < 6 weeks) and Systolic BP >= 160 OR Diastolic >= 110   Negative: Systolic BP >= 200 OR Diastolic >= 120 and having NO cardiac or neurologic symptoms   Negative: Pregnant 20 or more weeks (or postpartum < 6 weeks) with Systolic BP >= 140 OR Diastolic >= 90   Negative: Systolic BP >= 180 OR Diastolic >= 110, and missed most recent dose of blood pressure medication   Negative: Systolic BP >= 180 OR Diastolic >= 110   Negative: Patient wants to be seen   Negative: Ran out of BP medications   Negative: Taking BP medications and feels is having side effects (e.g., impotence, cough, dizziness)   Negative: Systolic BP >= 160 OR Diastolic >= 100   Negative: Systolic BP >= 130 OR Diastolic >= 80, and pregnant    Protocols used: Blood Pressure - High-A-OH

## 2024-02-15 NOTE — TELEPHONE ENCOUNTER
----- Message from Jossie Redding sent at 2/15/2024  1:50 PM CST -----  Contact: self  Patient is requesting a call back regarding blood pressure being low. She stated 108/59 pulse 64 Please call back @ [phone] (home)

## 2024-02-16 PROBLEM — I27.9 PULMONARY HEART DISEASE: Status: ACTIVE | Noted: 2024-02-16

## 2024-02-16 PROBLEM — I25.10 CORONARY ARTERY CALCIFICATION: Status: ACTIVE | Noted: 2024-02-16

## 2024-02-16 PROBLEM — I25.84 CORONARY ARTERY CALCIFICATION: Status: ACTIVE | Noted: 2024-02-16

## 2024-02-20 ENCOUNTER — OFFICE VISIT (OUTPATIENT)
Dept: INTERNAL MEDICINE | Facility: CLINIC | Age: 79
End: 2024-02-20
Payer: MEDICARE

## 2024-02-20 ENCOUNTER — CLINICAL SUPPORT (OUTPATIENT)
Dept: AUDIOLOGY | Facility: CLINIC | Age: 79
End: 2024-02-20
Payer: MEDICARE

## 2024-02-20 VITALS
DIASTOLIC BLOOD PRESSURE: 86 MMHG | HEART RATE: 67 BPM | OXYGEN SATURATION: 97 % | WEIGHT: 162.25 LBS | BODY MASS INDEX: 29.86 KG/M2 | HEIGHT: 62 IN | SYSTOLIC BLOOD PRESSURE: 152 MMHG

## 2024-02-20 DIAGNOSIS — E04.2 MULTIPLE THYROID NODULES: ICD-10-CM

## 2024-02-20 DIAGNOSIS — H91.90 HEARING LOSS, UNSPECIFIED HEARING LOSS TYPE, UNSPECIFIED LATERALITY: ICD-10-CM

## 2024-02-20 DIAGNOSIS — H90.3 SENSORINEURAL HEARING LOSS, BILATERAL: Primary | ICD-10-CM

## 2024-02-20 DIAGNOSIS — Z59.9 FINANCIAL DIFFICULTIES: ICD-10-CM

## 2024-02-20 DIAGNOSIS — Z00.00 ENCOUNTER FOR MEDICARE ANNUAL WELLNESS EXAM: ICD-10-CM

## 2024-02-20 DIAGNOSIS — R20.2 NUMBNESS AND TINGLING: ICD-10-CM

## 2024-02-20 DIAGNOSIS — I25.84 CORONARY ARTERY CALCIFICATION: ICD-10-CM

## 2024-02-20 DIAGNOSIS — Z95.810 ICD (IMPLANTABLE CARDIOVERTER-DEFIBRILLATOR) IN PLACE: Chronic | ICD-10-CM

## 2024-02-20 DIAGNOSIS — I25.10 CORONARY ARTERY CALCIFICATION: ICD-10-CM

## 2024-02-20 DIAGNOSIS — N28.1 KIDNEY CYSTS: ICD-10-CM

## 2024-02-20 DIAGNOSIS — Z00.00 ENCOUNTER FOR PREVENTIVE HEALTH EXAMINATION: Primary | ICD-10-CM

## 2024-02-20 DIAGNOSIS — H90.3 SENSORINEURAL HEARING LOSS, ASYMMETRICAL: Primary | ICD-10-CM

## 2024-02-20 DIAGNOSIS — F41.9 ANXIETY: Chronic | ICD-10-CM

## 2024-02-20 DIAGNOSIS — I27.9 PULMONARY HEART DISEASE: ICD-10-CM

## 2024-02-20 DIAGNOSIS — R20.0 NUMBNESS AND TINGLING: ICD-10-CM

## 2024-02-20 DIAGNOSIS — M85.80 OSTEOPENIA, UNSPECIFIED LOCATION: ICD-10-CM

## 2024-02-20 DIAGNOSIS — I10 ESSENTIAL HYPERTENSION: Chronic | ICD-10-CM

## 2024-02-20 DIAGNOSIS — E78.2 MIXED HYPERLIPIDEMIA: Chronic | ICD-10-CM

## 2024-02-20 PROBLEM — R41.3 AMNESIA: Status: ACTIVE | Noted: 2022-03-02

## 2024-02-20 PROCEDURE — 99999 PR PBB SHADOW E&M-EST. PATIENT-LVL V: CPT | Mod: PBBFAC,HCNC,, | Performed by: NURSE PRACTITIONER

## 2024-02-20 PROCEDURE — 1160F RVW MEDS BY RX/DR IN RCRD: CPT | Mod: HCNC,CPTII,S$GLB, | Performed by: NURSE PRACTITIONER

## 2024-02-20 PROCEDURE — 1159F MED LIST DOCD IN RCRD: CPT | Mod: HCNC,CPTII,S$GLB, | Performed by: NURSE PRACTITIONER

## 2024-02-20 PROCEDURE — 99999 PR PBB SHADOW E&M-EST. PATIENT-LVL II: CPT | Mod: PBBFAC,HCNC,, | Performed by: AUDIOLOGIST-HEARING AID FITTER

## 2024-02-20 PROCEDURE — 3079F DIAST BP 80-89 MM HG: CPT | Mod: HCNC,CPTII,S$GLB, | Performed by: NURSE PRACTITIONER

## 2024-02-20 PROCEDURE — 1170F FXNL STATUS ASSESSED: CPT | Mod: HCNC,CPTII,S$GLB, | Performed by: NURSE PRACTITIONER

## 2024-02-20 PROCEDURE — 3288F FALL RISK ASSESSMENT DOCD: CPT | Mod: HCNC,CPTII,S$GLB, | Performed by: NURSE PRACTITIONER

## 2024-02-20 PROCEDURE — 92557 COMPREHENSIVE HEARING TEST: CPT | Mod: HCNC,S$GLB,, | Performed by: AUDIOLOGIST-HEARING AID FITTER

## 2024-02-20 PROCEDURE — 1101F PT FALLS ASSESS-DOCD LE1/YR: CPT | Mod: HCNC,CPTII,S$GLB, | Performed by: NURSE PRACTITIONER

## 2024-02-20 PROCEDURE — G0439 PPPS, SUBSEQ VISIT: HCPCS | Mod: HCNC,S$GLB,, | Performed by: NURSE PRACTITIONER

## 2024-02-20 PROCEDURE — 3077F SYST BP >= 140 MM HG: CPT | Mod: HCNC,CPTII,S$GLB, | Performed by: NURSE PRACTITIONER

## 2024-02-20 PROCEDURE — 1125F AMNT PAIN NOTED PAIN PRSNT: CPT | Mod: HCNC,CPTII,S$GLB, | Performed by: NURSE PRACTITIONER

## 2024-02-20 PROCEDURE — 92567 TYMPANOMETRY: CPT | Mod: HCNC,S$GLB,, | Performed by: AUDIOLOGIST-HEARING AID FITTER

## 2024-02-20 SDOH — SOCIAL DETERMINANTS OF HEALTH (SDOH): PROBLEM RELATED TO HOUSING AND ECONOMIC CIRCUMSTANCES, UNSPECIFIED: Z59.9

## 2024-02-20 NOTE — PATIENT INSTRUCTIONS
Counseling and Referral of Other Preventative  (Italic type indicates deductible and co-insurance are waived)    Patient Name: Leticia Grimes  Today's Date: 2/20/2024    Health Maintenance       Date Due Completion Date    TETANUS VACCINE Never done ---    DEXA Scan Never done ---    RSV Vaccine (Age 60+ and Pregnant patients) (1 - 1-dose 60+ series) Never done ---    COVID-19 Vaccine (6 - 2023-24 season) 09/01/2023 12/7/2022    Lipid Panel 07/07/2028 7/7/2023        Orders Placed This Encounter   Procedures    Ambulatory referral/consult to Outpatient Case Management     The following information is provided to all patients.  This information is to help you find resources for any of the problems found today that may be affecting your health:                  Living healthy guide: www.[facility].[place].gov      Understanding Diabetes: www.diabetes.org      Eating healthy: www.cdc.gov/healthyweight      CDC home safety checklist: www.cdc.gov/steadi/patient.html      Agency on Aging: www.goea.[place].[facility]      Alcoholics anonymous (AA): www.aa.org      Physical Activity: www.saeid.nih.gov/xy9peln      Tobacco use: www.quitwithusla.org

## 2024-02-20 NOTE — PROGRESS NOTES
"  Leticia Grimes presented for a  Medicare AWV and comprehensive Health Risk Assessment today. The following components were reviewed and updated:    Medical history  Family History  Social history  Allergies and Current Medications  Health Risk Assessment  Health Maintenance  Care Team         ** See Completed Assessments for Annual Wellness Visit within the encounter summary.**         The following assessments were completed:  Living Situation  CAGE  Depression Screening  Timed Get Up and Go  Whisper Test-na  Cognitive Function Screening  Nutrition Screening  ADL Screening  PAQ Screening        Vitals:    02/20/24 1341   BP: (!) 152/86   Pulse: 67   SpO2: 97%   Weight: 73.6 kg (162 lb 4.1 oz)   Height: 5' 2.21" (1.58 m)     Body mass index is 29.48 kg/m².  Physical Exam  Vitals and nursing note reviewed.   Constitutional:       Appearance: She is well-developed.   HENT:      Head: Normocephalic.   Cardiovascular:      Rate and Rhythm: Normal rate and regular rhythm.      Heart sounds: Normal heart sounds.   Pulmonary:      Effort: Pulmonary effort is normal. No respiratory distress.      Breath sounds: Normal breath sounds.   Abdominal:      Palpations: Abdomen is soft. There is no mass.      Tenderness: There is no abdominal tenderness.   Musculoskeletal:         General: Normal range of motion.   Skin:     General: Skin is warm and dry.   Neurological:      Mental Status: She is alert and oriented to person, place, and time.      Motor: No abnormal muscle tone.   Psychiatric:         Speech: Speech normal.         Behavior: Behavior normal.               Diagnoses and health risks identified today and associated recommendations/orders:    1. Encounter for preventive health examination     Review for Opioid Screening: Pt does not have Rx for Opioids      Review for Substance Use Disorders: Alcohol use, see flow sheet for detail No drug use per chart     Encouraged healthy diet and exercise as tolerated  Reports " she is at her respiratory baseline  Discussed receiving rsv, covid, and tetanus vaccine at pharmacy.       2. Pulmonary heart disease  Echo 9/23  Stable. Continue current treatment plan as previously prescribed with your  cardiologist.     3. Coronary artery calcification  Ct 9/23  Discussed diagnosis and risk reduction.   Advised to follow up with PCP/cardiologist for further recommendations. Patient expressed understanding.       4. Essential hypertension  BP elevated at today's visit. In digital medicine.  Discussed s/s of MI and stroke (patient denies any s/s) and advised to go to the ER/911 if occur. Advised patient to monitor BP (keep a log) and if continues to stay elevated (greater than 140/90) to follow up with PCP for further evaluation and treatment. She expressed understanding.      5. Mixed hyperlipidemia  Continue current treatment plan as previously prescribed with your  pcp and cardiologist.     6. ICD (implantable cardioverter-defibrillator) in place  Continue current treatment plan as previously prescribed with your  cardiologist.     7. Anxiety  PHQ 2-1  Reports stress  Advised to follow up with PCP/ for further evaluation and recommendations. Patient expressed understanding.      8. Kidney cysts  Continue current treatment plan as previously prescribed with your  urologist.     9. Multiple thyroid nodules  Continue current treatment plan as previously prescribed with your  ENT    10. Osteopenia, unspecified location  Continue current treatment plan as previously prescribed with your  pcp     11. Encounter for Medicare annual wellness exam  - Ambulatory Referral/Consult to Enhanced Annual Wellness Visit (eAWV)    12. Financial difficulties  [facility] financial resources information page given to patient.    - Ambulatory referral/consult to Outpatient Case Management    13. Numbness and tingling  BUE and BLE  Chronic  Advised to follow up with PCP for further evaluation and  recommendations. Patient expressed understanding.        Provided Leticia with a 5-10 year written screening schedule and personal prevention plan. Recommendations were developed using the USPSTF age appropriate recommendations. Education, counseling, and referrals were provided as needed. After Visit Summary printed and given to patient which includes a list of additional screenings\tests needed.    Follow up in about 1 year (around 2/20/2025) for awv.    Estella Proctor, NP  I offered to discuss advanced care planning, including how to pick a person who would make decisions for you if you were unable to make them for yourself, called a health care power of , and what kind of decisions you might make such as use of life sustaining treatments such as ventilators and tube feeding when faced with a life limiting illness recorded on a living will that they will need to know. (How you want to be cared for as you near the end of your natural life)     X Patient is interested in learning more about how to make advanced directives.  I provided them paperwork and offered to discuss this with them.

## 2024-02-20 NOTE — PROGRESS NOTES
Referring provider: Dr. Arie Carolina Cornelio was seen 02/20/2024 for an audiological evaluation.  Patient complains of decreased hearing. She wears hearing aids. She has history of right poorer than left SNHL with her last audiogram in January 2022. No tinnitus or vertigo. No history of stroke. Has pacemaker. She also reports problems with recurrent cerumen impaction.    Otoscopy:  Right ear: clear ear canal with visualization of tympanic membrane  Left ear: partial cerumen impaction with visualization of tympanic membrane    Audiogram:   Results reveal a mild-to-profound sensorineural hearing loss 250-8000 Hz for each ea with an asymmetry for the right ear at 2760-5448 Hz. Speech Reception Thresholds were 35 dBHL for the right ear and 40 dBHL for the left ear.   Word recognition scores were fair for the right ear and good for the left ear.   Tympanograms were Type A for the right ear and Type A for the left ear.    There is a decrease in hearing and WRS since her previous audiogram from 2022.   Patient was counseled on the above findings.    Recommendations:  Annual audiogram to monitor hearing loss.  Continue with hearing aids - reprogrammed today.

## 2024-02-20 NOTE — PROGRESS NOTES
Leticia Grimes was seen 02/20/2024 for hearing aid followup.  Audiogram today reveals slight decrease in hearing and WRS - patient was counseled on audiogram and expectations.    She wears Phonak Audeo P50-R and has had the following challenges:  Recurrent soft cerumen impaction (LE>>RE). She has not been changing cerushield regularly, only the domes. Both speakers (dome, filter, speaker body) today were significantly impacted along with BTE itself had some was debris. Cleaned both aids with alcohol pads and sani-spray. Replaced domes/filters. Both aids GWO.  Poor patient tolerance to dome in the left ear. It causes her to feel stopped up like she has a cold. Improved if she pulls speaker slightly out of ear. Has tried small vent dome (recommended for her hearing loss) but her EAC is so small, small vent is too tight of a fit to be comfortable. She has been wearing small open domes, and continues to feel plugged up. There is concern if she will tolerate custom FELI mold. I reprogrammed both aids to CAP DOMES and she did great. She reported comfort, ease of handling and improved sound quality.   Decreased sound tolerance to HF-gain. For that reason, I input audiogram in [facility] to better than actually HF-thresholds to roll-off HF-gain. I then reprogrammed both aids to cap dome fitting rationale, and then increased MPO X3 R/L. Good feedback test results. Patient is very pleased with sound quality and clarity, tolerated background noises well and was able to understand speech near, distance and with various background sounds (fans, water running).   Updated to current firmware. Reprogrammed aids to her Android cell phone for media/pc streaming and melissa. Reviewed features and practiced use.       Summary of adjustments:  Reprogrammed R/L to CAP DOME fitting, 100% target, Increased MPO X3. Right=BT aid. #0M with kickstand R/L  Re-paired aids to Android cell phone streaming and Suda melissa.   Reinstructed how to clean aids  - patient purchased bottle of sani-spray today. She practiced replacing cerushield today. At her next visit, will have her practice changing dome after she tries the cap dome to ensure good tolerance and results. Explained she may need to return for regularly scheduled ear/hearing aid cleanings if wax continues to be an issue.     Patient will trial these settings for one month. She is scheduled to return for ENT for cerumen removal followed by hearing aid check. Practice replacing dome/filters at that visit and will give her a pack at that visit.

## 2024-02-23 ENCOUNTER — TELEPHONE (OUTPATIENT)
Dept: NEUROSURGERY | Facility: CLINIC | Age: 79
End: 2024-02-23
Payer: MEDICARE

## 2024-02-23 NOTE — TELEPHONE ENCOUNTER
I spoke with the patient in regards to NP appointment needing to be reschedule due to MD being out. The patient declined both appointments for the PA/MD. The patient states that she will have to find another provider.

## 2024-02-26 ENCOUNTER — PATIENT MESSAGE (OUTPATIENT)
Dept: INTERNAL MEDICINE | Facility: CLINIC | Age: 79
End: 2024-02-26
Payer: MEDICARE

## 2024-02-26 ENCOUNTER — PATIENT MESSAGE (OUTPATIENT)
Dept: CARDIOLOGY | Facility: CLINIC | Age: 79
End: 2024-02-26
Payer: MEDICARE

## 2024-03-04 ENCOUNTER — PATIENT MESSAGE (OUTPATIENT)
Dept: INTERNAL MEDICINE | Facility: CLINIC | Age: 79
End: 2024-03-04
Payer: MEDICARE

## 2024-03-05 ENCOUNTER — PATIENT MESSAGE (OUTPATIENT)
Dept: ADMINISTRATIVE | Facility: HOSPITAL | Age: 79
End: 2024-03-05
Payer: MEDICARE

## 2024-03-05 DIAGNOSIS — G89.4 CHRONIC PAIN DISORDER: ICD-10-CM

## 2024-03-05 DIAGNOSIS — M25.512 CHRONIC LEFT SHOULDER PAIN: ICD-10-CM

## 2024-03-05 DIAGNOSIS — M54.16 LUMBAR RADICULOPATHY, CHRONIC: ICD-10-CM

## 2024-03-05 DIAGNOSIS — G89.29 CHRONIC LEFT SHOULDER PAIN: ICD-10-CM

## 2024-03-05 RX ORDER — GABAPENTIN 300 MG/1
300 CAPSULE ORAL 3 TIMES DAILY
Qty: 90 CAPSULE | Refills: 2 | Status: SHIPPED | OUTPATIENT
Start: 2024-03-05 | End: 2024-06-03

## 2024-03-08 ENCOUNTER — CLINICAL SUPPORT (OUTPATIENT)
Dept: CARDIOLOGY | Facility: HOSPITAL | Age: 79
End: 2024-03-08

## 2024-03-08 ENCOUNTER — CLINICAL SUPPORT (OUTPATIENT)
Dept: CARDIOLOGY | Facility: HOSPITAL | Age: 79
End: 2024-03-08
Attending: INTERNAL MEDICINE
Payer: MEDICARE

## 2024-03-08 DIAGNOSIS — Z95.810 PRESENCE OF AUTOMATIC (IMPLANTABLE) CARDIAC DEFIBRILLATOR: ICD-10-CM

## 2024-03-08 PROCEDURE — 93295 DEV INTERROG REMOTE 1/2/MLT: CPT | Mod: S$GLB,,, | Performed by: INTERNAL MEDICINE

## 2024-03-08 PROCEDURE — 93296 REM INTERROG EVL PM/IDS: CPT | Performed by: INTERNAL MEDICINE

## 2024-03-14 NOTE — TELEPHONE ENCOUNTER
----- Message from Rony Herrera sent at 6/29/2022 10:01 AM CDT -----  Contact: self  Pt is asking for an return call in reference to her physical therapy visits, also pt states she is asking for an referral for an neurologist for her neuropathy, please call back at .[phone] Thx CJ     Bedside report received from Dyana CONLEY. Pt on cardiac monitor, pulse ox, and automatic BP. Fall precautions in place. Call light within reach. No supplemental O2 use at this time

## 2024-03-19 ENCOUNTER — PATIENT MESSAGE (OUTPATIENT)
Dept: ADMINISTRATIVE | Facility: OTHER | Age: 79
End: 2024-03-19
Payer: MEDICARE

## 2024-03-19 ENCOUNTER — OUTPATIENT CASE MANAGEMENT (OUTPATIENT)
Dept: ADMINISTRATIVE | Facility: OTHER | Age: 79
End: 2024-03-19
Payer: MEDICARE

## 2024-03-19 NOTE — PROGRESS NOTES
Outpatient Care Management   - Low Risk Patient Assessment    Patient: Leticia Grimes  MRN:  [id number]  Date of Service:  3/19/2024  Completed by:  Agnes Merino LMSW  Referral Date: 02/20/2024    Reason for Visit   Patient presents with    OPCM SW First Assessment Attempt     3/19/2024  1st attempt to complete Initial Assessment  for Outpatient Care Management, left message.     Social Work Assessment - Low/Mod Risk    Plan Of Care    Case Closure       Brief Summary:  received a referral from EMILIA Proctor. SW completed assessment with patient. Patient resides alone. Patient reports being independent with IADL's and ADL's.  Patient denied using any assisted devices to ambulate. Patient denied needing assistance with food and medication but needs assistance with medical, utilities, and home repairs.  Patient reports daughters provide transportation to and from appointments. SW discussed ACP document with patient .Care plan was created in collaboration with patient/caregiver input.

## 2024-03-28 ENCOUNTER — OFFICE VISIT (OUTPATIENT)
Dept: PSYCHIATRY | Facility: CLINIC | Age: 79
End: 2024-03-28
Payer: MEDICARE

## 2024-03-28 ENCOUNTER — TELEPHONE (OUTPATIENT)
Dept: PAIN MEDICINE | Facility: CLINIC | Age: 79
End: 2024-03-28
Payer: MEDICARE

## 2024-03-28 DIAGNOSIS — F43.21 ADJUSTMENT DISORDER WITH DEPRESSED MOOD: Primary | ICD-10-CM

## 2024-03-28 DIAGNOSIS — R52 PAIN: ICD-10-CM

## 2024-03-28 DIAGNOSIS — F43.21 GRIEF: ICD-10-CM

## 2024-03-28 PROCEDURE — 90834 PSYTX W PT 45 MINUTES: CPT | Mod: HCNC,95,, | Performed by: SOCIAL WORKER

## 2024-03-28 NOTE — PROGRESS NOTES
Individual Psychotherapy (PhD/LCSW)    3/28/2024    Site:  [facility]            Therapeutic Intervention: Met with patient. Patient contacted grandson on speakerphone during the session, as planned.  Outpatient - Insight oriented psychotherapy 45 min - CPT code 10722 and Outpatient - Supportive psychotherapy 45 min - CPT Code 57650    Chief complaint/reason for encounter: anxiety, sleep, interpersonal and memory gap from July of 2021.      Interval history and content of current session: Late entry for 3/21/22.  78 year old female patient returned to clinic for follow up psychotherapy, after roughly 2 years away; last seen, via virtual visit, on 3/21/22.  Patient reporting she is experiencing some medical struggles with physical discomfort. Said her low mood has been since shortly before this past Christmas. Said she has been trying to see a specialist for pain since last summer, was originally given a late fall appointment, but as that time approach, she was contacted and informed the doctor would not be available that day. She was then given a next available appointment not until March, and as that time came, she was again contacted and told the second appointment also had to be rescheduled, so she still has not been seen for an orthopedic pain issue she sought help for last summer. Patient reported some time back she had connected with a telephone call-in radio operation that allowed her to connect with other grieving and she greatly appreciated that connection. But after a year, it closed down, due to the organizer having gone legally blind and having to close down because of the difficulty. Patient reported she is starting to experience balance instability. Reporting her daughter has moved in with the patient shortly after our last session 2 years ago. She is still there to help with a lot of things. Said her younger daughter has felt too stressed by the death of her father and stress around the nephew's  surprise violent episode to feel comfortable visiting the house anymore, so she only meets the patient out places, though she does call regularly.  Patient denied any si/hi, mood swings, psychosis, or substance abuse.  Supportive therapy provided.  Plan is to follow up on 4/26/24.      Treatment plan:  Target symptoms: anxiety , adjustment, dissociative amnesia memory gap.  Why chosen therapy is appropriate versus another modality: relevant to diagnosis, patient responds to this modality  Outcome monitoring methods: self-report, observation  Therapeutic intervention type: insight oriented psychotherapy, supportive psychotherapy    Risk parameters:  Patient reports no suicidal ideation  Patient reports no homicidal ideation  Patient reports no self-injurious behavior  Patient reports no violent behavior    Verbal deficits: None    Patient's response to intervention:  The patient's response to intervention is accepting.    Progress toward goals and other mental status changes:  The patient's progress toward goals is limited; just re-establishing therapeutic contact after an absense    Diagnosis:     ICD-10-CM ICD-9-CM   1. Anxiety  F41.9 300.00   2. Depression, unspecified depression type  F32.A 311   3. Grief  F43.21 309.0       Plan:  individual psychotherapy and medication management by physician    Return to clinic:  As scheduled  Length of Service (minutes): 52

## 2024-03-28 NOTE — TELEPHONE ENCOUNTER
Called pt and informed her of the Toradol shot and the OMAR injection. I informed to that she would need to be evaluated before she could get an inj. I scheduled an appt and placed pt on the cancellation list. Pt verbalized understanding.    Osmel WISDOM

## 2024-04-01 DIAGNOSIS — I10 ESSENTIAL HYPERTENSION: ICD-10-CM

## 2024-04-01 NOTE — TELEPHONE ENCOUNTER
----- Message from Aniya Pickens sent at 4/1/2024  9:07 AM CDT -----  Contact: [facility]\Ena  Type:  RX Refill Request    Who Called: Ena  Refill or New Rx:refill  RX Name and Strength:lisinopriL (PRINIVIL,ZESTRIL) 40 MG tablet  How is the patient currently taking it? (ex. 1XDay):as prescribed  Is this a 30 day or 90 day RX:90 days  Preferred Pharmacy with phone number:  [facility] [address] [address]  Phone: [phone] Fax: [phone]  Local or Mail Order:local  Ordering Provider:Arie  Would the patient rather a call back or a response via [facility]? Call back  Best Call Back Number:[phone]  Additional Information: Ena states the pt is completely out of the medication.     Thanks  TS

## 2024-04-01 NOTE — TELEPHONE ENCOUNTER
No care due was identified.  [facility] Embedded Care Due Messages. Reference number: [id number].   4/01/2024 9:05:03 AM CDT

## 2024-04-01 NOTE — TELEPHONE ENCOUNTER
No care due was identified.  [facility] Embedded Care Due Messages. Reference number: [id number].   4/01/2024 9:03:43 AM CDT

## 2024-04-02 NOTE — TELEPHONE ENCOUNTER
Refill Routing Note   Medication(s) are not appropriate for processing by [facility] for the following reason(s):        Required vitals abnormal    [facility] action(s):  Defer               Appointments  past 12m or future 3m with PCP    Date Provider   Last Visit   11/16/2023 Joseph Sargent MD   Next Visit   4/1/2024 Joseph Sargent MD   ED visits in past 90 days: 0        Note composed:11:31 AM 04/02/2024

## 2024-04-03 ENCOUNTER — PATIENT MESSAGE (OUTPATIENT)
Dept: INTERNAL MEDICINE | Facility: CLINIC | Age: 79
End: 2024-04-03
Payer: MEDICARE

## 2024-04-03 DIAGNOSIS — I10 ESSENTIAL HYPERTENSION: ICD-10-CM

## 2024-04-03 RX ORDER — LISINOPRIL 40 MG/1
40 TABLET ORAL DAILY
Qty: 90 TABLET | Refills: 1 | Status: SHIPPED | OUTPATIENT
Start: 2024-04-03

## 2024-04-03 RX ORDER — LISINOPRIL 40 MG/1
TABLET ORAL
Qty: 90 TABLET | Refills: 0 | Status: SHIPPED | OUTPATIENT
Start: 2024-04-03 | End: 2024-05-07 | Stop reason: SDUPTHER

## 2024-04-03 RX ORDER — LISINOPRIL 40 MG/1
40 TABLET ORAL DAILY
Qty: 90 TABLET | Refills: 1 | OUTPATIENT
Start: 2024-04-03

## 2024-04-03 NOTE — TELEPHONE ENCOUNTER
----- Message from Marie Olson sent at 4/3/2024 10:23 AM CDT -----  Contact: JUNIOR  Patient is calling regarding refill request for lisinopriL (PRINIVIL,ZESTRIL) 40 MG tablet, report pharmacy still hasn't received prescription And she is completely out. Please call the patient at [phone].            [facility] [facility] [address] [facility] [place]  [address] [facility] [address]  Phone: [phone] Fax: [phone]

## 2024-04-03 NOTE — TELEPHONE ENCOUNTER
No care due was identified.  [facility] Embedded Care Due Messages. Reference number: [id number].   4/03/2024 10:44:49 AM CDT

## 2024-04-03 NOTE — TELEPHONE ENCOUNTER
No care due was identified.  [facility] Embedded Care Due Messages. Reference number: [id number].   4/03/2024 10:20:21 AM CDT

## 2024-04-04 RX ORDER — LISINOPRIL 20 MG/1
20 TABLET ORAL
Qty: 90 TABLET | Refills: 0 | OUTPATIENT
Start: 2024-04-04

## 2024-04-04 NOTE — TELEPHONE ENCOUNTER
Refill Decision Note   Leticia Grimes  is requesting a refill authorization.  Brief Assessment and Rationale for Refill:  Quick Discontinue     Medication Therapy Plan:         Comments:     Note composed:9:37 AM 04/04/2024

## 2024-04-09 ENCOUNTER — CLINICAL SUPPORT (OUTPATIENT)
Dept: AUDIOLOGY | Facility: CLINIC | Age: 79
End: 2024-04-09
Payer: MEDICARE

## 2024-04-09 DIAGNOSIS — H90.3 SENSORINEURAL HEARING LOSS, BILATERAL: Primary | ICD-10-CM

## 2024-04-09 NOTE — PROGRESS NOTES
Leticia Grimes was seen 04/09/2024 for hearing aid follow-up.  Patient reports she is not understanding with the aids as well as she would like, specifically in [employer] (sits in back) or her daughter from a distance. Discussed room acoustics and communication strategies. Her filters and domes were partially clogged. After replacing, she appreciates a marked improvement in hearing aid sound quality. Reviewed replacing domes and filters. She is scheduled to return in one month for followup and to practice replacing parts in office with me. Otoscopy today revealed clear ear canal AU. For that reason, cancelled the ENT appt. She is given a pack of cap domes and pack of cerustop.

## 2024-04-21 LAB
OHS CV DC REMOTE DEVICE TYPE: NORMAL
OHS CV RV PACING PERCENT: 1 %

## 2024-05-07 DIAGNOSIS — E78.2 MIXED HYPERLIPIDEMIA: Chronic | ICD-10-CM

## 2024-05-07 RX ORDER — ROSUVASTATIN CALCIUM 10 MG/1
10 TABLET, COATED ORAL
Qty: 90 TABLET | Refills: 1 | Status: SHIPPED | OUTPATIENT
Start: 2024-05-07

## 2024-05-07 NOTE — TELEPHONE ENCOUNTER
No care due was identified.  [facility] Embedded Care Due Messages. Reference number: [id number].   5/07/2024 9:11:38 AM CDT

## 2024-05-07 NOTE — TELEPHONE ENCOUNTER
Refill Decision Note   Leticia Grimes  is requesting a refill authorization.  Brief Assessment and Rationale for Refill:  Approve     Medication Therapy Plan:        Comments:     Note composed:11:52 AM 05/07/2024

## 2024-05-20 ENCOUNTER — TELEPHONE (OUTPATIENT)
Dept: CARDIOLOGY | Facility: CLINIC | Age: 79
End: 2024-05-20
Payer: MEDICARE

## 2024-05-20 NOTE — TELEPHONE ENCOUNTER
Spoke with pt in regards to concerns about HR. Pt verbalized understanding information without any concerns.                         ----- Message from Apolonia Elliott sent at 5/20/2024  2:37 PM CDT -----  Contact: self  [phone]  Patient called back after missing a call from your office. [facility]  [phone] thanks tpw

## 2024-05-20 NOTE — TELEPHONE ENCOUNTER
LVM for pt to call back in regards to concerns about HR.                       ----- Message from Raquel Gregorio sent at 5/20/2024  1:19 PM CDT -----  Contact: JUNIOR HERNÁNDEZ [[id number]]  ..Type:  Patient Requesting Call    Who Called: JUNIOR HERNÁNDEZ [[id number]]  Does the patient know what this is regarding?: wants to speak with nurse regarding heart rate of 60  Would the patient rather a call back or a response via [facility]?  call  Best Call Back Number: .[phone] (home)   Additional Information: last checked around 110pm

## 2024-06-07 ENCOUNTER — CLINICAL SUPPORT (OUTPATIENT)
Dept: CARDIOLOGY | Facility: HOSPITAL | Age: 79
End: 2024-06-07
Attending: INTERNAL MEDICINE
Payer: MEDICARE

## 2024-06-07 ENCOUNTER — CLINICAL SUPPORT (OUTPATIENT)
Dept: CARDIOLOGY | Facility: HOSPITAL | Age: 79
End: 2024-06-07
Payer: MEDICARE

## 2024-06-07 DIAGNOSIS — Z95.810 PRESENCE OF AUTOMATIC (IMPLANTABLE) CARDIAC DEFIBRILLATOR: ICD-10-CM

## 2024-06-07 PROCEDURE — 93296 REM INTERROG EVL PM/IDS: CPT | Mod: HCNC | Performed by: INTERNAL MEDICINE

## 2024-06-07 PROCEDURE — 93295 DEV INTERROG REMOTE 1/2/MLT: CPT | Mod: HCNC,S$GLB,, | Performed by: INTERNAL MEDICINE

## 2024-06-09 LAB
OHS CV DC REMOTE DEVICE TYPE: NORMAL
OHS CV RV PACING PERCENT: 1 %

## 2024-06-18 ENCOUNTER — TELEPHONE (OUTPATIENT)
Dept: INTERNAL MEDICINE | Facility: CLINIC | Age: 79
End: 2024-06-18
Payer: MEDICARE

## 2024-06-18 NOTE — TELEPHONE ENCOUNTER
----- Message from Tong Mathew sent at 6/18/2024 10:32 AM CDT -----  Contact: Leticia Kelly called in regards to she is experiencing dizziness and unsteady walking will like to be scheduled for tomorrow. Call back is [phone]

## 2024-06-24 DIAGNOSIS — I10 ESSENTIAL HYPERTENSION: ICD-10-CM

## 2024-06-25 RX ORDER — AMLODIPINE BESYLATE 5 MG/1
5 TABLET ORAL
Qty: 90 TABLET | Refills: 3 | Status: SHIPPED | OUTPATIENT
Start: 2024-06-25

## 2024-07-09 DIAGNOSIS — F41.9 ANXIETY: ICD-10-CM

## 2024-07-09 DIAGNOSIS — F51.02 INSOMNIA DUE TO STRESS: ICD-10-CM

## 2024-07-09 NOTE — TELEPHONE ENCOUNTER
Care Due:                  Date            Visit Type   Department     Provider  --------------------------------------------------------------------------------                                EP -                              PRIMARY      [facility]  Last Visit: 11-      CARE ([facility])   MEDICINE       Joseph Sargent  Next Visit: None Scheduled  None         None Found                                                            Last  Test          Frequency    Reason                     Performed    Due Date  --------------------------------------------------------------------------------    CMP.........  12 months..  lisinopriL, rosuvastatin.  09- 09-    [facility] Embedded Care Due Messages. Reference number: [id number].   7/09/2024 6:47:32 PM CDT

## 2024-07-10 RX ORDER — LORAZEPAM 0.5 MG/1
0.5 TABLET ORAL NIGHTLY PRN
Qty: 15 TABLET | Refills: 0 | Status: SHIPPED | OUTPATIENT
Start: 2024-07-10

## 2024-07-12 ENCOUNTER — OFFICE VISIT (OUTPATIENT)
Dept: PAIN MEDICINE | Facility: CLINIC | Age: 79
End: 2024-07-12
Payer: MEDICARE

## 2024-07-12 ENCOUNTER — PATIENT MESSAGE (OUTPATIENT)
Dept: CARDIOLOGY | Facility: CLINIC | Age: 79
End: 2024-07-12
Payer: MEDICARE

## 2024-07-12 DIAGNOSIS — M54.16 LUMBAR RADICULOPATHY, CHRONIC: ICD-10-CM

## 2024-07-12 DIAGNOSIS — G89.4 CHRONIC PAIN DISORDER: ICD-10-CM

## 2024-07-12 DIAGNOSIS — G89.29 CHRONIC LEFT SHOULDER PAIN: ICD-10-CM

## 2024-07-12 DIAGNOSIS — M25.512 CHRONIC LEFT SHOULDER PAIN: ICD-10-CM

## 2024-07-12 RX ORDER — GABAPENTIN 300 MG/1
300 CAPSULE ORAL 3 TIMES DAILY
Qty: 90 CAPSULE | Refills: 5 | Status: SHIPPED | OUTPATIENT
Start: 2024-07-12 | End: 2024-10-10

## 2024-07-12 NOTE — PROGRESS NOTES
Chronic Pain-Established Note (Telemed visit)    PCP: Joseph Sargent  The patient location is: home  The chief complaint leading to consultation is: med refill (Gabapentin)    Visit type: audiovisual    Face to Face time with patient: 10-15 minutes of total time spent on the encounter, which includes face to face time and non-face to face time preparing to see the patient (eg, review of tests), Obtaining and/or reviewing separately obtained history, Documenting clinical information in the electronic or other health record, Independently interpreting results (not separately reported) and communicating results to the patient/family/caregiver, or Care coordination (not separately reported).     Each patient to whom he or she provides medical services by telemedicine is:  (1) informed of the relationship between the physician and patient and the respective role of any other health care provider with respect to management of the patient; and (2) notified that he or she may decline to receive medical services by telemedicine and may withdraw from such care at any time.    Notes:    SUBJECTIVE:  Interval History (7/12/2024):  Leticiakwame Grimes presents today for follow-up visit.  Patient was last seen on 2/8/2024. Patient reports pain as 2/10 today. She requests refill(s) on Gabapentin only. Reports seeing Dr. Capellan since her last office visit. He performed lumbar OMAR and prescribed Robaxin 750 mg Q8H, Mobic 15 mg QD and 50 mg Tramadol QD prn.     Interval History (2/8/2024):  Patient  presents today for follow-up visit.  Patient was last seen on 8/15/2023. At that visit, the plan was to start Lyrica and referral placed to urology due to abnormal US.  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 It was recommended that she follow up in a year with repeat US.   She reports issues all on the left  side. She was told she needed to see neurosurgery and was scheduled initially in December but then her appointment was pushed back to end of February.   Whenever night time arrives, she cannot sleep due to being uncomfortable. She has tried several remedies, including Vapor rub and socks, with no relief.   Patient is interested in restarting Gabapentin again. Patient reports she has not taken this since last year.  Localizes her pain to L scapula --> buttocks--> upper thigh and foot. Also c/o burning and stabbing pain in feet. Patient reports pain as 3/10 today.   She is s/p L shoulder replacement with Dr. Taylor from 5/5/22.    Interval History (7/12/2024):  Leticia Grimes presents today for follow-up visit via telemed for ultrasound review.  Patient was last seen on 4/20/2023.   Ultrasound retroperitoneal kidney      Interval History (7/17/2023):  Leticia Grimes presents today for follow-up visit for CT review.  Patient was last seen on 04/20/2023. She continues to report  She reports  lower back pain with radiation into her left lower extremity along the lateral and posterior aspect extending into the left calf. Taking Gabapentin 400 mg nightly with limited relief. Patient reports pain as 7/10 today.    CT lumbar spine      Interval History (4/20/2023):  Leticia Grimes presents today via telemed for follow-up visit.  Patient was last seen on 1/4/2023. Referred back to our clinic by Dr. Leonard for lumbar radiculopathy. Patient reports pain as 6/10 today. She has completed over 6 weeks of outpatient rehab from 11/15/2022 to 1/17/2023. She has continued with a home exercise program at home with limited relief. She reports pain is worsened with laying down, improved with heat. She reports continued lower back pain with radiation into her left lower extremity along the lateral and posterior aspect extending into the left calf. Taking Gabapentin 400 mg nightly with limited relief.    She also reports continued  pain in left shoulder/shoulder blade. History of previous left shoulder total reversal 05/2022. She reports some pain in the left shoulder and some stiffness and limitations to ROM. Her thoracic pain is worsened with moving her upper extremities.     Interval History (01/04/2022):  Leticia Grimes presents today for follow-up visit.  Patient was last seen on 04/15/2021. She was referred back to our clinic by Neurology. She reports her primary complaint is thoracic back pain between her shoulder blades along her bra line. She reports this began about 1 month ago, but has been worsening over the past week. She reports increased frequency of spasms that interrupt her sleep. She has been taking tizanidine, using a heating pad, and began physical therapy, with limited relief. History of left shoulder total reversal 05/2022. She reports some pain in the left shoulder and some stiffness and limitations to ROM. Her thoracic pain is worsened with moving her upper extremities. She also reports low back pain in a band like distribution across the lumbosacral region moreso on the left side that began 1 month ago. She reports at first it stopped above the knee, but is now intermittently radiating into her left leg into her foot. This pain is worsened by laying on her left side. She reports this has somewhat improved with massage, heat, and physical therapy. She was recently prescribed Baclofen by Dr. Leonard for muscle spasms to replace the Tizanidine, but she has not yet started this medication. Patient reports pain as 6/10 today.      Interval HPI 04/15/2021  Leticia Grimes presents to tele-medicine appointment for chief complaint bilateral hand pain.  This is a new complaint as we previously treated her left shoulder pain with glenohumeral joint injection out was successful.  She locates this neuropathic pain to the bilateral hands, right worse than left.  She also reports having some neck pain.  She has had previous  epidurals that did provide relief of these symptoms.  Since the last visit, Leticia Grimes states the pain has been improving. Current pain intensity is 6/10.    Patient denies night fever/night sweats, urinary incontinence, bowel incontinence, significant weight loss, significant motor weakness and loss of sensations.    Interval HPI 04/09/2021:  Leticia Grimes presents to tele-medicine appointment for a follow-up appointment for left shoulder pain.  She was last seen for procedure on 03/02/2021 and underwent left-sided glenohumeral joint injection.  She reports that this resulted in 95% relief.  Since the last visit, Leticia Grimes states the pain has been improving. Current pain intensity is 0/10.    Initial HPI 02/01/2021:  Leticia Grimes is a 75 y.o. female who presents to the clinic for the evaluation of left shoulder pain.  She was referred by the Orthopedics Department for further evaluation and management of this pain.  She has past medical history of anxiety, hypertension, status post ICD, hyperlipidemia, GERD, osteopenia, multiple other medical comorbidities as listed in her chart.  The pain started several years ago without any inciting event or trauma and symptoms have been worsening.The pain is located in the left cervical myofascial area and radiates to the left upper extremity.  She reports that the left shoulder source of the pain however.  The pain is described as Aching, numbness, tingling, squeezing, tightness and is rated as 5/10. The pain is rated with a score of  4/10 on the BEST day and a score of 9/10 on the WORST day.  Symptoms interfere with daily activity. The pain is exacerbated by sleeping on her left side, movement of the left shoulder.  The pain is mitigated by sitting, medications, heat. The patient reports spending 2-4 hours per day reclining. The patient reports 5-7 hours of uninterrupted sleep per night.       Non-Pharmacologic Treatments:  Physical Therapy/Home Exercise:  yes  Ice/Heat:yes  TENS: no  Acupuncture: no  Massage: no  Chiropractic: no    Other: no        Pain Medications:  - Opioids: None  - Adjuvant Medications: Lorazepam, Lyrica, Diazepam, Baclofen  - Anti-Coagulants: Aspirin     Pain Procedures:   -previous cervical epidural steroid injection, moderate relief  -03/02/2021:  Left glenohumeral joint injection, 95% relief        Imaging (Reviewed on 7/12/2024):    CT Lumbar spine 11/16/2023  EXAMINATION:  CT LUMBAR SPINE WITHOUT CONTRAST     CLINICAL HISTORY:  Low back pain, symptoms persist with > 6wks conservative treatment;  Dorsalgia, unspecified     TECHNIQUE:  Low-dose axial, sagittal and coronal reformations are obtained through the lumbar spine.  Contrast was not administered.     COMPARISON:  CT abdomen and pelvis dated 09/06/2023     FINDINGS:  Alignment: There is grade 1 anterolisthesis of L4 on L5.     Vertebrae: Normal heights.  No fracture.     Posterior elements: No fractures. Facet articulations appear within normal limits.     Discs: Mild disc height loss at L4-5.     Degenerative findings:     T11-12: Minimal generalized disc bulge.  No definite spinal canal or neuroforaminal stenosis.     T12-L1:  No definite spinal canal or neuroforaminal stenosis.     L1-L2:  No definite spinal canal or neuroforaminal stenosis.     L2-L3:  No definite spinal canal or neuroforaminal stenosis.     L3-L4:  No definite spinal canal or neuroforaminal stenosis.     L4-L5: Severe bilateral facet arthropathy.  Uncovering of the intervertebral disc and mild thickening of the ligamentum flavum.  Suspect at least mild spinal canal stenosis with mild-to-moderate bilateral neuroforaminal narrowing.     L5-S1: Moderate bilateral facet arthropathy.  Small posterior disc bulge. No definite spinal canal or neuroforaminal stenosis.     Paraspinal muscles & soft tissues: There multiple bilateral renal cysts present, better characterized on previous CT.     Miscellaneous: There is  suggestion of subarticular erosive changes present at the bilateral SI joints, raising concern for sacroiliitis.     Impression:     1. Grade 1 anterolisthesis of L4 on L5 secondary to facet arthropathy.  There is at least mild suspected associated spinal canal stenosis with mild-to-moderate bilateral neural foraminal narrowing at this level.  2. Other mild degenerative findings as above  3. Findings suggesting bilateral sacroiliitis.       X-ray left shoulder 10/12/2020:  No fracture or dislocation.  Prominent glenohumeral degenerative findings present including complete joint space loss and large inferior osteophyte formation.  Mild AC joint degenerative changes.  Lung parenchyma clear.        X-ray cervical spine 12/16/2015:  No fracture or dislocation is demonstrated.  At the C5-6 level there   is some mild disc space narrowing with associated spurring.  Uncovertebral   joint spurring results in some moderate encroachment upon the neuroforamina   bilaterally at this level The odontoid appears intact and in good alignment.         PMHx,PSHx, Social history, and Family history:  I have reviewed the patient's medical, surgical, social, and family history in detail and updated the computerized patient record.        Review of patient's allergies indicates:   Allergen Reactions    Codeine     Morphine Other (See Comments)       Current Outpatient Medications   Medication Sig    acetaminophen (TYLENOL) 500 MG tablet Take 2 tablets (1,000 mg total) by mouth every 8 (eight) hours as needed for Pain.    amLODIPine (NORVASC) 5 MG tablet Take 1 tablet by mouth once daily    carvediloL (COREG) 12.5 MG tablet Take 1 tablet (12.5 mg total) by mouth 2 (two) times daily with meals.    cloNIDine (CATAPRES) 0.1 MG tablet Take 0.1 mg by mouth as needed. If SBP > 200 mmHg    ergocalciferol, vitamin D2, (VITAMIN D ORAL) Take by mouth.    gabapentin (NEURONTIN) 300 MG capsule Take 1 capsule (300 mg total) by mouth 3 (three) times  daily.    lisinopriL (PRINIVIL,ZESTRIL) 40 MG tablet Take 1 tablet (40 mg total) by mouth once daily.    LORazepam (ATIVAN) 0.5 MG tablet Take 1 tablet (0.5 mg total) by mouth nightly as needed.    meloxicam (MOBIC) 7.5 MG tablet Take 7.5 mg by mouth every morning.    minoxidiL (LONITEN) 2.5 MG tablet Take 2.5 mg by mouth once daily.    mupirocin (BACTROBAN) 2 % ointment Apply topically 3 (three) times daily.    mv-mn/folic/lutein/herbal 293 (ALIVE WOMEN'S 50 PLUS ORAL) Take 1 tablet by mouth once daily.    omega 3-dha-epa-fish oil (FISH OIL) 1,000 mg (120 mg-180 mg) Cap Take 1 capsule by mouth 2 (two) times daily with meals.    rosuvastatin (CRESTOR) 10 MG tablet Take 1 tablet by mouth once daily    tiZANidine (ZANAFLEX) 4 MG tablet Take 1/2 to 1 tab BID PRN muscle spasms. May cause drowsiness.    traZODone (DESYREL) 50 MG tablet 1 to 2 tabs po qhs prn sleep (Patient not taking: Reported on 2/20/2024)     No current facility-administered medications for this visit.     REVIEW OF SYSTEMS:    GENERAL:  No weight loss, malaise or fevers.  HEENT:   No recent changes in vision or hearing  NECK:  Negative for lumps, no difficulty with swallowing.  RESPIRATORY:  Negative for cough, wheezing or shortness of breath, patient denies any recent URI.  CARDIOVASCULAR:  Negative for chest pain, leg swelling or palpitations.  GI:  Negative for abdominal discomfort, blood in stools or black stools or change in bowel habits.  MUSCULOSKELETAL:  See HPI.  SKIN:  Negative for lesions, rash, and itching.  PSYCH:  No mood disorder or recent psychosocial stressors.  Patients sleep is not disturbed secondary to pain.  HEMATOLOGY/LYMPHOLOGY:  Negative for prolonged bleeding, bruising easily or swollen nodes.  Patient is currently taking anti-coagulants - ASA  NEURO:   No history of headaches, syncope, paralysis, seizures or tremors.  All other reviewed and negative other than HPI.    OBJECTIVE:    Telemedicine Exam  There were no vitals  filed for this visit.  There is no height or weight on file to calculate BMI.   (reviewed on 7/12/2024)     GENERAL: Well appearing, in no acute distress, alert and oriented x3.  Cooperative.  PSYCH:  Mood and affect appropriate.  SKIN: Skin color & texture with no obvious abnormalities.    HEAD/FACE:  Normocephalic, atraumatic.    PULM:  No difficulty breathing. No nasal flaring. No obvious wheezing.  EXTREMITIES: No obvious deformities. Moving all extremities well, appears to have symmetric strength throughout.  MUSCULOSKELETAL: No obvious atrophy abnormalities are noted.   NEURO: No obvious neurologic deficit.   GAIT: sitting.     Physical Exam: last in clinic visit:      GENERAL: Well appearing, in no acute distress, alert and oriented x3.    PSYCH:  Mood and affect appropriate.  SKIN: Skin color, texture, turgor normal, no rashes or lesions.  HEAD/FACE:  Normocephalic, atraumatic. Cranial nerves grossly intact.  PULM:  No difficulty breathing     Neuro/MSK:  NECK:  Minimal pain with neck flexion, extension, or lateral flexion.  Spurling's - equivocal  Back: SLR negative. No pain with lumbar ROM.  EXTREMITIES: No deformities, edema, or skin discoloration.   MUSCULOSKELETAL:    Shoulder:left  - Pain on abduction: Present  - ROM:  Decreased secondary to pain  - TTP over the AC and GH joint: Present  - Neer's: Positive   - Hawkin's: Positive     GAIT: normal. Able to tip toe/ heel walk.         ASSESSMENT: 78 y.o. year old female with left shoulder pain, consistent with     1. Lumbar radiculopathy, chronic  gabapentin (NEURONTIN) 300 MG capsule      2. Chronic left shoulder pain  gabapentin (NEURONTIN) 300 MG capsule      3. Chronic pain disorder  gabapentin (NEURONTIN) 300 MG capsule            PLAN:   - Interventions: None at this time. Patient had recent Lumbar OMAR with [facility] (Dr. Capellan).    - Anticoagulation use: yes aspirin    - Medications: I have stressed the importance of physical activity and a  home exercise plan to help with pain and improve health.  Patient can continue with medications for now since they are providing benefits, using them appropriately, and without side effects.      - - Continue Robaxin 750 mg Q8H per Dr. Capellan.     -- Previously tried Zanaflex and Baclofen    -- Continue Gabapentin 300 mg TID. Refill sent today.         -- Previously tried Lyrica (dc'd due to side effects).    - Continue Meloxicam 15 mg daily per Dr. Capellan     report:  Reviewed and consistent with medication use as prescribed.        - Therapy:  Advised patient continue with activities and exercises as tolerated.    - Imaging: Reviewed available imaging.    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 (It was recommended that she   follow up in a year with repeat US)     Continue f/u with [facility] (Dr. Capellan).    - Records: Will request records (injections, imaging, progress notes) from [facility].    - Follow up visit: 6 months or as needed.     - Counseled patient regarding the importance of activity modification and physical therapy     - This condition does not require this patient to take time off of work, and the primary goal of our Pain Management services is to improve the patient's functional capacity.     - Patient Questions: Answered all of the patient's questions regarding diagnosis, therapy, and treatment    The above plan and management options were discussed at length with patient. Patient is in agreement with the above and verbalized understanding.      Maria Alejandra Steve PA-C  Interventional Pain Management  [facility]

## 2024-07-24 ENCOUNTER — HOSPITAL ENCOUNTER (OUTPATIENT)
Dept: RADIOLOGY | Facility: HOSPITAL | Age: 79
Discharge: HOME | End: 2024-07-24
Attending: NURSE PRACTITIONER
Payer: MEDICARE

## 2024-07-24 ENCOUNTER — PATIENT MESSAGE (OUTPATIENT)
Dept: UROLOGY | Facility: CLINIC | Age: 79
End: 2024-07-24
Payer: MEDICARE

## 2024-07-24 ENCOUNTER — TELEPHONE (OUTPATIENT)
Dept: UROLOGY | Facility: CLINIC | Age: 79
End: 2024-07-24
Payer: MEDICARE

## 2024-07-24 DIAGNOSIS — N28.1 RENAL CYST: ICD-10-CM

## 2024-07-24 PROCEDURE — 76770 US EXAM ABDO BACK WALL COMP: CPT | Mod: 26,HCNC,, | Performed by: RADIOLOGY

## 2024-07-24 PROCEDURE — 76770 US EXAM ABDO BACK WALL COMP: CPT | Mod: TC,HCNC

## 2024-07-24 NOTE — TELEPHONE ENCOUNTER
.Outgoing call attempted to notify patient regarding below but no answer, left voice message with contact information letting patient know she can contact us at her earliest convenience and we will send her a "[employer] message in regards.     ----- Message from Joelle Paredes NP sent at 7/24/2024  2:38 PM CDT -----  Needs a follow-up appointment to review renal ultrasound

## 2024-08-05 ENCOUNTER — NURSE TRIAGE (OUTPATIENT)
Dept: ADMINISTRATIVE | Facility: CLINIC | Age: 79
End: 2024-08-05
Payer: MEDICARE

## 2024-08-09 ENCOUNTER — TELEPHONE (OUTPATIENT)
Dept: SPORTS MEDICINE | Facility: CLINIC | Age: 79
End: 2024-08-09
Payer: MEDICARE

## 2024-08-21 DIAGNOSIS — F41.9 ANXIETY: ICD-10-CM

## 2024-08-21 DIAGNOSIS — F51.02 INSOMNIA DUE TO STRESS: ICD-10-CM

## 2024-08-21 NOTE — TELEPHONE ENCOUNTER
No care due was identified.  [facility] Embedded Care Due Messages. Reference number: [id number].   8/21/2024 11:25:10 AM CDT

## 2024-08-26 ENCOUNTER — HOSPITAL ENCOUNTER (OUTPATIENT)
Dept: CARDIOLOGY | Facility: HOSPITAL | Age: 79
Discharge: HOME | End: 2024-08-26
Attending: INTERNAL MEDICINE
Payer: MEDICARE

## 2024-08-26 ENCOUNTER — OFFICE VISIT (OUTPATIENT)
Dept: CARDIOLOGY | Facility: CLINIC | Age: 79
End: 2024-08-26
Attending: INTERNAL MEDICINE
Payer: MEDICARE

## 2024-08-26 VITALS
DIASTOLIC BLOOD PRESSURE: 76 MMHG | BODY MASS INDEX: 31.45 KG/M2 | HEART RATE: 51 BPM | WEIGHT: 173.06 LBS | SYSTOLIC BLOOD PRESSURE: 142 MMHG | OXYGEN SATURATION: 97 %

## 2024-08-26 DIAGNOSIS — R29.90 MULTIPLE NEUROLOGICAL SYMPTOMS: ICD-10-CM

## 2024-08-26 DIAGNOSIS — R53.1 DECREASED STRENGTH, ENDURANCE, AND MOBILITY: ICD-10-CM

## 2024-08-26 DIAGNOSIS — Z86.74 HISTORY OF SUDDEN CARDIAC ARREST SUCCESSFULLY RESUSCITATED: Primary | Chronic | ICD-10-CM

## 2024-08-26 DIAGNOSIS — F51.02 ADJUSTMENT INSOMNIA: Chronic | ICD-10-CM

## 2024-08-26 DIAGNOSIS — I10 ESSENTIAL HYPERTENSION: ICD-10-CM

## 2024-08-26 DIAGNOSIS — Z86.74 HISTORY OF SUDDEN CARDIAC ARREST SUCCESSFULLY RESUSCITATED: ICD-10-CM

## 2024-08-26 DIAGNOSIS — I10 ESSENTIAL HYPERTENSION: Primary | ICD-10-CM

## 2024-08-26 DIAGNOSIS — Z95.810 ICD (IMPLANTABLE CARDIOVERTER-DEFIBRILLATOR) IN PLACE: ICD-10-CM

## 2024-08-26 DIAGNOSIS — I27.9 PULMONARY HEART DISEASE: ICD-10-CM

## 2024-08-26 DIAGNOSIS — E78.2 MIXED HYPERLIPIDEMIA: Chronic | ICD-10-CM

## 2024-08-26 DIAGNOSIS — R41.3 AMNESIA: ICD-10-CM

## 2024-08-26 DIAGNOSIS — F41.9 ANXIETY: Chronic | ICD-10-CM

## 2024-08-26 DIAGNOSIS — R68.89 DECREASED STRENGTH, ENDURANCE, AND MOBILITY: ICD-10-CM

## 2024-08-26 DIAGNOSIS — Z95.810 ICD (IMPLANTABLE CARDIOVERTER-DEFIBRILLATOR) IN PLACE: Chronic | ICD-10-CM

## 2024-08-26 DIAGNOSIS — Z74.09 DECREASED STRENGTH, ENDURANCE, AND MOBILITY: ICD-10-CM

## 2024-08-26 DIAGNOSIS — I10 ESSENTIAL HYPERTENSION: Chronic | ICD-10-CM

## 2024-08-26 LAB
OHS QRS DURATION: 90 MS
OHS QTC CALCULATION: 430 MS

## 2024-08-26 PROCEDURE — 3077F SYST BP >= 140 MM HG: CPT | Mod: CPTII,S$GLB,, | Performed by: INTERNAL MEDICINE

## 2024-08-26 PROCEDURE — 1160F RVW MEDS BY RX/DR IN RCRD: CPT | Mod: CPTII,S$GLB,, | Performed by: INTERNAL MEDICINE

## 2024-08-26 PROCEDURE — 3078F DIAST BP <80 MM HG: CPT | Mod: CPTII,S$GLB,, | Performed by: INTERNAL MEDICINE

## 2024-08-26 PROCEDURE — 1101F PT FALLS ASSESS-DOCD LE1/YR: CPT | Mod: CPTII,S$GLB,, | Performed by: INTERNAL MEDICINE

## 2024-08-26 PROCEDURE — 3288F FALL RISK ASSESSMENT DOCD: CPT | Mod: CPTII,S$GLB,, | Performed by: INTERNAL MEDICINE

## 2024-08-26 PROCEDURE — 93005 ELECTROCARDIOGRAM TRACING: CPT

## 2024-08-26 PROCEDURE — 99999 PR PBB SHADOW E&M-EST. PATIENT-LVL III: CPT | Mod: PBBFAC,,, | Performed by: INTERNAL MEDICINE

## 2024-08-26 PROCEDURE — 93010 ELECTROCARDIOGRAM REPORT: CPT | Mod: ,,, | Performed by: INTERNAL MEDICINE

## 2024-08-26 PROCEDURE — 1159F MED LIST DOCD IN RCRD: CPT | Mod: CPTII,S$GLB,, | Performed by: INTERNAL MEDICINE

## 2024-08-26 PROCEDURE — 93282 PRGRMG EVAL IMPLANTABLE DFB: CPT

## 2024-08-26 PROCEDURE — 99214 OFFICE O/P EST MOD 30 MIN: CPT | Mod: 25,S$GLB,, | Performed by: INTERNAL MEDICINE

## 2024-08-26 NOTE — PROGRESS NOTES
Subjective:   Patient ID:  Leticia Grimes is a 79 y.o. female     Chief complaint:SCD/ICD    HPI  First seen by me on 12/14/2020 upon consultation by Dr Arthur  74 yo female, left arm pain  PMH HTN, OA    H/o cardiac arrest. sent to [facility]. H/o cath normal and normal cardiac function per pt.   S/P st omid ICD on 08/05/2020.  Feels dizziness when BP high  No chest pain, dyspnea, orthopnea  Occasional weakness.   No smoking/drinking/drug  EKG today NSR PAC and fusion beats V pacing     C/o left arm pain and hand swelling for 3 days and improved today. No erythema and weakness     Reported to have no CAD and a normal heart   ECG here shows normal QT   ICD in good repair    >>  Continue as is   RTC 1 year  CXR PA and lateral  Decrease V pacing rate to 35 bpm  BP too high, working with PCP, Suggest digital BP clinic - will refer to dr Marie     Update 10/5/2021:  Did not get enrolled in Dig med  BP still variable per Epic data  icd eval today RV paced<1% - PPM set at VVI 35  Battery life 9 years  HR curve normal  Coreview - no fluid retention   Capture .75/.5     Clinically:  Doing well - no CV c/os  C/o neck and shoulder joint pains  L arm swelling has subsided        Update 06/28/2023 :  She is doing well without any cardiac complaints.  She had surgery for shoulder replacement on 05/05/2023.  During that time, the device detected I ventricular rate/VF tracings.  Analysis shows get these were due to noise/FRANCISCA detection.  I have reviewed the actual image of the ECG tracing obtained today and it shows NSR with normal intervals  >>  Doing well, no cardiac symptoms at this point.     Update 08/26/2024 :    Patient has been doing well from a CV point of view w/o c/o undue dyspnea on exertion, orthopnea, PND, leg edema, abdominal swelling chest pain, palpitations, syncope or near-syncope.    Patient's main issue relates to arthritic issues.     Patient's device (VVEV)was fully  evaluated today under my direct supervision and real-time feedback.  Summary of findings are as listed below:  Device is in good repair.   The battery is not near BOONE.  The sensing and pacing thresholds are favorable with well maintained safety margins.   There were no significant tachy-dysrhythmias noted.  There were no device data indicating possible ongoing fluid retention.    Recommendation:  Device follow up as per clinic routine with remote and in house checks  .    I have reviewed the actual image of the ECG tracing obtained today and it shows NSR with normal intervals. HR is 65.    Current Outpatient Medications   Medication Sig    acetaminophen (TYLENOL) 500 MG tablet Take 2 tablets (1,000 mg total) by mouth every 8 (eight) hours as needed for Pain.    amLODIPine (NORVASC) 5 MG tablet Take 1 tablet by mouth once daily    cloNIDine (CATAPRES) 0.1 MG tablet Take 0.1 mg by mouth as needed. If SBP > 200 mmHg    ergocalciferol, vitamin D2, (VITAMIN D ORAL) Take by mouth.    gabapentin (NEURONTIN) 300 MG capsule Take 1 capsule (300 mg total) by mouth 3 (three) times daily.    lisinopriL (PRINIVIL,ZESTRIL) 40 MG tablet Take 1 tablet (40 mg total) by mouth once daily.    LORazepam (ATIVAN) 0.5 MG tablet Take 1 tablet (0.5 mg total) by mouth nightly as needed.    minoxidiL (LONITEN) 2.5 MG tablet Take 2.5 mg by mouth once daily.    mupirocin (BACTROBAN) 2 % ointment Apply topically 3 (three) times daily.    mv-mn/folic/lutein/herbal 293 (ALIVE WOMEN'S 50 PLUS ORAL) Take 1 tablet by mouth once daily.    rosuvastatin (CRESTOR) 10 MG tablet Take 1 tablet by mouth once daily    tiZANidine (ZANAFLEX) 4 MG tablet Take 1/2 to 1 tab BID PRN muscle spasms. May cause drowsiness.    traZODone (DESYREL) 50 MG tablet 1 to 2 tabs po qhs prn sleep    carvediloL (COREG) 12.5 MG tablet Take 1 tablet (12.5 mg total) by mouth 2 (two) times daily with meals.    omega 3-dha-epa-fish oil (FISH OIL) 1,000 mg (120 mg-180 mg) Cap Take 1  capsule by mouth 2 (two) times daily with meals.     No current facility-administered medications for this visit.     Review of Systems     Constitutional: Reviewed  for decreased appetite, weight gain and weight loss.   HENT: Reviewed for nosebleeds.    Eyes:  Reviewed for blurred vision and visual disturbance.   Cardiovascular: Reviewed for chest pain, claudication, cyanosis,dyspnea on exertion, leg swelling, orthopnea,paroxysmal nocturnal dyspnearregular heartbeats, palpitations, near-syncope, and syncope.   Respiratory: Reviewed for cough, shortness of breath, wheezing, sleep disturbances due to breathing and snoring, .    Endocrine: Reviewed for heat intolerance.   Hematologic/Lymphatic: Reviewed for easy bruisability/bleeding.   Skin: Reviewed for rash.   Musculoskeletal: Reviewed for muscle weakness and myalgias.   Gastrointestinal: Reviewed for abdominal pain, anorexia, melena, nausea and vomiting.   Genitourinary: Reviewed for menorrhagia, frequency, nocturia and incontinence.   Neurological: Reviewed for excessive daytime sleepiness, dizziness, vertigo, weakness, headaches, loss of balance and seizures,   Psychiatric/Behavioral:  Reviewed for insomnia, altered mental status, depression, anxiety and nervousness.       All symptoms reviewed above were negative except for hearing loss, dependent leg edema, loss of balance with numbness and tingling, 30 complaints, depression and anxiety.     Social History     Tobacco Use   Smoking Status Never   Smokeless Tobacco Never        Objective:     Physical Exam  Vitals and nursing note reviewed.   Constitutional:       Appearance: She is well-developed. She is obese.      Comments: Overweight   HENT:      Head: Normocephalic and atraumatic.      Right Ear: External ear normal.      Left Ear: External ear normal.   Eyes:      General: No scleral icterus.        Left eye: No discharge.      Conjunctiva/sclera: Conjunctivae normal.      Left eye: Left conjunctiva is  not injected. No hemorrhage.     Pupils: Pupils are equal, round, and reactive to light.   Neck:      Thyroid: No thyromegaly.   Cardiovascular:      Rate and Rhythm: Normal rate and regular rhythm.      Pulses: Intact distal pulses.           Carotid pulses are 2+ on the right side and 2+ on the left side.       Radial pulses are 2+ on the right side and 2+ on the left side.        Dorsalis pedis pulses are 2+ on the right side and 2+ on the left side.        Posterior tibial pulses are 2+ on the right side and 2+ on the left side.      Heart sounds: Normal heart sounds. No midsystolic click and no opening snap. No murmur heard.     No friction rub. No gallop. No S3 or S4 sounds.   Pulmonary:      Effort: Pulmonary effort is normal.      Breath sounds: Normal breath sounds.   Chest:      Comments: Device pocket is in great repair.  Abdominal:      General: There is no distension.      Palpations: Abdomen is soft. Abdomen is not rigid. There is no hepatomegaly.      Tenderness: There is no abdominal tenderness. There is no guarding.      Comments: Obese abdomen   Musculoskeletal:      Cervical back: Normal range of motion and neck supple.      Right lower leg: No swelling.      Left lower leg: No swelling.      Right ankle: No swelling.      Left ankle: No swelling.   Skin:     General: Skin is warm and dry.      Findings: No rash.      Nails: There is no clubbing.   Neurological:      Mental Status: She is alert and oriented to person, place, and time.      Cranial Nerves: No cranial nerve deficit.      Gait: Gait normal.   Psychiatric:         Speech: Speech normal.         Behavior: Behavior normal.         Thought Content: Thought content normal.       BP (!) 142/76 (BP Location: Left arm, Patient Position: Sitting)   Pulse (!) 51   Wt 78.5 kg (173 lb 1 oz)   SpO2 97%   BMI 31.45 kg/m²       Results for orders placed during the hospital encounter of 09/06/23    Echo    Interpretation Summary    Left  Ventricle: The left ventricle is normal in size. Normal wall thickness. There is concentric remodeling. regional wall motion abnormalities present. Septal motion is consistent with pacing. There is normal systolic function with a visually estimated ejection fraction of 55 - 70%. Ejection fraction by visual approximation is 60%. There is normal diastolic function.    Right Ventricle: Normal right ventricular cavity size. Wall thickness is normal. rv pacer wire Right ventricle wall motion  is normal. Systolic function is normal.    Tricuspid Valve: There is mild regurgitation. There is pulmonary hypertension.    Pulmonary Artery: The estimated pulmonary artery systolic pressure is 50 mmHg.    IVC/SVC: Normal venous pressure at 3 mmHg.    WBC   Date Value Ref Range Status   2023 4.97 3.90 - 12.70 K/uL Final     Hematocrit   Date Value Ref Range Status   2023 39.5 37.0 - 48.5 % Final     Hemoglobin   Date Value Ref Range Status   2023 13.0 12.0 - 16.0 g/dL Final     Lab Results   Component Value Date     2023     Lab Results   Component Value Date    CREATININE 0.9 2023    EGFRNORACEVR >60.0 2023    K 4.3 2023     Lab Results   Component Value Date    BNP 33 2022            reports current alcohol use.  Past Medical History:   Diagnosis Date    Adjustment insomnia 2020    Anxiety 2020    GERD (gastroesophageal reflux disease)     History of sudden cardiac arrest successfully resuscitated 2020    Hypertension     ICD (implantable cardioverter-defibrillator) in place 2020    Left arm swelling 9/3/2020    Mild vitamin D deficiency 2013    Osteopenia 2020    Vitamin D deficiency disease      Past Surgical History:   Procedure Laterality Date     SECTION, CLASSIC      HYSTERECTOMY      INJECTION OF JOINT Left 2021    Procedure: Left shoulder Joint injection;  Surgeon: Main Mcgraw MD;  Location: [facility];  Service: Pain  Management;  Laterality: Left;    INSERTION OF BIVENTRICULAR IMPLANTABLE CARDIOVERTER-DEFIBRILLATOR (ICD)      REVERSE TOTAL SHOULDER ARTHROPLASTY Left 05/05/2022    Procedure: ARTHROPLASTY, SHOULDER, TOTAL, REVERSE;  Surgeon: Tomas Taylor MD;  Location: [facility];  Service: Orthopedics;  Laterality: Left;    TUBAL LIGATION       Family History   Problem Relation Name Age of Onset    Hypertension Mother Farhana     Arthritis Mother Stryker     Diabetes Father Peter     Heart disease Father Peter     Cancer Sister Thania        Assessment:   No cardiovascular issues at this point.  1. History of sudden cardiac arrest successfully resuscitated    2. ICD (implantable cardioverter-defibrillator) in place    3. Mixed hyperlipidemia    4. Multiple neurological symptoms    5. Pulmonary heart disease    6. Essential hypertension    7. Decreased strength, endurance, and mobility    8. Anxiety    9. Amnesia;Dissociative amnesia    10. Adjustment insomnia        Plan:      I discussed routine device follow up including quarterly to bi-annual device checks for device function as well as yearly follow up in the EP clinic. The patient  was advised to call with any concerns regarding their device. Device clinic follow up as scheduled. RTC 1y        No orders of the defined types were placed in this encounter.      No follow-ups on file.    There are no discontinued medications.         Medication List with Changes/Refills   Current Medications    ACETAMINOPHEN (TYLENOL) 500 MG TABLET    Take 2 tablets (1,000 mg total) by mouth every 8 (eight) hours as needed for Pain.    AMLODIPINE (NORVASC) 5 MG TABLET    Take 1 tablet by mouth once daily    CARVEDILOL (COREG) 12.5 MG TABLET    Take 1 tablet (12.5 mg total) by mouth 2 (two) times daily with meals.    CLONIDINE (CATAPRES) 0.1 MG TABLET    Take 0.1 mg by mouth as needed. If SBP > 200 mmHg    ERGOCALCIFEROL, VITAMIN D2, (VITAMIN D ORAL)    Take by mouth.    GABAPENTIN  (NEURONTIN) 300 MG CAPSULE    Take 1 capsule (300 mg total) by mouth 3 (three) times daily.    LISINOPRIL (PRINIVIL,ZESTRIL) 40 MG TABLET    Take 1 tablet (40 mg total) by mouth once daily.    LORAZEPAM (ATIVAN) 0.5 MG TABLET    Take 1 tablet (0.5 mg total) by mouth nightly as needed.    MINOXIDIL (LONITEN) 2.5 MG TABLET    Take 2.5 mg by mouth once daily.    MUPIROCIN (BACTROBAN) 2 % OINTMENT    Apply topically 3 (three) times daily.    MV-MN/FOLIC/LUTEIN/HERBAL 293 (ALIVE WOMEN'S 50 PLUS ORAL)    Take 1 tablet by mouth once daily.    OMEGA 3-DHA-EPA-FISH OIL (FISH OIL) 1,000 MG (120 MG-180 MG) CAP    Take 1 capsule by mouth 2 (two) times daily with meals.    ROSUVASTATIN (CRESTOR) 10 MG TABLET    Take 1 tablet by mouth once daily    TIZANIDINE (ZANAFLEX) 4 MG TABLET    Take 1/2 to 1 tab BID PRN muscle spasms. May cause drowsiness.    TRAZODONE (DESYREL) 50 MG TABLET    1 to 2 tabs po qhs prn sleep        This note is at least partially dictated using the M*Modal Fluency Direct word recognition program. There are word recognition mistakes that are occasionally missed on review.     Pre-visit chart review: 8 min    Face to face time: 20 min, > 50% of which spent in education    I have reviewed the actual images of all relevant ECG, rhythm, holter and long term monitoring tracings available in EPIC, MUSE  and in legacy as well as outside records.   I have reviewed the actual images of all relevant CXRays.   I have directly evaluated all relevant data pertaining to any CIED.     Post visit chart documentation and care coordination: 10 min

## 2024-08-31 LAB
OHS CV DC REMOTE DEVICE TYPE: NORMAL
OHS CV RV PACING PERCENT: 1 %

## 2024-09-02 ENCOUNTER — NURSE TRIAGE (OUTPATIENT)
Dept: ADMINISTRATIVE | Facility: CLINIC | Age: 79
End: 2024-09-02
Payer: MEDICARE

## 2024-09-02 NOTE — TELEPHONE ENCOUNTER
Patient called to inquire if she should take an additional Covid-19 immunization for the Fall/Winter 2024. Patient's last Covid-19 immunization was received on 02/22/24 at [facility].     Patient advised that a message will be sent to the office of her PCP for follow-up/call back to patient to discuss if additional Covid-19 immunization is needed at this time. Patient also advised to contact the [facility] on Call Service for any additional symptoms/questions. Patient states understanding of care advice.     Reason for Disposition   [1] Caller requesting NON-URGENT health information AND [2] PCP's office is the best resource    Additional Information   Negative: [1] Caller is not with the adult (patient) AND [2] reporting urgent symptoms   Negative: Lab result questions   Negative: Medication questions   Negative: Caller can't be reached by phone   Negative: Caller has already spoken to PCP (doctor or NP/PA) or another triager   Negative: Triager needs further essential information from caller in order to complete triage    Protocols used: Information Only Call - No Triage-[facility]

## 2024-09-05 NOTE — PROGRESS NOTES
[facility]   Physical Therapy Treatment Note     Name: Leticia Grimes  Clinic Number: [id number]    Therapy Diagnosis:   Encounter Diagnoses   Name Primary?    Gait abnormality Yes    Left hip pain     Decreased strength, endurance, and mobility        Physician: Ben Marie MD    Visit Date: 12/16/2022    Physician Orders: PT Eval and Treat   Medical Diagnosis from Referral:    Chronic bilateral low back pain with left-sided sciatica    Evaluation Date: 11/15/2022  Authorization Period Expiration: 12/30/2022  Plan of Care Expiration: 2/15/2023  Progress Note Due: 1/15/2023  Visit # / Visits authorized: 6/12 (+1 eval)  FOTO: 1/3      Precautions: Standard    PTA Visit #: 1/5     Time In: 8:30 am  Time Out: 9:15 am  Total Billable Time: 45 minutes    SUBJECTIVE     Pt reports: pain is intermittent. Feeling good yesterday but woke with pain this morning.     She was compliant with home exercise program.    Response to previous treatment: felt good after manual therapy    Functional change: continuing to be able to lay on her left side for a little longer, sleeping better    Pain: 4/10, at worst at night 6-7/10  Location: low back and left lower extremity into hamstring    OBJECTIVE     Objective Measures updated at progress report unless specified.     Treatment     Leticia received the treatments listed below:      therapeutic exercises to develop strength, endurance, ROM, flexibility, posture, and core stabilization for 25 minutes including:    Nu step with handles for endurance level 3, 5 minutes  Side lying hip abduction 10x each bilateral  Lower trunk rotations 3 minutes  Piriformis stretch 3 x 30 second hold bilateral  Sciatic neural glide 5 x ankle pumps, 5 sets with cues for technique hold      manual therapy techniques: Myofacial release and Soft tissue Mobilization were applied to the: left hip for 0 minutes, including:  Soft tissue mobilization to gluteals, piriformis,  tensor fasciae latae and obturators/gemelli muscle's.  (-) SI provocation today    Leticia participated in neuromuscular re-education activities to improve: Balance, Coordination, Proprioception, and Posture for 20 minutes. The following activities were included:    Bridges with abdominal brace 3 x 10  Supine hip abduction with green band 3 minutes alternating 1 lower extremity at a time  Supine ball squeezes 3 minutes; 5 second holds  Sit to stands 10x/ 2 sets no hands tan mat 5 pound kettle bell  held today  Standing hip abduction 10x each held today  Straight leg raise with abdominal brace 3 x 8 bilateral      Patient Education and Home Exercises     Home Exercises Provided and Patient Education Provided     Education provided: 11/21/2022: added home exercise program to my chart    Written Home Exercises Provided: Patient instructed to cont prior HEP. Exercises were reviewed and Leticia was able to demonstrate them prior to the end of the session.  Leticia demonstrated good  understanding of the education provided. See EMR under Patient Instructions for exercises provided during therapy sessions        ASSESSMENT     Patient recently has experienced peripheralization of symptoms but tolerates all exercises with good quality and was able to be progressed with exercises (see above for details). She reports decreased symptoms by the end of this session.    Leticia Is progressing well towards her goals.   Pt prognosis is Good.     Pt will continue to benefit from skilled outpatient physical therapy to address the deficits listed in the problem list box on initial evaluation, provide pt/family education and to maximize pt's level of independence in the home and community environment.     Pt's spiritual, cultural and educational needs considered and pt agreeable to plan of care and goals.     Anticipated barriers to physical therapy: osteopenia, HTN, anxiety, defibrillator placement, MI, left total shoulder  arthroplasty    Goals:   Reviewed: 12/16/2022     Short Term Goals: In 4 weeks   1.Patient to be educated on HEP. Ongoing 12/13/2022  2. Patient  to increase lumbar ROM to WFL's without pain, in order to improve available range of motion for ADL's.  12/13/2022 Progressing  3. Patient  to increase hip AROM to ER 70 deg, in order to assist with more normalized gait pattern. 12/13/2022 Not tested  4. Patient  to increase B LE strength to 4-/5, in order to perform gait without compensation 12/13/2022 MET  5. Patient   to have pain less than 2/10 at worst, to improve QOL. 12/13/2022 progressing  6. Patient  to improve score on the FOTO, to improve QOL. 12/13/2022 Met   7. Patient  to be educated on postural/body mechanics awareness. 12/13/2022 ongoing     Long Term Goals: In 8 weeks  1.Patient to improve score on the FOTO to improve QOL. 12/13/2022 MET  2. Patient to demo increase in LE strength to 4/5, n order to perform gait without compensation 12/13/2022 progressing  3. Patient to have decreased pain to 2/10 at worst, to improve QOL. 12/13/2022 progressing  4. Patient to demo increase lumbar ROM to B Rot 100% and left sidebending 30 deg, in order to improve available range of motion for ADL's.  12/13/2022 progressing  5. Patient to increase hip AROM to ER 70 deg, in order to assist with more normalized gait pattern. 12/13/2022 progressing  6. Patient to perform daily activities including sleeping, ascending/descending stairs, squatting without increased symptoms. 12/13/2022 progressing    PLAN     Plan of care Certification: 11/15/2022 to 2/15/2023.    Continue Plan of Care (POC) and progress per patient tolerance.     Isaac Morel, PTA               Admission Reconciliation is Completed  Discharge Reconciliation is Not Complete Admission Reconciliation is Completed  Discharge Reconciliation is Completed

## 2024-09-06 ENCOUNTER — CLINICAL SUPPORT (OUTPATIENT)
Dept: CARDIOLOGY | Facility: HOSPITAL | Age: 79
End: 2024-09-06
Payer: MEDICARE

## 2024-09-06 ENCOUNTER — CLINICAL SUPPORT (OUTPATIENT)
Dept: CARDIOLOGY | Facility: HOSPITAL | Age: 79
End: 2024-09-06
Attending: INTERNAL MEDICINE
Payer: MEDICARE

## 2024-09-06 DIAGNOSIS — Z95.810 PRESENCE OF AUTOMATIC (IMPLANTABLE) CARDIAC DEFIBRILLATOR: ICD-10-CM

## 2024-09-06 PROCEDURE — 93296 REM INTERROG EVL PM/IDS: CPT | Performed by: INTERNAL MEDICINE

## 2024-09-06 PROCEDURE — 93295 DEV INTERROG REMOTE 1/2/MLT: CPT | Mod: S$GLB,,, | Performed by: INTERNAL MEDICINE

## 2024-09-13 RX ORDER — LORAZEPAM 0.5 MG/1
0.5 TABLET ORAL NIGHTLY PRN
Qty: 15 TABLET | Refills: 0 | Status: SHIPPED | OUTPATIENT
Start: 2024-09-13

## 2024-09-18 LAB
OHS CV DC REMOTE DEVICE TYPE: NORMAL
OHS CV RV PACING PERCENT: 1 %

## 2024-09-30 ENCOUNTER — OFFICE VISIT (OUTPATIENT)
Dept: CARDIOLOGY | Facility: CLINIC | Age: 79
End: 2024-09-30
Payer: MEDICARE

## 2024-09-30 VITALS
OXYGEN SATURATION: 98 % | HEIGHT: 62 IN | BODY MASS INDEX: 31.74 KG/M2 | SYSTOLIC BLOOD PRESSURE: 132 MMHG | DIASTOLIC BLOOD PRESSURE: 80 MMHG | WEIGHT: 172.5 LBS | HEART RATE: 67 BPM

## 2024-09-30 DIAGNOSIS — I27.9 PULMONARY HEART DISEASE: ICD-10-CM

## 2024-09-30 DIAGNOSIS — I25.10 CORONARY ARTERY CALCIFICATION: Primary | ICD-10-CM

## 2024-09-30 DIAGNOSIS — E78.1 HYPERTRIGLYCERIDEMIA: ICD-10-CM

## 2024-09-30 DIAGNOSIS — E78.2 MIXED HYPERLIPIDEMIA: Chronic | ICD-10-CM

## 2024-09-30 DIAGNOSIS — I10 ESSENTIAL HYPERTENSION: Chronic | ICD-10-CM

## 2024-09-30 DIAGNOSIS — Z95.810 ICD (IMPLANTABLE CARDIOVERTER-DEFIBRILLATOR) IN PLACE: Chronic | ICD-10-CM

## 2024-09-30 DIAGNOSIS — Z86.74 HISTORY OF SUDDEN CARDIAC ARREST SUCCESSFULLY RESUSCITATED: Chronic | ICD-10-CM

## 2024-09-30 PROCEDURE — 1160F RVW MEDS BY RX/DR IN RCRD: CPT | Mod: CPTII,S$GLB,, | Performed by: INTERNAL MEDICINE

## 2024-09-30 PROCEDURE — 3288F FALL RISK ASSESSMENT DOCD: CPT | Mod: CPTII,S$GLB,, | Performed by: INTERNAL MEDICINE

## 2024-09-30 PROCEDURE — 1126F AMNT PAIN NOTED NONE PRSNT: CPT | Mod: CPTII,S$GLB,, | Performed by: INTERNAL MEDICINE

## 2024-09-30 PROCEDURE — 1101F PT FALLS ASSESS-DOCD LE1/YR: CPT | Mod: CPTII,S$GLB,, | Performed by: INTERNAL MEDICINE

## 2024-09-30 PROCEDURE — 1159F MED LIST DOCD IN RCRD: CPT | Mod: CPTII,S$GLB,, | Performed by: INTERNAL MEDICINE

## 2024-09-30 PROCEDURE — 99214 OFFICE O/P EST MOD 30 MIN: CPT | Mod: S$GLB,,, | Performed by: INTERNAL MEDICINE

## 2024-09-30 PROCEDURE — 99999 PR PBB SHADOW E&M-EST. PATIENT-LVL IV: CPT | Mod: PBBFAC,,, | Performed by: INTERNAL MEDICINE

## 2024-09-30 PROCEDURE — 3079F DIAST BP 80-89 MM HG: CPT | Mod: CPTII,S$GLB,, | Performed by: INTERNAL MEDICINE

## 2024-09-30 PROCEDURE — 3075F SYST BP GE 130 - 139MM HG: CPT | Mod: CPTII,S$GLB,, | Performed by: INTERNAL MEDICINE
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
## 2024-11-01 ENCOUNTER — TELEPHONE (OUTPATIENT)
Dept: PAIN MEDICINE | Facility: CLINIC | Age: 79
End: 2024-11-01
Payer: MEDICARE

## 2024-11-01 ENCOUNTER — OFFICE VISIT (OUTPATIENT)
Dept: PAIN MEDICINE | Facility: CLINIC | Age: 79
End: 2024-11-01
Payer: MEDICARE

## 2024-11-01 VITALS
DIASTOLIC BLOOD PRESSURE: 68 MMHG | BODY MASS INDEX: 31.6 KG/M2 | WEIGHT: 171.75 LBS | HEART RATE: 63 BPM | HEIGHT: 62 IN | SYSTOLIC BLOOD PRESSURE: 117 MMHG

## 2024-11-01 DIAGNOSIS — M54.2 CERVICALGIA: ICD-10-CM

## 2024-11-01 DIAGNOSIS — M54.12 CERVICAL RADICULOPATHY: Primary | ICD-10-CM

## 2024-11-01 PROCEDURE — 99999 PR PBB SHADOW E&M-EST. PATIENT-LVL III: CPT | Mod: PBBFAC,,, | Performed by: PHYSICIAN ASSISTANT

## 2024-11-04 ENCOUNTER — PATIENT MESSAGE (OUTPATIENT)
Dept: ADMINISTRATIVE | Facility: OTHER | Age: 79
End: 2024-11-04
Payer: MEDICARE

## 2024-11-06 ENCOUNTER — TELEPHONE (OUTPATIENT)
Dept: PAIN MEDICINE | Facility: CLINIC | Age: 79
End: 2024-11-06
Payer: MEDICARE

## 2024-11-06 NOTE — TELEPHONE ENCOUNTER
Returned call to patient.  She informed me that she had information that JAVIER Steve needed.   Lab work was done in May.  [facility]  fax number [phone].  Will forward message to provider.

## 2024-11-06 NOTE — TELEPHONE ENCOUNTER
----- Message from Shannen sent at 11/6/2024 11:56 AM CST -----  Contact: pt  Type:  Request a callback from Maria Alejandra Steve     Name of Caller: pt  Best Call Back Number: [phone]  Additional Information:  pt is calling with information that the provider needed from her.  Please call pt back

## 2024-11-06 NOTE — PRE-PROCEDURE INSTRUCTIONS
Spoke with patient regarding procedure scheduled on 11.19     Arrival time 0730     Has patient been sick with fever or on antibiotics within the last 7 days? No     Does the patient have any open wounds, sores or rashes? No     Does the patient have any recent fractures? no     Has patient received a vaccination within the last 7 days? No     Received the COVID vaccination? yes     Has the patient stopped all medications as directed? na     Does patient have a pacemaker, defibrillator, or implantable stimulator? aicd     Does the patient have a ride to and from procedure and someone reliable to remain with patient?  daughter     Is the patient diabetic? no     Does the patient have sleep apnea? Or use O2 at home? no     Is the patient receiving sedation? yes     Is the patient instructed to remain NPO beginning at midnight the night before their procedure? yes     Procedure location confirmed with patient? Yes     Covid- Denies signs/symptoms. Instructed to notify PAT/MD if any changes.

## 2024-11-11 ENCOUNTER — TELEPHONE (OUTPATIENT)
Dept: PAIN MEDICINE | Facility: CLINIC | Age: 79
End: 2024-11-11
Payer: MEDICARE

## 2024-11-11 NOTE — TELEPHONE ENCOUNTER
----- Message from [employer] sent at 11/11/2024  9:18 AM CST -----  Contact: Letiica  Type:  Needs Medical Advice    Who Called: Leticia  Symptoms (please be specific): /   How long has patient had these symptoms:  /  Pharmacy name and phone #:  /  Would the patient rather a call back or a response via [facility]? call  Best Call Back Number: [phone]   Additional Information: procedure questions

## 2024-11-11 NOTE — TELEPHONE ENCOUNTER
Tried returning the patient phone call but no answer. The phone just kept ringing     Doreen MAGALLANES ([facility])

## 2024-11-12 ENCOUNTER — TELEPHONE (OUTPATIENT)
Dept: PAIN MEDICINE | Facility: CLINIC | Age: 79
End: 2024-11-12
Payer: MEDICARE

## 2024-11-12 NOTE — TELEPHONE ENCOUNTER
Returned call to patient, she wants to know if Maria Alejandra Steve PA-C received lab work from [place] for medication increase.  Forward message to Mrs. Steve

## 2024-11-12 NOTE — TELEPHONE ENCOUNTER
----- Message from Vince sent at 11/12/2024  9:11 AM CST -----  Contact: [phone]  Type:  Patient Returning Call    Who Called:JUNIOR HERNÁNDEZ [[id number]]  Who Left Message for Patient:  Does the patient know what this is regarding?:patient have been calling , she have a question for you she is having surgery on the 19th  Would the patient rather a call back or a response [facility]? Call back  Best Call Back Number: [phone]  Additional Information: [facility] [id number]

## 2024-11-12 NOTE — TELEPHONE ENCOUNTER
----- Message from Vince sent at 11/12/2024  9:11 AM CST -----  Contact: [phone]  Type:  Patient Returning Call    Who Called:JUNIOR HERNÁNDEZ [[id number]]  Who Left Message for Patient:  Does the patient know what this is regarding?:patient have been calling , she have a question for you she is having surgery on the 19th  Would the patient rather a call back or a [facility]? Call back  Best Call Back Number: [phone]  Additional Information: [facility] [id number]

## 2024-11-18 ENCOUNTER — CLINICAL SUPPORT (OUTPATIENT)
Dept: AUDIOLOGY | Facility: CLINIC | Age: 79
End: 2024-11-18
Payer: MEDICARE

## 2024-11-18 DIAGNOSIS — H90.3 SENSORINEURAL HEARING LOSS OF BOTH EARS: Primary | ICD-10-CM

## 2024-11-18 NOTE — PROGRESS NOTES
Leticia Grimes was seen 11/18/2024 for hearing aid adjustment.  She complains of some days hearing well from the aids and other days not hearing well. Yesterday she heard well with the aids; today she is having a hard time. She is also having increased sinus and ear pressure during those days. She arrives today with nasal congestion. She is concerned if she is cleaning aids well.   She is using small open dome R/L rather than cap dome.  with lock R/L.     Hearing aids were clean and checked and found to be in good working order. Cerushield and small open domes were replaced - re-instructed patient. Increased MF gain to patient volume tolerance for speech clarity. Reassured she is cleaning aids well. Provided her small open domes and pack of cerushield today. Return as needed.

## 2024-11-19 ENCOUNTER — HOSPITAL ENCOUNTER (OUTPATIENT)
Facility: HOSPITAL | Age: 79
Discharge: HOME | End: 2024-11-19
Attending: PHYSICAL MEDICINE & REHABILITATION | Admitting: PHYSICAL MEDICINE & REHABILITATION
Payer: MEDICARE

## 2024-11-19 VITALS
BODY MASS INDEX: 32.11 KG/M2 | RESPIRATION RATE: 14 BRPM | HEIGHT: 62 IN | TEMPERATURE: 98 F | HEART RATE: 59 BPM | SYSTOLIC BLOOD PRESSURE: 125 MMHG | OXYGEN SATURATION: 96 % | DIASTOLIC BLOOD PRESSURE: 58 MMHG | WEIGHT: 174.5 LBS

## 2024-11-19 DIAGNOSIS — M54.12 CERVICAL RADICULOPATHY: Primary | ICD-10-CM

## 2024-11-19 PROCEDURE — 62321 NJX INTERLAMINAR CRV/THRC: CPT | Mod: ,,, | Performed by: PHYSICAL MEDICINE & REHABILITATION

## 2024-11-19 PROCEDURE — 25500020 PHARM REV CODE 255: Performed by: PHYSICAL MEDICINE & REHABILITATION

## 2024-11-19 PROCEDURE — 63600175 PHARM REV CODE 636 W HCPCS: Performed by: PHYSICAL MEDICINE & REHABILITATION

## 2024-11-19 PROCEDURE — 62321 NJX INTERLAMINAR CRV/THRC: CPT | Performed by: PHYSICAL MEDICINE & REHABILITATION

## 2024-11-19 RX ORDER — MIDAZOLAM HYDROCHLORIDE 1 MG/ML
INJECTION INTRAMUSCULAR; INTRAVENOUS
Status: DISCONTINUED | OUTPATIENT
Start: 2024-11-19 | End: 2024-11-19 | Stop reason: HOSPADM

## 2024-11-19 RX ORDER — DEXAMETHASONE SODIUM PHOSPHATE 10 MG/ML
INJECTION INTRAMUSCULAR; INTRAVENOUS
Status: DISCONTINUED | OUTPATIENT
Start: 2024-11-19 | End: 2024-11-19 | Stop reason: HOSPADM

## 2024-11-19 RX ORDER — FENTANYL CITRATE 50 UG/ML
INJECTION, SOLUTION INTRAMUSCULAR; INTRAVENOUS
Status: DISCONTINUED | OUTPATIENT
Start: 2024-11-19 | End: 2024-11-19 | Stop reason: HOSPADM

## 2024-11-19 RX ORDER — ONDANSETRON HYDROCHLORIDE 2 MG/ML
4 INJECTION, SOLUTION INTRAVENOUS ONCE AS NEEDED
Status: DISCONTINUED | OUTPATIENT
Start: 2024-11-19 | End: 2024-11-19 | Stop reason: HOSPADM

## 2024-11-19 RX ORDER — BUPIVACAINE HYDROCHLORIDE 2.5 MG/ML
INJECTION, SOLUTION EPIDURAL; INFILTRATION; INTRACAUDAL
Status: DISCONTINUED | OUTPATIENT
Start: 2024-11-19 | End: 2024-11-19 | Stop reason: HOSPADM

## 2024-11-19 NOTE — OP NOTE
Cervical Interlaminar Epidural Steroid Injection under Fluoroscopic Guidance.   Time-out taken to identify patient and procedure prior to starting the procedure.     Date of Procedure: 11/19/2024    PROCEDURE: Cervical Interlaminar epidural steroid injection C6-7 under fluoroscopic guidance.     Pre-Op diagnosis: Cervicalgia [M54.2]  Cervical radiculopathy [M54.12]    Post-Op diagnosis: Cervicalgia [M54.2]  Cervical radiculopathy [M54.12]    PHYSICIAN: Main Mcgraw MD    ASSISTANTS: None     SEDATION: Conscious sedation provided by M.D    The patient was monitored with continuous pulse oximetry, EKG, and intermittent blood pressure monitors, immediately prior to administration of sedation.  The patient was hemodynamically stable throughout the entire process was responsive to voice, and breathing spontaneously.  Supplemental O2 was provided at 2L/min via nasal cannula.  Patient was comfortable for the duration of the procedure.     There was a total of 2mg IV Midazolam and 50mcg Fentanyl titrated for the procedure    Total sedation time was >10minutes and <20minutes      MEDICATIONS INJECTED: Preservative-free Decadron 10 mg with 1mL of sterile 0.25%Bupivicaine and 3ml of preservative free normal saline.     LOCAL ANESTHETIC INJECTED: Xylocaine 1% 3mL    ESTIMATED BLOOD LOSS: none.     COMPLICATIONS: none.     TECHNIQUE: With the patient laying in a prone position, the area was prepped and draped in the usual sterile fashion using ChloraPrep and a fenestrated drape. Local anesthetic was given using a 27-gauge needle by raising a wheal and going down to the hub of the needle over the C6-7 interlaminar space.  The interlaminar space was then approached with a 3.5 inch 18-gauge Touhy needle was introduced under fluoroscopic guidance in the AP and Lateral view. Once the Ligamentum flavum was encountered loss of resistance to saline was used to enter the epidural space. With positive loss of resistance and negative  CSF or Blood, 2mL contrast dye Omnipaque (300mg/ml) was injected to confirm placement and there was no vascular runoff. The medication was then injected slowly. The patient tolerated the procedure well.       The patient was monitored after the procedure.   They were given post-procedure and discharge instructions to follow at home.  The patient was discharged in a stable condition.

## 2024-11-19 NOTE — H&P
HPI  Patient presenting for Procedure(s) (LRB):  Cervical C6/7 Interlaminar OMAR (N/A)     No health changes since previous encounter    Past Medical History:   Diagnosis Date    Adjustment insomnia 2020    Anxiety 2020    GERD (gastroesophageal reflux disease)     History of sudden cardiac arrest successfully resuscitated 2020    Hypertension     ICD (implantable cardioverter-defibrillator) in place 2020    Left arm swelling 9/3/2020    Mild vitamin D deficiency 2013    Osteopenia 2020    Vitamin D deficiency disease      Past Surgical History:   Procedure Laterality Date     SECTION, CLASSIC      HYSTERECTOMY      INJECTION OF JOINT Left 2021    Procedure: Left shoulder Joint injection;  Surgeon: Main Mcgraw MD;  Location: [facility];  Service: Pain Management;  Laterality: Left;    INSERTION OF BIVENTRICULAR IMPLANTABLE CARDIOVERTER-DEFIBRILLATOR (ICD)      REVERSE TOTAL SHOULDER ARTHROPLASTY Left 2022    Procedure: ARTHROPLASTY, SHOULDER, TOTAL, REVERSE;  Surgeon: Tomas Taylor MD;  Location[facility];  Service: Orthopedics;  Laterality: Left;    TUBAL LIGATION       Review of patient's allergies indicates:   Allergen Reactions    Codeine     Morphine Other (See Comments)        No current facility-administered medications on file prior to encounter.     Current Outpatient Medications on File Prior to Encounter   Medication Sig Dispense Refill    carvediloL (COREG) 12.5 MG tablet Take 1 tablet (12.5 mg total) by mouth 2 (two) times daily with meals. 180 tablet 0    hydroCHLOROthiazide (HYDRODIURIL) 12.5 MG Tab Take 1 tablet (12.5 mg total) by mouth once daily. 90 tablet 1    lisinopriL (PRINIVIL,ZESTRIL) 40 MG tablet Take 1 tablet (40 mg total) by mouth once daily. 90 tablet 1    minoxidiL (LONITEN) 2.5 MG tablet Take 2.5 mg by mouth once daily.      rosuvastatin (CRESTOR) 10 MG tablet Take 1 tablet by mouth once daily 90 tablet 1    acetaminophen  "(TYLENOL) 500 MG tablet Take 2 tablets (1,000 mg total) by mouth every 8 (eight) hours as needed for Pain. 60 tablet 0    cloNIDine (CATAPRES) 0.1 MG tablet Take 0.1 mg by mouth as needed. If SBP > 200 mmHg      ergocalciferol, vitamin D2, (VITAMIN D ORAL) Take by mouth.      gabapentin (NEURONTIN) 300 MG capsule Take 1 capsule (300 mg total) by mouth 3 (three) times daily. 90 capsule 5    LORazepam (ATIVAN) 0.5 MG tablet TAKE 1 TABLET BY MOUTH AT BEDTIME AS NEEDED 15 tablet 0    mupirocin (BACTROBAN) 2 % ointment Apply topically 3 (three) times daily. 30 g 1    mv-mn/folic/lutein/herbal 293 (ALIVE WOMEN'S 50 PLUS ORAL) Take 1 tablet by mouth once daily.      omega 3-dha-epa-fish oil (FISH OIL) 1,000 mg (120 mg-180 mg) Cap Take 1 capsule by mouth 2 (two) times daily with meals. (Patient not taking: Reported on 11/1/2024) 180 capsule 3    tiZANidine (ZANAFLEX) 4 MG tablet Take 1/2 to 1 tab BID PRN muscle spasms. May cause drowsiness. 60 tablet 2    traZODone (DESYREL) 50 MG tablet 1 to 2 tabs po qhs prn sleep 30 tablet 0        PMHx, PSHx, Allergies, Medications reviewed in epic    ROS negative except pain complaints in HPI    OBJECTIVE:    /60 (BP Location: Right arm, Patient Position: Sitting)   Pulse 68   Temp 97.5 °F (36.4 °C) (Temporal)   Resp 16   Ht 5' 1.5" (1.562 m)   Wt 79.2 kg (174 lb 7.9 oz)   SpO2 98%   Breastfeeding No   BMI 32.44 kg/m²     PHYSICAL EXAMINATION:    GENERAL: Well appearing, in no acute distress, alert and oriented x3.  PSYCH:  Mood and affect appropriate.  SKIN: Skin color, texture, turgor normal, no rashes or lesions which will impact the procedure.  CV: RRR with palpation of the radial artery.  PULM: No evidence of respiratory difficulty, symmetric chest rise. Clear to auscultation.  NEURO: Cranial nerves grossly intact.    Plan:    Proceed with procedure as planned Procedure(s) (LRB):  Cervical C6/7 Interlaminar OMAR (N/A)    Main Mcgraw MD  11/19/2024            "

## 2024-11-19 NOTE — PLAN OF CARE
Pt in room being prepped for procedure. Pt unsure of procedure being performed and wants to talk to Dr Mcgraw before proceeding with IV start and procedure.

## 2024-11-19 NOTE — DISCHARGE SUMMARY
Discharge Note  Short Stay      SUMMARY     Admit Date: 11/19/2024    Attending Physician: Main Mcgraw MD        Discharge Physician: Main Mcgraw MD        Discharge Date: 11/19/2024 9:24 AM    Procedure(s) (LRB):  Cervical C6/7 Interlaminar OMAR (N/A)    Final Diagnosis: Cervicalgia [M54.2]  Cervical radiculopathy [M54.12]    Disposition: Home or self care    Patient Instructions:   Current Discharge Medication List        CONTINUE these medications which have NOT CHANGED    Details   carvediloL (COREG) 12.5 MG tablet Take 1 tablet (12.5 mg total) by mouth 2 (two) times daily with meals.  Qty: 180 tablet, Refills: 0    Comments: .  Associated Diagnoses: Essential hypertension      hydroCHLOROthiazide (HYDRODIURIL) 12.5 MG Tab Take 1 tablet (12.5 mg total) by mouth once daily.  Qty: 90 tablet, Refills: 1    Comments: .  Associated Diagnoses: Essential hypertension      lisinopriL (PRINIVIL,ZESTRIL) 40 MG tablet Take 1 tablet (40 mg total) by mouth once daily.  Qty: 90 tablet, Refills: 1    Comments: .  Associated Diagnoses: Essential hypertension      minoxidiL (LONITEN) 2.5 MG tablet Take 2.5 mg by mouth once daily.      rosuvastatin (CRESTOR) 10 MG tablet Take 1 tablet by mouth once daily  Qty: 90 tablet, Refills: 1    Associated Diagnoses: Mixed hyperlipidemia      acetaminophen (TYLENOL) 500 MG tablet Take 2 tablets (1,000 mg total) by mouth every 8 (eight) hours as needed for Pain.  Qty: 60 tablet, Refills: 0      cloNIDine (CATAPRES) 0.1 MG tablet Take 0.1 mg by mouth as needed. If SBP > 200 mmHg      ergocalciferol, vitamin D2, (VITAMIN D ORAL) Take by mouth.      gabapentin (NEURONTIN) 300 MG capsule Take 1 capsule (300 mg total) by mouth 3 (three) times daily.  Qty: 90 capsule, Refills: 5    Associated Diagnoses: Lumbar radiculopathy, chronic; Chronic left shoulder pain; Chronic pain disorder      LORazepam (ATIVAN) 0.5 MG tablet TAKE 1 TABLET BY MOUTH AT BEDTIME AS NEEDED  Qty: 15 tablet,  Refills: 0    Associated Diagnoses: Anxiety; Insomnia due to stress      mupirocin (BACTROBAN) 2 % ointment Apply topically 3 (three) times daily.  Qty: 30 g, Refills: 1      mv-mn/folic/lutein/herbal 293 (ALIVE WOMEN'S 50 PLUS ORAL) Take 1 tablet by mouth once daily.      omega 3-dha-epa-fish oil (FISH OIL) 1,000 mg (120 mg-180 mg) Cap Take 1 capsule by mouth 2 (two) times daily with meals.  Qty: 180 capsule, Refills: 3    Associated Diagnoses: Mixed hyperlipidemia      tiZANidine (ZANAFLEX) 4 MG tablet Take 1/2 to 1 tab BID PRN muscle spasms. May cause drowsiness.  Qty: 60 tablet, Refills: 2    Associated Diagnoses: Lumbar radiculopathy, chronic; Chronic left shoulder pain; Chronic pain disorder      traZODone (DESYREL) 50 MG tablet 1 to 2 tabs po qhs prn sleep  Qty: 30 tablet, Refills: 0    Associated Diagnoses: Insomnia, unspecified type                 Discharge Diagnosis: Cervicalgia [M54.2]  Cervical radiculopathy [M54.12]  Condition on Discharge: Stable with no complications to procedure   Diet on Discharge: Same as before.  Activity: as per instruction sheet.  Discharge to: Home with a responsible adult.  Follow up: 2-4 weeks       Please call the office at [phone] if you experience any weakness or loss of sensation, fever > 101.5, pain uncontrolled with oral medications, persistent nausea/vomiting/or diarrhea, redness or drainage from the incisions, or any other worrisome concerns. If physician on call was not reached or could not communicate with our office for any reason please go to the nearest emergency department

## 2024-11-19 NOTE — DISCHARGE INSTRUCTIONS

## 2024-11-20 ENCOUNTER — TELEPHONE (OUTPATIENT)
Dept: PAIN MEDICINE | Facility: CLINIC | Age: 79
End: 2024-11-20
Payer: MEDICARE

## 2024-11-20 NOTE — TELEPHONE ENCOUNTER
----- Message from [employer] sent at 11/20/2024 12:34 PM CST -----  Contact: Leticia  Type:  Needs Medical Advice    Who Called: Leticia  Symptoms (please be specific): /   How long has patient had these symptoms:  /  Pharmacy name and phone #:  /  Would the patient rather a call back or a response via [facility]? call  Best Call Back Number: [phone]   Additional Information: post op questions

## 2024-11-20 NOTE — TELEPHONE ENCOUNTER
I returned the patient phone call letting her know that she can take tylenol since she had her procedure done. Patient stated she has a headache, I told her its probably the anesthesia wearing off. Patient does understands.    Doreen MAGALLANES ([place])

## 2024-11-20 NOTE — TELEPHONE ENCOUNTER
----- Message from Janell Birmingham sent at 7/26/2023  9:09 AM CDT -----  Contact: Self  .Type:  Same Day Appointment Request    Caller is requesting a same day appointment.  Caller declined first available appointment listed below.    Name of Caller:Leticia  When is the first available appointment?8/1/2023  Symptoms:Extreme leg pain   Best Call Back Number:[phone]   Additional Information: Patient cant walk       
Reach out to pt from the message. Pt reported that she has leg pain and she cant walk. Pt reported that  office called and she has an appt today with them. All question answered.   
Render In Strict Bullet Format?: No
Detail Level: Zone
Discontinue Regimen: Terbinafine PO as directed.

## 2024-12-06 ENCOUNTER — CLINICAL SUPPORT (OUTPATIENT)
Dept: CARDIOLOGY | Facility: HOSPITAL | Age: 79
End: 2024-12-06
Attending: INTERNAL MEDICINE
Payer: MEDICARE

## 2024-12-06 DIAGNOSIS — Z95.810 PRESENCE OF AUTOMATIC (IMPLANTABLE) CARDIAC DEFIBRILLATOR: ICD-10-CM

## 2024-12-06 PROCEDURE — 93296 REM INTERROG EVL PM/IDS: CPT | Performed by: INTERNAL MEDICINE

## 2024-12-06 PROCEDURE — 93295 DEV INTERROG REMOTE 1/2/MLT: CPT | Mod: S$GLB,,, | Performed by: INTERNAL MEDICINE

## 2024-12-09 LAB
OHS CV DC REMOTE DEVICE TYPE: NORMAL
OHS CV RV PACING PERCENT: 1 %

## 2024-12-12 ENCOUNTER — NURSE TRIAGE (OUTPATIENT)
Dept: ADMINISTRATIVE | Facility: CLINIC | Age: 79
End: 2024-12-12
Payer: MEDICARE

## 2024-12-12 NOTE — TELEPHONE ENCOUNTER
Pt reports dizziness that started this morning and has since subsided. Pt reports when she was having dizziness her BP was 187 systolic. BP has decreased since and is currently 131/78 and HR 36. Pt denies current dizziness, SOB, chest pain. Care advice to call  now. Pt verbalizes understanding.   Reason for Disposition   Heart beating < 50 beats per minute OR > 140 beats per minute    Additional Information   Negative: SEVERE difficulty breathing (e.g., struggling for each breath, speaks in single words)   Negative: Shock suspected (e.g., cold/pale/clammy skin, too weak to stand, low BP, rapid pulse)   Negative: Difficult to awaken or acting confused (e.g., disoriented, slurred speech)   Negative: Fainted, and still feels dizzy afterwards   Negative: Overdose (accidental or intentional) of medications   Negative: New neurologic deficit that is present now: * Weakness of the face, arm, or leg on one side of the body * Numbness of the face, arm, or leg on one side of the body * Loss of speech or garbled speech    Protocols used: Dizziness-A-OH

## 2024-12-13 ENCOUNTER — OFFICE VISIT (OUTPATIENT)
Dept: PAIN MEDICINE | Facility: CLINIC | Age: 79
End: 2024-12-13
Payer: MEDICARE

## 2024-12-13 VITALS
BODY MASS INDEX: 31.52 KG/M2 | WEIGHT: 171.31 LBS | HEIGHT: 62 IN | SYSTOLIC BLOOD PRESSURE: 145 MMHG | DIASTOLIC BLOOD PRESSURE: 69 MMHG | HEART RATE: 68 BPM

## 2024-12-13 DIAGNOSIS — M54.2 CERVICALGIA: Primary | ICD-10-CM

## 2024-12-13 DIAGNOSIS — M54.12 CERVICAL RADICULOPATHY: ICD-10-CM

## 2024-12-13 DIAGNOSIS — M54.6 PAIN IN THORACIC SPINE: ICD-10-CM

## 2024-12-13 DIAGNOSIS — M54.6 THORACIC SPINE PAIN: ICD-10-CM

## 2024-12-13 DIAGNOSIS — M48.02 SPINAL STENOSIS, CERVICAL REGION: ICD-10-CM

## 2024-12-13 PROCEDURE — 1101F PT FALLS ASSESS-DOCD LE1/YR: CPT | Mod: CPTII,S$GLB,, | Performed by: PHYSICIAN ASSISTANT

## 2024-12-13 PROCEDURE — 3288F FALL RISK ASSESSMENT DOCD: CPT | Mod: CPTII,S$GLB,, | Performed by: PHYSICIAN ASSISTANT

## 2024-12-13 PROCEDURE — 1126F AMNT PAIN NOTED NONE PRSNT: CPT | Mod: CPTII,S$GLB,, | Performed by: PHYSICIAN ASSISTANT

## 2024-12-13 PROCEDURE — 99999 PR PBB SHADOW E&M-EST. PATIENT-LVL IV: CPT | Mod: PBBFAC,,, | Performed by: PHYSICIAN ASSISTANT

## 2024-12-13 PROCEDURE — 3077F SYST BP >= 140 MM HG: CPT | Mod: CPTII,S$GLB,, | Performed by: PHYSICIAN ASSISTANT

## 2024-12-13 PROCEDURE — 99213 OFFICE O/P EST LOW 20 MIN: CPT | Mod: S$GLB,,, | Performed by: PHYSICIAN ASSISTANT

## 2024-12-13 PROCEDURE — 3078F DIAST BP <80 MM HG: CPT | Mod: CPTII,S$GLB,, | Performed by: PHYSICIAN ASSISTANT

## 2024-12-13 NOTE — PROGRESS NOTES
"Chronic Pain-Established Note- Follow Up Visit    PCP: Dr. Elmo Kemp    Chief complaint: Injection follow up    Notes:    SUBJECTIVE:  Interval History (12/13/2024): Leticia Grimes presents today for follow-up visit.  she underwent Cervical C6/7 Interlaminar OMAR  on 11/19/24.  The patient reports that she is/was better following the procedure.  she reports 50% pain relief.  The changes lasted 2 weeks or so.   Patient reports pain as "0/10 today.  She has pain every night with the R hand/wrist. In the morning it is worse. Her finger tips are throbbing and ache.     Patient states she did well until 1-2 weeks later. She started having more pain on the left shoulder and also Left mid and lower back.     Patient also reports she has soreness after having a BM. She denies any significant changes to her diet. She utilizes preparation H which does help.       Interval History (11/1/2024):   Leticia Grimes presents today for follow-up visit.  Patient was last seen on 7/12/2024.She presents with bilateral neck pain as well as pain affecting her left shoulder. She also reports weakness in R hand.  Patient reports pain as 7/10 today.  Patient reports that she has been utilizing Robaxin and meloxicam but does not find much relief. She does, however, find some improvement after taking Gabapentin 300 mg TID.     Interval History (7/12/2024):  Leticia Grimes presents today for follow-up visit.  Patient was last seen on 2/8/2024. Patient reports pain as 2/10 today. She requests refill(s) on Gabapentin only. Reports seeing Dr. Capellan since her last office visit. He performed lumbar OMAR and prescribed Robaxin 750 mg Q8H, Mobic 15 mg QD and 50 mg Tramadol QD prn.     Interval History (2/8/2024):  Patient  presents today for follow-up visit.  Patient was last seen on 8/15/2023. At that visit, the plan was to start Lyrica and referral placed to urology due to abnormal US.  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 It was recommended that she follow up in a year with repeat US.   She reports issues all on the left side. She was told she needed to see neurosurgery and was scheduled initially in December but then her appointment was pushed back to end of February.   Whenever night time arrives, she cannot sleep due to being uncomfortable. She has tried several remedies, including Vapor rub and socks, with no relief.   Patient is interested in restarting Gabapentin again. Patient reports she has not taken this since last year.  Localizes her pain to L scapula --> buttocks--> upper thigh and foot. Also c/o burning and stabbing pain in feet. Patient reports pain as 3/10 today.   She is s/p L shoulder replacement with Dr. Taylor from 5/5/22.    Interval History (12/13/2024):  Leticia Grimes presents today for follow-up visit via telemed for ultrasound review.  Patient was last seen on 4/20/2023.   Ultrasound retroperitoneal kidney      Interval History (7/17/2023):  Leticia Grimes presents today for follow-up visit for CT review.  Patient was last seen on 04/20/2023. She continues to report  She reports  lower back pain with radiation into her left lower extremity along the lateral and posterior aspect extending into the left calf. Taking Gabapentin 400 mg nightly with limited relief. Patient reports pain as 7/10 today.    CT lumbar spine      Interval History (4/20/2023):  Leticia Grimes presents today via telemed for follow-up visit.  Patient was last seen on 1/4/2023. Referred back to our clinic by Dr. Leonard for lumbar radiculopathy. Patient reports pain as 6/10 today. She has completed over 6 weeks of outpatient rehab from 11/15/2022 to 1/17/2023. She has continued with a home exercise program at home with limited relief. She reports pain is worsened with laying down, improved with heat. She reports  continued lower back pain with radiation into her left lower extremity along the lateral and posterior aspect extending into the left calf. Taking Gabapentin 400 mg nightly with limited relief.    She also reports continued pain in left shoulder/shoulder blade. History of previous left shoulder total reversal 05/2022. She reports some pain in the left shoulder and some stiffness and limitations to ROM. Her thoracic pain is worsened with moving her upper extremities.     Interval History (01/04/2022):  Leticia Grimes presents today for follow-up visit.  Patient was last seen on 04/15/2021. She was referred back to our clinic by Neurology. She reports her primary complaint is thoracic back pain between her shoulder blades along her bra line. She reports this began about 1 month ago, but has been worsening over the past week. She reports increased frequency of spasms that interrupt her sleep. She has been taking tizanidine, using a heating pad, and began physical therapy, with limited relief. History of left shoulder total reversal 05/2022. She reports some pain in the left shoulder and some stiffness and limitations to ROM. Her thoracic pain is worsened with moving her upper extremities. She also reports low back pain in a band like distribution across the lumbosacral region moreso on the left side that began 1 month ago. She reports at first it stopped above the knee, but is now intermittently radiating into her left leg into her foot. This pain is worsened by laying on her left side. She reports this has somewhat improved with massage, heat, and physical therapy. She was recently prescribed Baclofen by Dr. Leonard for muscle spasms to replace the Tizanidine, but she has not yet started this medication. Patient reports pain as 6/10 today.      Interval HPI 04/15/2021  Leticia Grimes presents to tele-medicine appointment for chief complaint bilateral hand pain.  This is a new complaint as we previously treated her  left shoulder pain with glenohumeral joint injection out was successful.  She locates this neuropathic pain to the bilateral hands, right worse than left.  She also reports having some neck pain.  She has had previous epidurals that did provide relief of these symptoms.  Since the last visit, Leticia Grimes states the pain has been improving. Current pain intensity is 6/10.    Patient denies night fever/night sweats, urinary incontinence, bowel incontinence, significant weight loss, significant motor weakness and loss of sensations.    Interval HPI 04/09/2021:  Leticia Grimes presents to tele-medicine appointment for a follow-up appointment for left shoulder pain.  She was last seen for procedure on 03/02/2021 and underwent left-sided glenohumeral joint injection.  She reports that this resulted in 95% relief.  Since the last visit, Leticia Grimes states the pain has been improving. Current pain intensity is 0/10.    Initial HPI 02/01/2021:  Leticia Grimes is a 75 y.o. female who presents to the clinic for the evaluation of left shoulder pain.  She was referred by the Orthopedics Department for further evaluation and management of this pain.  She has past medical history of anxiety, hypertension, status post ICD, hyperlipidemia, GERD, osteopenia, multiple other medical comorbidities as listed in her chart.  The pain started several years ago without any inciting event or trauma and symptoms have been worsening.The pain is located in the left cervical myofascial area and radiates to the left upper extremity.  She reports that the left shoulder source of the pain however.  The pain is described as Aching, numbness, tingling, squeezing, tightness and is rated as 5/10. The pain is rated with a score of  4/10 on the BEST day and a score of 9/10 on the WORST day.  Symptoms interfere with daily activity. The pain is exacerbated by sleeping on her left side, movement of the left shoulder.  The pain is mitigated by  sitting, medications, heat. The patient reports spending 2-4 hours per day reclining. The patient reports 5-7 hours of uninterrupted sleep per night.       Non-Pharmacologic Treatments:  Physical Therapy/Home Exercise: yes  Ice/Heat:yes  TENS: no  Acupuncture: no  Massage: no  Chiropractic: no    Other: no        Pain Medications:  - Opioids: None  - Adjuvant Medications: Lorazepam, Lyrica, Diazepam, Baclofen  - Anti-Coagulants: Aspirin     Pain Procedures:   -previous cervical epidural steroid injection, moderate relief  - 03/02/2021:  Left glenohumeral joint injection, 95% relief   -11/19/2024:  Cervical C6/7 IL OMAR with 50% relief     Imaging (Reviewed on 12/13/2024):    CT Lumbar spine 11/16/2023  EXAMINATION:  CT LUMBAR SPINE WITHOUT CONTRAST     CLINICAL HISTORY:  Low back pain, symptoms persist with > 6wks conservative treatment;  Dorsalgia, unspecified     TECHNIQUE:  Low-dose axial, sagittal and coronal reformations are obtained through the lumbar spine.  Contrast was not administered.     COMPARISON:  CT abdomen and pelvis dated 09/06/2023     FINDINGS:  Alignment: There is grade 1 anterolisthesis of L4 on L5.     Vertebrae: Normal heights.  No fracture.     Posterior elements: No fractures. Facet articulations appear within normal limits.     Discs: Mild disc height loss at L4-5.     Degenerative findings:     T11-12: Minimal generalized disc bulge.  No definite spinal canal or neuroforaminal stenosis.     T12-L1:  No definite spinal canal or neuroforaminal stenosis.     L1-L2:  No definite spinal canal or neuroforaminal stenosis.     L2-L3:  No definite spinal canal or neuroforaminal stenosis.     L3-L4:  No definite spinal canal or neuroforaminal stenosis.     L4-L5: Severe bilateral facet arthropathy.  Uncovering of the intervertebral disc and mild thickening of the ligamentum flavum.  Suspect at least mild spinal canal stenosis with mild-to-moderate bilateral neuroforaminal narrowing.     L5-S1:  Moderate bilateral facet arthropathy.  Small posterior disc bulge. No definite spinal canal or neuroforaminal stenosis.     Paraspinal muscles & soft tissues: There multiple bilateral renal cysts present, better characterized on previous CT.     Miscellaneous: There is suggestion of subarticular erosive changes present at the bilateral SI joints, raising concern for sacroiliitis.     Impression:     1. Grade 1 anterolisthesis of L4 on L5 secondary to facet arthropathy.  There is at least mild suspected associated spinal canal stenosis with mild-to-moderate bilateral neural foraminal narrowing at this level.  2. Other mild degenerative findings as above  3. Findings suggesting bilateral sacroiliitis.       X-ray left shoulder 10/12/2020:  No fracture or dislocation.  Prominent glenohumeral degenerative findings present including complete joint space loss and large inferior osteophyte formation.  Mild AC joint degenerative changes.  Lung parenchyma clear.        X-ray cervical spine 12/16/2015:  No fracture or dislocation is demonstrated.  At the C5-6 level there   is some mild disc space narrowing with associated spurring.  Uncovertebral   joint spurring results in some moderate encroachment upon the neuroforamina   bilaterally at this level The odontoid appears intact and in good alignment.         PMHx,PSHx, Social history, and Family history:  I have reviewed the patient's medical, surgical, social, and family history in detail and updated the computerized patient record.        Review of patient's allergies indicates:   Allergen Reactions    Codeine     Morphine Other (See Comments)       Current Outpatient Medications   Medication Sig    acetaminophen (TYLENOL) 500 MG tablet Take 2 tablets (1,000 mg total) by mouth every 8 (eight) hours as needed for Pain.    carvediloL (COREG) 12.5 MG tablet Take 1 tablet (12.5 mg total) by mouth 2 (two) times daily with meals.    cloNIDine (CATAPRES) 0.1 MG tablet Take 0.1 mg  by mouth as needed. If SBP > 200 mmHg    ergocalciferol, vitamin D2, (VITAMIN D ORAL) Take by mouth.    gabapentin (NEURONTIN) 300 MG capsule Take 1 capsule (300 mg total) by mouth 3 (three) times daily.    hydroCHLOROthiazide (HYDRODIURIL) 12.5 MG Tab Take 1 tablet (12.5 mg total) by mouth once daily.    lisinopriL (PRINIVIL,ZESTRIL) 40 MG tablet Take 1 tablet (40 mg total) by mouth once daily.    LORazepam (ATIVAN) 0.5 MG tablet TAKE 1 TABLET BY MOUTH AT BEDTIME AS NEEDED    minoxidiL (LONITEN) 2.5 MG tablet Take 2.5 mg by mouth once daily.    mupirocin (BACTROBAN) 2 % ointment Apply topically 3 (three) times daily.    mv-mn/folic/lutein/herbal 293 (ALIVE WOMEN'S 50 PLUS ORAL) Take 1 tablet by mouth once daily.    omega 3-dha-epa-fish oil (FISH OIL) 1,000 mg (120 mg-180 mg) Cap Take 1 capsule by mouth 2 (two) times daily with meals. (Patient not taking: Reported on 11/1/2024)    rosuvastatin (CRESTOR) 10 MG tablet Take 1 tablet by mouth once daily    tiZANidine (ZANAFLEX) 4 MG tablet Take 1/2 to 1 tab BID PRN muscle spasms. May cause drowsiness.    traZODone (DESYREL) 50 MG tablet 1 to 2 tabs po qhs prn sleep     No current facility-administered medications for this visit.     REVIEW OF SYSTEMS:    GENERAL:  No weight loss, malaise or fevers.  HEENT:   No recent changes in vision or hearing  NECK:  Negative for lumps, no difficulty with swallowing.  RESPIRATORY:  Negative for cough, wheezing or shortness of breath, patient denies any recent URI.  CARDIOVASCULAR:  Negative for chest pain, leg swelling or palpitations.  GI:  Negative for abdominal discomfort, blood in stools or black stools or change in bowel habits.  MUSCULOSKELETAL:  See HPI.  SKIN:  Negative for lesions, rash, and itching.  PSYCH:  No mood disorder or recent psychosocial stressors.  Patients sleep is not disturbed secondary to pain.  HEMATOLOGY/LYMPHOLOGY:  Negative for prolonged bleeding, bruising easily or swollen nodes.  Patient is currently  "taking anti-coagulants - ASA  NEURO:   No history of headaches, syncope, paralysis, seizures or tremors.  All other reviewed and negative other than HPI.    OBJECTIVE:    Vitals:    12/13/24 1104   BP: (!) 145/69   Pulse: 68   Weight: 77.7 kg (171 lb 4.8 oz)   Height: 5' 1.5" (1.562 m)     Body mass index is 31.84 kg/m².   (reviewed on 12/13/2024)       GENERAL: Well appearing, in no acute distress, alert and oriented x3.    PSYCH:  Mood and affect appropriate.  SKIN: Skin color, texture, turgor normal, no rashes or lesions.  HEAD/FACE:  Normocephalic, atraumatic. Cranial nerves grossly intact.  PULM:  No difficulty breathing     Neuro/MSK:  NECK:   Musculoskeletal - Cervical Spine:  - Pain on flexion of cervical spine: Present   - Pain on extension of cervical spine: Present   - Cervical facet loading: Present   - TTP over the cervical facet joints: Present on the left  - TTP over the cervical paraspinals: Present on the left  - Spurling's: Equivocal    Thoracic:   - TTP over the thoracic facet joints: Present on the left  - TTP over the thoracic paraspinals: Present on the left (throughout thoracic)    Neuro - Upper Extremities:  - BUE Strength:R/L: D: 5/5; B: 5/5; T: 5/5; WF: 5/5; WE: 5/5; IO: 5/5  - Extremity Reflexes: Brisk and symmetric throughout  - Sensory: Sensation to light touch intact bilaterally    EXTREMITIES: No deformities, edema, or skin discoloration.   MUSCULOSKELETAL:    Shoulder:left  - Pain on abduction: Present  - ROM:  Decreased secondary to pain  - TTP over the AC and GH joint: Present  - Neer's: Positive   - Hawkin's: Positive     Patient had a left shoulder replacement in 2021.       ASSESSMENT: 79 y.o. year old female with left shoulder pain, consistent with     1. Cervicalgia        2. Spinal stenosis, cervical region        3. Pain in thoracic spine        4. Cervical radiculopathy        5. Thoracic spine pain            PLAN:   - Interventions: None at this time.     - S/p Cervical " C6/7 Interlaminar OMAR on 11/19/2024 with 50% relief      Patient had recent Lumbar O[facility] (Dr. Capellan).    - Anticoagulation use: no, not currently taking Aspirin.    - Medications: I have stressed the importance of physical activity and a home exercise plan to help with pain and improve health.  Patient can continue with medications for now since they are providing benefits, using them appropriately, and without side effects.        -- Previously tried Zanaflex, Baclofen and Robaxin.     -- Continue Gabapentin 300 mg TID.        -- Previously tried Lyrica (dc'd due to side effects).    - Can continue Meloxicam 15 mg daily per Dr. Capellan     report:  Reviewed and consistent with medication use as prescribed.        - Therapy:  Advised patient continue with activities and exercises as tolerated.    - Imaging: CT Cervical and Thoracic spine ordered for further evaluation.    - Consults/Referrals:  Continue follow up with Psychiatry, Edie Alexis:      - Follow up visit:  return to clinic after CT of C/T spine to discuss findings and other tx options.     - Counseled patient regarding the importance of activity modification and physical therapy     - This condition does not require this patient to take time off of work, and the primary goal of our Pain Management services is to improve the patient's functional capacity.     - Patient Questions: Answered all of the patient's questions regarding diagnosis, therapy, and treatment    The above plan and management options were discussed at length with patient. Patient is in agreement with the above and verbalized understanding.      Maria Alejandra Steve PA-C  Interventional Pain Management  [facility] Uriostegui

## 2024-12-20 ENCOUNTER — TELEPHONE (OUTPATIENT)
Dept: PAIN MEDICINE | Facility: CLINIC | Age: 79
End: 2024-12-20
Payer: MEDICARE

## 2024-12-20 NOTE — TELEPHONE ENCOUNTER
Called patient and informed her that we got her MRI's authorized and I resent her CT scans. I told her to reach out to us once she is scheduled. Pt verbalized understanding.    Osmel WISDOM

## 2024-12-26 ENCOUNTER — TELEPHONE (OUTPATIENT)
Dept: INTERNAL MEDICINE | Facility: CLINIC | Age: 79
End: 2024-12-26
Payer: MEDICARE

## 2024-12-26 NOTE — TELEPHONE ENCOUNTER
I returned a call back to the pt and she was requesting an appt for UTI and I offered her a 10:40 am appt today with  she declined and stated she did not want to drive so she will stay local where she lives in [place] and see a doctor there. I verbalized understanding. //kah

## 2024-12-26 NOTE — TELEPHONE ENCOUNTER
----- Message from Shae sent at 12/26/2024  8:08 AM CST -----  Contact: Leticia  Type:  Same Day Appointment Request    Caller is requesting a same day appointment.      Name of Caller:Leticia  When is the first available appointment?unknown  Symptoms:feeling unwell/symptoms of bladder infection  Best Call Back Number:[phone]   Additional Information: Please give patient an immediate call back to assist.  Thank you,  GH

## 2024-12-27 ENCOUNTER — TELEPHONE (OUTPATIENT)
Dept: PAIN MEDICINE | Facility: CLINIC | Age: 79
End: 2024-12-27
Payer: MEDICARE

## 2024-12-27 NOTE — TELEPHONE ENCOUNTER
Spoke with Inga, she was calling regarding pt stating she didn't have orders.  She verified that she does have orders from our office.

## 2024-12-27 NOTE — TELEPHONE ENCOUNTER
----- Message from Peyton sent at 12/27/2024 10:48 AM CST -----  Would like to receive medical advice.    Inga calling from [facility] with questions about pt orders.     Would they like a call back or a response via [facility]:  call    Additional information:  Please call Inga at [phone]

## 2025-01-02 ENCOUNTER — TELEPHONE (OUTPATIENT)
Dept: PAIN MEDICINE | Facility: CLINIC | Age: 80
End: 2025-01-02
Payer: MEDICARE

## 2025-01-02 NOTE — TELEPHONE ENCOUNTER
Called patient and patient informed me that she completed her CT scans and wanted to know if we received them. I told her we didn't. Called [facility] and requested CT scans to be sent over. Uploaded them into the .    Osmel WISDOM

## 2025-01-02 NOTE — TELEPHONE ENCOUNTER
----- Message from Cecille sent at 1/2/2025  1:28 PM CST -----  Contact: nancy Kelly is requesting a call back in regards to her CT scan results.   Please call her at [phone]

## 2025-01-07 ENCOUNTER — OFFICE VISIT (OUTPATIENT)
Dept: PAIN MEDICINE | Facility: CLINIC | Age: 80
End: 2025-01-07
Payer: MEDICARE

## 2025-01-07 VITALS
WEIGHT: 171.63 LBS | SYSTOLIC BLOOD PRESSURE: 127 MMHG | HEART RATE: 69 BPM | BODY MASS INDEX: 31.58 KG/M2 | RESPIRATION RATE: 17 BRPM | HEIGHT: 62 IN | DIASTOLIC BLOOD PRESSURE: 75 MMHG

## 2025-01-07 DIAGNOSIS — M25.511 CHRONIC RIGHT SHOULDER PAIN: Primary | ICD-10-CM

## 2025-01-07 DIAGNOSIS — M54.12 CERVICAL RADICULOPATHY: ICD-10-CM

## 2025-01-07 DIAGNOSIS — M54.2 CERVICALGIA: ICD-10-CM

## 2025-01-07 DIAGNOSIS — G89.29 CHRONIC RIGHT SHOULDER PAIN: Primary | ICD-10-CM

## 2025-01-07 DIAGNOSIS — M75.41 ROTATOR CUFF IMPINGEMENT SYNDROME OF RIGHT SHOULDER: ICD-10-CM

## 2025-01-07 PROCEDURE — 1159F MED LIST DOCD IN RCRD: CPT | Mod: CPTII,S$GLB,, | Performed by: PHYSICIAN ASSISTANT

## 2025-01-07 PROCEDURE — 20610 DRAIN/INJ JOINT/BURSA W/O US: CPT | Mod: RT,S$GLB,, | Performed by: PHYSICIAN ASSISTANT

## 2025-01-07 PROCEDURE — 1125F AMNT PAIN NOTED PAIN PRSNT: CPT | Mod: CPTII,S$GLB,, | Performed by: PHYSICIAN ASSISTANT

## 2025-01-07 PROCEDURE — 99214 OFFICE O/P EST MOD 30 MIN: CPT | Mod: 25,S$GLB,, | Performed by: PHYSICIAN ASSISTANT

## 2025-01-07 PROCEDURE — 3078F DIAST BP <80 MM HG: CPT | Mod: CPTII,S$GLB,, | Performed by: PHYSICIAN ASSISTANT

## 2025-01-07 PROCEDURE — 3074F SYST BP LT 130 MM HG: CPT | Mod: CPTII,S$GLB,, | Performed by: PHYSICIAN ASSISTANT

## 2025-01-07 PROCEDURE — 1101F PT FALLS ASSESS-DOCD LE1/YR: CPT | Mod: CPTII,S$GLB,, | Performed by: PHYSICIAN ASSISTANT

## 2025-01-07 PROCEDURE — 3288F FALL RISK ASSESSMENT DOCD: CPT | Mod: CPTII,S$GLB,, | Performed by: PHYSICIAN ASSISTANT

## 2025-01-07 PROCEDURE — 99999 PR PBB SHADOW E&M-EST. PATIENT-LVL III: CPT | Mod: PBBFAC,,, | Performed by: PHYSICIAN ASSISTANT

## 2025-01-07 RX ORDER — METHYLPREDNISOLONE ACETATE 40 MG/ML
40 INJECTION, SUSPENSION INTRA-ARTICULAR; INTRALESIONAL; INTRAMUSCULAR; SOFT TISSUE
Status: COMPLETED | OUTPATIENT
Start: 2025-01-07 | End: 2025-01-07

## 2025-01-07 RX ORDER — METHYLPREDNISOLONE 4 MG/1
TABLET ORAL
Qty: 21 EACH | Refills: 0 | Status: SHIPPED | OUTPATIENT
Start: 2025-01-07 | End: 2025-01-28

## 2025-01-07 RX ADMIN — METHYLPREDNISOLONE ACETATE 40 MG: 40 INJECTION, SUSPENSION INTRA-ARTICULAR; INTRALESIONAL; INTRAMUSCULAR; SOFT TISSUE at 01:01

## 2025-01-07 NOTE — PROGRESS NOTES
"Chronic Pain-Established Note- Follow Up Visit    PCP: Dr. Elmo Kemp    Chief Complaint   Patient presents with    Arm Pain           Hand Pain       SUBJECTIVE:    Interval History (1/7/2025): Leticia Grimes presents today for follow-up visit.  Patient was last seen on 12/13/2024. At that visit, the plan was to complete CT of C/T spine. Patient reports pain as 7/10 today. She localizes pain in Right shoulder and throughout R upper extremity.       Interval History (12/13/2024): Leticia Grimes presents today for follow-up visit.  she underwent Cervical C6/7 Interlaminar OMAR  on 11/19/24.  The patient reports that she is/was better following the procedure.  she reports 50% pain relief.  The changes lasted 2 weeks or so.   Patient reports pain as "0/10 today.  She has pain every night with the R hand/wrist. In the morning it is worse. Her finger tips are throbbing and ache.     Patient states she did well until 1-2 weeks later. She started having more pain on the left shoulder and also Left mid and lower back.   Patient also reports she has soreness after having a BM. She denies any significant changes to her diet. She utilizes preparation H which does help.       Interval History (11/1/2024):   Leticia Grimes presents today for follow-up visit.  Patient was last seen on 7/12/2024.She presents with bilateral neck pain as well as pain affecting her left shoulder. She also reports weakness in R hand.  Patient reports pain as 7/10 today.  Patient reports that she has been utilizing Robaxin and meloxicam but does not find much relief. She does, however, find some improvement after taking Gabapentin 300 mg TID.     Interval History (7/12/2024):  Leticia Grimes presents today for follow-up visit.  Patient was last seen on 2/8/2024. Patient reports pain as 2/10 today. She requests refill(s) on Gabapentin only. Reports seeing Dr. Capellan since her last office visit. He performed lumbar OMAR and prescribed " Robaxin 750 mg Q8H, Mobic 15 mg QD and 50 mg Tramadol QD prn.     Interval History (2/8/2024):  Patient  presents today for follow-up visit.  Patient was last seen on 8/15/2023. At that visit, the plan was to start Lyrica and referral placed to urology due to abnormal US.  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 It was recommended that she follow up in a year with repeat US.   She reports issues all on the left side. She was told she needed to see neurosurgery and was scheduled initially in December but then her appointment was pushed back to end of February.   Whenever night time arrives, she cannot sleep due to being uncomfortable. She has tried several remedies, including Vapor rub and socks, with no relief.   Patient is interested in restarting Gabapentin again. Patient reports she has not taken this since last year.  Localizes her pain to L scapula --> buttocks--> upper thigh and foot. Also c/o burning and stabbing pain in feet. Patient reports pain as 3/10 today.   She is s/p L shoulder replacement with Dr. Taylor from 5/5/22.    Interval History (1/7/2025):  Leticia Grimes presents today for follow-up visit via telemed for ultrasound review.  Patient was last seen on 4/20/2023.   Ultrasound retroperitoneal kidney      Interval History (7/17/2023):  Leticia Grimes presents today for follow-up visit for CT review.  Patient was last seen on 04/20/2023. She continues to report  She reports  lower back pain with radiation into her left lower extremity along the lateral and posterior aspect extending into the left calf. Taking Gabapentin 400 mg nightly with limited relief. Patient reports pain as 7/10 today.    CT lumbar spine      Interval History (4/20/2023):  Leticia Grimes presents today via telemed for follow-up visit.  Patient was last seen on 1/4/2023. Referred back to  our clinic by Dr. Leonard for lumbar radiculopathy. Patient reports pain as 6/10 today. She has completed over 6 weeks of outpatient rehab from 11/15/2022 to 1/17/2023. She has continued with a home exercise program at home with limited relief. She reports pain is worsened with laying down, improved with heat. She reports continued lower back pain with radiation into her left lower extremity along the lateral and posterior aspect extending into the left calf. Taking Gabapentin 400 mg nightly with limited relief.    She also reports continued pain in left shoulder/shoulder blade. History of previous left shoulder total reversal 05/2022. She reports some pain in the left shoulder and some stiffness and limitations to ROM. Her thoracic pain is worsened with moving her upper extremities.     Interval History (01/04/2022):  Leticia Grimes presents today for follow-up visit.  Patient was last seen on 04/15/2021. She was referred back to our clinic by Neurology. She reports her primary complaint is thoracic back pain between her shoulder blades along her bra line. She reports this began about 1 month ago, but has been worsening over the past week. She reports increased frequency of spasms that interrupt her sleep. She has been taking tizanidine, using a heating pad, and began physical therapy, with limited relief. History of left shoulder total reversal 05/2022. She reports some pain in the left shoulder and some stiffness and limitations to ROM. Her thoracic pain is worsened with moving her upper extremities. She also reports low back pain in a band like distribution across the lumbosacral region moreso on the left side that began 1 month ago. She reports at first it stopped above the knee, but is now intermittently radiating into her left leg into her foot. This pain is worsened by laying on her left side. She reports this has somewhat improved with massage, heat, and physical therapy. She was recently prescribed Baclofen  by Dr. Leonard for muscle spasms to replace the Tizanidine, but she has not yet started this medication. Patient reports pain as 6/10 today.      Interval HPI 04/15/2021  Leticia Grimes presents to tele-medicine appointment for chief complaint bilateral hand pain.  This is a new complaint as we previously treated her left shoulder pain with glenohumeral joint injection out was successful.  She locates this neuropathic pain to the bilateral hands, right worse than left.  She also reports having some neck pain.  She has had previous epidurals that did provide relief of these symptoms.  Since the last visit, Leticia Grimes states the pain has been improving. Current pain intensity is 6/10.    Patient denies night fever/night sweats, urinary incontinence, bowel incontinence, significant weight loss, significant motor weakness and loss of sensations.    Interval HPI 04/09/2021:  Leticia Grimes presents to tele-medicine appointment for a follow-up appointment for left shoulder pain.  She was last seen for procedure on 03/02/2021 and underwent left-sided glenohumeral joint injection.  She reports that this resulted in 95% relief.  Since the last visit, Leticia Grimes states the pain has been improving. Current pain intensity is 0/10.    Initial HPI 02/01/2021:  Leticia Grimes is a 75 y.o. female who presents to the clinic for the evaluation of left shoulder pain.  She was referred by the Orthopedics Department for further evaluation and management of this pain.  She has past medical history of anxiety, hypertension, status post ICD, hyperlipidemia, GERD, osteopenia, multiple other medical comorbidities as listed in her chart.  The pain started several years ago without any inciting event or trauma and symptoms have been worsening.The pain is located in the left cervical myofascial area and radiates to the left upper extremity.  She reports that the left shoulder source of the pain however.  The pain is described as  Aching, numbness, tingling, squeezing, tightness and is rated as 5/10. The pain is rated with a score of  4/10 on the BEST day and a score of 9/10 on the WORST day.  Symptoms interfere with daily activity. The pain is exacerbated by sleeping on her left side, movement of the left shoulder.  The pain is mitigated by sitting, medications, heat. The patient reports spending 2-4 hours per day reclining. The patient reports 5-7 hours of uninterrupted sleep per night.       Non-Pharmacologic Treatments:  Physical Therapy/Home Exercise: yes  Ice/Heat:yes  TENS: no  Acupuncture: no  Massage: no  Chiropractic: no    Other: no        Pain Medications:  - Opioids: None  - Adjuvant Medications: Lorazepam, Lyrica, Diazepam, Baclofen  - Anti-Coagulants: Aspirin     Pain Procedures:   -previous cervical epidural steroid injection, moderate relief  - 03/02/2021:  Left glenohumeral joint injection, 95% relief   -11/19/2024:  Cervical C6/7 IL OMAR with 50% relief     Imaging (Reviewed on 1/7/2025):      CT thoracic / cervical Spine  12/30/2024          CT Lumbar spine 11/16/2023  EXAMINATION:  CT LUMBAR SPINE WITHOUT CONTRAST     CLINICAL HISTORY:  Low back pain, symptoms persist with > 6wks conservative treatment;  Dorsalgia, unspecified     TECHNIQUE:  Low-dose axial, sagittal and coronal reformations are obtained through the lumbar spine.  Contrast was not administered.     COMPARISON:  CT abdomen and pelvis dated 09/06/2023     FINDINGS:  Alignment: There is grade 1 anterolisthesis of L4 on L5.     Vertebrae: Normal heights.  No fracture.     Posterior elements: No fractures. Facet articulations appear within normal limits.     Discs: Mild disc height loss at L4-5.     Degenerative findings:     T11-12: Minimal generalized disc bulge.  No definite spinal canal or neuroforaminal stenosis.     T12-L1:  No definite spinal canal or neuroforaminal stenosis.     L1-L2:  No definite spinal canal or neuroforaminal stenosis.     L2-L3:  No  definite spinal canal or neuroforaminal stenosis.     L3-L4:  No definite spinal canal or neuroforaminal stenosis.     L4-L5: Severe bilateral facet arthropathy.  Uncovering of the intervertebral disc and mild thickening of the ligamentum flavum.  Suspect at least mild spinal canal stenosis with mild-to-moderate bilateral neuroforaminal narrowing.     L5-S1: Moderate bilateral facet arthropathy.  Small posterior disc bulge. No definite spinal canal or neuroforaminal stenosis.     Paraspinal muscles & soft tissues: There multiple bilateral renal cysts present, better characterized on previous CT.     Miscellaneous: There is suggestion of subarticular erosive changes present at the bilateral SI joints, raising concern for sacroiliitis.     Impression:     1. Grade 1 anterolisthesis of L4 on L5 secondary to facet arthropathy.  There is at least mild suspected associated spinal canal stenosis with mild-to-moderate bilateral neural foraminal narrowing at this level.  2. Other mild degenerative findings as above  3. Findings suggesting bilateral sacroiliitis.       X-ray left shoulder 10/12/2020:  No fracture or dislocation.  Prominent glenohumeral degenerative findings present including complete joint space loss and large inferior osteophyte formation.  Mild AC joint degenerative changes.  Lung parenchyma clear.        X-ray cervical spine 12/16/2015:  No fracture or dislocation is demonstrated.  At the C5-6 level there   is some mild disc space narrowing with associated spurring.  Uncovertebral   joint spurring results in some moderate encroachment upon the neuroforamina   bilaterally at this level The odontoid appears intact and in good alignment.         PMHx,PSHx, Social history, and Family history:  I have reviewed the patient's medical, surgical, social, and family history in detail and updated the computerized patient record.        Review of patient's allergies indicates:   Allergen Reactions    Codeine      Morphine Other (See Comments)       Current Outpatient Medications   Medication Sig    acetaminophen (TYLENOL) 500 MG tablet Take 2 tablets (1,000 mg total) by mouth every 8 (eight) hours as needed for Pain.    carvediloL (COREG) 12.5 MG tablet Take 1 tablet (12.5 mg total) by mouth 2 (two) times daily with meals.    ergocalciferol, vitamin D2, (VITAMIN D ORAL) Take by mouth.    LORazepam (ATIVAN) 0.5 MG tablet TAKE 1 TABLET BY MOUTH AT BEDTIME AS NEEDED    mupirocin (BACTROBAN) 2 % ointment Apply topically 3 (three) times daily.    mv-mn/folic/lutein/herbal 293 (ALIVE WOMEN'S 50 PLUS ORAL) Take 1 tablet by mouth once daily.    olmesartan-hydrochlorothiazide (BENICAR HCT) 40-25 mg per tablet Take 1 tablet by mouth once daily.    rosuvastatin (CRESTOR) 10 MG tablet Take 1 tablet by mouth once daily    tiZANidine (ZANAFLEX) 4 MG tablet Take 1/2 to 1 tab BID PRN muscle spasms. May cause drowsiness.    traZODone (DESYREL) 50 MG tablet 1 to 2 tabs po qhs prn sleep    gabapentin (NEURONTIN) 300 MG capsule Take 1 capsule (300 mg total) by mouth 3 (three) times daily.    omega 3-dha-epa-fish oil (FISH OIL) 1,000 mg (120 mg-180 mg) Cap Take 1 capsule by mouth 2 (two) times daily with meals. (Patient not taking: Reported on 11/1/2024)     No current facility-administered medications for this visit.     REVIEW OF SYSTEMS:    GENERAL:  No weight loss, malaise or fevers.  HEENT:   No recent changes in vision or hearing  NECK:  Negative for lumps, no difficulty with swallowing.  RESPIRATORY:  Negative for cough, wheezing or shortness of breath, patient denies any recent URI.  CARDIOVASCULAR:  Negative for chest pain, leg swelling or palpitations.  GI:  Negative for abdominal discomfort, blood in stools or black stools or change in bowel habits.  MUSCULOSKELETAL:  See HPI.  SKIN:  Negative for lesions, rash, and itching.  PSYCH:  No mood disorder or recent psychosocial stressors.  Patients sleep is not disturbed secondary to  "pain.  HEMATOLOGY/LYMPHOLOGY:  Negative for prolonged bleeding, bruising easily or swollen nodes.  Patient is currently taking anti-coagulants - ASA  NEURO:   No history of headaches, syncope, paralysis, seizures or tremors.  All other reviewed and negative other than HPI.    OBJECTIVE:    Vitals:    01/07/25 1058   BP: 127/75   Pulse: 69   Resp: 17   Weight: 77.9 kg (171 lb 10.1 oz)   Height: 5' 1.5" (1.562 m)     Body mass index is 31.9 kg/m².   (reviewed on 1/7/2025)       GENERAL: Well appearing, in no acute distress, alert and oriented x3.    PSYCH:  Mood and affect appropriate.  SKIN: Skin color, texture, turgor normal, no rashes or lesions.  HEAD/FACE:  Normocephalic, atraumatic. Cranial nerves grossly intact.  PULM:  No difficulty breathing     Neuro/MSK:  NECK:   Musculoskeletal - Cervical Spine:  - Pain on flexion of cervical spine: Present   - Pain on extension of cervical spine: Present   - Cervical facet loading: Present   -TTP Paraspinal cervical cheng: right    Neuro - Upper Extremities:  - BUE Strength:R/L: D: 5/5; B: 5/5; T: 5/5; WF: 5/5; WE: 5/5; IO: 5/5  - Extremity Reflexes: Brisk and symmetric throughout  - Sensory: Sensation to light touch intact bilaterally    EXTREMITIES: No deformities, edema, or skin discoloration.       MUSCULOSKELETAL:    Shoulder:right  - Pain on abduction: Present  - ROM:  Decreased secondary to pain  - TTP over the AC and GH joint: Present  - Neer's: Positive   - Hawkin's: Positive     Patient had a left shoulder replacement in 2021.       ASSESSMENT: 79 y.o. year old female with left shoulder pain, consistent with     1. Chronic right shoulder pain        2. Cervicalgia        3. Cervical radiculopathy        4. Rotator cuff impingement syndrome of right shoulder              PLAN:   - Interventions: Right Subacromial bursa injection administered in clinic today. See procedure not below.        Can consider Cervical C5/6 IL OMAR in the future.    - S/p Cervical C6/7 " Interlaminar OMAR on 11/19/2024 with 50% relief        - Anticoagulation use: no, not currently taking Aspirin.    - Medications: I have stressed the importance of physical activity and a home exercise plan to help with pain and improve health.  Patient can continue with medications for now since they are providing benefits, using them appropriately, and without side effects.        -- Previously tried Zanaflex, Baclofen and Robaxin.     -- Continue Gabapentin 300 mg TID.        -- Previously tried Lyrica (dc'd due to side effects).    - Can continue Meloxicam 15 mg daily per Dr. Capellan    - Will prescribe Medrol Dose pack with instructions - for acute pain flare:  Day 1: 2 tablets before breakfast, 1 tablet after lunch, 1 tablet after dinner, 2 tablets at bedtime   Day 2: 1 tablet before breakfast, 1 tablet after lunch, 1 tablet after dinner, 2 tablets at bedtime   Day 3: 1 tablet before breakfast, 1 tablet after lunch, 1 tablet after dinner, 1 tablet at bedtime   Day 4: 1 tablet before breakfast, 1 tablet after lunch, 1 tablet at bedtime   Day 5: 1 tablet before breakfast, 1 tablet at bedtime   Day 6: 1 tablet before breakfast.       report:  Reviewed and consistent with medication use as prescribed.        - Therapy:  Advised patient continue with activities and exercises as tolerated.    - Imaging: CT Cervical and Thoracic spine discussed and reviewed. Findings reveal multi level degenerative changes.    - Consults/Referrals:  Continue follow up with Psychiatry, Mr. Alexis:      - Follow up visit:  patient will consider cervical OMAR after upcoming trip. Will call/message us to schedule.     - Counseled patient regarding the importance of activity modification and physical therapy       Procedure note: Right subacromial bursa injection  Treatment options were discussed.  After verbal consent was obtained and the sight was identified, the right shoulder was prepped in sterile fashion. The patient was then  given an injection of 1 mL of 40 mg depomedrol and 4 mL  of 1% lidocaine (total of 5 mL) .  After injection a sterile Band-Aid was applied.  The patient tolerated the procedure well and was sent home in stable condition.  The patient was instructed to apply an ice pack as needed and not to do anything strenuous for the next 48 hours.      - This condition does not require this patient to take time off of work, and the primary goal of our Pain Management services is to improve the patient's functional capacity.     - Patient Questions: Answered all of the patient's questions regarding diagnosis, therapy, and treatment    The above plan and management options were discussed at length with patient. Patient is in agreement with the above and verbalized understanding.      Maria Alejandra Steve PA-C  Interventional Pain Management  [facility]

## 2025-01-09 ENCOUNTER — PATIENT MESSAGE (OUTPATIENT)
Dept: SPORTS MEDICINE | Facility: CLINIC | Age: 80
End: 2025-01-09
Payer: MEDICARE

## 2025-01-11 ENCOUNTER — NURSE TRIAGE (OUTPATIENT)
Dept: ADMINISTRATIVE | Facility: CLINIC | Age: 80
End: 2025-01-11
Payer: MEDICARE

## 2025-01-11 ENCOUNTER — OCHSNER VIRTUAL EMERGENCY DEPARTMENT (OUTPATIENT)
Facility: CLINIC | Age: 80
End: 2025-01-11
Payer: MEDICARE

## 2025-01-11 NOTE — TELEPHONE ENCOUNTER
"Pt is taking Medrol Dosepack as ordered. Pt states that the medication is making her stomach feel bad. States that she feels abdominal weakness. Denies abdominal pain, diarrhea and nausea. Pt states that she has 5 pills left and is scheduled to stop med on Monday. Pt states that she is taking med with food. Pt is calling to find out how can she stop med sooner safely. Advise contact PCP now. Secure chat sent to Андрей Dr. Conor Ridley III, MD  per protocol. Advised " If the medication is causing her GI distress, she can stop the steroids" Pt made aware- VU. Pt to callback with new or worsening of s/s. VU. Encounter routed to provider.     Reason for Disposition   [1] Caller has URGENT medicine question about med that PCP or specialist prescribed AND [2] triager unable to answer question    Additional Information   Negative: [1] Intentional drug overdose AND [2] suicidal thoughts or ideas   Negative: MORE THAN A DOUBLE DOSE of a prescription or over-the-counter (OTC) drug   Negative: [1] DOUBLE DOSE (an extra dose or lesser amount) of prescription drug AND [2] any symptoms (e.g., dizziness, nausea, pain, sleepiness)   Negative: [1] DOUBLE DOSE (an extra dose or lesser amount) of over-the-counter (OTC) drug AND [2] any symptoms (e.g., dizziness, nausea, pain, sleepiness)   Negative: Took another person's prescription drug   Negative: [1] DOUBLE DOSE (an extra dose or lesser amount) of prescription drug AND [2] NO symptoms  (Exception: A double dose of antibiotics.)   Negative: Diabetes drug error or overdose (e.g., took wrong type of insulin or took extra dose)   Negative: [1] Prescription not at pharmacy AND [2] was prescribed by PCP recently (Exception: Triager has access to EMR and prescription is recorded there. Go to Home Care and confirm for pharmacy.)   Negative: [1] Pharmacy calling with prescription question AND [2] triager unable to answer question    Protocols used: Medication Question Call-A-AH    "

## 2025-01-11 NOTE — PLAN OF CARE-OVED
[facility] Emergency Department Plan of Care Note    Referral source: Nurse On-Call      Reason for consult:  Adverse drug effect      Additional History:  Patient currently on steroids for shoulder problems.  She states that the steroids appear to be causing her to have some abdominal weakness but no abdominal pain, nausea, vomiting, or diarrhea.  She is calling to know if she can stop her steroids.      Disposition recommended: Treat in place      Additional Recommendations:  Given that this is a Medrol Dosepak and that her steroids are for shoulder pain, she can stop the steroids.

## 2025-01-12 NOTE — TELEPHONE ENCOUNTER
Pt calling back after previous triage.    Call sent to TAYLOR Winslow      Reason for Disposition   Caller has already spoken with another triager and has no further questions.    Protocols used: No Contact or Duplicate Contact Call-A-AH

## 2025-01-12 NOTE — TELEPHONE ENCOUNTER
Pt c/o of stool being green in color and sticky twice today. Pt concerned that green stool is side effect of meds. Home care advised VU. Encounter routed to provider.     Reason for Disposition   Green stools    Additional Information   Negative: Patient sounds very sick or weak to the triager   Negative: [1] Stool is light gray or whitish AND [2] unexplained   Negative: [1] Stool is mucus or pus AND [2] persists > 24 hours   Negative: [1] Abnormal color is unexplained AND [2] persists > 24 hours   Negative: [1] Suspected food (or medicine) is eliminated AND [2] abnormal color persists > 48 hours   Negative: White stools (or gray) from a barium Upper or Lower GI x-ray    Protocols used: Stools - Unusual Color-A-AH

## 2025-01-15 ENCOUNTER — TELEPHONE (OUTPATIENT)
Dept: PAIN MEDICINE | Facility: CLINIC | Age: 80
End: 2025-01-15
Payer: MEDICARE

## 2025-01-15 NOTE — TELEPHONE ENCOUNTER
Reached out to patient to see if she stopped taking the steroids. Pt did not answer left [email or web address]

## 2025-01-24 ENCOUNTER — NURSE TRIAGE (OUTPATIENT)
Dept: ADMINISTRATIVE | Facility: CLINIC | Age: 80
End: 2025-01-24
Payer: MEDICARE

## 2025-01-24 DIAGNOSIS — I10 ESSENTIAL HYPERTENSION: Chronic | ICD-10-CM

## 2025-01-24 RX ORDER — CARVEDILOL 12.5 MG/1
12.5 TABLET ORAL 2 TIMES DAILY WITH MEALS
Qty: 180 TABLET | Refills: 0 | OUTPATIENT
Start: 2025-01-24

## 2025-01-24 NOTE — TELEPHONE ENCOUNTER
Refill Decision Note   Leticia Grimes  is requesting a refill authorization.  Brief Assessment and Rationale for Refill:  Quick Discontinue     Medication Therapy Plan:         Comments:     Note composed:11:47 AM 01/24/2025

## 2025-01-25 NOTE — TELEPHONE ENCOUNTER
Patient has questions about taking Protonix and Pepto bismol together. Per protocol, patient was advised to contact the pharmacy. Patient verbalizes understanding. Advised the patient to call back with any further questions or if symptoms worsen.          Reason for Disposition   [1] Caller has medicine question about med NOT prescribed by PCP AND [2] triager unable to answer question (e.g., compatibility with other med, storage)    Protocols used: Medication Question Call-A-

## 2025-01-27 ENCOUNTER — OFFICE VISIT (OUTPATIENT)
Dept: INTERNAL MEDICINE | Facility: CLINIC | Age: 80
End: 2025-01-27
Payer: MEDICARE

## 2025-01-27 ENCOUNTER — TELEPHONE (OUTPATIENT)
Dept: PSYCHIATRY | Facility: CLINIC | Age: 80
End: 2025-01-27
Payer: MEDICARE

## 2025-01-27 ENCOUNTER — NURSE TRIAGE (OUTPATIENT)
Dept: ADMINISTRATIVE | Facility: CLINIC | Age: 80
End: 2025-01-27
Payer: MEDICARE

## 2025-01-27 DIAGNOSIS — K59.00 CONSTIPATION, UNSPECIFIED CONSTIPATION TYPE: Primary | ICD-10-CM

## 2025-01-27 RX ORDER — POLYETHYLENE GLYCOL 3350 17 G/17G
17 POWDER, FOR SOLUTION ORAL DAILY
Qty: 238 G | Refills: 0 | Status: SHIPPED | OUTPATIENT
Start: 2025-01-27

## 2025-01-27 NOTE — TELEPHONE ENCOUNTER
Pt reports constipation and reports that she has not had a bowel movement since Wednesday. Pt denies rectal pain, abdominal pain, emesis. Care advice to be seen in office today. VV scheduled for pt. Pt verbalizes understanding.   Reason for Disposition   Last bowel movement (BM) > 4 days ago    Additional Information   Negative: Vomiting bile (green color)   Negative: Patient sounds very sick or weak to the triager   Negative: Constant abdominal pain lasting > 2 hours   Negative: Vomiting and abdomen looks much more swollen than usual   Negative: Rectal pain or fullness from fecal impaction (rectum full of stool) and NOT better after SITZ bath, suppository or enema   Negative: Abdomen is more swollen than usual    Protocols used: Constipation-A-OH

## 2025-01-27 NOTE — TELEPHONE ENCOUNTER
----- Message from Kristin sent at 1/27/2025  2:00 PM CST -----  Contact: [phone]  [email or web address]     Patient is calling for Medical Advice regarding:pt needs an appt     How long has patient had these symptoms:    Pharmacy name and phone#:    Patient wants a call back or thru [facility]:call back     Comments:  Please advise and give return call   Please advise patient replies from provider may take up to 48 hours.
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
## 2025-01-28 ENCOUNTER — TELEPHONE (OUTPATIENT)
Dept: PSYCHIATRY | Facility: CLINIC | Age: 80
End: 2025-01-28
Payer: MEDICARE

## 2025-01-28 NOTE — TELEPHONE ENCOUNTER
----- Message from Med Assistant Callahan sent at 1/28/2025  8:23 AM CST -----  Regarding: RTC  Contact: Leticia Avila morning  ----- Message -----  From: Vikki Elliott  Sent: 1/27/2025   3:32 PM CST  To: [facility] Clinical Staff    .Type:  Patient Returning Call    Who Called:Leticia   Who Left Message for Patient:Griselda Yun LPN  Does the patient know what this is regarding?:missed call she received on today  Would the patient rather a call back or a response via [facility]? Call back   Best Call Back Number:.[phone]   Additional Information:

## 2025-01-30 ENCOUNTER — NURSE TRIAGE (OUTPATIENT)
Dept: ADMINISTRATIVE | Facility: CLINIC | Age: 80
End: 2025-01-30
Payer: MEDICARE

## 2025-01-30 NOTE — TELEPHONE ENCOUNTER
She is having a bad sciatic flare up. She said normally its only one side of her body but today whole body. From shoulder blade in the back to her toes. Pt was diagnosed with flu on last week. Pain is severe. Care advice recommends pt go to office now. Appt given.  Reason for Disposition   SEVERE pain and taking a statin medicine (a lipid or cholesterol lowering drug)    Additional Information   Negative: Shock suspected (e.g., cold/pale/clammy skin, too weak to stand, low BP, rapid pulse)   Negative: Difficult to awaken or acting confused (e.g., disoriented, slurred speech)   Negative: Sounds like a life-threatening emergency to the triager   Negative: Dark (cola or tea-colored) or red-colored urine   Negative: Drinking very little and dehydration suspected (e.g., no urine > 12 hours, very dry mouth, very lightheaded)   Negative: Patient sounds very sick or weak to the triager   Negative: SEVERE pain (e.g., excruciating, unable to do any normal activities) and not improved 2 hours after pain medicine    Protocols used: Muscle Aches and Body Pain-A-OH

## 2025-02-01 ENCOUNTER — OFFICE VISIT (OUTPATIENT)
Dept: INTERNAL MEDICINE | Facility: CLINIC | Age: 80
End: 2025-02-01
Payer: MEDICARE

## 2025-02-01 VITALS
DIASTOLIC BLOOD PRESSURE: 62 MMHG | HEART RATE: 70 BPM | SYSTOLIC BLOOD PRESSURE: 120 MMHG | WEIGHT: 166.25 LBS | BODY MASS INDEX: 30.9 KG/M2 | OXYGEN SATURATION: 97 % | TEMPERATURE: 98 F

## 2025-02-01 DIAGNOSIS — M15.0 PRIMARY OSTEOARTHRITIS INVOLVING MULTIPLE JOINTS: Primary | ICD-10-CM

## 2025-02-01 DIAGNOSIS — R42 ORTHOSTATIC DIZZINESS: ICD-10-CM

## 2025-02-01 PROCEDURE — 96372 THER/PROPH/DIAG INJ SC/IM: CPT | Mod: S$GLB,,, | Performed by: FAMILY MEDICINE

## 2025-02-01 PROCEDURE — 1101F PT FALLS ASSESS-DOCD LE1/YR: CPT | Mod: CPTII,S$GLB,, | Performed by: FAMILY MEDICINE

## 2025-02-01 PROCEDURE — 3074F SYST BP LT 130 MM HG: CPT | Mod: CPTII,S$GLB,, | Performed by: FAMILY MEDICINE

## 2025-02-01 PROCEDURE — 3288F FALL RISK ASSESSMENT DOCD: CPT | Mod: CPTII,S$GLB,, | Performed by: FAMILY MEDICINE

## 2025-02-01 PROCEDURE — 1125F AMNT PAIN NOTED PAIN PRSNT: CPT | Mod: CPTII,S$GLB,, | Performed by: FAMILY MEDICINE

## 2025-02-01 PROCEDURE — 3078F DIAST BP <80 MM HG: CPT | Mod: CPTII,S$GLB,, | Performed by: FAMILY MEDICINE

## 2025-02-01 PROCEDURE — 1159F MED LIST DOCD IN RCRD: CPT | Mod: CPTII,S$GLB,, | Performed by: FAMILY MEDICINE

## 2025-02-01 PROCEDURE — 99214 OFFICE O/P EST MOD 30 MIN: CPT | Mod: 25,S$GLB,, | Performed by: FAMILY MEDICINE

## 2025-02-01 PROCEDURE — 99999 PR PBB SHADOW E&M-EST. PATIENT-LVL IV: CPT | Mod: PBBFAC,,, | Performed by: FAMILY MEDICINE

## 2025-02-01 RX ORDER — OLMESARTAN MEDOXOMIL 40 MG/1
40 TABLET ORAL DAILY
Qty: 90 TABLET | Refills: 1 | Status: SHIPPED | OUTPATIENT
Start: 2025-02-01 | End: 2026-02-01

## 2025-02-01 RX ORDER — KETOROLAC TROMETHAMINE 30 MG/ML
30 INJECTION, SOLUTION INTRAMUSCULAR; INTRAVENOUS
Status: COMPLETED | OUTPATIENT
Start: 2025-02-01 | End: 2025-02-01

## 2025-02-01 RX ORDER — HYDROCHLOROTHIAZIDE 12.5 MG/1
12.5 TABLET ORAL DAILY
Qty: 90 TABLET | Refills: 1 | Status: SHIPPED | OUTPATIENT
Start: 2025-02-01 | End: 2026-02-01

## 2025-02-01 RX ADMIN — KETOROLAC TROMETHAMINE 30 MG: 30 INJECTION, SOLUTION INTRAMUSCULAR; INTRAVENOUS at 11:02

## 2025-02-01 NOTE — PROGRESS NOTES
"Subjective:       Patient ID: Leticia Grimes is a 79 y.o. female.    Chief Complaint: No chief complaint on file.    Leticia Grimes is a 79 y.o. female who presents to clinic for sciatic nerve pain    She is accompanied by her adult daughter today.    States she has arthritis and sciatica, having a  flare, "body not aligned" has to sit for 1 hour to just to be able to move. She sees a chronic pain specialist. None of her current medications are helping.     Dizziness with change in her position, has had to move very slowly to avoid falling       Review of Systems   Constitutional:  Negative for chills and fever.   HENT:  Negative for congestion, rhinorrhea and sore throat.    Respiratory:  Negative for cough, shortness of breath and wheezing.    Cardiovascular:  Negative for chest pain, palpitations and leg swelling.   Gastrointestinal:  Negative for abdominal pain, constipation, diarrhea, nausea and vomiting.   Genitourinary:  Negative for dysuria, frequency and urgency.   Musculoskeletal:  Positive for arthralgias and back pain.   Neurological:  Positive for dizziness. Negative for tremors, seizures, syncope, weakness, numbness and headaches.   Psychiatric/Behavioral:  Negative for dysphoric mood.        Objective:      Physical Exam  Vitals reviewed.   HENT:      Head: Normocephalic and atraumatic.      Nose: No congestion or rhinorrhea.   Eyes:      General: No scleral icterus.        Right eye: No discharge.         Left eye: No discharge.      Extraocular Movements: Extraocular movements intact.      Conjunctiva/sclera: Conjunctivae normal.      Pupils: Pupils are equal, round, and reactive to light.   Cardiovascular:      Rate and Rhythm: Normal rate and regular rhythm.      Heart sounds: No murmur heard.     No friction rub. No gallop.   Pulmonary:      Effort: Pulmonary effort is normal. No respiratory distress.      Breath sounds: Normal breath sounds. No stridor. No wheezing or rhonchi.   Abdominal: "      General: Bowel sounds are normal.      Palpations: Abdomen is soft.   Musculoskeletal:         General: Tenderness present.      Right lower leg: No edema.      Left lower leg: No edema.   Skin:     General: Skin is warm.   Neurological:      General: No focal deficit present.      Mental Status: She is alert.   Psychiatric:         Mood and Affect: Mood normal.         Behavior: Behavior normal.         Assessment:       1. Primary osteoarthritis involving multiple joints    2. Orthostatic dizziness        Plan:   1. Primary osteoarthritis involving multiple joints  Chronic, uncontrolled, will given Toradol injection today and advised consistent use of tylenol arthritis strength along with referral PT   -     Ambulatory Referral/Consult to Physical Therapy/Occupational Therapy; Future; Expected date: 02/08/2025  -     ketorolac injection 30 mg    2. Orthostatic dizziness  Acute, will reduced dose of HCTZ to 12.5 to add in control of volume status   -     olmesartan (BENICAR) 40 MG tablet; Take 1 tablet (40 mg total) by mouth once daily.  Dispense: 90 tablet; Refill: 1  -     hydroCHLOROthiazide 12.5 MG Tab; Take 1 tablet (12.5 mg total) by mouth once daily.  Dispense: 90 tablet; Refill: 1           Future Appointments   Date Time Provider Department Center   2/18/2025 10:40 AM Nikki Marie MD [facility]   2/24/2025  1:40 PM Luis Arthur MD ON CARDIO [facility]   2/27/2025 10:00 AM PACEMAKER CLINIC [facility] ARR PRO [facility]   3/4/2025  2:15 PM Tomas Taylor MD [facility] SPOMED [facility]   4/15/2025  4:00 PM Rakesh Alexis [facility] ON PSYCH [facility]   9/22/2025 11:00 AM CARDIOVASCULAR 3, ONLH ONL SPECCPR [facility]   10/6/2025 10:40 AM Luis Arthur MD ON CARDIO [facility]       Nikki Marie MD  [facility] [address]  [place] [address]  [phone]

## 2025-02-03 ENCOUNTER — TELEPHONE (OUTPATIENT)
Dept: CARDIOLOGY | Facility: CLINIC | Age: 80
End: 2025-02-03
Payer: MEDICARE

## 2025-02-03 NOTE — TELEPHONE ENCOUNTER
Spoke with pt in regards to questions she had about medication questions. Pt medication was recently adjusted and she needs some clarification on what she should be taking. After visit with primary care she was changed from olmesartan/hctz to olmesartan 40 mg and HCTZ 12.5mg. Pt would like to know what she should be taking. Pt was instructed to contact Dr. Nikki Marie since she prescribed the medication but requested to speak to Dr. Arthur regarding this.                     ----- Message from Domingo Garcia sent at 2/3/2025 11:17 AM CST -----  Contact: Leticia  Type:  Needing Return Call    Who Called: Leticia  Needs Call Back For: Discussing medication update  Would the patient rather a call back or a response via [facility]?  Call  Best Call Back Number: [phone]  Additional Information:

## 2025-02-06 ENCOUNTER — PATIENT MESSAGE (OUTPATIENT)
Dept: INTERNAL MEDICINE | Facility: CLINIC | Age: 80
End: 2025-02-06
Payer: MEDICARE

## 2025-02-06 ENCOUNTER — TELEPHONE (OUTPATIENT)
Dept: INTERNAL MEDICINE | Facility: CLINIC | Age: 80
End: 2025-02-06
Payer: MEDICARE

## 2025-02-06 DIAGNOSIS — M25.552 LEFT HIP PAIN: ICD-10-CM

## 2025-02-06 DIAGNOSIS — M54.42 CHRONIC BILATERAL LOW BACK PAIN WITH LEFT-SIDED SCIATICA: Primary | ICD-10-CM

## 2025-02-06 DIAGNOSIS — G89.29 CHRONIC BILATERAL LOW BACK PAIN WITH LEFT-SIDED SCIATICA: Primary | ICD-10-CM

## 2025-02-06 DIAGNOSIS — M54.17 LUMBOSACRAL RADICULOPATHY AT S1: ICD-10-CM

## 2025-02-06 NOTE — TELEPHONE ENCOUNTER
Called pt, no answer. Left a message stating that I will be faxing over her referral today and if she has any questions she can give me a call back.

## 2025-02-06 NOTE — TELEPHONE ENCOUNTER
----- Message from Shannen sent at 2/6/2025 11:34 AM CST -----  Contact: pt  Type: Request a call back    Name of Caller: kyle   Best Call Back Number: [phone]  Additional Information:  pt is calling to see if her paperwork for therapy has been [place] [facility] [place]

## 2025-02-07 ENCOUNTER — TELEPHONE (OUTPATIENT)
Dept: PAIN MEDICINE | Facility: CLINIC | Age: 80
End: 2025-02-07
Payer: MEDICARE

## 2025-02-10 ENCOUNTER — TELEPHONE (OUTPATIENT)
Dept: INTERNAL MEDICINE | Facility: CLINIC | Age: 80
End: 2025-02-10
Payer: MEDICARE

## 2025-02-10 NOTE — TELEPHONE ENCOUNTER
Spoke to patient and will fax over last ov note and what therapy will need to focus on in therapy sessions, fax [phone].      ----- Message from Poonam sent at 2/10/2025 10:19 AM CST -----  Contact: Mal Kelly@ [phone]  Pt calling to speak with the nurse regarding questions about the therapy center that the doctor refer her to. Please call to advise.

## 2025-02-12 ENCOUNTER — TELEPHONE (OUTPATIENT)
Dept: INTERNAL MEDICINE | Facility: CLINIC | Age: 80
End: 2025-02-12
Payer: MEDICARE

## 2025-02-12 NOTE — TELEPHONE ENCOUNTER
Patient reports BP being high all day and going up and down, not feeling well, instructed patient Dr. Marie is not here and she should proceed to the ED for further evaluation.       ----- Message from Solange sent at 2/12/2025 12:28 PM CST -----  Contact: Leticia  .Patient is calling to speak with the nurse regarding bp  . Reports bp is 187/60 and needing someone to give pt a call back  . Please give patient a call back at [phone]

## 2025-02-13 ENCOUNTER — TELEPHONE (OUTPATIENT)
Dept: INTERNAL MEDICINE | Facility: CLINIC | Age: 80
End: 2025-02-13
Payer: MEDICARE

## 2025-02-13 ENCOUNTER — NURSE TRIAGE (OUTPATIENT)
Dept: ADMINISTRATIVE | Facility: CLINIC | Age: 80
End: 2025-02-13
Payer: MEDICARE

## 2025-02-13 NOTE — TELEPHONE ENCOUNTER
I spoke to the pt and she was scheduled to see another provider on [facility] today for HTN she is scheduled to see Dr. ISABELLE Marie on 2/20/25 . She replied thanks for checking on her. Fyi  //kah
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
## 2025-02-13 NOTE — TELEPHONE ENCOUNTER
Pt stated yesterday she woke uo with a bad headache. She took 2 tylenols and blood pressure medications. Bp at the time was 187/87. Went down to 160/80 something during the day. At 5 am she had a headache so she took tylenol and blood pressure medication. Pt stated she went back to sleep and just woke up. She has a slight headache 3/10 her bp is currently 171/87. Pain in neck and shoulder that she is currently being treated for. Headache is currently moderate. Pt stated once she took the tylenol the headache went away. Care advice recommends pt see MD within 3 days. Appt given.  Reason for Disposition   Systolic BP  >= 160 OR Diastolic >= 100   [1] New-onset headache AND [2] age > 50 years    Additional Information   Negative: Difficult to awaken or acting confused (e.g., disoriented, slurred speech)   Negative: SEVERE difficulty breathing (e.g., struggling for each breath, speaks in single words)   Negative: [1] Weakness of the face, arm or leg on one side of the body AND [2] new-onset   Negative: [1] Numbness (i.e., loss of sensation) of the face, arm or leg on one side of the body AND [2] new-onset   Negative: [1] Chest pain lasts > 5 minutes AND [2] history of heart disease (i.e., heart attack, bypass surgery, angina, angioplasty, CHF)   Negative: [1] Chest pain AND [2] took nitroglycerin AND [3] pain was not relieved   Negative: Sounds like a life-threatening emergency to the triager   Negative: [1] Systolic BP  >= 160 OR Diastolic >= 100 AND [2] cardiac (e.g., breathing difficulty, chest pain) or neurologic symptoms (e.g., new-onset blurred or double vision, unsteady gait)   Negative: [1] Systolic BP  >= 200 OR Diastolic >= 120 AND [2] having NO cardiac or neurologic symptoms   Negative: [1] Systolic BP  >= 180 OR Diastolic >= 110 AND [2] missed most recent dose of blood pressure medication   Negative: Systolic BP  >= 180 OR Diastolic >= 110   Negative: Ran out of BP medications   Negative: Difficult to awaken  "or acting confused (e.g., disoriented, slurred speech)   Negative: [1] Weakness of the face, arm or leg on one side of the body AND [2] new-onset   Negative: [1] Numbness of the face, arm or leg on one side of the body AND [2] new-onset   Negative: [1] Loss of speech or garbled speech AND [2] new-onset   Negative: Passed out (e.g., fainted, lost consciousness, blacked out and was not responding)   Negative: Sounds like a life-threatening emergency to the triager   Negative: Unable to walk, or can only walk with assistance (e.g., requires support)   Negative: Stiff neck (can't touch chin to chest)   Negative: Severe pain in one eye   Negative: [1] Other family members (or people in same household) with headaches AND [2] possibility of carbon monoxide exposure   Negative: [1] SEVERE headache (e.g., excruciating) AND [2] "worst headache" of life   Negative: [1] SEVERE headache AND [2] sudden-onset (i.e., reaching maximum intensity within seconds to 1 hour)   Negative: [1] SEVERE headache AND [2] fever   Negative: Loss of vision or double vision  (Exception: Same as previously diagnosed migraines.)   Negative: [1] Fever > 100 F (37.8 C) AND [2] diabetes mellitus or weak immune system (e.g., HIV positive, cancer chemo, splenectomy, organ transplant, chronic steroids)   Negative: Patient sounds very sick or weak to the triager   Negative: [1] SEVERE headache (e.g., excruciating) AND [2] not improved after 2 hours of pain medicine   Negative: [1] Vomiting AND [2] 2 or more times  (Exception: Similar to previously diagnosed migraines.)   Negative: Fever > 104 F (40 C)   Negative: [1] MODERATE headache (e.g., interferes with normal activities) AND [2] present > 24 hours AND [3] unexplained  (Exceptions: Pain medicines not tried, typical migraine, or headache part of viral illness.)   Negative: Fever present > 3 days (72 hours)   Negative: [1] New-onset headache AND [2] weak immune system (e.g., HIV positive, cancer chemo, " splenectomy, organ transplant, chronic steroids)   Negative: [1] MILD-MODERATE headache AND [2] present > 3 days (72 hours) AND [3] no improvement after using Care Advice    Protocols used: Blood Pressure - High-A-AH, Headache-A-AH
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
## 2025-02-15 ENCOUNTER — NURSE TRIAGE (OUTPATIENT)
Dept: ADMINISTRATIVE | Facility: CLINIC | Age: 80
End: 2025-02-15
Payer: MEDICARE

## 2025-02-15 NOTE — TELEPHONE ENCOUNTER
Pt called with c/o elevated BP over the last week. Pt spoke to her doctors office and was placed back on her hydrochlorothiazide but is concerned because her BP is still elevated. Pt reports her current BP is 175/95. Pt asking if she should take her Clonidine. Per pts provider note pt advised to take clonidine if BP >160/100. Pt verbalized understanding.   Reason for Disposition   Systolic BP  >= 160 OR Diastolic >= 100    Additional Information   Negative: Difficult to awaken or acting confused (e.g., disoriented, slurred speech)   Negative: SEVERE difficulty breathing (e.g., struggling for each breath, speaks in single words)   Negative: [1] Weakness of the face, arm or leg on one side of the body AND [2] new-onset   Negative: [1] Numbness (i.e., loss of sensation) of the face, arm or leg on one side of the body AND [2] new-onset   Negative: [1] Chest pain lasts > 5 minutes AND [2] history of heart disease (i.e., heart attack, bypass surgery, angina, angioplasty, CHF)   Negative: [1] Chest pain AND [2] took nitroglycerin AND [3] pain was not relieved   Negative: Sounds like a life-threatening emergency to the triager   Negative: [1] Systolic BP  >= 160 OR Diastolic >= 100 AND [2] cardiac (e.g., breathing difficulty, chest pain) or neurologic symptoms (e.g., new-onset blurred or double vision, unsteady gait)   Negative: [1] Systolic BP  >= 200 OR Diastolic >= 120 AND [2] having NO cardiac or neurologic symptoms   Negative: [1] Systolic BP  >= 180 OR Diastolic >= 110 AND [2] missed most recent dose of blood pressure medication   Negative: Systolic BP  >= 180 OR Diastolic >= 110   Negative: Ran out of BP medications    Protocols used: Blood Pressure - High-A-

## 2025-02-17 ENCOUNTER — TELEPHONE (OUTPATIENT)
Dept: INTERNAL MEDICINE | Facility: CLINIC | Age: 80
End: 2025-02-17
Payer: MEDICARE

## 2025-02-17 NOTE — TELEPHONE ENCOUNTER
I returned a call back to the pt who was returning a call to COOPER Saleh  regarding her elevated BP's which has become under control and getting back to normal ranges after being instructed by on call nurse to take clonidine. She was able to get the pharmacy to fill it. She was offered an appt today to be seen but, she will wait until her appt on 2/20 /25 with Dr.Jessica Marie. I verbalized understanding. Eddai //kah

## 2025-02-17 NOTE — TELEPHONE ENCOUNTER
----- Message from Tamika sent at 2/17/2025 10:24 AM CST -----  Contact: Leticia [phone]  Returning a phone call.Who left a message for the patient:  NurseDo they know what this is regarding:  yesWould they like a phone call back or a response via [facility]:  call back

## 2025-02-17 NOTE — TELEPHONE ENCOUNTER
Called and left message on patient vm to give us a call so we can schedule a appt for her to come in and access her blood pressure.

## 2025-02-19 DIAGNOSIS — Z78.0 MENOPAUSE: ICD-10-CM

## 2025-02-20 ENCOUNTER — TELEPHONE (OUTPATIENT)
Dept: CARDIOLOGY | Facility: CLINIC | Age: 80
End: 2025-02-20
Payer: MEDICARE

## 2025-02-20 DIAGNOSIS — Z86.74 HISTORY OF SUDDEN CARDIAC ARREST SUCCESSFULLY RESUSCITATED: ICD-10-CM

## 2025-02-20 DIAGNOSIS — Z95.810 ICD (IMPLANTABLE CARDIOVERTER-DEFIBRILLATOR) IN PLACE: Primary | ICD-10-CM

## 2025-02-20 NOTE — TELEPHONE ENCOUNTER
Lvm for pt to return call to clinic in regards scheduling an annual appt in August 2025 with Talya Bowen arrhythmia Nurse Practitioner. Left a call back number for patient to return call to clinic.     Pt is scheduled for August 20th 2025

## 2025-02-24 ENCOUNTER — HOSPITAL ENCOUNTER (OUTPATIENT)
Dept: CARDIOLOGY | Facility: HOSPITAL | Age: 80
Discharge: HOME | End: 2025-02-24
Attending: INTERNAL MEDICINE
Payer: MEDICARE

## 2025-02-24 ENCOUNTER — OFFICE VISIT (OUTPATIENT)
Dept: INTERNAL MEDICINE | Facility: CLINIC | Age: 80
End: 2025-02-24
Payer: MEDICARE

## 2025-02-24 ENCOUNTER — OFFICE VISIT (OUTPATIENT)
Dept: CARDIOLOGY | Facility: CLINIC | Age: 80
End: 2025-02-24
Payer: MEDICARE

## 2025-02-24 VITALS
TEMPERATURE: 97 F | DIASTOLIC BLOOD PRESSURE: 62 MMHG | RESPIRATION RATE: 18 BRPM | HEART RATE: 72 BPM | WEIGHT: 167.13 LBS | BODY MASS INDEX: 30.76 KG/M2 | HEIGHT: 62 IN | OXYGEN SATURATION: 97 % | SYSTOLIC BLOOD PRESSURE: 100 MMHG

## 2025-02-24 VITALS
WEIGHT: 166.56 LBS | HEART RATE: 67 BPM | HEIGHT: 62 IN | DIASTOLIC BLOOD PRESSURE: 74 MMHG | BODY MASS INDEX: 30.65 KG/M2 | OXYGEN SATURATION: 100 % | SYSTOLIC BLOOD PRESSURE: 122 MMHG

## 2025-02-24 DIAGNOSIS — I25.10 CORONARY ARTERY CALCIFICATION: Primary | ICD-10-CM

## 2025-02-24 DIAGNOSIS — Z86.74 HISTORY OF SUDDEN CARDIAC ARREST SUCCESSFULLY RESUSCITATED: ICD-10-CM

## 2025-02-24 DIAGNOSIS — F51.02 INSOMNIA DUE TO STRESS: ICD-10-CM

## 2025-02-24 DIAGNOSIS — I10 ESSENTIAL HYPERTENSION: Primary | ICD-10-CM

## 2025-02-24 DIAGNOSIS — F41.9 ANXIETY: ICD-10-CM

## 2025-02-24 DIAGNOSIS — Z86.74 HISTORY OF SUDDEN CARDIAC ARREST SUCCESSFULLY RESUSCITATED: Primary | ICD-10-CM

## 2025-02-24 DIAGNOSIS — Z23 NEED FOR VACCINATION: ICD-10-CM

## 2025-02-24 DIAGNOSIS — Z95.810 ICD (IMPLANTABLE CARDIOVERTER-DEFIBRILLATOR) IN PLACE: ICD-10-CM

## 2025-02-24 DIAGNOSIS — E78.1 HYPERTRIGLYCERIDEMIA: ICD-10-CM

## 2025-02-24 DIAGNOSIS — R42 ORTHOSTATIC DIZZINESS: ICD-10-CM

## 2025-02-24 DIAGNOSIS — I27.9 PULMONARY HEART DISEASE: ICD-10-CM

## 2025-02-24 DIAGNOSIS — Z95.810 ICD (IMPLANTABLE CARDIOVERTER-DEFIBRILLATOR) IN PLACE: Chronic | ICD-10-CM

## 2025-02-24 DIAGNOSIS — I10 ESSENTIAL HYPERTENSION: Chronic | ICD-10-CM

## 2025-02-24 DIAGNOSIS — E78.2 MIXED HYPERLIPIDEMIA: Chronic | ICD-10-CM

## 2025-02-24 DIAGNOSIS — Z78.0 ASYMPTOMATIC POSTMENOPAUSAL STATE: ICD-10-CM

## 2025-02-24 LAB
OHS QRS DURATION: 90 MS
OHS QTC CALCULATION: 399 MS

## 2025-02-24 PROCEDURE — 1101F PT FALLS ASSESS-DOCD LE1/YR: CPT | Mod: HCNC,CPTII,S$GLB, | Performed by: INTERNAL MEDICINE

## 2025-02-24 PROCEDURE — 3288F FALL RISK ASSESSMENT DOCD: CPT | Mod: HCNC,CPTII,S$GLB, | Performed by: FAMILY MEDICINE

## 2025-02-24 PROCEDURE — 90653 IIV ADJUVANT VACCINE IM: CPT | Mod: HCNC,S$GLB,, | Performed by: FAMILY MEDICINE

## 2025-02-24 PROCEDURE — 3078F DIAST BP <80 MM HG: CPT | Mod: HCNC,CPTII,S$GLB, | Performed by: INTERNAL MEDICINE

## 2025-02-24 PROCEDURE — 1160F RVW MEDS BY RX/DR IN RCRD: CPT | Mod: HCNC,CPTII,S$GLB, | Performed by: INTERNAL MEDICINE

## 2025-02-24 PROCEDURE — 3288F FALL RISK ASSESSMENT DOCD: CPT | Mod: HCNC,CPTII,S$GLB, | Performed by: INTERNAL MEDICINE

## 2025-02-24 PROCEDURE — 3074F SYST BP LT 130 MM HG: CPT | Mod: HCNC,CPTII,S$GLB, | Performed by: INTERNAL MEDICINE

## 2025-02-24 PROCEDURE — G2211 COMPLEX E/M VISIT ADD ON: HCPCS | Mod: HCNC,S$GLB,, | Performed by: FAMILY MEDICINE

## 2025-02-24 PROCEDURE — 3078F DIAST BP <80 MM HG: CPT | Mod: HCNC,CPTII,S$GLB, | Performed by: FAMILY MEDICINE

## 2025-02-24 PROCEDURE — 99999 PR PBB SHADOW E&M-EST. PATIENT-LVL IV: CPT | Mod: PBBFAC,,, | Performed by: INTERNAL MEDICINE

## 2025-02-24 PROCEDURE — 1126F AMNT PAIN NOTED NONE PRSNT: CPT | Mod: HCNC,CPTII,S$GLB, | Performed by: INTERNAL MEDICINE

## 2025-02-24 PROCEDURE — G0008 ADMIN INFLUENZA VIRUS VAC: HCPCS | Mod: HCNC,S$GLB,, | Performed by: FAMILY MEDICINE

## 2025-02-24 PROCEDURE — 93282 PRGRMG EVAL IMPLANTABLE DFB: CPT

## 2025-02-24 PROCEDURE — 3074F SYST BP LT 130 MM HG: CPT | Mod: HCNC,CPTII,S$GLB, | Performed by: FAMILY MEDICINE

## 2025-02-24 PROCEDURE — 93010 ELECTROCARDIOGRAM REPORT: CPT | Mod: ,,, | Performed by: INTERNAL MEDICINE

## 2025-02-24 PROCEDURE — 99999 PR PBB SHADOW E&M-EST. PATIENT-LVL V: CPT | Mod: PBBFAC,HCNC,, | Performed by: FAMILY MEDICINE

## 2025-02-24 PROCEDURE — 1159F MED LIST DOCD IN RCRD: CPT | Mod: HCNC,CPTII,S$GLB, | Performed by: INTERNAL MEDICINE

## 2025-02-24 PROCEDURE — 1101F PT FALLS ASSESS-DOCD LE1/YR: CPT | Mod: HCNC,CPTII,S$GLB, | Performed by: FAMILY MEDICINE

## 2025-02-24 PROCEDURE — 93005 ELECTROCARDIOGRAM TRACING: CPT

## 2025-02-24 PROCEDURE — 99214 OFFICE O/P EST MOD 30 MIN: CPT | Mod: HCNC,S$GLB,, | Performed by: FAMILY MEDICINE

## 2025-02-24 PROCEDURE — 1126F AMNT PAIN NOTED NONE PRSNT: CPT | Mod: HCNC,CPTII,S$GLB, | Performed by: FAMILY MEDICINE

## 2025-02-24 PROCEDURE — 99214 OFFICE O/P EST MOD 30 MIN: CPT | Mod: HCNC,S$GLB,, | Performed by: INTERNAL MEDICINE

## 2025-02-24 RX ORDER — TRAMADOL HYDROCHLORIDE 50 MG/1
TABLET ORAL
COMMUNITY

## 2025-02-24 RX ORDER — LISINOPRIL 40 MG/1
TABLET ORAL
COMMUNITY
End: 2025-02-24

## 2025-02-24 RX ORDER — METHOCARBAMOL 750 MG/1
750 TABLET, FILM COATED ORAL
COMMUNITY

## 2025-02-24 RX ORDER — PANTOPRAZOLE SODIUM 40 MG/1
TABLET, DELAYED RELEASE ORAL
COMMUNITY
Start: 2025-01-24

## 2025-02-24 RX ORDER — MECLIZINE HYDROCHLORIDE 25 MG/1
25 TABLET ORAL EVERY 8 HOURS PRN
COMMUNITY
Start: 2024-08-27

## 2025-02-24 RX ORDER — CLONIDINE HYDROCHLORIDE 0.1 MG/1
0.1 TABLET ORAL EVERY 6 HOURS PRN
Qty: 60 TABLET | Refills: 11 | Status: SHIPPED | OUTPATIENT
Start: 2025-02-24

## 2025-02-24 RX ORDER — LORAZEPAM 0.5 MG/1
0.5 TABLET ORAL NIGHTLY PRN
Qty: 15 TABLET | Refills: 0 | Status: SHIPPED | OUTPATIENT
Start: 2025-02-24
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
## 2025-02-24 NOTE — PROGRESS NOTES
Subjective:       Patient ID: Leticia Grimes is a 79 y.o. female.    Chief Complaint: Follow-up (2 week)    History of Present Illness    CHIEF COMPLAINT:  Ms. Grimes presents for a follow-up visit to check on her blood pressure management and to receive recommended vaccinations.    HPI:  Ms. Grimes reports her blood pressure had increased to 180 something, but has since improved with medication adjustments. She is now taking three blood pressure medications instead of one. She had a week-long period of elevated blood pressure due to not having her hydrochlorothiazide medication. She took Clonidine, an emergency medication, twice before starting her regular medication regimen, which helped bring her blood pressure down. She mentions dizziness, which has now resolved. She is evaluated by a cardiologist, Dr. MOROCHO, for management of her heart condition and blood pressure. She received her last COVID booster in December and is due for her annual flu vaccine. She denies current dizziness.    MEDICAL HISTORY:  Ms. Grimes has a history of hypertension. She received the RSV vaccine in March 2024 and a COVID-19 booster in December. Ms. Grimes completed her pneumonia vaccination, a one-dose vaccine, in 2022.    MEDICATIONS:  Ms. Grimes is on Hydrochlorothiazide, a diuretic, for blood pressure control. She is also on Clonidine for emergency blood pressure control and Lorazepam. Her blood pressure medication regimen was recently changed from one medication to three medications due to high blood pressure readings, which reached up to 180. She recently started Hydrochlorothiazide to help control her blood pressure. Prior to starting her regular medication regimen, she used Clonidine twice for emergency blood pressure control.        Review of Systems   Constitutional:  Negative for chills and fever.   HENT:  Negative for congestion, rhinorrhea and sore throat.    Respiratory:  Negative for cough, shortness of breath and wheezing.     Cardiovascular:  Negative for chest pain, palpitations and leg swelling.   Gastrointestinal:  Negative for abdominal pain, constipation, diarrhea, nausea and vomiting.   Genitourinary:  Negative for dysuria, frequency and urgency.   Neurological:  Negative for headaches.   Psychiatric/Behavioral:  Negative for dysphoric mood.        Objective:      Physical Exam  Vitals reviewed.   HENT:      Head: Normocephalic and atraumatic.      Nose: No congestion or rhinorrhea.   Eyes:      General: No scleral icterus.        Right eye: No discharge.         Left eye: No discharge.      Extraocular Movements: Extraocular movements intact.      Conjunctiva/sclera: Conjunctivae normal.      Pupils: Pupils are equal, round, and reactive to light.   Cardiovascular:      Rate and Rhythm: Normal rate and regular rhythm.      Heart sounds: No murmur heard.     No friction rub. No gallop.   Pulmonary:      Effort: Pulmonary effort is normal. No respiratory distress.      Breath sounds: Normal breath sounds. No stridor. No wheezing or rhonchi.   Abdominal:      General: Bowel sounds are normal.      Palpations: Abdomen is soft.   Musculoskeletal:      Right lower leg: No edema.      Left lower leg: No edema.   Skin:     General: Skin is warm.   Neurological:      General: No focal deficit present.      Mental Status: She is alert.   Psychiatric:         Mood and Affect: Mood normal.         Behavior: Behavior normal.         Assessment/Plan:       1. Essential hypertension    2. Anxiety    3. Insomnia due to stress    4. Need for vaccination    5. Asymptomatic postmenopausal state      Assessment & Plan    IMPRESSION:  - Evaluated blood pressure management; noted improvement with addition of 3rd medication  - Assessed resolution of dizziness symptoms  - Reviewed vaccination status; patient is up-to-date on RSV and pneumonia vaccines  - Considered need for tetanus vaccine; deemed not urgent unless patient plans to be around small  babies  - Recommend DEXA scan for bone density assessment    PULMONARY HEART DISEASE:  - Explained RSV (RSV) to the patient, detailing its severe symptoms, prevalence, potential for hospitalization, and recommended October vaccination timing.  - Performed a physical exam, including auscultation of the heart and lungs.    HYPERTENSION:  - Continued the current blood pressure medication regimen consisting of 3 medications.  - Added hydrochlorothiazide (diuretic) to the regimen.  - Monitored blood pressure, which peaked at 180+ for a week, but patient reported improved control with the new regimen.  - Acknowledged recent fluctuations and recommended evaluation by a cardiologist.  - Noted that the patient used Clonidine for emergency blood pressure management.    ORTHOSTATIC DIZZINESS:  - Monitored and evaluated the patient's report of improvement in dizziness.  - Confirmed resolution and assessed that it was likely related to blood pressure issues.    VACCINATIONS:  - Reviewed the patient's immunization history and clarified the vaccination schedule.  - Explained that pneumonia vaccines require only 1 dose, while COVID-19 and influenza vaccinations are needed annually.  - Administered a high-dose influenza vaccine.  - Discussed various vaccines including influenza, pneumonia, and tetanus, and explained the future vaccination schedule.    MEDICATIONS/SUPPLEMENTS:  - Continued the patient's lorazepam prescription.    FOLLOW UP:  - Follow up in 3 months.              Future Appointments   Date Time Provider Department Center   3/7/2025 11:00 AM Tomas Taylor MD [facility] [place]   4/15/2025  4:00 PM Rakesh Alexis, [facility] ON PSYCH BR Medical C   10/6/2025 10:40 AM Luis Arthur MD ON CARDIO BR Medical C   3/3/2026  1:30 PM [facility], [facility] ARRHYTHMIA [facility] ARR PRO BR Medical C       Nikki Marie MD  [facility] [address]  [place] [address]  [phone]      This  note was generated with the assistance of ambient listening technology. Verbal consent was obtained by the patient and accompanying visitor(s) for the recording of patient appointment to facilitate this note. I attest to having reviewed and edited the generated note for accuracy, though some syntax or spelling errors may persist. Please contact the author of this note for any clarification.

## 2025-02-25 ENCOUNTER — TELEPHONE (OUTPATIENT)
Dept: INTERNAL MEDICINE | Facility: CLINIC | Age: 80
End: 2025-02-25
Payer: MEDICARE

## 2025-02-25 ENCOUNTER — TELEPHONE (OUTPATIENT)
Dept: CARDIOLOGY | Facility: CLINIC | Age: 80
End: 2025-02-25
Payer: MEDICARE

## 2025-02-25 DIAGNOSIS — Z12.31 VISIT FOR SCREENING MAMMOGRAM: Primary | ICD-10-CM

## 2025-02-25 NOTE — TELEPHONE ENCOUNTER
----- Message from Alessia sent at 2/25/2025  1:35 PM CST -----  Name of Who is Calling: Pt   What is the request in detail:Pt would like a call back to in regards to sending pt's mammo orders to [facility] Radiology dept. [facility] is not showing any mammo orders.  Please advise thank you   Can the clinic reply by [facility]:no  What Number to Call Back if not [facility]:Telephone Information:Mobile          [phone]

## 2025-02-25 NOTE — TELEPHONE ENCOUNTER
Spoke to patient and let her know the rx was sent to the pharmacy on yesterday.     ----- Message from Leia sent at 2/25/2025 10:43 AM CST -----  Contact: Leticia  Type:  Patient Requesting a call back Who Called:Leticia What is the call back request regarding?: pt states Lorazepam has not yet been sent to pharmacy Would the patient rather a call back or a response via [facility]?call Best Call Back Number:[phone] Additional Information: Please call for additional information

## 2025-02-25 NOTE — TELEPHONE ENCOUNTER
Spoke with pt in regards to confirming pharmacy had received prescription.                 ----- Message from Leia sent at 2/25/2025 10:45 AM CST -----  Contact: Leticia  Type:  Patient Requesting a call back Who Called:Luis Enrique is the call back request regarding?:pt states Clonidine has not been sent to pharmacy Would the patient rather a call back or a response via [facility]?call Best Call Back Number:[phone]  Additional Information:Please call for additional information

## 2025-02-27 ENCOUNTER — PATIENT MESSAGE (OUTPATIENT)
Dept: PAIN MEDICINE | Facility: CLINIC | Age: 80
End: 2025-02-27
Payer: MEDICARE

## 2025-02-28 ENCOUNTER — TELEPHONE (OUTPATIENT)
Dept: PAIN MEDICINE | Facility: CLINIC | Age: 80
End: 2025-02-28
Payer: MEDICARE

## 2025-02-28 NOTE — TELEPHONE ENCOUNTER
Reached out to pt to schedule her an earlier appt since she is in a lot of pain. Accepted a virtual visit.    Genoveva ARZOLA MA

## 2025-03-04 NOTE — PROGRESS NOTES
Orthopaedic Follow-Up Visit    Last Appointment: 10/4/22  Diagnosis: s/p Left Reverse Total Shoulder Arthroplasty   Prior Procedure: None    History of Present Illness            ***    Leticia Grimes is a 79 y.o. female who is here for f/u evaluation of her left shoulder. The patient was last seen here by me 10/4/22 at which point she was making progress after surgery. Of note she had Cervical Interlaminar epidural steroid injection C6-7 under fluoroscopic guidance with Dr. Mcgraw on 24 with 50% for approximately 2 weeks.  The patient returns today reporting ***.    To review her history, Leticia Grimes is a 79 y.o. female who is s/p left reverse total shoulder arthoplasty (DOS: 22).     Patient's medications, allergies, past medical, surgical, social and family histories were reviewed and updated as appropriate.    Review of Systems   All systems reviewed were negative.  Specifically, the patient denies fever, chills, weight loss, chest pain, shortness of breath, or dyspnea on exertion.      Past Medical History:   Diagnosis Date    Adjustment insomnia 2020    Anxiety 2020    GERD (gastroesophageal reflux disease)     History of sudden cardiac arrest successfully resuscitated 2020    Hypertension     ICD (implantable cardioverter-defibrillator) in place 2020    Left arm swelling 9/3/2020    Mild vitamin D deficiency 2013    Osteopenia 2020    Vitamin D deficiency disease        Past Surgical History:   Procedure Laterality Date     SECTION, CLASSIC      EPIDURAL STEROID INJECTION INTO CERVICAL SPINE N/A 2024    Procedure: Cervical C6/7 Interlaminar OMAR;  Surgeon: Main Mcgraw MD;  Location: [facility];  Service: Pain Management;  Laterality: N/A;    HYSTERECTOMY      INJECTION OF JOINT Left 2021    Procedure: Left shoulder Joint injection;  Surgeon: Main Mcgraw MD;  Location: [facility];  Service: Pain Management;  Laterality: Left;     INSERTION OF BIVENTRICULAR IMPLANTABLE CARDIOVERTER-DEFIBRILLATOR (ICD)      REVERSE TOTAL SHOULDER ARTHROPLASTY Left 05/05/2022    Procedure: ARTHROPLASTY, SHOULDER, TOTAL, REVERSE;  Surgeon: Tomas Taylor MD;  Location: [facility];  Service: Orthopedics;  Laterality: Left;    TUBAL LIGATION         Patient's Medications   New Prescriptions    No medications on file   Previous Medications    ACETAMINOPHEN (TYLENOL) 500 MG TABLET    Take 2 tablets (1,000 mg total) by mouth every 8 (eight) hours as needed for Pain.    CARVEDILOL (COREG) 12.5 MG TABLET    Take 1 tablet (12.5 mg total) by mouth 2 (two) times daily with meals.    CLONIDINE (CATAPRES) 0.1 MG TABLET    Take 1 tablet (0.1 mg total) by mouth every 6 (six) hours as needed (if SBP > 150 mmHG).    ERGOCALCIFEROL, VITAMIN D2, (VITAMIN D ORAL)    Take by mouth.    GABAPENTIN (NEURONTIN) 300 MG CAPSULE    Take 1 capsule (300 mg total) by mouth 3 (three) times daily.    HYDROCHLOROTHIAZIDE 12.5 MG TAB    Take 1 tablet (12.5 mg total) by mouth once daily.    LORAZEPAM (ATIVAN) 0.5 MG TABLET    Take 1 tablet (0.5 mg total) by mouth nightly as needed.    MECLIZINE (ANTIVERT) 25 MG TABLET    Take 25 mg by mouth every 8 (eight) hours as needed for Dizziness.    METHOCARBAMOL (ROBAXIN) 750 MG TAB    Take 750 mg by mouth.    MUPIROCIN (BACTROBAN) 2 % OINTMENT    Apply topically 3 (three) times daily.    MV-MN/FOLIC/LUTEIN/HERBAL 293 (ALIVE WOMEN'S 50 PLUS ORAL)    Take 1 tablet by mouth once daily.    OLMESARTAN (BENICAR) 40 MG TABLET    Take 1 tablet (40 mg total) by mouth once daily.    PANTOPRAZOLE (PROTONIX) 40 MG TABLET    1 tablet Orally Once a day for 90 days    POLYETHYLENE GLYCOL (GLYCOLAX) 17 GRAM/DOSE POWDER    Take 17 g by mouth once daily.    ROSUVASTATIN (CRESTOR) 10 MG TABLET    Take 1 tablet by mouth once daily    TIZANIDINE (ZANAFLEX) 4 MG TABLET    Take 1/2 to 1 tab BID PRN muscle spasms. May cause drowsiness.    TRAMADOL (ULTRAM) 50 MG TABLET     1 tablet as needed Orally Once a day    TRAZODONE (DESYREL) 50 MG TABLET    1 to 2 tabs po qhs prn sleep   Modified Medications    No medications on file   Discontinued Medications    No medications on file       Family History   Problem Relation Name Age of Onset    Hypertension Mother [place]     Arthritis Mother Farhana     Diabetes Father Peter     Heart disease Father Kyle     Cancer Sister Thania        Review of patient's allergies indicates:   Allergen Reactions    Codeine     Morphine Other (See Comments)         Objective:      Physical Exam  Patient is alert and oriented, no distress. Skin is intact. Neuro is normal with no focal motor or sensory findings.    Cervical exam is unremarkable. Intact cervical ROM. Negative Spurling's test    Physical Exam:  RIGHT    LEFT    Scap Dyskinesis/Winging (-)    (-)    Tenderness:          Greater Tuberosity  (-)    (-)  Bicipital Groove  (-)    (-)  AC joint   (-)    (-)  Other:     ROM:  Forward Elevation 180***    180***  Abduction  120***    120***  ER (at side)  80***    80***  IR   T8***    T8***    Strength:   Supraspinatus  5/5    5/5  Infraspinatus  5/5    5/5  Subscap / IR  5/5    5/5     Special Tests:   Apprehension:   (-)    (-)   Gregoria Relocation:  (-)    (-)   Jerk / Posterior Load:  (-)    (-)   Neer:    (-)    (-)   Larson:   (-)    (-)   SS Stress:   (-)    (-)   Bear Hug:   (-)    (-)   Nash's:   (-)    (-)   Resisted Thrower's:   (-)    (-)   Speed's   (-)    (-)   Cross Arm Abduction:  (-)    (-)    Neurovascular examination  - Motor grossly intact bilaterally to shoulder abduction, elbow flexion and extension, wrist flexion and extension, and intrinsic hand musculature  - Sensation intact to light touch bilaterally in axillary, median, radial, and ulnar distributions  - Symmetrical radial pulses    Imaging:    XR Results:  ***      Physician read: I agree with the above impression.***    Assessment/Plan:   Leticiakwame Grimes is a 79 y.o.  female with *** s/p left reverse total shoulder arthroplasty (DOS: 5/5/22)    Plan:    ***  Follow up ***          Tomas Taylor MD    I, Orville Man, acted as a scribe for Tomas Taylor MD for the duration of this office visit.

## 2025-03-06 DIAGNOSIS — M25.512 BILATERAL SHOULDER PAIN, UNSPECIFIED CHRONICITY: Primary | ICD-10-CM

## 2025-03-06 DIAGNOSIS — M25.511 BILATERAL SHOULDER PAIN, UNSPECIFIED CHRONICITY: Primary | ICD-10-CM

## 2025-03-07 ENCOUNTER — OFFICE VISIT (OUTPATIENT)
Dept: SPORTS MEDICINE | Facility: CLINIC | Age: 80
End: 2025-03-07
Payer: MEDICARE

## 2025-03-07 ENCOUNTER — RESULTS FOLLOW-UP (OUTPATIENT)
Dept: INTERNAL MEDICINE | Facility: CLINIC | Age: 80
End: 2025-03-07

## 2025-03-07 ENCOUNTER — CLINICAL SUPPORT (OUTPATIENT)
Dept: CARDIOLOGY | Facility: HOSPITAL | Age: 80
End: 2025-03-07
Payer: MEDICARE

## 2025-03-07 ENCOUNTER — CLINICAL SUPPORT (OUTPATIENT)
Dept: CARDIOLOGY | Facility: HOSPITAL | Age: 80
End: 2025-03-07
Attending: INTERNAL MEDICINE
Payer: MEDICARE

## 2025-03-07 ENCOUNTER — HOSPITAL ENCOUNTER (OUTPATIENT)
Dept: RADIOLOGY | Facility: HOSPITAL | Age: 80
Discharge: HOME | End: 2025-03-07
Attending: STUDENT IN AN ORGANIZED HEALTH CARE EDUCATION/TRAINING PROGRAM
Payer: MEDICARE

## 2025-03-07 VITALS — WEIGHT: 166.44 LBS | HEIGHT: 62 IN | BODY MASS INDEX: 30.63 KG/M2 | RESPIRATION RATE: 17 BRPM

## 2025-03-07 DIAGNOSIS — M25.511 BILATERAL SHOULDER PAIN, UNSPECIFIED CHRONICITY: ICD-10-CM

## 2025-03-07 DIAGNOSIS — Z95.810 PRESENCE OF AUTOMATIC (IMPLANTABLE) CARDIAC DEFIBRILLATOR: ICD-10-CM

## 2025-03-07 DIAGNOSIS — Z96.612 STATUS POST REVERSE TOTAL ARTHROPLASTY OF LEFT SHOULDER: ICD-10-CM

## 2025-03-07 DIAGNOSIS — M25.512 BILATERAL SHOULDER PAIN, UNSPECIFIED CHRONICITY: ICD-10-CM

## 2025-03-07 DIAGNOSIS — M19.011 GLENOHUMERAL ARTHRITIS, RIGHT: Primary | ICD-10-CM

## 2025-03-07 DIAGNOSIS — M54.12 CERVICAL RADICULOPATHY: ICD-10-CM

## 2025-03-07 PROCEDURE — 1101F PT FALLS ASSESS-DOCD LE1/YR: CPT | Mod: CPTII,S$GLB,, | Performed by: STUDENT IN AN ORGANIZED HEALTH CARE EDUCATION/TRAINING PROGRAM

## 2025-03-07 PROCEDURE — 93295 DEV INTERROG REMOTE 1/2/MLT: CPT | Mod: S$GLB,,, | Performed by: INTERNAL MEDICINE

## 2025-03-07 PROCEDURE — 1159F MED LIST DOCD IN RCRD: CPT | Mod: CPTII,S$GLB,, | Performed by: STUDENT IN AN ORGANIZED HEALTH CARE EDUCATION/TRAINING PROGRAM

## 2025-03-07 PROCEDURE — 73030 X-RAY EXAM OF SHOULDER: CPT | Mod: TC,50,PN

## 2025-03-07 PROCEDURE — 3288F FALL RISK ASSESSMENT DOCD: CPT | Mod: CPTII,S$GLB,, | Performed by: STUDENT IN AN ORGANIZED HEALTH CARE EDUCATION/TRAINING PROGRAM

## 2025-03-07 PROCEDURE — 20610 DRAIN/INJ JOINT/BURSA W/O US: CPT | Mod: RT,S$GLB,, | Performed by: STUDENT IN AN ORGANIZED HEALTH CARE EDUCATION/TRAINING PROGRAM

## 2025-03-07 PROCEDURE — 93296 REM INTERROG EVL PM/IDS: CPT | Performed by: INTERNAL MEDICINE

## 2025-03-07 PROCEDURE — 99214 OFFICE O/P EST MOD 30 MIN: CPT | Mod: 25,S$GLB,, | Performed by: STUDENT IN AN ORGANIZED HEALTH CARE EDUCATION/TRAINING PROGRAM

## 2025-03-07 PROCEDURE — 73030 X-RAY EXAM OF SHOULDER: CPT | Mod: 26,50,, | Performed by: RADIOLOGY

## 2025-03-07 PROCEDURE — 1125F AMNT PAIN NOTED PAIN PRSNT: CPT | Mod: CPTII,S$GLB,, | Performed by: STUDENT IN AN ORGANIZED HEALTH CARE EDUCATION/TRAINING PROGRAM

## 2025-03-07 PROCEDURE — 99999 PR PBB SHADOW E&M-EST. PATIENT-LVL III: CPT | Mod: PBBFAC,,, | Performed by: STUDENT IN AN ORGANIZED HEALTH CARE EDUCATION/TRAINING PROGRAM

## 2025-03-07 PROCEDURE — 1160F RVW MEDS BY RX/DR IN RCRD: CPT | Mod: CPTII,S$GLB,, | Performed by: STUDENT IN AN ORGANIZED HEALTH CARE EDUCATION/TRAINING PROGRAM

## 2025-03-07 RX ADMIN — TRIAMCINOLONE ACETONIDE 40 MG: 40 INJECTION, SUSPENSION INTRA-ARTICULAR; INTRAMUSCULAR at 11:03

## 2025-03-07 NOTE — PROGRESS NOTES
Orthopaedic Follow-Up Visit    Last Appointment: 10/4/22  Diagnosis: s/p Left Reverse Total Shoulder Arthroplasty   Prior Procedure: None    Leticia Grimes is a 79 y.o. female who is here for f/u evaluation of her left shoulder. The patient was last seen here by me 10/4/22 at which point she was making progress after surgery. Of note she had Cervical Interlaminar epidural steroid injection C6-7 under fluoroscopic guidance with Dr. Mcgraw on 24 with 50% for approximately 2 weeks.  The patient returns today reporting she is having pain in the right shoulder that is constant. She reports the pain is waking her up at night as well.    To review her history, Leticia Grimes is a 79 y.o. female who is s/p left reverse total shoulder arthoplasty (DOS: 22).     Patient's medications, allergies, past medical, surgical, social and family histories were reviewed and updated as appropriate.    Review of Systems   All systems reviewed were negative.  Specifically, the patient denies fever, chills, weight loss, chest pain, shortness of breath, or dyspnea on exertion.      Past Medical History:   Diagnosis Date    Adjustment insomnia 2020    Anxiety 2020    GERD (gastroesophageal reflux disease)     History of sudden cardiac arrest successfully resuscitated 2020    Hypertension     ICD (implantable cardioverter-defibrillator) in place 2020    Left arm swelling 9/3/2020    Mild vitamin D deficiency 2013    Osteopenia 2020    Vitamin D deficiency disease        Past Surgical History:   Procedure Laterality Date     SECTION, CLASSIC      EPIDURAL STEROID INJECTION INTO CERVICAL SPINE N/A 2024    Procedure: Cervical C6/7 Interlaminar OMAR;  Surgeon: Main Mcgraw MD;  Location: [facility];  Service: Pain Management;  Laterality: N/A;    HYSTERECTOMY      INJECTION OF JOINT Left 2021    Procedure: Left shoulder Joint injection;  Surgeon: Main Mcgraw MD;  Location:  [facility];  Service: Pain Management;  Laterality: Left;    INSERTION OF BIVENTRICULAR IMPLANTABLE CARDIOVERTER-DEFIBRILLATOR (ICD)      REVERSE TOTAL SHOULDER ARTHROPLASTY Left 05/05/2022    Procedure: ARTHROPLASTY, SHOULDER, TOTAL, REVERSE;  Surgeon: Tomas Taylor MD;  Location: [facility];  Service: Orthopedics;  Laterality: Left;    TUBAL LIGATION         Patient's Medications   New Prescriptions    No medications on file   Previous Medications    ACETAMINOPHEN (TYLENOL) 500 MG TABLET    Take 2 tablets (1,000 mg total) by mouth every 8 (eight) hours as needed for Pain.    CARVEDILOL (COREG) 12.5 MG TABLET    Take 1 tablet (12.5 mg total) by mouth 2 (two) times daily with meals.    CLONIDINE (CATAPRES) 0.1 MG TABLET    Take 1 tablet (0.1 mg total) by mouth every 6 (six) hours as needed (if SBP > 150 mmHG).    ERGOCALCIFEROL, VITAMIN D2, (VITAMIN D ORAL)    Take by mouth.    GABAPENTIN (NEURONTIN) 300 MG CAPSULE    Take 1 capsule (300 mg total) by mouth 3 (three) times daily.    HYDROCHLOROTHIAZIDE 12.5 MG TAB    Take 1 tablet (12.5 mg total) by mouth once daily.    LORAZEPAM (ATIVAN) 0.5 MG TABLET    Take 1 tablet (0.5 mg total) by mouth nightly as needed.    MECLIZINE (ANTIVERT) 25 MG TABLET    Take 25 mg by mouth every 8 (eight) hours as needed for Dizziness.    METHOCARBAMOL (ROBAXIN) 750 MG TAB    Take 750 mg by mouth.    MUPIROCIN (BACTROBAN) 2 % OINTMENT    Apply topically 3 (three) times daily.    MV-MN/FOLIC/LUTEIN/HERBAL 293 (ALIVE WOMEN'S 50 PLUS ORAL)    Take 1 tablet by mouth once daily.    OLMESARTAN (BENICAR) 40 MG TABLET    Take 1 tablet (40 mg total) by mouth once daily.    PANTOPRAZOLE (PROTONIX) 40 MG TABLET    1 tablet Orally Once a day for 90 days    POLYETHYLENE GLYCOL (GLYCOLAX) 17 GRAM/DOSE POWDER    Take 17 g by mouth once daily.    ROSUVASTATIN (CRESTOR) 10 MG TABLET    Take 1 tablet by mouth once daily    TIZANIDINE (ZANAFLEX) 4 MG TABLET    Take 1/2 to 1 tab BID PRN muscle  spasms. May cause drowsiness.    TRAMADOL (ULTRAM) 50 MG TABLET    1 tablet as needed Orally Once a day    TRAZODONE (DESYREL) 50 MG TABLET    1 to 2 tabs po qhs prn sleep   Modified Medications    No medications on file   Discontinued Medications    No medications on file       Family History   Problem Relation Name Age of Onset    Hypertension Mother [place]     Arthritis Mother [place]     Diabetes Father Kyle     Heart disease Father Kyle     Cancer Sister Thania        Review of patient's allergies indicates:   Allergen Reactions    Codeine     Morphine Other (See Comments)         Objective:      Physical Exam  Patient is alert and oriented, no distress. Skin is intact. Neuro is normal with no focal motor or sensory findings.    Cervical exam: Pain with cervical rotation to the right side    Physical Exam:  RIGHT    LEFT    Scap Dyskinesis/Winging (-)    (-)    Tenderness:          Greater Tuberosity  +    (-)  Bicipital Groove  (-)    (-)  AC joint   (-)    (-)  Other:      ROM:  Forward Elevation 160    160  Abduction  120    110  ER (at side)  50    50        Strength:   Supraspinatus  4/5      Infraspinatus  5/5      Subscap / IR  5/5         Special Tests:   Neer:    +       Larson:   +       SS Stress:   (-)       Bear Hug:   (-)       Hendry's:   +       Resisted Thrower's:   +       Speed's   (-)       Cross Arm Abduction:  (-)        Neurovascular examination  - Motor grossly intact bilaterally to shoulder abduction, elbow flexion and extension, wrist flexion and extension, and intrinsic hand musculature  - Sensation intact to light touch bilaterally in axillary, median, radial, and ulnar distributions  - Symmetrical radial pulses    Imaging:    XR Results:  X-Ray Shoulder 2 or More views Bilateral  Narrative: EXAM: XR SHOULDER COMPLETE 2 OR MORE VIEWS BILATERAL    CLINICAL HISTORY: Bilateral shoulder pain    FINDINGS:  Bilateral shoulders, 4 views    Moderate right glenohumeral degenerative joint  disease and acromio clavicular arthrosis.  Alignment is satisfactory.    Left glenohumeral arthroplasty in satisfactory position.  Impression:  As above.    Finalized on: 3/7/2025 2:16 PM By:  DARIUSZ Bowen MD, MD  [facility] [place] [id number]      2025-03-07 14:18:[id number]     [facility] [place]          Physician read: I agree with the above impression.    Assessment/Plan:   Leticia Grimes is a 79 y.o. female 2 years s/p left reverse total shoulder arthroplasty (DOS: 5/5/22), Right shoulder osteoarthritis, and cervical radiculopathy    Plan:    She has right shoulder glenohumeral arthritis and suspected cervical radiculopathy.  She is 2 years out from her left reverse total shoulder arthroplasty.  Has excellent range of motion.  Does report some discomfort intermittently.  Reassured her x-rays show stable appearance of implants with no evidence of loosening.  Discussed non-operative treatment options in the form of rest, activity modifications, oral anti-inflammatories, corticosteroid injections, and physical therapy/physician directed home exercise program.    For right shoulder glenohumeral arthritis, I recommend proceeding with intra-articular corticosteroid injection into the right glenohumeral joint. The patient is in agreement with this plan.   a. Procedure performed today and patient tolerated the procedure well with no immediate complications.    4. I recommend following up with her spine provider for continued radiating pain coming from her neck   5. I recommend continued use of voltaren  6. Follow up as needed          Tomas Taylor MD    I, Orville Man, acted as a scribe for Tomas Taylor MD for the duration of this office visit.

## 2025-03-08 ENCOUNTER — NURSE TRIAGE (OUTPATIENT)
Dept: ADMINISTRATIVE | Facility: CLINIC | Age: 80
End: 2025-03-08
Payer: MEDICARE

## 2025-03-09 NOTE — TELEPHONE ENCOUNTER
Pt is calling and states that she just took her BP -177/88. Pt was just reading and watching tv. Pt states that she has just a little headache. Denies SOB, Denies any weakness or numbness or chest pain. Pt states that she did just take her pain medication about an hour ago for her shoulder pain she has been having. Pt states that she still has her nighttime BP med to take before she goes to bed. Dispo- See PCP within 3 days. Pt verbalizes understanding. Advised to call back with any further concerns.           Reason for Disposition   Systolic BP  >= 160 OR Diastolic >= 100    Additional Information   Negative: Difficult to awaken or acting confused (e.g., disoriented, slurred speech)   Negative: SEVERE difficulty breathing (e.g., struggling for each breath, speaks in single words)   Negative: [1] Weakness of the face, arm or leg on one side of the body AND [2] new-onset   Negative: [1] Numbness (i.e., loss of sensation) of the face, arm or leg on one side of the body AND [2] new-onset   Negative: [1] Chest pain lasts > 5 minutes AND [2] history of heart disease (i.e., heart attack, bypass surgery, angina, angioplasty, CHF)   Negative: [1] Chest pain AND [2] took nitroglycerin AND [3] pain was not relieved   Negative: Sounds like a life-threatening emergency to the triager   Negative: [1] Systolic BP  >= 160 OR Diastolic >= 100 AND [2] cardiac (e.g., breathing difficulty, chest pain) or neurologic symptoms (e.g., new-onset blurred or double vision, unsteady gait)   Negative: [1] Systolic BP  >= 200 OR Diastolic >= 120 AND [2] having NO cardiac or neurologic symptoms   Negative: [1] Systolic BP  >= 180 OR Diastolic >= 110 AND [2] missed most recent dose of blood pressure medication   Negative: Systolic BP  >= 180 OR Diastolic >= 110   Negative: Ran out of BP medications    Protocols used: [facility]

## 2025-03-10 RX ORDER — TRIAMCINOLONE ACETONIDE 40 MG/ML
40 INJECTION, SUSPENSION INTRA-ARTICULAR; INTRAMUSCULAR
Status: DISCONTINUED | OUTPATIENT
Start: 2025-03-07 | End: 2025-03-10 | Stop reason: HOSPADM

## 2025-03-10 NOTE — PROCEDURES
Large Joint Aspiration/Injection: R glenohumeral    Date/Time: 3/7/2025 11:00 AM    Performed by: Tomas Taylor MD  Authorized by: Tomas Taylor MD    Consent Done?:  Yes (Verbal)  Indications:  Pain and arthritis  Site marked: the procedure site was marked    Timeout: prior to procedure the correct patient, procedure, and site was verified    Prep: patient was prepped and draped in usual sterile fashion      Local anesthesia used?: Yes    Anesthesia:  Local infiltration  Local anesthetic:  Lidocaine 1% without epinephrine    Details:  Needle Size:  22 G  Ultrasonic Guidance for needle placement?: No    Approach:  Posterior  Location:  Shoulder  Site:  R glenohumeral  Medications:  40 mg triamcinolone acetonide 40 mg/mL  Patient tolerance:  Patient tolerated the procedure well with no immediate complications

## 2025-03-11 ENCOUNTER — NURSE TRIAGE (OUTPATIENT)
Dept: ADMINISTRATIVE | Facility: CLINIC | Age: 80
End: 2025-03-11
Payer: MEDICARE

## 2025-03-11 NOTE — TELEPHONE ENCOUNTER
"Pt feeling dizzy. /65, HR 55. No other symptoms. She has a meclizine rx but left it at home. Pt is currently in [place]. Ok if she is lying down, but gets dizzy when she stands.    Dispo- see provider within 24 hours. Recommended UC or ODVV. Care advice given. Pt VU.  Reason for Disposition   [1] MODERATE dizziness (e.g., interferes with normal activities) AND [2] has NOT been evaluated by doctor (or NP/PA) for this  (Exception: Dizziness caused by heat exposure, sudden standing, or poor fluid intake.)    Additional Information   Negative: SEVERE difficulty breathing (e.g., struggling for each breath, speaks in single words)   Negative: [1] Difficulty breathing or swallowing AND [2] started suddenly after medicine, an allergic food or bee sting   Negative: Shock suspected (e.g., cold/pale/clammy skin, too weak to stand, low BP, rapid pulse)   Negative: Difficult to awaken or acting confused (e.g., disoriented, slurred speech)   Negative: [1] Weakness (i.e., paralysis, loss of muscle strength) of the face, arm or leg on one side of the body AND [2] sudden onset AND [3] present now   Negative: [1] Numbness (i.e., loss of sensation) of the face, arm or leg on one side of the body AND [2] sudden onset AND [3] present now   Negative: [1] Loss of speech or garbled speech AND [2] sudden onset AND [3] present now   Negative: Overdose (accidental or intentional) of medications   Negative: [1] Fainted > 15 minutes ago AND [2] still feels too weak or dizzy to stand   Negative: Heart beating < 50 beats per minute OR > 140 beats per minute   Negative: Sounds like a life-threatening emergency to the triager   Negative: Difficulty breathing   Negative: SEVERE dizziness (e.g., unable to stand, requires support to walk, feels like passing out now)   Negative: Extra heartbeats, irregular heart beating, or heart is beating very fast  (i.e., "palpitations")   Negative: [1] Drinking very little AND [2] dehydration suspected (e.g., " no urine > 12 hours, very dry mouth, very lightheaded)   Negative: [1] Weakness (i.e., paralysis, loss of muscle strength) of the face, arm / hand, or leg / foot on one side of the body AND [2] sudden onset AND [3] brief (now gone)   Negative: [1] Numbness (i.e., loss of sensation) of the face, arm / hand, or leg / foot on one side of the body AND [2] sudden onset AND [3] brief (now gone)   Negative: [1] Loss of speech or garbled speech AND [2] sudden onset AND [3] brief (now gone)   Negative: Loss of vision or double vision  (Exception: Similar to previous migraines.)   Negative: Patient sounds very sick or weak to the triager   Negative: [1] Dizziness caused by heat exposure, sudden standing, or poor fluid intake AND [2] no improvement after 2 hours of rest and fluids   Negative: [1] Fever > 103 F (39.4 C) AND [2] not able to get the fever down using Fever Care Advice   Negative: [1] Fever > 101 F (38.3 C) AND [2] age > 60 years   Negative: [1] Fever > 100 F (37.8 C) AND [2] bedridden (e.g., CVA, chronic illness, recovering from surgery)   Negative: [1] Fever > 100 F (37.8 C) AND [2] diabetes mellitus or weak immune system (e.g., HIV positive, cancer chemo, splenectomy, organ transplant, chronic steroids)    Protocols used: Dizziness - [id number]

## 2025-03-12 ENCOUNTER — TELEPHONE (OUTPATIENT)
Dept: INTERNAL MEDICINE | Facility: CLINIC | Age: 80
End: 2025-03-12
Payer: MEDICARE

## 2025-03-12 NOTE — TELEPHONE ENCOUNTER
Returned patient call, left message, not sure what patient is calling in regards to, so if someone does speak to patient if they could find out information please,     ----- Message from DeVinfoliojosué sent at 3/12/2025  2:58 PM CDT -----  Contact: self [phone]  Type:  Patient Returning CallWho Called:Leticia Who Left Message for Patient:Va/ nurse  Does the patient know what this is regarding?:yesWould the patient rather a call back or a response via [facility]? Call back Best Call Back Number:[phone] Information:

## 2025-03-13 ENCOUNTER — TELEPHONE (OUTPATIENT)
Dept: FAMILY MEDICINE | Facility: CLINIC | Age: 80
End: 2025-03-13
Payer: MEDICARE

## 2025-03-13 DIAGNOSIS — M25.512 CHRONIC PAIN OF BOTH SHOULDERS: Primary | ICD-10-CM

## 2025-03-13 DIAGNOSIS — G89.4 CHRONIC PAIN DISORDER: ICD-10-CM

## 2025-03-13 DIAGNOSIS — M25.512 CHRONIC LEFT SHOULDER PAIN: ICD-10-CM

## 2025-03-13 DIAGNOSIS — G89.29 CHRONIC PAIN OF BOTH SHOULDERS: Primary | ICD-10-CM

## 2025-03-13 DIAGNOSIS — G89.29 CHRONIC LEFT SHOULDER PAIN: ICD-10-CM

## 2025-03-13 DIAGNOSIS — M25.511 CHRONIC PAIN OF BOTH SHOULDERS: Primary | ICD-10-CM

## 2025-03-13 DIAGNOSIS — M54.16 LUMBAR RADICULOPATHY, CHRONIC: ICD-10-CM

## 2025-03-13 RX ORDER — TIZANIDINE 4 MG/1
TABLET ORAL
Qty: 60 TABLET | Refills: 2 | Status: SHIPPED | OUTPATIENT
Start: 2025-03-13

## 2025-03-13 NOTE — TELEPHONE ENCOUNTER
----- Message from Med Assistant Garcia sent at 3/12/2025  1:50 PM CDT -----  Contact: Leticia  Type:  Patient Returning CallWho Called: Ricardo Mccrary Message for Patient: Does the patient know what this is regarding?: NoWould the patient rather a call back or a response via [facility]?  CallBest Call Back Number:  [phone] Information:

## 2025-03-15 NOTE — TELEPHONE ENCOUNTER
Unsure on my ability to refer her to a massage therapy. Please have her check with her insurance for covered providers

## 2025-03-17 ENCOUNTER — TELEPHONE (OUTPATIENT)
Dept: INTERNAL MEDICINE | Facility: CLINIC | Age: 80
End: 2025-03-17
Payer: MEDICARE

## 2025-03-17 ENCOUNTER — PATIENT MESSAGE (OUTPATIENT)
Dept: ADMINISTRATIVE | Facility: HOSPITAL | Age: 80
End: 2025-03-17
Payer: MEDICARE

## 2025-03-17 DIAGNOSIS — M54.17 LUMBOSACRAL RADICULOPATHY AT S1: ICD-10-CM

## 2025-03-17 DIAGNOSIS — Z74.09 DECREASED STRENGTH, ENDURANCE, AND MOBILITY: ICD-10-CM

## 2025-03-17 DIAGNOSIS — G89.29 CHRONIC BILATERAL LOW BACK PAIN WITH LEFT-SIDED SCIATICA: Primary | ICD-10-CM

## 2025-03-17 DIAGNOSIS — M25.552 LEFT HIP PAIN: ICD-10-CM

## 2025-03-17 DIAGNOSIS — M54.42 CHRONIC BILATERAL LOW BACK PAIN WITH LEFT-SIDED SCIATICA: Primary | ICD-10-CM

## 2025-03-17 DIAGNOSIS — R68.89 DECREASED STRENGTH, ENDURANCE, AND MOBILITY: ICD-10-CM

## 2025-03-17 DIAGNOSIS — R53.1 DECREASED STRENGTH, ENDURANCE, AND MOBILITY: ICD-10-CM

## 2025-03-17 NOTE — TELEPHONE ENCOUNTER
I received a call back from the pt regarding whether the massage therapy for her shoulder is covered under her [employer] and she stated the payor wants you to place the order stating the need for it and they will review it and decide if this is needed and they will find a provider. Eddai .//kah

## 2025-03-17 NOTE — TELEPHONE ENCOUNTER
----- Message from Juanita sent at 3/17/2025  3:37 PM CDT -----  Contact: [phone]  Patient is returning a phone call.Who left a message for the patient: Frances patient know what this is regarding:  missed call re insurance information nurse needs that pt wanted to give to her directly.Would you like a call back, or a response through your [facility] portal?:   callComments:

## 2025-03-18 ENCOUNTER — PATIENT OUTREACH (OUTPATIENT)
Dept: ADMINISTRATIVE | Facility: HOSPITAL | Age: 80
End: 2025-03-18
Payer: MEDICARE

## 2025-03-18 ENCOUNTER — TELEPHONE (OUTPATIENT)
Dept: INTERNAL MEDICINE | Facility: CLINIC | Age: 80
End: 2025-03-18
Payer: MEDICARE

## 2025-03-18 NOTE — PROGRESS NOTES
Replying to Campaign Questionnaire for Overdue HM: Bone Density    Called and spoke with patient - Scheduled appointment 3/27/2025 @ 10:45am - [facility]

## 2025-03-18 NOTE — TELEPHONE ENCOUNTER
I called the pt's insurance payor Yuri regarding coverage for her massage therapy order the rep Denice states that it is not covered under her formulary only OT/PT Chiropractor . Please contact if she would like to find a clinic that she can afford to pay out of pocket at and we will send the referral there. The reference number for the call with Yuri was [id number]. Eddai //kelly

## 2025-03-22 ENCOUNTER — PATIENT MESSAGE (OUTPATIENT)
Dept: INTERNAL MEDICINE | Facility: CLINIC | Age: 80
End: 2025-03-22
Payer: MEDICARE

## 2025-03-24 ENCOUNTER — TELEPHONE (OUTPATIENT)
Dept: PSYCHIATRY | Facility: CLINIC | Age: 80
End: 2025-03-24
Payer: MEDICARE

## 2025-03-24 LAB
OHS CV DC REMOTE DEVICE TYPE: NORMAL
OHS CV RV PACING PERCENT: 1 %

## 2025-03-24 NOTE — TELEPHONE ENCOUNTER
----- Message from Danitza sent at 3/24/2025  9:18 AM CDT -----  .Type:  Reschedule Appointment RequestName of Caller:.Leticia Grimes When is the first available appointment?04/15/2025Best Call Back Number:.[phone]   Additional Information: Patient is going out of [place] 04/05/2025 and would like to reschedule the appointment to 04/01, 04/03/ or 04/04/2025. She would like an office or virtual appointment. Please call the patient back to advise. Omax. EL

## 2025-03-26 ENCOUNTER — OFFICE VISIT (OUTPATIENT)
Dept: INTERNAL MEDICINE | Facility: CLINIC | Age: 80
End: 2025-03-26
Payer: MEDICARE

## 2025-03-26 ENCOUNTER — TELEPHONE (OUTPATIENT)
Dept: NEUROSURGERY | Facility: CLINIC | Age: 80
End: 2025-03-26
Payer: MEDICARE

## 2025-03-26 DIAGNOSIS — R15.9 FECAL INCONTINENCE WITH INCOMPLETE DEFECATION: ICD-10-CM

## 2025-03-26 DIAGNOSIS — R15.0 FECAL INCONTINENCE WITH INCOMPLETE DEFECATION: ICD-10-CM

## 2025-03-26 DIAGNOSIS — K59.00 CONSTIPATION, UNSPECIFIED CONSTIPATION TYPE: ICD-10-CM

## 2025-03-26 DIAGNOSIS — M54.17 LUMBOSACRAL RADICULOPATHY AT S1: Primary | ICD-10-CM

## 2025-03-26 PROCEDURE — 98006 SYNCH AUDIO-VIDEO EST MOD 30: CPT | Mod: HCNC,95,, | Performed by: FAMILY MEDICINE

## 2025-03-26 NOTE — PROGRESS NOTES
The patient location is: [place]  The chief complaint leading to consultation is: Fecal incontinence    Visit type: audiovisual    Face to Face time with patient: 8 minutes   20 minutes of total time spent on the encounter, which includes face to face time and non-face to face time preparing to see the patient (eg, review of tests), Obtaining and/or reviewing separately obtained history, Documenting clinical information in the electronic or other health record, Independently interpreting results (not separately reported) and communicating results to the patient/family/caregiver, or Care coordination (not separately reported).     Each patient to whom he or she provides medical services by telemedicine is:  (1) informed of the relationship between the physician and patient and the respective role of any other health care provider with respect to management of the patient; and (2) notified that he or she may decline to receive medical services by telemedicine and may withdraw from such care at any time.    Notes:      Subjective:       Patient ID: Leticia Grimes is a 79 y.o. female.    Chief Complaint: No chief complaint on file.    History of Present Illness    CHIEF COMPLAINT:  Ms. Grimes presents with concerns about recurrent fecal incontinence and incomplete bowel movements.    HPI:  Ms. Grimes reports issues with incontinence, characterized by the urge to defecate but inability to have a complete bowel movement. This is followed by later episodes where she needs to return to the bathroom, resulting in small amounts of feces in her clothes. She was previously treated for this condition by her primary care physician, Dr. Kemp, who prescribed ClearLax, a laxative, to be taken nightly. This treatment initially resolved the issue, but the symptoms have recently returned.    She has a history of sciatica, later diagnosed as a pinched nerve in her back, affecting the left side. This condition may be related to  issues at the L5-S1 region of the spine. She has previously undergone a CT instead of an MRI due to having a cardiac medical device incompatible with MRI machines.    She [place] which limits her ability to attend certain medical appointments.     She denies having complete bowel incontinence on herself.    MEDICAL HISTORY:  She has a history of a pinched nerve in her back, affecting the left side. Ms. Grimes also has a spinal issue at the L5-S1 level.    MEDICATIONS:  Ms. Grimes was on ClearLax, taken nightly, for constipation and incomplete bowel movements. During this visit, the provider discontinued the ClearLax medication.    IMAGING:  Ms. Grimes underwent a CT, but the anatomical location, date, and results were not specified.        Review of Systems   Constitutional:  Negative for activity change and unexpected weight change.   HENT:  Positive for hearing loss. Negative for rhinorrhea and trouble swallowing.    Eyes:  Negative for discharge and visual disturbance.   Respiratory:  Negative for chest tightness and wheezing.    Cardiovascular:  Negative for chest pain and palpitations.   Gastrointestinal:  Positive for constipation. Negative for blood in stool, diarrhea and vomiting.   Endocrine: Negative for polydipsia and polyuria.   Genitourinary:  Negative for difficulty urinating, dysuria, hematuria and menstrual problem.   Musculoskeletal:  Positive for arthralgias. Negative for joint swelling and neck pain.   Neurological:  Positive for weakness. Negative for headaches.   Psychiatric/Behavioral:  Positive for dysphoric mood. Negative for confusion.        Objective:      Physical Exam  Constitutional:       General: She is not in acute distress.  HENT:      Head: Normocephalic and atraumatic.   Eyes:      General: No scleral icterus.        Right eye: No discharge.         Left eye: No discharge.   Pulmonary:      Effort: Pulmonary effort is normal.   Neurological:       Mental Status: She is alert and oriented to person, place, and time.   Psychiatric:         Mood and Affect: Mood normal.         Behavior: Behavior normal.         CT LUMBAR SPINE WITHOUT CONTRAST (11/16/2023)  1. Grade 1 anterolisthesis of L4 on L5 secondary to facet arthropathy.  There is at least mild suspected associated spinal canal stenosis with mild-to-moderate bilateral neural foraminal narrowing at this level.  2. Other mild degenerative findings as above  3. Findings suggesting bilateral sacroiliitis.    Assessment/Plan:       1. Lumbosacral radiculopathy at S1    2. Fecal incontinence with incomplete defecation    3. Constipation, unspecified constipation type      Assessment & Plan    IMPRESSION:  - Assessed symptoms of incomplete bowel movements and fecal incontinence.  - Considered possibility of spinal issues causing nerve problems affecting rectal function and sphincter control.  - Evaluated need for MRI, but noted patient's incompatibility with MRI due to implanted device.  - Will monitor symptoms and continue conservative management with stool softener.  - Identified concerns around L5-S1 region, warranting neurosurgical evaluation.    FECAL INCONTINENCE, INCOMPLETE DEFECATION AND CONSTIPATION:  - Explained the potential link between spinal issues and bowel control problems.  - Ms. Grimes reports recurring issues with incomplete bowel movements and fecal smearing.  - Evaluated the condition as constipation with inability to completely empty the rectum.  - Considered potential spinal and nerve issues affecting rectal function.  - Recommend continuing ClearLax (laxative) and monitoring symptoms.  - Suggested pausing ClearLax to observe if symptoms change.  - Ms. Grimes reports recurring constipation and incomplete bowel movements.  - Instructed the patient to temporarily discontinue ClearLax to evaluate symptom changes.  - Advised to resume ClearLax if constipation return.    LUMBAR  RADICULOPATHY AND SCIATICA:  - Ms. Grimes reports having a pinched nerve in the back.  - Identified issues around L5-S1 vertebrae.  - Considered the need for an MRI if symptoms worsen.  - Referred the patient to neurosurgery for evaluation of L5-S1 concerns.  - Planned to refer the patient to a spine specialist.  - Ms. Grimes reports having sciatica-like symptoms on the left side.    HEALTHCARE ACCESS ISSUES:  - Ms. Grimes reports difficulty accessing healthcare due to transportation issues.  - Instructed the office to coordinate appointment dates with the patient.    FOLLOW-UP:  - Instructed the patient to contact the office to coordinate appointment dates for neurosurgery referral.              Future Appointments   Date Time Provider Department Center   7/15/2025  1:00 PM Rakesh Alexis LCSW [facility] PSYCH BR Medical C   10/6/2025 10:40 AM Luis Arthur MD [facility] CARDIO BR Medical C   3/3/2026  1:30 PM [facility], [facility] ARRHYTHMIA ON ARR PRO BR Medical C       Nikki Marie MD  [facility] [address]  [place] [address]  [phone]      This note was generated with the assistance of ambient listening technology. Verbal consent was obtained by the patient and accompanying visitor(s) for the recording of patient appointment to facilitate this note. I attest to having reviewed and edited the generated note for accuracy, though some syntax or spelling errors may persist. Please contact the author of this note for any clarification.

## 2025-03-28 ENCOUNTER — TELEPHONE (OUTPATIENT)
Dept: NEUROSURGERY | Facility: CLINIC | Age: 80
End: 2025-03-28
Payer: MEDICARE

## 2025-03-28 NOTE — TELEPHONE ENCOUNTER
Returned patient's call. Patient had already scheduled NRSX appt with Dr. Hill via [employer]. Patient v/u.  RD

## 2025-04-02 ENCOUNTER — OFFICE VISIT (OUTPATIENT)
Dept: INTERNAL MEDICINE | Facility: CLINIC | Age: 80
End: 2025-04-02
Payer: MEDICARE

## 2025-04-02 ENCOUNTER — APPOINTMENT (OUTPATIENT)
Dept: RADIOLOGY | Facility: HOSPITAL | Age: 80
End: 2025-04-02
Attending: NURSE PRACTITIONER
Payer: MEDICARE

## 2025-04-02 VITALS
HEIGHT: 62 IN | DIASTOLIC BLOOD PRESSURE: 62 MMHG | OXYGEN SATURATION: 98 % | WEIGHT: 165.88 LBS | RESPIRATION RATE: 18 BRPM | HEART RATE: 74 BPM | BODY MASS INDEX: 30.52 KG/M2 | SYSTOLIC BLOOD PRESSURE: 114 MMHG | TEMPERATURE: 98 F

## 2025-04-02 DIAGNOSIS — M54.17 LUMBOSACRAL RADICULOPATHY AT S1: ICD-10-CM

## 2025-04-02 DIAGNOSIS — R42 VERTIGO: ICD-10-CM

## 2025-04-02 DIAGNOSIS — K59.00 CONSTIPATION, UNSPECIFIED CONSTIPATION TYPE: ICD-10-CM

## 2025-04-02 DIAGNOSIS — K59.00 CONSTIPATION, UNSPECIFIED CONSTIPATION TYPE: Primary | ICD-10-CM

## 2025-04-02 DIAGNOSIS — G47.00 INSOMNIA, UNSPECIFIED TYPE: ICD-10-CM

## 2025-04-02 DIAGNOSIS — E78.2 MIXED HYPERLIPIDEMIA: Chronic | ICD-10-CM

## 2025-04-02 PROCEDURE — 99214 OFFICE O/P EST MOD 30 MIN: CPT | Mod: HCNC,S$GLB,, | Performed by: NURSE PRACTITIONER

## 2025-04-02 PROCEDURE — 1125F AMNT PAIN NOTED PAIN PRSNT: CPT | Mod: HCNC,CPTII,S$GLB, | Performed by: NURSE PRACTITIONER

## 2025-04-02 PROCEDURE — 1159F MED LIST DOCD IN RCRD: CPT | Mod: HCNC,CPTII,S$GLB, | Performed by: NURSE PRACTITIONER

## 2025-04-02 PROCEDURE — 3078F DIAST BP <80 MM HG: CPT | Mod: HCNC,CPTII,S$GLB, | Performed by: NURSE PRACTITIONER

## 2025-04-02 PROCEDURE — 3288F FALL RISK ASSESSMENT DOCD: CPT | Mod: HCNC,CPTII,S$GLB, | Performed by: NURSE PRACTITIONER

## 2025-04-02 PROCEDURE — 99999 PR PBB SHADOW E&M-EST. PATIENT-LVL V: CPT | Mod: PBBFAC,HCNC,, | Performed by: NURSE PRACTITIONER

## 2025-04-02 PROCEDURE — 1160F RVW MEDS BY RX/DR IN RCRD: CPT | Mod: HCNC,CPTII,S$GLB, | Performed by: NURSE PRACTITIONER

## 2025-04-02 PROCEDURE — 1101F PT FALLS ASSESS-DOCD LE1/YR: CPT | Mod: HCNC,CPTII,S$GLB, | Performed by: NURSE PRACTITIONER

## 2025-04-02 PROCEDURE — 74019 RADEX ABDOMEN 2 VIEWS: CPT | Mod: TC,HCNC,PN

## 2025-04-02 PROCEDURE — 3074F SYST BP LT 130 MM HG: CPT | Mod: HCNC,CPTII,S$GLB, | Performed by: NURSE PRACTITIONER

## 2025-04-02 PROCEDURE — 74019 RADEX ABDOMEN 2 VIEWS: CPT | Mod: 26,HCNC,, | Performed by: STUDENT IN AN ORGANIZED HEALTH CARE EDUCATION/TRAINING PROGRAM

## 2025-04-02 RX ORDER — TRAZODONE HYDROCHLORIDE 50 MG/1
TABLET ORAL
Qty: 30 TABLET | Refills: 0 | Status: SHIPPED | OUTPATIENT
Start: 2025-04-02

## 2025-04-02 RX ORDER — TRAMADOL HYDROCHLORIDE 50 MG/1
TABLET ORAL
Status: CANCELLED | OUTPATIENT
Start: 2025-04-02

## 2025-04-02 RX ORDER — DICLOFENAC SODIUM 75 MG/1
75 TABLET, DELAYED RELEASE ORAL 2 TIMES DAILY
COMMUNITY
Start: 2025-03-31

## 2025-04-02 NOTE — PROGRESS NOTES
Patient ID: Leticia Grimes is a 79 y.o. female.    Chief Complaint: Dizziness and Bowel Incontinence (Possibly sometimes . Urgency to have a BM but, when she goes there is something on the tissue and sometimes she don't have a BM at that time x1 mo )    History of Present Illness    CHIEF COMPLAINT:  Ms. Grimes presents today for positional vertigo and multiple concerns    VERTIGO:  She experiences intermittent positional vertigo with associated off-balance sensations. She takes meclizine which provides temporary relief. She denies continuous symptoms.    GI CONCERNS:  She reports abdominal bloating and constipation with sensation of incomplete evacuation despite having urge for bowel movements. Previous trial of glycolytic powder was ineffective for constipation management.    CURRENT MEDICATIONS:  She takes meclizine as needed for vertigo, Trazodone for insomnia, and Tramadol 50mg under pain management supervision.    LABS:  Lipid panel was completed in 2024. Additional labs were completed at [facility] in March.      ROS:  Constitutional: negative diminished activity, negative appetite change, negative fatigue, negative fever  HENT: negative ear discharge, negative ear pain, negative mouth sores, negative nosebleeds, negative sore throat, negative tinnitus  Respiratory: negative apnea, negative cough, negative shortness of breath  Cardiovascular: negative chest pain, negative leg swelling  Gastrointestinal: POSITIVE ABDOMINAL DISTENTION, negative abdominal pain, negative blood in stool, POSITIVE CONSTIPATION, negative diarrhea, negative nausea, negative vomiting, POSITIVE BLOATING  Genitourinary: negative difficulty urinating, negative flank pain, negative frequency, negative hematuria  Musculoskeletal: negative back pain, negative abnormal gait, negative neck pain, negative neck stiffness, negative joint pain, negative muscle pain  Skin: negative rash, negative wound, negative abnormal  moles  Neurological: negative dizziness, negative seizures, negative numbness, negative tingling, negative headaches, POSITIVE BALANCE ISSUES, POSITIVE VERTIGO  Psychiatric: negative agitation, negative confusion, negative hallucinations, POSITIVE SLEEP DIFFICULTY, negative suicidal ideation         Physical Exam    Vital Signs: Reviewed.  Constitutional: Well-appearing. Well-developed. No acute distress.  HENT: Normocephalic. Tympanic membranes normal bilaterally. Nares patent. Oropharynx clear. No erythema. No exudate.  Cardiovascular: Normal rate and regular rhythm. Normal heart sounds.  Pulmonary: Pulmonary effort is normal. Normal breath sounds.  Abdominal: Bowel sounds are normal. Abdomen is soft. No tenderness.  Musculoskeletal: No muscle tenderness. No joint tenderness. Normal range of motion.  Skin: Warm. Dry.  Neurological: No focal deficit is present. Alert and oriented to person, place, and time.  Psychiatric: Mood is normal. Behavior is normal. Behavior is cooperative.         Assessment & Plan    G89.29 Other chronic pain  H81.13 Benign paroxysmal vertigo, bilateral  G47.00 Insomnia, unspecified  K59.04 Chronic idiopathic constipation  R14.0 Abdominal distension (gaseous)    CHRONIC PAIN/LUMBAR RADICULOPATHY:  - Evaluated current pain management regimen.  - Noted that the patient is currently under pain management.  - Advised the patient to obtain Tramadol 50 mg refill from their pain management providers.    BENIGN PAROXYSMAL VERTIGO:  - Assessed benign positional vertigo and off-balance symptoms.  - Noted patient reports intermittent symptoms of positional vertigo and balance issues.  - Continued meclizine as needed for vertigo symptoms.    INSOMNIA:  - Continued Trazodone for insomnia.  - Sent refill prescription to the pharmacy.    CHRONIC CONSTIPATION:  - Evaluated reports of constipation, noting patient's inability to fully empty bowels despite urges.  - Reviewed history of using glycolytic  powder for bowel movements, noting ineffectiveness.  - Prescribed lactulose 10 g twice daily as needed for constipation.  - Ordered flatplate X-ray to assess constipation.  - Advised patient to include fruits, vegetables, prunes, and prune juice in diet.  - Discussed dietary modifications to alleviate constipation, including increasing intake of fruits, vegetables, prunes, and prune juice.    ABDOMINAL BLOATING:  - Evaluated patient's reports of abdominal bloating.    LABORATORY AND DIAGNOSTIC TESTS:  - Ordered lab work for further evaluation.  - Last lipid panel was in 2024, with other labs from 2023.  - Requested recent labs from [facility], reportedly done in March.  - Ordered lab work.    FOLLOW-UP:  - Follow up in 2 weeks to see Dr. Marie.            Follow up in about 2 weeks (around 4/16/2025).    This note was generated with the assistance of ambient listening technology. Verbal consent was obtained by the patient and accompanying visitor(s) for the recording of patient appointment to facilitate this note. I attest to having reviewed and edited the generated note for accuracy, though some syntax or spelling errors may persist. Please contact the author of this note for any clarification.

## 2025-04-04 ENCOUNTER — TELEPHONE (OUTPATIENT)
Dept: INTERNAL MEDICINE | Facility: CLINIC | Age: 80
End: 2025-04-04
Payer: MEDICARE

## 2025-04-04 ENCOUNTER — RESULTS FOLLOW-UP (OUTPATIENT)
Dept: INTERNAL MEDICINE | Facility: CLINIC | Age: 80
End: 2025-04-04

## 2025-04-04 ENCOUNTER — NURSE TRIAGE (OUTPATIENT)
Dept: ADMINISTRATIVE | Facility: CLINIC | Age: 80
End: 2025-04-04
Payer: MEDICARE

## 2025-04-04 NOTE — TELEPHONE ENCOUNTER
Pt states that the urgent care wanted to give her a shot for pain, but they didn't want to because they haven't got anything back about her bloodwork. I encouraged that the pt visit the ED.

## 2025-04-04 NOTE — TELEPHONE ENCOUNTER
Pt stated she has bad left shoulder blade pain. She has sciatic and thinks its that. She had an injection in her right shoulder due to arthritis. Pain in her left shoulder off and on for a year but its worse now. Breathing is a little shallow per patient. Care advice recommends pt go to ER. Pt verbalized understanding.   Reason for Disposition   Difficulty breathing or unusual sweating (e.g., sweating without exertion)    Additional Information   Negative: Shock suspected (e.g., cold/pale/clammy skin, too weak to stand, low BP, rapid pulse)   Negative: Similar pain previously and it was from 'heart attack'   Negative: Similar pain previously and it was from 'angina' and not relieved by nitroglycerin   Negative: Sounds like a life-threatening emergency to the triager   Negative: Chest pain    Protocols used: Shoulder Pain-A-[place]

## 2025-04-04 NOTE — TELEPHONE ENCOUNTER
Ok thank you for updating me. I strongly encourage her to go to the ED pending her urgent care evaluation.

## 2025-04-04 NOTE — TELEPHONE ENCOUNTER
----- Message from [employer] sent at 4/4/2025 12:05 PM CDT -----  .Type: Patient Call BackWho called:Patient What is the request in detail:   calling concerning needing to speak with Nataly  or someone in office regarding an urgent matterCan the clinic reply by [facility]?   Would the patient rather a call back or a response via [facility] call back number:  .[phone]

## 2025-04-06 RX ORDER — ROSUVASTATIN CALCIUM 10 MG/1
10 TABLET, COATED ORAL DAILY
Qty: 30 TABLET | Refills: 0 | Status: SHIPPED | OUTPATIENT
Start: 2025-04-06

## 2025-04-15 ENCOUNTER — CLINICAL SUPPORT (OUTPATIENT)
Dept: REHABILITATION | Facility: HOSPITAL | Age: 80
End: 2025-04-15
Payer: MEDICARE

## 2025-04-15 DIAGNOSIS — G89.29 CHRONIC BILATERAL LOW BACK PAIN WITH LEFT-SIDED SCIATICA: ICD-10-CM

## 2025-04-15 DIAGNOSIS — R29.898 WEAKNESS OF LEFT LOWER EXTREMITY: Primary | ICD-10-CM

## 2025-04-15 DIAGNOSIS — M25.552 LEFT HIP PAIN: ICD-10-CM

## 2025-04-15 DIAGNOSIS — M54.17 LUMBOSACRAL RADICULOPATHY AT S1: ICD-10-CM

## 2025-04-15 DIAGNOSIS — M25.69 DECREASED RANGE OF MOTION OF TRUNK AND BACK: ICD-10-CM

## 2025-04-15 DIAGNOSIS — M54.42 CHRONIC BILATERAL LOW BACK PAIN WITH LEFT-SIDED SCIATICA: ICD-10-CM

## 2025-04-15 PROCEDURE — 97530 THERAPEUTIC ACTIVITIES: CPT | Mod: PN

## 2025-04-15 PROCEDURE — 97161 PT EVAL LOW COMPLEX 20 MIN: CPT | Mod: PN

## 2025-04-15 NOTE — PROGRESS NOTES
Outpatient Rehab    Physical Therapy Evaluation    Patient Name: Leticia Grimes  MRN: [id number]  YOB: 1945  Encounter Date: 4/15/2025    Therapy Diagnosis:   Encounter Diagnoses   Name Primary?    Left hip pain     Lumbosacral radiculopathy at S1     Chronic bilateral low back pain with left-sided sciatica     Weakness of left lower extremity Yes    Decreased range of motion of trunk and back      Physician: Nikki Marie MD    Physician Orders: Eval and Treat  Medical Diagnosis: Left hip pain  Lumbosacral radiculopathy at S1  Chronic bilateral low back pain with left-sided sciatica    Visit # / Visits Authorized:  1 / 1  Insurance Authorization Period: 3/18/2025 to 12/31/2025  Date of Evaluation: 4/15/2025  Plan of Care Certification: 4/15/2025 to 5/27/2025     Time In: 1100   Time Out: 1150  Total Time: 50   Total Billable Time:  50 minutes 1:1 with PT     Intake Outcome Measure for FOTO Survey    Therapist reviewed FOTO scores for Leticia Grimes on 4/15/2025.   FOTO report - see Media section or FOTO account episode details.     Intake Score: 48%    Precautions     ICD      Subjective   History of Present Illness  Leticia is a 79 y.o. female who reports to physical therapy with a chief concern of low back and left lower extremity pain.                 History of Present Condition/Illness: Patient presents with c/o bilateral low back pain with symptoms into the L lower extremity. Pt reports she has had pain like this on and off for years. Pt reports this flare up began in December 2024 without any specific known cause. Pt states pain extends down the lateral portion of her left leg with tingling into the L foot, and is most painful at night, limiting her sleep function. Pt reports she also is struggling with balance, but has not had a fall or felt need to use an assistive device. Pt notes she similar tingling symptoms are also present in the R hand.     Pain     Patient reports a current  pain level of 3/10.  Pain at worst is reported as 10/10.   Location: L thigh  Pain Qualities: Aching  Pain-Relieving Factors: Medications - prescription, Physical therapy, Heat, Massage, Other (Comment)  Other Pain-Relieving Factors: stationary bike  Pain-Aggravating Factors: Sleeping, Lying down, Lifting, Cooking         Living Arrangements  Living Situation  Living Arrangements: Family members    Home Setup  Home Access: Level entry  Number of Levels in Home: One level        Employment  Employment Status: Retired          Past Medical History/Physical Systems Review:   Leticia Grimes  has a past medical history of Adjustment insomnia, Anxiety, GERD (gastroesophageal reflux disease), History of sudden cardiac arrest successfully resuscitated, Hypertension, ICD (implantable cardioverter-defibrillator) in place, Left arm swelling, Mild vitamin D deficiency, Osteopenia, and Vitamin D deficiency disease.    Leticia Grmies  has a past surgical history that includes Hysterectomy; Tubal ligation;  section, classic; Insertion of biventricular implantable cardioverter-defibrillator (ICD); Injection of joint (Left, 2021); Reverse total shoulder arthroplasty (Left, 2022); and Epidural steroid injection into cervical spine (N/A, 2024).    Leticia has a current medication list which includes the following prescription(s): acetaminophen, carvedilol, clonidine, diclofenac, ergocalciferol (vitamin d2), gabapentin, hydrochlorothiazide, lactulose, lorazepam, meclizine, mv-mn/folic/lutein/herbal 293, olmesartan, pantoprazole, rosuvastatin, tizanidine, tramadol, and trazodone.    Review of patient's allergies indicates:   Allergen Reactions    Codeine     Morphine Other (See Comments)        Objective      RANGE OF MOTION:    Lumbar AROM  (% of norm ROM present)  Right  (spine) Left   Pain/Dysfunction with Movement Goal   Lumbar Flexion  75% --- Pulling felt in low back ---   Lumbar Extension  50% --- Pain  increase in L side low back/hip 100% min to no pain   Lumbar Side Bending  100% 100% Pain increase in L side with L SB ---   Lumbar Rotation 50% 50% Pain in left upper and low back 100% min to no pain     STRENGTH:    L/E MMT Right Left Pain/Dysfunction with Movement Goal   Hip Flexion  4/5 4/5 Pain increase B  5/5 B   Hip Extension  4/5 4-/5 --- 4+/5 B   Hip Abduction  4/5 4-/5 Pain increase in L hip  4+/5 B   Knee Extension 4+/5 4+/5 --- ---   Knee Flexion 4+/5 4+/5 --- ---   Hip ER 4+/5 4-/5 Pain increase with L hip  4+/5 R       MUSCLE LENGTH:     Muscle Tested  Right Left    Hamstrings  decreased decreased   Piriformis  decreased decreased     JOINT MOBILITY:     Joint Motion Tested Spine   Lumbar  Hypomobile and painful       SPECIAL TESTS:     Right  (spine) Left    SLR  Negative Positive     Sensation:  Sensation is intact to light touch in B lower extremities     Palpation: TTP to L glute medius, TFL, lateral hamstrings and IT band, along with lumbar paraspinals (L>R)    Posture:  Pt presents with postural abnormalities which include: forward head, rounded shoulders , and increased lumbar lordosis    Gait Analysis: The patient ambulated with the following assistive device: none; the pt presents with the following gait abnormalities: bradykinetic and increased NANCY    Functional Tests  Outcome Norms   Timed Up and Go 12.8 seconds <13.5 sec       Treatment: (15 minutes)   Therapeutic Activity  TA 1: HEP and POC education       Assessment & Plan   Assessment  Leticia presents with a condition of Low complexity.   Presentation of Symptoms: Stable  Will Comorbidities Impact Care: Yes  See patient co-morbidities listed in patient past medical history above in subjective section of evaluation      Functional Limitations: Activity tolerance, Ambulating on uneven surfaces, Carrying objects, Decreased ambulation distance/endurance, Disrupted sleep pattern, Functional mobility, Gait limitations, Performing household  chores, Pain with ADLs/IADLs, Squatting, Standing tolerance  Impairments: Abnormal gait, Abnormal muscle firing, Activity intolerance, Abnormal or restricted range of motion, Impaired balance, Impaired physical strength, Pain with functional activity, Lack of appropriate home exercise program    Patient Goal for Therapy (PT): to decrease pain levels, especially at night  Prognosis: Good  Assessment Details: Patient is a 79 year old female presenting to outpatient physical therapy with diagnosed left hip pain, lumbosacral radiculopathy and chronic bilateral low back pain with left-sided sciatica. Patient presents with deficits noted in strength, range of motion, flexibility, nerve tension, TTP, posture and gait. Pt is being limited with sleep function, safe mobility and positional tolerance due to pain and deficits.     Plan  From a physical therapy perspective, the patient would benefit from: Skilled Rehab Services    Planned therapy interventions include: Therapeutic exercise, Therapeutic activities, Neuromuscular re-education, Manual therapy, ADLs/IADLs, Aquatic therapy, Canalith repositioning, Orthotic management and training, Lymphatic compression wrapping, Gait training, Cognitive functional training, Other (Comment), Wound care, Wheelchair management, and Work conditioning. Dry Needling   Planned modalities to include: Cryotherapy (cold pack), Biofeedback, Electrical stimulation - attended, Electrical stimulation - passive/unattended, Mechanical traction, Thermotherapy (hot pack), and Ultrasound.        Visit Frequency: 1 times Per Week for 6 Weeks.       This plan was discussed with Patient.   Discussion participants: Agreed Upon Plan of Care         Patient's spiritual, cultural, and educational needs considered and patient agreeable to plan of care and goals.     Education             No show policy, HEP, plan of care, PT role and benefits        Goals:   Active       Functional outcome       Patient will  show a significant change in FOTO survey score to 60 or better to demonstrate subjective improvement       Start:  04/15/25    Expected End:  05/27/25            Patient will demonstrate independence in home program for support of progression       Start:  04/15/25    Expected End:  05/06/25               Pain       Patient will report pain of 5/10 at worst demonstrating a reduction of overall pain       Start:  04/15/25    Expected End:  05/06/25               Range of Motion       Patient will achieve trunk AROM to goals stated in objective section of evaluation.        Start:  04/15/25    Expected End:  05/27/25               Strength       Patient will demonstrate strength improvements to goals stated in objective section of evaluation.        Start:  04/15/25    Expected End:  05/27/25                Flor Balderas PT

## 2025-04-20 ENCOUNTER — PATIENT MESSAGE (OUTPATIENT)
Dept: CARDIOLOGY | Facility: CLINIC | Age: 80
End: 2025-04-20
Payer: MEDICARE

## 2025-04-21 ENCOUNTER — PATIENT MESSAGE (OUTPATIENT)
Dept: CARDIOLOGY | Facility: CLINIC | Age: 80
End: 2025-04-21
Payer: MEDICARE

## 2025-04-23 ENCOUNTER — TELEPHONE (OUTPATIENT)
Dept: INTERNAL MEDICINE | Facility: CLINIC | Age: 80
End: 2025-04-23
Payer: MEDICARE

## 2025-04-23 DIAGNOSIS — R15.9 FECAL INCONTINENCE WITH INCOMPLETE DEFECATION: ICD-10-CM

## 2025-04-23 DIAGNOSIS — K59.00 CONSTIPATION, UNSPECIFIED CONSTIPATION TYPE: Primary | ICD-10-CM

## 2025-04-23 DIAGNOSIS — R15.0 FECAL INCONTINENCE WITH INCOMPLETE DEFECATION: ICD-10-CM

## 2025-04-23 NOTE — TELEPHONE ENCOUNTER
Patient requesting referral to [facility] Will send request to Dr. Marie to make sure ok for referral       ----- Message from Laisha sent at 4/23/2025 10:43 AM CDT -----  .Type: Patient Call Back  Who called: Patient  What is the request in detail:  Called in concerning getting a gastro referral. Please call backCan the clinic reply by [facility]?     Would the patient rather a call back or a response via [facility]?  call [facility] call back number:.[phone]

## 2025-05-08 ENCOUNTER — TELEPHONE (OUTPATIENT)
Dept: AUDIOLOGY | Facility: CLINIC | Age: 80
End: 2025-05-08
Payer: MEDICARE

## 2025-05-08 NOTE — TELEPHONE ENCOUNTER
Left  w/ [facility] phone # /rt/  ----- Message from Sujatha sent at 5/8/2025 10:27 AM CDT -----  Contact: Leticia Kelly is calling to speak to the nurse regarding scheduling a appointment to have her hearing aids checked, please give her a call at  [phone]

## 2025-05-12 DIAGNOSIS — F51.02 INSOMNIA DUE TO STRESS: ICD-10-CM

## 2025-05-12 DIAGNOSIS — F41.9 ANXIETY: ICD-10-CM

## 2025-05-13 NOTE — TELEPHONE ENCOUNTER
----- Message from Keri sent at 5/13/2025  4:56 PM CDT -----  Regarding: Leticia  Who Called: JoyceRefill or New Rx: RefillRX Name and Strength:LORazepam (ATIVAN) 0.5 MG tabletPreferred Pharmacy with phone number:[facility] [address]Phone: [phone] Fax: [phone] the patient rather a call back or a response via [facility]? CallbackBest Call Back Number:.[phone] Information:

## 2025-05-14 ENCOUNTER — OFFICE VISIT (OUTPATIENT)
Dept: NEUROLOGY | Facility: CLINIC | Age: 80
End: 2025-05-14
Payer: MEDICARE

## 2025-05-14 VITALS
HEIGHT: 62 IN | DIASTOLIC BLOOD PRESSURE: 73 MMHG | RESPIRATION RATE: 18 BRPM | WEIGHT: 167 LBS | SYSTOLIC BLOOD PRESSURE: 136 MMHG | BODY MASS INDEX: 30.73 KG/M2 | HEART RATE: 62 BPM

## 2025-05-14 DIAGNOSIS — Z86.74 HISTORY OF SUDDEN CARDIAC ARREST SUCCESSFULLY RESUSCITATED: Chronic | ICD-10-CM

## 2025-05-14 DIAGNOSIS — Z96.612 PRESENCE OF ARTIFICIAL SHOULDER JOINT, LEFT: ICD-10-CM

## 2025-05-14 DIAGNOSIS — N28.1 RENAL CYST: ICD-10-CM

## 2025-05-14 DIAGNOSIS — R68.89 DECREASED STRENGTH, ENDURANCE, AND MOBILITY: ICD-10-CM

## 2025-05-14 DIAGNOSIS — I25.10 CORONARY ARTERY CALCIFICATION: ICD-10-CM

## 2025-05-14 DIAGNOSIS — F41.9 ANXIETY: Chronic | ICD-10-CM

## 2025-05-14 DIAGNOSIS — R29.90 MULTIPLE NEUROLOGICAL SYMPTOMS: ICD-10-CM

## 2025-05-14 DIAGNOSIS — M54.42 CHRONIC BILATERAL LOW BACK PAIN WITH LEFT-SIDED SCIATICA: ICD-10-CM

## 2025-05-14 DIAGNOSIS — Z74.09 DECREASED STRENGTH, ENDURANCE, AND MOBILITY: ICD-10-CM

## 2025-05-14 DIAGNOSIS — H91.90 HEARING LOSS, UNSPECIFIED HEARING LOSS TYPE, UNSPECIFIED LATERALITY: ICD-10-CM

## 2025-05-14 DIAGNOSIS — K21.9 GASTROESOPHAGEAL REFLUX DISEASE WITHOUT ESOPHAGITIS: ICD-10-CM

## 2025-05-14 DIAGNOSIS — M25.69 DECREASED RANGE OF MOTION OF TRUNK AND BACK: ICD-10-CM

## 2025-05-14 DIAGNOSIS — E78.1 HYPERTRIGLYCERIDEMIA: ICD-10-CM

## 2025-05-14 DIAGNOSIS — E78.2 MIXED HYPERLIPIDEMIA: Chronic | ICD-10-CM

## 2025-05-14 DIAGNOSIS — M85.80 OSTEOPENIA, UNSPECIFIED LOCATION: Chronic | ICD-10-CM

## 2025-05-14 DIAGNOSIS — M54.17 LUMBOSACRAL RADICULOPATHY AT S1: ICD-10-CM

## 2025-05-14 DIAGNOSIS — G89.29 CHRONIC BILATERAL LOW BACK PAIN WITH LEFT-SIDED SCIATICA: ICD-10-CM

## 2025-05-14 DIAGNOSIS — Z95.810 ICD (IMPLANTABLE CARDIOVERTER-DEFIBRILLATOR) IN PLACE: Chronic | ICD-10-CM

## 2025-05-14 DIAGNOSIS — G47.8 SLEEP PARALYSIS: Primary | ICD-10-CM

## 2025-05-14 DIAGNOSIS — F51.02 ADJUSTMENT INSOMNIA: Chronic | ICD-10-CM

## 2025-05-14 DIAGNOSIS — R26.9 GAIT ABNORMALITY: ICD-10-CM

## 2025-05-14 DIAGNOSIS — R20.2 PARESTHESIA OF BOTH HANDS: ICD-10-CM

## 2025-05-14 DIAGNOSIS — R53.1 DECREASED STRENGTH, ENDURANCE, AND MOBILITY: ICD-10-CM

## 2025-05-14 DIAGNOSIS — E55.9 MILD VITAMIN D DEFICIENCY: Chronic | ICD-10-CM

## 2025-05-14 DIAGNOSIS — I10 ESSENTIAL HYPERTENSION: Chronic | ICD-10-CM

## 2025-05-14 DIAGNOSIS — G56.03 BILATERAL CARPAL TUNNEL SYNDROME: ICD-10-CM

## 2025-05-14 DIAGNOSIS — H26.9 CATARACT, UNSPECIFIED CATARACT TYPE, UNSPECIFIED LATERALITY: ICD-10-CM

## 2025-05-14 DIAGNOSIS — E04.1 THYROID NODULE: ICD-10-CM

## 2025-05-14 DIAGNOSIS — R41.3 AMNESIA: ICD-10-CM

## 2025-05-14 DIAGNOSIS — R29.898 WEAKNESS OF LEFT LOWER EXTREMITY: ICD-10-CM

## 2025-05-14 DIAGNOSIS — M25.552 LEFT HIP PAIN: ICD-10-CM

## 2025-05-14 DIAGNOSIS — I27.9 PULMONARY HEART DISEASE: ICD-10-CM

## 2025-05-14 PROCEDURE — 99215 OFFICE O/P EST HI 40 MIN: CPT | Mod: S$GLB,,, | Performed by: PSYCHIATRY & NEUROLOGY

## 2025-05-14 PROCEDURE — G2211 COMPLEX E/M VISIT ADD ON: HCPCS | Mod: S$GLB,,, | Performed by: PSYCHIATRY & NEUROLOGY

## 2025-05-14 PROCEDURE — 3078F DIAST BP <80 MM HG: CPT | Mod: CPTII,S$GLB,, | Performed by: PSYCHIATRY & NEUROLOGY

## 2025-05-14 PROCEDURE — 3288F FALL RISK ASSESSMENT DOCD: CPT | Mod: CPTII,S$GLB,, | Performed by: PSYCHIATRY & NEUROLOGY

## 2025-05-14 PROCEDURE — 99417 PROLNG OP E/M EACH 15 MIN: CPT | Mod: S$GLB,,, | Performed by: PSYCHIATRY & NEUROLOGY

## 2025-05-14 PROCEDURE — 1159F MED LIST DOCD IN RCRD: CPT | Mod: CPTII,S$GLB,, | Performed by: PSYCHIATRY & NEUROLOGY

## 2025-05-14 PROCEDURE — 3075F SYST BP GE 130 - 139MM HG: CPT | Mod: CPTII,S$GLB,, | Performed by: PSYCHIATRY & NEUROLOGY

## 2025-05-14 PROCEDURE — 1101F PT FALLS ASSESS-DOCD LE1/YR: CPT | Mod: CPTII,S$GLB,, | Performed by: PSYCHIATRY & NEUROLOGY

## 2025-05-14 PROCEDURE — 99999 PR PBB SHADOW E&M-EST. PATIENT-LVL IV: CPT | Mod: PBBFAC,,, | Performed by: PSYCHIATRY & NEUROLOGY

## 2025-05-14 PROCEDURE — 1125F AMNT PAIN NOTED PAIN PRSNT: CPT | Mod: CPTII,S$GLB,, | Performed by: PSYCHIATRY & NEUROLOGY

## 2025-05-14 RX ORDER — LORAZEPAM 0.5 MG/1
0.5 TABLET ORAL NIGHTLY PRN
Qty: 15 TABLET | Refills: 0 | Status: SHIPPED | OUTPATIENT
Start: 2025-05-14

## 2025-05-14 NOTE — PROGRESS NOTES
Subjective:       Patient ID: Leticia Grimes is a 79 y.o. female.    Chief Complaint: Multiple neurological symptom and trouble sleeping           HPI      BACKGROUND HISTORY      Presented on 11- for evaluation.        The patient describes paroxysmal bilateral (mostly RT side) hand numbness, tingling and pain since . The numbness and tingling involve all the fingers. The symptoms do wake the patient up from sleep. The patient denies  weakness. No muscle wasting. No neck pain with radiation to the hands.  No similar symptoms in the feet. No history of trauma.  No history of diabetes. No history of hypothyroidism. No history of rheumatoid arthritis.  No history of amyloidosis or kidney disease.  Ordered NCS/EMG BUE.      The patient started experiencing severe LT-sided low back pain in . The pain is sharp, 6-8/10 and radiated to the LT lower extremity. It radiates to back of the leg and the sole. The patient is having difficulty walking due to the pain. Tylenol, NSAIDs and GBP failed to alleviate the pain. The patient stated PT recently with some relief. No fixed weakness. The patient denies persistent numbness in the distribution of pain radiation. No bowel or bladder incontinence. No trauma. Recommended PT and Pain Management evaluation.         INTERVAL HISTORY     Presented on 05-, accompanied by her sister, for evaluation.      On 12- NCS/EMG BUE Mild-Moderate RT CTS.    Under PM for chronic  cervical and lumbar radiculopathy.    The patient has been experiencing sleep paralysis since childhood but has gotten worse. She described the symptoms as temporarily lose the ability to move or speak while falling asleep or waking up. She has been extremely anxious and will be seeing psychiatry for evaluation.  Was prescribed Trazodone but did not take it.      Review of Systems   Constitutional:  Negative for appetite change and fatigue.   HENT:  Negative for hearing loss and  tinnitus.    Eyes:  Negative for photophobia and visual disturbance.   Respiratory:  Negative for apnea and shortness of breath.    Cardiovascular:  Negative for chest pain and palpitations.   Gastrointestinal:  Negative for nausea and vomiting.   Endocrine: Negative for cold intolerance and heat intolerance.   Genitourinary:  Negative for difficulty urinating and urgency.   Musculoskeletal:  Positive for arthralgias and back pain. Negative for gait problem, joint swelling, myalgias, neck pain and neck stiffness.   Skin:  Negative for color change and rash.   Allergic/Immunologic: Negative for environmental allergies and immunocompromised state.   Neurological:  Positive for numbness. Negative for dizziness, tremors, seizures, syncope, facial asymmetry, speech difficulty, weakness, light-headedness and headaches.   Hematological:  Negative for adenopathy. Does not bruise/bleed easily.   Psychiatric/Behavioral:  Positive for sleep disturbance. Negative for agitation, behavioral problems, confusion, decreased concentration, dysphoric mood, hallucinations, self-injury and suicidal ideas. The patient is nervous/anxious. The patient is not hyperactive.                  Current Outpatient Medications:     acetaminophen (TYLENOL) 500 MG tablet, Take 2 tablets (1,000 mg total) by mouth every 8 (eight) hours as needed for Pain., Disp: 60 tablet, Rfl: 0    carvediloL (COREG) 12.5 MG tablet, Take 1 tablet (12.5 mg total) by mouth 2 (two) times daily with meals., Disp: 180 tablet, Rfl: 0    cloNIDine (CATAPRES) 0.1 MG tablet, Take 1 tablet (0.1 mg total) by mouth every 6 (six) hours as needed (if SBP > 150 mmHG)., Disp: 60 tablet, Rfl: 11    diclofenac (VOLTAREN) 75 MG EC tablet, Take 75 mg by mouth 2 (two) times daily., Disp: , Rfl:     ergocalciferol, vitamin D2, (VITAMIN D ORAL), Take by mouth., Disp: , Rfl:     gabapentin (NEURONTIN) 300 MG capsule, Take 1 capsule (300 mg total) by mouth 3 (three) times daily., Disp: 90  capsule, Rfl: 5    hydroCHLOROthiazide 12.5 MG Tab, Take 1 tablet (12.5 mg total) by mouth once daily., Disp: 90 tablet, Rfl: 1    lactulose (CHRONULAC) 20 gram/30 mL Soln, Take 15 mLs (10 g total) by mouth 2 (two) times daily as needed (constipation)., Disp: 300 mL, Rfl: 0    LORazepam (ATIVAN) 0.5 MG tablet, Take 1 tablet (0.5 mg total) by mouth nightly as needed., Disp: 15 tablet, Rfl: 0    meclizine (ANTIVERT) 25 mg tablet, Take 25 mg by mouth every 8 (eight) hours as needed for Dizziness., Disp: , Rfl:     mv-mn/folic/lutein/herbal 293 (ALIVE WOMEN'S 50 PLUS ORAL), Take 1 tablet by mouth once daily., Disp: , Rfl:     olmesartan (BENICAR) 40 MG tablet, Take 1 tablet (40 mg total) by mouth once daily., Disp: 90 tablet, Rfl: 1    pantoprazole (PROTONIX) 40 MG tablet, 1 tablet Orally Once a day for 90 days, Disp: , Rfl:     rosuvastatin (CRESTOR) 10 MG tablet, Take 1 tablet (10 mg total) by mouth once daily., Disp: 30 tablet, Rfl: 0    tiZANidine (ZANAFLEX) 4 MG tablet, Take 1/2 to 1 tab BID PRN muscle spasms. May cause drowsiness., Disp: 60 tablet, Rfl: 2    traMADoL (ULTRAM) 50 mg tablet, , Disp: , Rfl:     traZODone (DESYREL) 50 MG tablet, 1 to 2 tabs po qhs prn sleep, Disp: 30 tablet, Rfl: 0  Past Medical History:   Diagnosis Date    Adjustment insomnia 2020    Anxiety 2020    GERD (gastroesophageal reflux disease)     History of sudden cardiac arrest successfully resuscitated 2020    Hypertension     ICD (implantable cardioverter-defibrillator) in place 2020    Left arm swelling 9/3/2020    Mild vitamin D deficiency 2013    Osteopenia 2020    Vitamin D deficiency disease      Past Surgical History:   Procedure Laterality Date     SECTION, CLASSIC      EPIDURAL STEROID INJECTION INTO CERVICAL SPINE N/A 2024    Procedure: Cervical C6/7 Interlaminar OMAR;  Surgeon: Main Mcgraw MD;  Location: [facility];  Service: Pain Management;  Laterality: N/A;    HYSTERECTOMY       INJECTION OF JOINT Left 03/02/2021    Procedure: Left shoulder Joint injection;  Surgeon: Main Mcgraw MD;  [facility];  Service: Pain Management;  Laterality: Left;    INSERTION OF BIVENTRICULAR IMPLANTABLE CARDIOVERTER-DEFIBRILLATOR (ICD)      REVERSE TOTAL SHOULDER ARTHROPLASTY Left 05/05/2022    Procedure: ARTHROPLASTY, SHOULDER, TOTAL, REVERSE;  Surgeon: Tomas Taylor MD;  Location: [facility] OR;  Service: Orthopedics;  Laterality: Left;    TUBAL LIGATION       Social History     Socioeconomic History    Marital status:    Occupational History    Occupation: retired school counselor   Tobacco Use    Smoking status: Never     Passive exposure: Never    Smokeless tobacco: Never   Substance and Sexual Activity    Alcohol use: Yes     Comment: rare    Drug use: Never    Sexual activity: Not Currently     Partners: Male     Birth control/protection: Abstinence, None     Social Drivers of Health     Financial Resource Strain: Medium Risk (2/20/2025)    Overall Financial Resource Strain (CARDIA)     Difficulty of Paying Living Expenses: Somewhat hard   Food Insecurity: No Food Insecurity (2/20/2025)    Hunger Vital Sign     Worried About Running Out of Food in the Last Year: Never true     Ran Out of Food in the Last Year: Never true   Transportation Needs: No Transportation Needs (2/20/2025)    PRAPARE - Transportation     Lack of Transportation (Medical): No     Lack of Transportation (Non-Medical): No   Physical Activity: Insufficiently Active (2/20/2025)    Exercise Vital Sign     Days of Exercise per Week: 1 day     Minutes of Exercise per Session: 20 min   Stress: Stress Concern Present (2/20/2025)    [employer] [place] [employer] - Occupational Stress Questionnaire     Feeling of Stress : Very much   Housing Stability: Low Risk  (2/20/2025)    Housing Stability Vital Sign     Unable to Pay for Housing in the Last Year: No     Homeless in the Last Year: No              Past/Current Medical/Surgical History, Past/Current Social History, Past/Current Family History and Past/Current Medications were reviewed in detail.        Objective:           VITAL SIGNS WERE REVIEWED      GENERAL APPEARANCE:     The patient looks anxious.    BMI 31.04    No signs of respiratory distress.    Normal breathing pattern.    No dysmorphic features    Normal eye contact.     GENERAL MEDICAL EXAM:    HEENT:  Head is atraumatic normocephalic.     Fundoscopic (Ophthalmoscopic) exam showed no disc edema.      Neck and Axillae: No JVD. No visible lesions.    Cardiopulmonary: No cyanosis. No tachypnea. Normal respiratory effort. AICD.     Gastrointestinal/Urogenital:  No jaundice. No stomas or lesions. No visible hernias. No catheters.     Skin, Hair and Nails: No pathognonomic skin rash. No neurofibromatosis. No visible lesions.No stigmata of autoimmune disease. No clubbing.    Limbs: No varicose veins. No visible swelling.    Muskoskeletal: No visible deformities.No visible lesions.           Neurologic Exam     Mental Status   Oriented to person, place, and time.   Follows 3 step commands.   Attention: normal. Concentration: normal.   Speech: speech is normal   Level of consciousness: alert  Able to name object. Able to repeat. Normal comprehension.     Cranial Nerves   Cranial nerves II through XII intact.     CN II   Visual fields full to confrontation.   Visual acuity: normal  Right visual field deficit: none  Left visual field deficit: none     CN III, IV, VI   Pupils are equal, round, and reactive to light.  Extraocular motions are normal.   Right pupil: Size: 2 mm. Shape: regular. Reactivity: brisk. Consensual response: intact. Accommodation: intact.   Left pupil: Size: 2 mm. Shape: regular. Reactivity: brisk. Consensual response: intact. Accommodation: intact.   CN III: no CN III palsy  CN VI: no CN VI palsy  Nystagmus: none   Diplopia: none  Ophthalmoparesis: none  Upgaze:  normal  Downgaze: normal  Conjugate gaze: present  Vestibulo-ocular reflex: present    CN V   Facial sensation intact.   Right facial sensation deficit: none  Left facial sensation deficit: none  Jaw jerk: normal    CN VII   Facial expression full, symmetric.   Right facial weakness: none  Left facial weakness: none    CN VIII   CN VIII normal.   Hearing: intact    CN IX, X   CN IX normal.   CN X normal.   Palate: symmetric    CN XI   CN XI normal.   Right sternocleidomastoid strength: normal  Left sternocleidomastoid strength: normal  Right trapezius strength: normal  Left trapezius strength: normal    CN XII   CN XII normal.   Tongue: not atrophic  Fasciculations: absent  Tongue deviation: none    Motor Exam   Muscle bulk: normal  Overall muscle tone: normal  Right arm tone: normal  Left arm tone: normal  Right arm pronator drift: absent  Left arm pronator drift: absent  Right leg tone: normal  Left leg tone: normal    Strength   Strength 5/5 throughout.   Right neck flexion: 5/5  Left neck flexion: 5/5  Right neck extension: 5/5  Left neck extension: 5/5  Right deltoid: 5/5  Left deltoid: 5/5  Right biceps: 5/5  Left biceps: 5/5  Right triceps: 5/5  Left triceps: 5/5  Right wrist flexion: 5/5  Left wrist flexion: 5/5  Right wrist extension: 5/5  Left wrist extension: 5/5  Right interossei: 5/5  Left interossei: 5/5  Right iliopsoas: 5/5  Left iliopsoas: 5/5  Right quadriceps: 5/5  Left quadriceps: 5/5  Right hamstrin/5  Left hamstrin/5  Right glutei: 5/5  Left glutei: 5/5  Right anterior tibial: 5/5  Left anterior tibial: 5/5  Right posterior tibial: 5/5  Left posterior tibial: 5/5  Right peroneal: 5/5  Left peroneal: 5/5  Right gastroc: 5/5  Left gastroc: 5/5    Sensory Exam   Light touch normal.   Right arm light touch: normal  Left arm light touch: normal  Right leg light touch: normal  Left leg light touch: normal  Vibration normal.   Right arm vibration: normal  Left arm vibration: normal  Right leg  vibration: normal  Left leg vibration: normal  Proprioception normal.   Right arm proprioception: normal  Left arm proprioception: normal  Right leg proprioception: normal  Left leg proprioception: normal  Pinprick normal.   Right arm pinprick: normal  Left arm pinprick: normal  Right leg pinprick: normal  Left leg pinprick: normal  Graphesthesia: normal  Stereognosis: normal    Gait, Coordination, and Reflexes     Gait  Gait: normal    Coordination   Romberg: negative  Finger to nose coordination: normal  Heel to shin coordination: normal  Tandem walking coordination: normal    Tremor   Resting tremor: absent  Intention tremor: absent  Action tremor: absent    Reflexes   Right brachioradialis: 2+  Left brachioradialis: 2+  Right biceps: 2+  Left biceps: 2+  Right triceps: 2+  Left triceps: 2+  Right patellar: 2+  Left patellar: 2+  Right achilles: 2+  Left achilles: 2+  Right plantar: normal  Left plantar: normal  Right Reyes: absent  Left Reyes: absent  Right ankle clonus: absent  Left ankle clonus: absent  Right pendular knee jerk: absent  Left pendular knee jerk: absent    Lab Results   Component Value Date    WBC 4.97 09/28/2023    HGB 13.0 09/28/2023    HCT 39.5 09/28/2023    MCV 87 09/28/2023     09/28/2023     Sodium   Date Value Ref Range Status   09/28/2023 144 136 - 145 mmol/L Final     Potassium   Date Value Ref Range Status   09/28/2023 4.3 3.5 - 5.1 mmol/L Final     Chloride   Date Value Ref Range Status   09/28/2023 111 (H) 95 - 110 mmol/L Final     CO2   Date Value Ref Range Status   09/28/2023 24 23 - 29 mmol/L Final     Glucose   Date Value Ref Range Status   09/28/2023 79 70 - 110 mg/dL Final     BUN   Date Value Ref Range Status   09/28/2023 13 8 - 23 mg/dL Final   07/07/2023 12 4 - 21 mg/dL Final     Creatinine   Date Value Ref Range Status   09/28/2023 0.9 0.5 - 1.4 mg/dL Final     Calcium   Date Value Ref Range Status   09/28/2023 10.1 8.7 - 10.5 mg/dL Final     Total Protein   Date  Value Ref Range Status   09/28/2023 7.2 6.0 - 8.4 g/dL Final     Albumin   Date Value Ref Range Status   09/28/2023 3.7 3.5 - 5.2 g/dL Final     Total Bilirubin   Date Value Ref Range Status   09/28/2023 0.5 0.1 - 1.0 mg/dL Final     Comment:     For infants and newborns, interpretation of results should be based  on gestational age, weight and in agreement with clinical  observations.    Premature Infant recommended reference ranges:  Up to 24 hours.............<8.0 mg/dL  Up to 48 hours............<12.0 mg/dL  3-5 days..................<15.0 mg/dL  6-29 days.................<15.0 mg/dL       Alkaline Phosphatase   Date Value Ref Range Status   09/28/2023 69 55 - 135 U/L Final     AST   Date Value Ref Range Status   09/28/2023 22 10 - 40 U/L Final     ALT   Date Value Ref Range Status   09/28/2023 21 10 - 44 U/L Final     Anion Gap   Date Value Ref Range Status   09/28/2023 9 8 - 16 mmol/L Final     eGFR if    Date Value Ref Range Status   05/11/2022 >60.0 >60 mL/min/1.73 m^2 Final     eGFR if non    Date Value Ref Range Status   07/07/2023 67.0 mL/min/1.73 m2 Final         Lab Results   Component Value Date    TSH 0.500 09/28/2023         LABORATORY EVALUATION      [id number]      CBC, CMP, TFT, HA1C NL    Vit D Low 17        RADIOLOGY EVALUATION    11-     CT L-Spine L4-L5, L5-S1 DDD and B/I Sacroiliitis            NEUROPHYSIOLOGY EVALUATION       12-    NCS/EMG BUE Mild-Moderate RT CTS.      Reviewed the neuroimaging independently       Assessment:       1. Sleep paralysis    2. Adjustment insomnia    3. Amnesia;Dissociative amnesia    4. Anxiety    5. Bilateral carpal tunnel syndrome    6. Cataract, unspecified cataract type, unspecified laterality    7. Chronic bilateral low back pain with left-sided sciatica    8. Coronary artery calcification    9. Decreased range of motion of trunk and back    10. Decreased strength, endurance, and mobility    11. Essential  hypertension    12. Gait abnormality    13. Gastroesophageal reflux disease without esophagitis    14. Hearing loss, unspecified hearing loss type, unspecified laterality    15. History of sudden cardiac arrest successfully resuscitated    16. Hypertriglyceridemia    17. ICD (implantable cardioverter-defibrillator) in place    18. Left hip pain    19. Lumbosacral radiculopathy at S1    20. Mild vitamin D deficiency    21. Mixed hyperlipidemia    22. Multiple neurological symptoms    23. Osteopenia, unspecified location    24. Paresthesia of both hands    25. Presence of artificial shoulder joint, left    26. Pulmonary heart disease    27. Renal cyst    28. Thyroid nodule    29. Weakness of left lower extremity          Plan:           HAND PAIN AND PARESTHESIAS, CARPAL TUNNEL SYNDROMES (CTS)      CTS Exercises.    CTS Brace.          LEFT S1 RADICULOPATHY WITH CERVICAL RADICULOPATHY       Pain Management evaluation.     Avoid heavy lifting and strenuous exercises and sudden changes in position.     Sleep on hard mattress.    Improve posture.     Call 911 for sudden severe pain, sudden weakness or sudden numbness and sudden bowel or bladder incontinence.         SLEEP PARALYSIS     Reassured her that the condition is harmless.    MANAGEMENT      Stick to a consistent sleep schedule    Get 7-9 hours of sleep per night    Avoid caffeine and heavy meals before bed    Sleep on your side instead of your back    Manage stress and anxiety. Advised her to take Trazodone every night and follow up with psychiatry.     Remind yourself it's temporary and harmless, focus on breathing slowly and deeply, try moving your fingers or toes first to break the paralysis and keep your eyes moving -- it can help disrupt the episode      MEDICAL/SURGICAL COMORBIDITIES     All relevant medical comorbidities noted and managed by primary care physician and medical care team.          HEALTHY LIFESTYLE AND PREVENTATIVE CARE    The patient to  adhere to the age-appropriate health maintenance guidelines including screening tests and vaccinations. The patient to adhere to  healthy lifestyle, optimal weight, exercise, healthy diet, good sleep hygiene and avoiding drugs including smoking, alcohol and recreational drugs.          Theresa Leonard MD, FAAN    Attending Neurologist/Epileptologist         Diplomate, American Board of Psychiatry and Neurology    Diplomate, American Board of Clinical Neurophysiology     Fellow, American Academy of Neurology             I spent a total of 73 minutes on the day of the visit.  This includes face to face time and non-face to face time preparing to see the patient (eg, review of tests), obtaining and/or reviewing separately obtained history, documenting clinical information in the electronic or other health record, independently interpreting results and communicating results to the patient/family/caregiver, or care coordinator.

## 2025-05-14 NOTE — PATIENT INSTRUCTIONS
"  Sleep paralysis is a phenomenon where you temporarily lose the ability to move or speak while falling asleep or waking up. It can be frightening, especially if it includes hallucinations, but it's generally harmless.  Key Facts:  When it happens: During the transitions between wakefulness and REM sleep (when dreaming occurs).  What's going on: Your brain wakes up, but your body remains in the "off" state that prevents you from acting out dreams.  Duration: It usually lasts a few seconds to a couple of minutes.  Common symptoms:  Inability to move or speak  A feeling of pressure on the chest  A sense of a "presence" in the room  Hallucinations (visual, auditory, or tactile)    Causes and Risk Factors:  Sleep deprivation  Irregular sleep schedule  Sleeping on your back  Stress or anxiety  Narcolepsy or other sleep disorders    How to Reduce or Prevent Sleep Paralysis:  Stick to a consistent sleep schedule  Get 7-9 hours of sleep per night  Avoid caffeine and heavy meals before bed  Sleep on your side instead of your back  Manage stress and anxiety    What to Do if It Happens:  Remind yourself it's temporary and harmless  Focus on breathing slowly and deeply  Try moving your fingers or toes first to break the paralysis  Keep your eyes moving -- it can help disrupt the episode      "

## 2025-05-19 DIAGNOSIS — K59.00 CONSTIPATION, UNSPECIFIED CONSTIPATION TYPE: ICD-10-CM

## 2025-05-20 ENCOUNTER — CLINICAL SUPPORT (OUTPATIENT)
Dept: AUDIOLOGY | Facility: CLINIC | Age: 80
End: 2025-05-20
Payer: MEDICARE

## 2025-05-20 DIAGNOSIS — H90.3 SENSORINEURAL HEARING LOSS OF BOTH EARS: Primary | ICD-10-CM

## 2025-05-20 NOTE — PROGRESS NOTES
Leticia Grimes was seen 05/20/2025 for hearing aid clean and check.  She is hearing well with the aids. She has a hard time cleaning the aids due to soft cerumen. She does not remember how to change cerushield filters. Otoscopy revealed a very slight amount of soft around EAC wall for each ear, non-occluding. Both aids were cleaned (microphone, , replaced dome, replaced filter) and found to be in good working order. Re-instructed patient how to change filter and domes. She was given a pack of cerushield filters and cap domes. No program adjustments were needed.  Patient is scheduled to return in six months.

## 2025-06-03 ENCOUNTER — PATIENT MESSAGE (OUTPATIENT)
Dept: INTERNAL MEDICINE | Facility: CLINIC | Age: 80
End: 2025-06-03
Payer: MEDICARE

## 2025-06-03 ENCOUNTER — OFFICE VISIT (OUTPATIENT)
Dept: INTERNAL MEDICINE | Facility: CLINIC | Age: 80
End: 2025-06-03
Payer: MEDICARE

## 2025-06-03 VITALS
HEART RATE: 70 BPM | DIASTOLIC BLOOD PRESSURE: 80 MMHG | SYSTOLIC BLOOD PRESSURE: 140 MMHG | WEIGHT: 163 LBS | RESPIRATION RATE: 18 BRPM | OXYGEN SATURATION: 99 % | TEMPERATURE: 98 F | BODY MASS INDEX: 30 KG/M2 | HEIGHT: 62 IN

## 2025-06-03 DIAGNOSIS — I10 ESSENTIAL HYPERTENSION: ICD-10-CM

## 2025-06-03 DIAGNOSIS — Z12.11 SCREENING FOR COLON CANCER: Primary | ICD-10-CM

## 2025-06-03 DIAGNOSIS — J06.9 VIRAL URI WITH COUGH: Primary | ICD-10-CM

## 2025-06-03 LAB
CTP QC/QA: YES
SARS-COV-2 RDRP RESP QL NAA+PROBE: NEGATIVE

## 2025-06-03 PROCEDURE — 87635 SARS-COV-2 COVID-19 AMP PRB: CPT | Mod: QW,S$GLB,, | Performed by: FAMILY MEDICINE

## 2025-06-03 PROCEDURE — 1125F AMNT PAIN NOTED PAIN PRSNT: CPT | Mod: CPTII,S$GLB,, | Performed by: FAMILY MEDICINE

## 2025-06-03 PROCEDURE — 1159F MED LIST DOCD IN RCRD: CPT | Mod: CPTII,S$GLB,, | Performed by: FAMILY MEDICINE

## 2025-06-03 PROCEDURE — 1101F PT FALLS ASSESS-DOCD LE1/YR: CPT | Mod: CPTII,S$GLB,, | Performed by: FAMILY MEDICINE

## 2025-06-03 PROCEDURE — 3077F SYST BP >= 140 MM HG: CPT | Mod: CPTII,S$GLB,, | Performed by: FAMILY MEDICINE

## 2025-06-03 PROCEDURE — 99214 OFFICE O/P EST MOD 30 MIN: CPT | Mod: S$GLB,,, | Performed by: FAMILY MEDICINE

## 2025-06-03 PROCEDURE — 3288F FALL RISK ASSESSMENT DOCD: CPT | Mod: CPTII,S$GLB,, | Performed by: FAMILY MEDICINE

## 2025-06-03 PROCEDURE — 99999 PR PBB SHADOW E&M-EST. PATIENT-LVL V: CPT | Mod: PBBFAC,,, | Performed by: FAMILY MEDICINE

## 2025-06-03 PROCEDURE — 3079F DIAST BP 80-89 MM HG: CPT | Mod: CPTII,S$GLB,, | Performed by: FAMILY MEDICINE

## 2025-06-03 RX ORDER — PROMETHAZINE HYDROCHLORIDE AND DEXTROMETHORPHAN HYDROBROMIDE 6.25; 15 MG/5ML; MG/5ML
5 SYRUP ORAL EVERY 8 HOURS PRN
Qty: 150 ML | Refills: 0 | Status: SHIPPED | OUTPATIENT
Start: 2025-06-03 | End: 2025-06-13

## 2025-06-03 RX ORDER — IPRATROPIUM BROMIDE 21 UG/1
2 SPRAY, METERED NASAL 2 TIMES DAILY
Qty: 30 ML | Refills: 0 | Status: SHIPPED | OUTPATIENT
Start: 2025-06-03 | End: 2025-06-13

## 2025-06-03 RX ORDER — IPRATROPIUM BROMIDE 21 UG/1
2 SPRAY, METERED NASAL 2 TIMES DAILY
Qty: 30 ML | Refills: 0 | Status: SHIPPED | OUTPATIENT
Start: 2025-06-03 | End: 2025-06-03

## 2025-06-03 RX ORDER — ALBUTEROL SULFATE 90 UG/1
INHALANT RESPIRATORY (INHALATION)
COMMUNITY
Start: 2025-06-02

## 2025-06-03 NOTE — PROGRESS NOTES
Subjective:       Patient ID: Leticia Grimes is a 79 y.o. female.    Chief Complaint: Sinus Problem (Symptoms started on last Tuesday. Cough, sneezing, earache, sore throat.)    Leticia Grimes is 79 y.o. female who presents to clinic with concern for 1 week hx of cough, productive with white sputum, afebrile, request testing for COVID.     HTN- blood pressure elevated today and yesterday, reports adherence with medication, denies chest pain, shortness of breath, lower extremity swelling       Review of Systems   Constitutional:  Negative for chills and fever.   HENT:  Positive for congestion, ear pain and rhinorrhea. Negative for sore throat.    Respiratory:  Negative for cough, shortness of breath and wheezing.    Cardiovascular:  Negative for chest pain, palpitations and leg swelling.   Gastrointestinal:  Negative for abdominal pain, constipation, diarrhea, nausea and vomiting.   Genitourinary:  Negative for dysuria, frequency and urgency.   Neurological:  Negative for headaches.   Psychiatric/Behavioral:  Negative for dysphoric mood.        Objective:      Physical Exam  Vitals reviewed.   HENT:      Head: Normocephalic and atraumatic.      Nose: No congestion or rhinorrhea.   Eyes:      General: No scleral icterus.        Right eye: No discharge.         Left eye: No discharge.      Extraocular Movements: Extraocular movements intact.      Conjunctiva/sclera: Conjunctivae normal.      Pupils: Pupils are equal, round, and reactive to light.   Cardiovascular:      Rate and Rhythm: Normal rate and regular rhythm.      Heart sounds: No murmur heard.     No friction rub. No gallop.   Pulmonary:      Effort: Pulmonary effort is normal. No respiratory distress.      Breath sounds: Normal breath sounds. No stridor. No wheezing or rhonchi.   Abdominal:      General: Bowel sounds are normal.      Palpations: Abdomen is soft.   Musculoskeletal:      Right lower leg: No edema.      Left lower leg: No edema.   Skin:      General: Skin is warm.   Neurological:      General: No focal deficit present.      Mental Status: She is alert.   Psychiatric:         Mood and Affect: Mood normal.         Behavior: Behavior normal.         Assessment:       1. Viral URI with cough    2. Essential hypertension        Plan:   1. Viral URI with cough  Acute viral illness, negative COVID test today, advised symptomatic managment  -     POCT COVID-19 Rapid Screening  -     promethazine-dextromethorphan (PROMETHAZINE-DM) 6.25-15 mg/5 mL Syrp; Take 5 mLs by mouth every 8 (eight) hours as needed (cough/congestion, do not use and operation machinery).  Dispense: 150 mL; Refill: 0  -     ipratropium (ATROVENT) 21 mcg (0.03 %) nasal spray; 2 sprays by Each Nostril route 2 (two) times daily. for 10 days  Dispense: 30 mL; Refill: 0    2. Essential hypertension  Overview:  Elevated today, continue home BP reading, Cont current meds.                    Future Appointments   Date Time Provider Department Center   6/11/2025  2:00 PM Rakesh Alexis LCSW [facility]   6/24/2025  2:30 PM Rao Lehman PA-C ON GASTRO [facility]   7/15/2025  1:00 PM Rakesh Alexis LCSW [facility]   10/6/2025 10:40 AM Luis Arthur MD ON CARDIO [facility]   11/4/2025 11:30 AM Ruslan Vee, AuCESAR, CCC-A ON AUDIO [facility]   3/3/2026  1:30 PM [facility], [facility] ARRHYTHMIA ON ARR PRO [facility]       Nikki Marie MD  [facility] [address]  [place] [address]  [phone]

## 2025-06-06 ENCOUNTER — CLINICAL SUPPORT (OUTPATIENT)
Dept: CARDIOLOGY | Facility: HOSPITAL | Age: 80
End: 2025-06-06
Payer: MEDICARE

## 2025-06-06 ENCOUNTER — TELEPHONE (OUTPATIENT)
Dept: PSYCHIATRY | Facility: CLINIC | Age: 80
End: 2025-06-06
Payer: MEDICARE

## 2025-06-06 ENCOUNTER — CLINICAL SUPPORT (OUTPATIENT)
Dept: CARDIOLOGY | Facility: HOSPITAL | Age: 80
End: 2025-06-06
Attending: INTERNAL MEDICINE
Payer: MEDICARE

## 2025-06-06 DIAGNOSIS — Z95.810 PRESENCE OF AUTOMATIC (IMPLANTABLE) CARDIAC DEFIBRILLATOR: ICD-10-CM

## 2025-06-06 PROCEDURE — 93296 REM INTERROG EVL PM/IDS: CPT | Performed by: INTERNAL MEDICINE

## 2025-06-06 PROCEDURE — 93295 DEV INTERROG REMOTE 1/2/MLT: CPT | Mod: S$GLB,,, | Performed by: INTERNAL MEDICINE

## 2025-06-10 ENCOUNTER — TELEPHONE (OUTPATIENT)
Dept: INTERNAL MEDICINE | Facility: CLINIC | Age: 80
End: 2025-06-10
Payer: MEDICARE

## 2025-06-11 ENCOUNTER — OFFICE VISIT (OUTPATIENT)
Dept: PSYCHIATRY | Facility: CLINIC | Age: 80
End: 2025-06-11
Payer: MEDICARE

## 2025-06-11 DIAGNOSIS — F32.A DEPRESSION, UNSPECIFIED DEPRESSION TYPE: ICD-10-CM

## 2025-06-11 DIAGNOSIS — F43.21 GRIEF: Primary | ICD-10-CM

## 2025-06-11 DIAGNOSIS — F41.9 ANXIETY: ICD-10-CM

## 2025-06-11 PROCEDURE — 98007 SYNCH AUDIO-VIDEO EST HI 40: CPT | Mod: 95,,, | Performed by: SOCIAL WORKER

## 2025-06-11 NOTE — PROGRESS NOTES
Individual Psychotherapy (PhD/LCSW)    6/11/2025    Site:  [place]            --Via virtual visit with synchronous audio and video.  Patient presented at home, in the state of [place].   Each patient to whom medical services by telemedicine is provided is:  (1) informed of the relationship between the physician and patient and the respective role of any other health care provider with respect to management of the patient; and (2) notified that he or she may decline to receive medical services by telemedicine and may withdraw from such care at any time.     Therapeutic Intervention: Met with patient. Patient contacted grandson on speakerphone during the session, as planned.  Outpatient - Insight oriented psychotherapy 45 min - CPT code 73259 and Outpatient - Supportive psychotherapy 45 min - CPT Code 97147    Chief complaint/reason for encounter: anxiety, sleep, interpersonal and memory gap from July of 2021.      Interval history and content of current session:  79 year old female patient returned to clinic for follow up psychotherapy,  unresolved grief at death of  Macho.  Last seen on 3/28/24.  Presenting from home via virtual visit. Stated last couple of years starting to develop disturbing dreams and sleep paralysis. After talking with her neurologist, she was encouraged to restart therapy.  Gadiel she is also on medication to help with her sleep. Last such experience was about 2 weeks ago. Stated she had been not dreaming about her late  for a while, but recently started to again. She endorsed awareness of continued grief. Stated she has interacted with a grief support group, mostly of other women, within her [employer], and she said she feels okay talking with others at the moment; they have lunch outings together and some meetings. But she feels some renewed sadness when she returns home. She reports her daughter has come from [place] to stay with her for a while, and the patient said she feels  "guilty for her daughter "having to" disrupt her life for the sake of checking in on the patient. She reports her other daughter, Lidia, is struggling with grief as well, but she had been masking it to hide it from the patient for some time. Patient is a retired school counselor. Mentioned she feels known by a lot of people in her [facility] community as a result of that past role. 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 Discussed with the patient some positives, particularly that she is being proactive in socializing and in working on her grief and depression. Patient denied any si/hi, mood swings, psychosis, or substance abuse.  Supportive therapy provided.  Discussed scheduling, and the patient noted that her years in the school system helped her overcome her fear of computer systems and "unknowns" of it. Stated she will try to add some appointments herself to her schedule. Tentatively planned for approximate monthly follow ups.   Plan is to follow up on 7/15/25.      Treatment plan:  Target symptoms: anxiety , adjustment, dissociative amnesia memory gap.  Why chosen therapy is appropriate versus another modality: relevant to diagnosis, patient responds to this modality  Outcome monitoring methods: self-report, observation  Therapeutic intervention type: insight oriented psychotherapy, supportive psychotherapy    Risk parameters:  Patient reports no suicidal ideation  Patient reports no homicidal ideation  Patient reports no self-injurious behavior  Patient reports no violent behavior    Verbal deficits: None    Patient's response to intervention:  The patient's response to intervention is accepting.    Progress toward goals and other mental status changes:  The patient's progress toward goals is limited; just re-establishing therapeutic contact after an " absense    Diagnosis:     ICD-10-CM ICD-9-CM   1. Grief  F43.21 309.0   2. Depression, unspecified depression type  F32.A 311   3. Anxiety  F41.9 300.00         Plan:  individual psychotherapy and medication management by physician    Return to clinic:  As scheduled  Length of Service (minutes): 52      The patient location is: [place]  The chief complaint leading to consultation is:  depression and unresolved grief; some sleep disturbance    Visit type: audiovisual    Face to Face time with patient: 44   52 minutes of total time spent on the encounter, which includes face to face time and non-face to face time preparing to see the patient (eg, review of tests), Obtaining and/or reviewing separately obtained history, Documenting clinical information in the electronic or other health record, Independently interpreting results (not separately reported) and communicating results to the patient/family/caregiver, or Care coordination (not separately reported).         Each patient to whom he or she provides medical services by telemedicine is:  (1) informed of the relationship between the physician and patient and the respective role of any other health care provider with respect to management of the patient; and (2) notified that he or she may decline to receive medical services by telemedicine and may withdraw from such care at any time.    Notes:

## 2025-06-16 LAB
OHS CV DC REMOTE DEVICE TYPE: NORMAL
OHS CV RV PACING PERCENT: 1 %

## 2025-06-19 ENCOUNTER — RESULTS FOLLOW-UP (OUTPATIENT)
Dept: INTERNAL MEDICINE | Facility: CLINIC | Age: 80
End: 2025-06-19

## 2025-06-19 DIAGNOSIS — R19.5 POSITIVE COLORECTAL CANCER SCREENING USING COLOGUARD TEST: ICD-10-CM

## 2025-06-19 DIAGNOSIS — Z12.11 SCREENING FOR COLON CANCER: Primary | ICD-10-CM

## 2025-06-25 DIAGNOSIS — F51.02 INSOMNIA DUE TO STRESS: ICD-10-CM

## 2025-06-25 DIAGNOSIS — F41.9 ANXIETY: ICD-10-CM

## 2025-06-27 ENCOUNTER — TELEPHONE (OUTPATIENT)
Dept: GASTROENTEROLOGY | Facility: CLINIC | Age: 80
End: 2025-06-27
Payer: MEDICARE

## 2025-06-27 ENCOUNTER — PATIENT MESSAGE (OUTPATIENT)
Dept: GASTROENTEROLOGY | Facility: CLINIC | Age: 80
End: 2025-06-27
Payer: MEDICARE

## 2025-06-27 NOTE — TELEPHONE ENCOUNTER
Returned call to pt, no answer. Msg left.   Scheduled a virtual appt with Ms Lehman on 07/02/25 at 1:00 pm.

## 2025-06-29 ENCOUNTER — PATIENT MESSAGE (OUTPATIENT)
Dept: INTERNAL MEDICINE | Facility: CLINIC | Age: 80
End: 2025-06-29
Payer: MEDICARE

## 2025-06-30 RX ORDER — LORAZEPAM 0.5 MG/1
0.5 TABLET ORAL NIGHTLY PRN
Qty: 15 TABLET | Refills: 0 | Status: SHIPPED | OUTPATIENT
Start: 2025-06-30

## 2025-07-02 ENCOUNTER — PATIENT MESSAGE (OUTPATIENT)
Dept: INTERNAL MEDICINE | Facility: CLINIC | Age: 80
End: 2025-07-02
Payer: MEDICARE

## 2025-07-02 ENCOUNTER — TELEPHONE (OUTPATIENT)
Dept: INTERNAL MEDICINE | Facility: CLINIC | Age: 80
End: 2025-07-02
Payer: MEDICARE

## 2025-07-02 ENCOUNTER — HOSPITAL ENCOUNTER (OUTPATIENT)
Dept: PREADMISSION TESTING | Facility: HOSPITAL | Age: 80
Discharge: HOME | End: 2025-07-02
Attending: INTERNAL MEDICINE | Admitting: INTERNAL MEDICINE
Payer: MEDICARE

## 2025-07-02 ENCOUNTER — E-CONSULT (OUTPATIENT)
Dept: CARDIOLOGY | Facility: CLINIC | Age: 80
End: 2025-07-02
Payer: MEDICARE

## 2025-07-02 DIAGNOSIS — R19.5 POSITIVE COLORECTAL CANCER SCREENING USING COLOGUARD TEST: Primary | ICD-10-CM

## 2025-07-02 DIAGNOSIS — Z01.810 PREOP CARDIOVASCULAR EXAM: Primary | ICD-10-CM

## 2025-07-02 DIAGNOSIS — Z12.11 SCREENING FOR COLON CANCER: ICD-10-CM

## 2025-07-02 RX ORDER — SODIUM, POTASSIUM,MAG SULFATES 17.5-3.13G
1 SOLUTION, RECONSTITUTED, ORAL ORAL DAILY
Qty: 1 KIT | Refills: 0 | Status: SHIPPED | OUTPATIENT
Start: 2025-07-02 | End: 2025-07-04

## 2025-07-02 NOTE — TELEPHONE ENCOUNTER
Copied from CRM #[id number]. Topic: General Inquiry - Patient Advice  >> Jul 2, 2025  1:33 PM Frank wrote:  Type:  Needs Medical Advice    Who Called: Leticia  Symptoms (please be specific):  test results   How long has patient had these symptoms:    Pharmacy name and phone #:    Would the patient rather a call back or a response via [facility]? Call back   Best Call Back Number: [phone]  Additional Information: please give a call back

## 2025-07-03 NOTE — CONSULTS
[facility] [address]  Response for E-Consult     Patient Name: Leticia Grimes  MRN: [id number]  Primary Care Provider: Nikki Marie MD   Requesting Provider: Talya Patel FNP  E-Consult to General Cardiology  Consult performed by: Luis Arthur MD  Consult ordered by: Talya Patel FNP            E consult for preop clearance of colonoscopy  The chart reviewed.  PMH s/p ICD, pulm HTN , HLD  02/25 EKG reviewed by myself today reveals NSR nonspecific STT change    Plan  Elevated periop risk of CV events for non-high risk procedure.  Ok to proceed the scheduled procedure without further cardiac study.    Total time of Consultation: 10 minute    I did not speak to the requesting provider verbally about this.     *This eConsult is based on the clinical data available to me and is furnished without benefit of a physical examination. The eConsult will need to be interpreted in light of any clinical issues or changes in patient status not available to me at the time of filing this eConsults. Significant changes in patient condition or level of acuity should result in immediate formal consultation and reevaluation. Please alert me if you have further questions.    Thank you for this eConsult referral.     Luis Arthur MD  [facility] [address]

## 2025-07-07 ENCOUNTER — PATIENT MESSAGE (OUTPATIENT)
Dept: INTERNAL MEDICINE | Facility: CLINIC | Age: 80
End: 2025-07-07
Payer: MEDICARE

## 2025-07-10 ENCOUNTER — NURSE TRIAGE (OUTPATIENT)
Dept: ADMINISTRATIVE | Facility: CLINIC | Age: 80
End: 2025-07-10
Payer: MEDICARE

## 2025-07-10 DIAGNOSIS — G47.00 INSOMNIA, UNSPECIFIED TYPE: ICD-10-CM

## 2025-07-10 DIAGNOSIS — I10 ESSENTIAL HYPERTENSION: Chronic | ICD-10-CM

## 2025-07-10 RX ORDER — TRAZODONE HYDROCHLORIDE 50 MG/1
TABLET ORAL
Qty: 30 TABLET | Refills: 0 | Status: SHIPPED | OUTPATIENT
Start: 2025-07-10

## 2025-07-10 RX ORDER — CARVEDILOL 12.5 MG/1
12.5 TABLET ORAL 2 TIMES DAILY WITH MEALS
Qty: 180 TABLET | Refills: 0 | Status: SHIPPED | OUTPATIENT
Start: 2025-07-10

## 2025-07-10 NOTE — TELEPHONE ENCOUNTER
Copied from CRM #[id number]. Topic: General Inquiry - Return Call  >> Jul 10, 2025  2:58 PM Rachell wrote:  .Type:  Patient Returning Call    Who Called: nancy   Who Left Message for Patient:  Does the patient know what this is regarding?:  Would the patient rather a call back or a response via [facility]? call  Best Call Back Number: [phone]   Additional Information: please call again    I returned a call back to the pt and there was no answer so, I left her a vm. //kah

## 2025-07-10 NOTE — TELEPHONE ENCOUNTER
Copied from CRM #[id number]. Topic: Medications - Medication Status Check   >> Jul 10, 2025  1:41 PM Ena wrote:  Type:  RX Refill Request    Who Called: Patient  Refill or New Rx:refill  RX Name and Strength:carvediloL (COREG) 12.5 MG tablet  How is the patient currently taking it? (ex. 1XDay):  Is this a 30 day or 90 day RX:  Preferred Pharmacy with phone number:  [facility] [address] [place] [address]  Phone: [phone] Fax: [phone]  Local or Mail Order:local   Ordering Provider:   Would the patient rather a call back or a response via [facility]? Call Back  Best Call Back Number:[phone]  Additional Information: Patient states she contacted [facility] for the refill but there were none left. She states she would like the medication refill approved because she only has 2 pills left.       Type:  RX Refill Request    Who Called: Patient  Refill or New Rx:refill  RX Name and Strength:traZODone (DESYREL) 50 MG tablet  How is the patient currently taking it? (ex. 1XDay):  Is this a 30 day or 90 day RX:  Preferred Pharmacy with phone number:  [facility] [address] [place] [address]  Phone: [phone] Fax: [phone]  Local or Mail Order:local   Ordering Provider:  Would the patient rather a call back or a response via [facility]? Call Back  Best Call Back Number:[phone]  Additional Information:   Patient states she requested this medication on June 30 and is still waiting for prescription to be filled.       I returned a call to the pt and there was no answer . I left a vm . The pt's lv was on 6/03/25. //kah

## 2025-07-10 NOTE — TELEPHONE ENCOUNTER
Copied from CRM #[id number]. Topic: General Inquiry - Return Call  >> Jul 10, 2025  3:47 PM Dayne wrote:  Type:  Patient Returning Call    Who Called: pt    Who Left Message for Patient: Jo Licea MA    Does the patient know what this is regarding?:no    Would the patient rather a call back or a response via [facility]? Call back    Best Call Back Number:Telephone Information:  Mobile          [phone]    Additional Information:     I returned a call back to the pt and the provider actually had me transfer the call to her to speak with the pt. //kah

## 2025-07-10 NOTE — TELEPHONE ENCOUNTER
Patient was calling with questions about her colonoscopy prep times. She has not started the prep yet, has no symptoms and all questions were answered. Instructed to call back with additional questions or worsening of symptoms. Patient verbalized understanding.       Reason for Disposition   Question about upcoming scheduled surgery, procedure or test, no triage required, and triager able to answer question    Protocols used: Information Only Call - No Triage-A-

## 2025-07-11 ENCOUNTER — ANESTHESIA EVENT (OUTPATIENT)
Dept: ENDOSCOPY | Facility: HOSPITAL | Age: 80
End: 2025-07-11
Payer: MEDICARE

## 2025-07-11 ENCOUNTER — ANESTHESIA (OUTPATIENT)
Dept: ENDOSCOPY | Facility: HOSPITAL | Age: 80
End: 2025-07-11
Payer: MEDICARE

## 2025-07-11 ENCOUNTER — HOSPITAL ENCOUNTER (OUTPATIENT)
Dept: ENDOSCOPY | Facility: HOSPITAL | Age: 80
Discharge: HOME | End: 2025-07-11
Attending: FAMILY MEDICINE | Admitting: COLON & RECTAL SURGERY
Payer: MEDICARE

## 2025-07-11 VITALS
WEIGHT: 165 LBS | RESPIRATION RATE: 12 BRPM | TEMPERATURE: 98 F | OXYGEN SATURATION: 96 % | DIASTOLIC BLOOD PRESSURE: 72 MMHG | HEIGHT: 62 IN | SYSTOLIC BLOOD PRESSURE: 124 MMHG | HEART RATE: 93 BPM | BODY MASS INDEX: 30.36 KG/M2

## 2025-07-11 DIAGNOSIS — R19.5 POSITIVE COLORECTAL CANCER SCREENING USING COLOGUARD TEST: ICD-10-CM

## 2025-07-11 PROCEDURE — 27201012 HC FORCEPS, HOT/COLD, DISP: Mod: HCNC | Performed by: COLON & RECTAL SURGERY

## 2025-07-11 PROCEDURE — 45380 COLONOSCOPY AND BIOPSY: CPT | Mod: PT,KX,XS,HCNC | Performed by: COLON & RECTAL SURGERY

## 2025-07-11 PROCEDURE — 88305 TISSUE EXAM BY PATHOLOGIST: CPT | Mod: TC,91,HCNC | Performed by: COLON & RECTAL SURGERY

## 2025-07-11 PROCEDURE — 45385 COLONOSCOPY W/LESION REMOVAL: CPT | Mod: 22,PT,KX,HCNC | Performed by: COLON & RECTAL SURGERY

## 2025-07-11 PROCEDURE — 37000008 HC ANESTHESIA 1ST 15 MINUTES: Mod: HCNC

## 2025-07-11 PROCEDURE — 63600175 PHARM REV CODE 636 W HCPCS: Mod: HCNC | Performed by: NURSE ANESTHETIST, CERTIFIED REGISTERED

## 2025-07-11 PROCEDURE — 27201089 HC SNARE, DISP (ANY): Mod: HCNC | Performed by: COLON & RECTAL SURGERY

## 2025-07-11 PROCEDURE — 37000009 HC ANESTHESIA EA ADD 15 MINS: Mod: HCNC

## 2025-07-11 RX ORDER — LIDOCAINE HYDROCHLORIDE 10 MG/ML
INJECTION, SOLUTION EPIDURAL; INFILTRATION; INTRACAUDAL; PERINEURAL
Status: DISCONTINUED | OUTPATIENT
Start: 2025-07-11 | End: 2025-07-11

## 2025-07-11 RX ORDER — PROPOFOL 10 MG/ML
VIAL (ML) INTRAVENOUS
Status: DISCONTINUED | OUTPATIENT
Start: 2025-07-11 | End: 2025-07-11

## 2025-07-11 RX ADMIN — LIDOCAINE HYDROCHLORIDE 50 MG: 10 SOLUTION INTRAVENOUS at 10:07

## 2025-07-11 RX ADMIN — PROPOFOL 25 MG: 10 INJECTION, EMULSION INTRAVENOUS at 10:07

## 2025-07-11 RX ADMIN — PROPOFOL 50 MG: 10 INJECTION, EMULSION INTRAVENOUS at 10:07

## 2025-07-11 RX ADMIN — GLYCOPYRROLATE 0.4 MG: 0.2 INJECTION, SOLUTION INTRAMUSCULAR; INTRAVENOUS at 10:07

## 2025-07-11 RX ADMIN — PROPOFOL 100 MG: 10 INJECTION, EMULSION INTRAVENOUS at 10:07

## 2025-07-11 NOTE — PLAN OF CARE
Dr Lilly came to bedside and discussed findings. No N/V, no abdominal pain, no GI bleeding and vitals. Pt discharged from unit.

## 2025-07-11 NOTE — ANESTHESIA PREPROCEDURE EVALUATION
07/11/2025  Leticia Grimes is a 79 y.o., female.      Pre-op Assessment    I have reviewed the Patient Summary Reports.    I have reviewed the NPO Status.   I have reviewed the Medications.     Review of Systems  Anesthesia Hx:  No problems with previous Anesthesia                Cardiovascular:    Pacemaker (ICD) Hypertension, well controlled   CAD              ECG has been reviewed.                            Renal/:  Chronic Renal Disease, CKD                Hepatic/GI:  Bowel Prep.   GERD, well controlled                Endocrine:        Obesity / BMI > 30  Psych:   anxiety               Physical Exam  General: Well nourished    Airway:  Mallampati: II   Mouth Opening: Normal  TM Distance: Normal  Tongue: Normal  Neck ROM: Normal ROM    Dental:  Intact    Chest/Lungs:  Normal Respiratory Rate    Heart:  Rate: Normal  Rhythm: Regular Rhythm    Anesthesia Plan  Type of Anesthesia, risks & benefits discussed:    Anesthesia Type: MAC  Intra-op Monitoring Plan: Standard ASA Monitors  Post Op Pain Control Plan: multimodal analgesia  Induction:  IV  Informed Consent: Informed consent signed with the Patient and all parties understand the risks and agree with anesthesia plan.  All questions answered.   ASA Score: 3  Day of Surgery Review of History & Physical: H&P Update referred to the surgeon/provider.    Ready For Surgery From Anesthesia Perspective.   .

## 2025-07-11 NOTE — TRANSFER OF CARE
"Anesthesia Transfer of Care Note    Patient: Leticia Grimes    Procedure(s) Performed: * No procedures listed *    Patient location: GI    Anesthesia Type: MAC    Transport from OR: Transported from OR on room air with adequate spontaneous ventilation    Post pain: adequate analgesia    Post assessment: no apparent anesthetic complications    Post vital signs: stable    Level of consciousness: sedated    Nausea/Vomiting: no nausea/vomiting    Complications: none    Transfer of care protocol was followed      Last vitals: Visit Vitals  BP (!) 144/65   Pulse 73   Temp 36.5 °C (97.7 °F)   Resp 18   Ht 5' 1.5" (1.562 m)   Wt 74.8 kg (165 lb)   SpO2 98%   Breastfeeding No   BMI 30.67 kg/m²     "

## 2025-07-11 NOTE — BRIEF OP NOTE
[facility] ([facility])  Brief Operative Note     SUMMARY     Surgery Date: 7/11/2025     Surgeon: Jose Lilly MD    Pre-op Diagnosis:  Screen for Colon Cancer    Post-op Diagnosis:  Screen for Colon Cancer    Procedure: Colonoscopy    Anesthesia: MAC    Description of the findings of the procedure: polyps removed    Estimated Blood Loss: minimal         Specimens:   Specimen (24h ago, onward)       Start     Ordered    07/11/25 1052  Specimen to Pathology Gastrointestinal tract  RELEASE UPON ORDERING        References:    Click here for ordering Quick Tip   Question:  Release to patient  Answer:  Immediate    07/11/25 9238

## 2025-07-11 NOTE — ANESTHESIA POSTPROCEDURE EVALUATION
Anesthesia Post Evaluation    Patient: Leticia Grimes    Procedure(s) Performed: * No procedures listed *    Final Anesthesia Type: MAC      Patient location during evaluation: GI PACU  Patient participation: Yes- Able to Participate  Level of consciousness: awake and alert  Post-procedure vital signs: reviewed and stable  Pain management: adequate  Airway patency: patent    PONV status at discharge: No PONV  Anesthetic complications: no      Cardiovascular status: blood pressure returned to baseline  Respiratory status: unassisted and spontaneous ventilation  Hydration status: euvolemic  Follow-up not needed.              Vitals Value Taken Time   /72 07/11/25 11:05   Temp 36.7 °C (98 °F) 07/11/25 10:55   Pulse 93 07/11/25 11:05   Resp 12 07/11/25 11:05   SpO2 96 % 07/11/25 11:05         Event Time   Out of Recovery 11:49:58         Pain/Shavon Score: Shavon Score: 10 (7/11/2025 11:05 AM)

## 2025-07-11 NOTE — H&P
[facility] ([facility])  Colon and Rectal Surgery  History & Physical    Patient Name: Leticia Grimes  MRN: [id number]  Admission Date: 7/11/2025  Attending Physician: Jose Lilly MD  Primary Care Provider: Nikki Marie MD    Patient information was obtained from patient and medical records.    Subjective:     Chief Complaint/Reason for Admission: Here for Colonoscopy    History of Present Illness:  Patient is a 79 y.o. female presents for colonoscopy. +Cologuard in June 2025. No current hematochezia or melena. Reports last cscope around 10yrs ago and normal. No fam hx of CRC.    Current Outpatient Medications on File Prior to Encounter   Medication Sig    acetaminophen (TYLENOL) 500 MG tablet Take 2 tablets (1,000 mg total) by mouth every 8 (eight) hours as needed for Pain.    albuterol (PROVENTIL/VENTOLIN HFA) 90 mcg/actuation inhaler Inhale into the lungs.    carvediloL (COREG) 12.5 MG tablet Take 1 tablet (12.5 mg total) by mouth 2 (two) times daily with meals.    hydroCHLOROthiazide 12.5 MG Tab Take 1 tablet (12.5 mg total) by mouth once daily.    lactulose (CHRONULAC) 20 gram/30 mL Soln Take 15 mLs (10 g total) by mouth 2 (two) times daily as needed (constipation).    LORazepam (ATIVAN) 0.5 MG tablet Take 1 tablet (0.5 mg total) by mouth nightly as needed.    mv-mn/folic/lutein/herbal 293 (ALIVE WOMEN'S 50 PLUS ORAL) Take 1 tablet by mouth once daily.    olmesartan (BENICAR) 40 MG tablet Take 1 tablet (40 mg total) by mouth once daily.    pantoprazole (PROTONIX) 40 MG tablet 1 tablet Orally Once a day for 90 days    rosuvastatin (CRESTOR) 10 MG tablet Take 1 tablet (10 mg total) by mouth once daily.    traZODone (DESYREL) 50 MG tablet 1 to 2 tabs po qhs prn sleep    cloNIDine (CATAPRES) 0.1 MG tablet Take 1 tablet (0.1 mg total) by mouth every 6 (six) hours as needed (if SBP > 150 mmHG).    diclofenac (VOLTAREN) 75 MG EC tablet Take 75 mg by mouth 2 (two) times daily.    ergocalciferol,  vitamin D2, (VITAMIN D ORAL) Take by mouth.    gabapentin (NEURONTIN) 300 MG capsule Take 1 capsule (300 mg total) by mouth 3 (three) times daily.    tiZANidine (ZANAFLEX) 4 MG tablet Take 1/2 to 1 tab BID PRN muscle spasms. May cause drowsiness.    traMADoL (ULTRAM) 50 mg tablet      No current facility-administered medications on file prior to encounter.       Review of patient's allergies indicates:   Allergen Reactions    Codeine     Morphine Other (See Comments)       Past Medical History:   Diagnosis Date    Adjustment insomnia 2020    Anxiety 2020    GERD (gastroesophageal reflux disease)     History of sudden cardiac arrest successfully resuscitated 2020    Hypertension     ICD (implantable cardioverter-defibrillator) in place 2020    Left arm swelling 9/3/2020    Mild vitamin D deficiency 2013    Osteopenia 2020    Vitamin D deficiency disease      Past Surgical History:   Procedure Laterality Date     SECTION, CLASSIC      EPIDURAL STEROID INJECTION INTO CERVICAL SPINE N/A 2024    Procedure: Cervical C6/7 Interlaminar OMAR;  Surgeon: Main Mcgraw MD;  Location: [facility] PAIN [facility]GT;  Service: Pain Management;  Laterality: N/A;    HYSTERECTOMY      INJECTION OF JOINT Left 2021    Procedure: Left shoulder Joint injection;  Surgeon: Main Mcgraw MD;  Location: [facility] PAIN [facility]GT;  Service: Pain Management;  Laterality: Left;    INSERTION OF BIVENTRICULAR IMPLANTABLE CARDIOVERTER-DEFIBRILLATOR (ICD)      REVERSE TOTAL SHOULDER ARTHROPLASTY Left 2022    Procedure: ARTHROPLASTY, SHOULDER, TOTAL, REVERSE;  Surgeon: Tomas Taylor MD;  Location: [facility] OR;  Service: Orthopedics;  Laterality: Left;    TUBAL LIGATION       Family History       Problem Relation (Age of Onset)    Arthritis Mother    Cancer Sister    Diabetes Father    Heart disease Father    Hypertension Mother          Tobacco Use    Smoking status: Never     Passive exposure: Never     Smokeless tobacco: Never   Vaping Use    Vaping status: Never Used   Substance and Sexual Activity    Alcohol use: Yes     Comment: rare    Drug use: Never    Sexual activity: Not Currently     Partners: Male     Birth control/protection: Abstinence, None     Review of Systems   Constitutional:  Negative for activity change, appetite change, chills, fatigue, fever and unexpected weight change.   HENT:  Negative for congestion, ear pain, sore throat and trouble swallowing.    Eyes:  Negative for pain, redness and itching.   Respiratory:  Negative for cough, shortness of breath and wheezing.    Cardiovascular:  Negative for chest pain, palpitations and leg swelling.   Gastrointestinal:  Negative for abdominal distention, abdominal pain, anal bleeding, blood in stool, constipation, diarrhea, nausea, rectal pain and vomiting.   Endocrine: Negative for cold intolerance, heat intolerance and polyuria.   Genitourinary:  Negative for dysuria, flank pain, frequency and hematuria.   Musculoskeletal:  Negative for gait problem, joint swelling and neck pain.   Skin:  Negative for color change, rash and wound.   Allergic/Immunologic: Negative for environmental allergies and immunocompromised state.   Neurological:  Negative for dizziness, speech difficulty, weakness and numbness.   Psychiatric/Behavioral:  Negative for agitation, confusion and hallucinations.      Objective:     Vital Signs (Most Recent):  Temp: 97.7 °F (36.5 °C) (07/11/25 0846)  Pulse: 73 (07/11/25 0846)  Resp: 18 (07/11/25 0846)  BP: (!) 144/65 (07/11/25 0846)  SpO2: 98 % (07/11/25 0846) Vital Signs (24h Range):  Temp:  [97.7 °F (36.5 °C)] 97.7 °F (36.5 °C)  Pulse:  [73] 73  Resp:  [18] 18  SpO2:  [98 %] 98 %  BP: (144)/(65) 144/65     Weight: 74.8 kg (165 lb)  Body mass index is 30.67 kg/m².    Physical Exam  Constitutional:       Appearance: She is well-developed.   HENT:      Head: Normocephalic and atraumatic.   Eyes:      Conjunctiva/sclera: Conjunctivae  normal.   Neck:      Thyroid: No thyromegaly.   Cardiovascular:      Rate and Rhythm: Normal rate and regular rhythm.   Pulmonary:      Effort: Pulmonary effort is normal. No respiratory distress.   Abdominal:      General: There is no distension.      Palpations: Abdomen is soft. There is no mass.      Tenderness: There is no abdominal tenderness.   Musculoskeletal:         General: No tenderness. Normal range of motion.      Cervical back: Normal range of motion.   Skin:     General: Skin is warm and dry.      Capillary Refill: Capillary refill takes less than 2 seconds.      Findings: No rash.   Neurological:      Mental Status: She is alert and oriented to person, place, and time.       Assessment/Plan:     Patient is a 79 y.o. female who presents for colonoscopy     - Ok to proceed to endoscopy suite for colonoscopy  - Consent obtained. All risks, benefits and alternatives fully explained to patient, including but not limited to bleeding, infection, perforation, and missed polyps. All questions appropriately answered to patient's satisfaction. Consent signed and placed on chart.    There are no hospital problems to display for this patient.    VTE Risk Mitigation (From admission, onward)      None            Jose Lilly MD  Colon and Rectal Surgery  [facility] ([facility])

## 2025-07-13 ENCOUNTER — PATIENT MESSAGE (OUTPATIENT)
Dept: ADMINISTRATIVE | Facility: OTHER | Age: 80
End: 2025-07-13
Payer: MEDICARE

## 2025-07-13 ENCOUNTER — PATIENT MESSAGE (OUTPATIENT)
Dept: ADMINISTRATIVE | Facility: HOSPITAL | Age: 80
End: 2025-07-13
Payer: MEDICARE

## 2025-07-15 LAB
ESTROGEN SERPL-MCNC: NORMAL PG/ML
INSULIN SERPL-ACNC: NORMAL U[IU]/ML
LAB AP CLINICAL INFORMATION: NORMAL
LAB AP GROSS DESCRIPTION: NORMAL
LAB AP PERFORMING LOCATION(S): NORMAL
LAB AP REPORT FOOTNOTES: NORMAL

## 2025-07-22 ENCOUNTER — TELEPHONE (OUTPATIENT)
Dept: PSYCHIATRY | Facility: CLINIC | Age: 80
End: 2025-07-22
Payer: MEDICARE

## 2025-07-22 NOTE — TELEPHONE ENCOUNTER
Copied from CRM #[id number]. Topic: General Inquiry - Patient Advice  >> Jul 22, 2025 10:33 AM Shahana wrote:  Type:  Needs Medical Advice    Who Called: Leticia Grimes  Would the patient rather a call back or a response via [facility]? Call back  Best Call Back Number: [phone]  Additional Information: the patient was calling to see if she can speak with some one from Dr Rakesh Alexis office.

## 2025-07-29 ENCOUNTER — PATIENT MESSAGE (OUTPATIENT)
Dept: ADMINISTRATIVE | Facility: HOSPITAL | Age: 80
End: 2025-07-29
Payer: MEDICARE

## 2025-07-29 ENCOUNTER — APPOINTMENT (OUTPATIENT)
Dept: RADIOLOGY | Facility: HOSPITAL | Age: 80
End: 2025-07-29
Attending: FAMILY MEDICINE
Payer: MEDICARE

## 2025-07-29 VITALS — SYSTOLIC BLOOD PRESSURE: 149 MMHG | DIASTOLIC BLOOD PRESSURE: 77 MMHG

## 2025-07-29 DIAGNOSIS — Z78.0 MENOPAUSE: ICD-10-CM

## 2025-07-29 PROCEDURE — 77080 DXA BONE DENSITY AXIAL: CPT | Mod: TC,HCNC

## 2025-07-29 PROCEDURE — 77080 DXA BONE DENSITY AXIAL: CPT | Mod: 26,HCNC,, | Performed by: STUDENT IN AN ORGANIZED HEALTH CARE EDUCATION/TRAINING PROGRAM

## 2025-07-30 ENCOUNTER — PATIENT OUTREACH (OUTPATIENT)
Dept: ADMINISTRATIVE | Facility: HOSPITAL | Age: 80
End: 2025-07-30
Payer: MEDICARE

## 2025-08-01 ENCOUNTER — TELEPHONE (OUTPATIENT)
Dept: PAIN MEDICINE | Facility: CLINIC | Age: 80
End: 2025-08-01
Payer: MEDICARE

## 2025-08-01 ENCOUNTER — OFFICE VISIT (OUTPATIENT)
Dept: PAIN MEDICINE | Facility: CLINIC | Age: 80
End: 2025-08-01
Payer: MEDICARE

## 2025-08-01 ENCOUNTER — PATIENT MESSAGE (OUTPATIENT)
Dept: PAIN MEDICINE | Facility: CLINIC | Age: 80
End: 2025-08-01

## 2025-08-01 VITALS
BODY MASS INDEX: 29.7 KG/M2 | HEIGHT: 62 IN | HEART RATE: 66 BPM | SYSTOLIC BLOOD PRESSURE: 127 MMHG | DIASTOLIC BLOOD PRESSURE: 77 MMHG | WEIGHT: 161.38 LBS | RESPIRATION RATE: 17 BRPM

## 2025-08-01 DIAGNOSIS — M51.34 DDD (DEGENERATIVE DISC DISEASE), THORACIC: ICD-10-CM

## 2025-08-01 DIAGNOSIS — M47.816 LUMBAR SPONDYLOSIS: ICD-10-CM

## 2025-08-01 DIAGNOSIS — M50.30 DDD (DEGENERATIVE DISC DISEASE), CERVICAL: ICD-10-CM

## 2025-08-01 DIAGNOSIS — M54.13 RADICULOPATHY, CERVICOTHORACIC REGION: ICD-10-CM

## 2025-08-01 DIAGNOSIS — M54.2 CERVICALGIA: ICD-10-CM

## 2025-08-01 DIAGNOSIS — Z76.89 ESTABLISHING CARE WITH NEW DOCTOR, ENCOUNTER FOR: ICD-10-CM

## 2025-08-01 DIAGNOSIS — M46.1 SACROILIITIS: Primary | ICD-10-CM

## 2025-08-01 DIAGNOSIS — R42 ORTHOSTATIC DIZZINESS: ICD-10-CM

## 2025-08-01 PROCEDURE — 99999 PR PBB SHADOW E&M-EST. PATIENT-LVL IV: CPT | Mod: PBBFAC,HCNC,, | Performed by: PHYSICIAN ASSISTANT

## 2025-08-01 RX ORDER — KETOROLAC TROMETHAMINE 30 MG/ML
30 INJECTION, SOLUTION INTRAMUSCULAR; INTRAVENOUS
Status: COMPLETED | OUTPATIENT
Start: 2025-08-01 | End: 2025-08-01

## 2025-08-01 RX ADMIN — KETOROLAC TROMETHAMINE 30 MG: 30 INJECTION, SOLUTION INTRAMUSCULAR; INTRAVENOUS at 10:08

## 2025-08-01 NOTE — PROGRESS NOTES
"Chronic Pain-Established Note- Follow Up Visit    PCP: Dr. Elmo Kemp    Chief Complaint   Patient presents with    Shoulder Pain     Patient has pain in left Shoulder and lower back left side (pain goes all the way down left side from shoulder to foot), patient has pain in right shoulder/arm that radiates into hand.  Pain level 8/10       SUBJECTIVE:      Interval History (8/1/2025):  Leticia Grimes presents today for follow-up visit.  Patient was last seen on 1/7/2025. At that visit, the plan was to MDP, however patient unable to take medication. She repots having a reaction /side effect. Patient reports pain as 8/10 today. Patient states she cannot sleep due to the pain. She localizes pain to left lower back down to the foot. She also has pain in both shoulders and right hand. She reports the pain starts in both shoulder blades.    Patient has been using heat and massager with no relief.  She denies any recent falls but reports she is not steady.    Interval History (1/7/2025): Leticia Grimes presents today for follow-up visit.  Patient was last seen on 12/13/2024. At that visit, the plan was to complete CT of C/T spine. Patient reports pain as 7/10 today. She localizes pain in Right shoulder and throughout R upper extremity.       Interval History (12/13/2024): Leticia Grimes presents today for follow-up visit.  she underwent Cervical C6/7 Interlaminar OMAR  on 11/19/24.  The patient reports that she is/was better following the procedure.  she reports 50% pain relief.  The changes lasted 2 weeks or so.   Patient reports pain as "0/10 today.  She has pain every night with the R hand/wrist. In the morning it is worse. Her finger tips are throbbing and ache.     Patient states she did well until 1-2 weeks later. She started having more pain on the left shoulder and also Left mid and lower back.   Patient also reports she has soreness after having a BM. She denies any significant changes to her diet. She " utilizes preparation H which does help.       Interval History (11/1/2024):   Leticia Grimes presents today for follow-up visit.  Patient was last seen on 7/12/2024.She presents with bilateral neck pain as well as pain affecting her left shoulder. She also reports weakness in R hand.  Patient reports pain as 7/10 today.  Patient reports that she has been utilizing Robaxin and meloxicam but does not find much relief. She does, however, find some improvement after taking Gabapentin 300 mg TID.     Interval History (7/12/2024):  Leticia Grimes presents today for follow-up visit.  Patient was last seen on 2/8/2024. Patient reports pain as 2/10 today. She requests refill(s) on Gabapentin only. Reports seeing Dr. Capellan since her last office visit. He performed lumbar OMAR and prescribed Robaxin 750 mg Q8H, Mobic 15 mg QD and 50 mg Tramadol QD prn.     Interval History (2/8/2024):  Patient  presents today for follow-up visit.  Patient was last seen on 8/15/2023. At that visit, the plan was to start Lyrica and referral placed to urology due to abnormal US.  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 It was recommended that she follow up in a year with repeat US.   She reports issues all on the left side. She was told she needed to see neurosurgery and was scheduled initially in December but then her appointment was pushed back to end of February.   Whenever night time arrives, she cannot sleep due to being uncomfortable. She has tried several remedies, including Vapor rub and socks, with no relief.   Patient is interested in restarting Gabapentin again. Patient reports she has not taken this since last year.  Localizes her pain to L scapula --> buttocks--> upper thigh and foot. Also c/o burning and stabbing pain in feet. Patient reports pain as 3/10 today.   She is s/p L shoulder replacement  with Dr. Taylor from 5/5/22.    Interval History (8/1/2025):  Leticia Grimes presents today for follow-up visit via telemed for ultrasound review.  Patient was last seen on 4/20/2023.   Ultrasound retroperitoneal kidney      Interval History (7/17/2023):  Leticia Grimes presents today for follow-up visit for CT review.  Patient was last seen on 04/20/2023. She continues to report  She reports  lower back pain with radiation into her left lower extremity along the lateral and posterior aspect extending into the left calf. Taking Gabapentin 400 mg nightly with limited relief. Patient reports pain as 7/10 today.    CT lumbar spine      Interval History (4/20/2023):  Leticia Grimes presents today via telemed for follow-up visit.  Patient was last seen on 1/4/2023. Referred back to our clinic by Dr. Leonard for lumbar radiculopathy. Patient reports pain as 6/10 today. She has completed over 6 weeks of outpatient rehab from 11/15/2022 to 1/17/2023. She has continued with a home exercise program at home with limited relief. She reports pain is worsened with laying down, improved with heat. She reports continued lower back pain with radiation into her left lower extremity along the lateral and posterior aspect extending into the left calf. Taking Gabapentin 400 mg nightly with limited relief.    She also reports continued pain in left shoulder/shoulder blade. History of previous left shoulder total reversal 05/2022. She reports some pain in the left shoulder and some stiffness and limitations to ROM. Her thoracic pain is worsened with moving her upper extremities.     Interval History (01/04/2022):  Leticia Grimes presents today for follow-up visit.  Patient was last seen on 04/15/2021. She was referred back to our clinic by Neurology. She reports her primary complaint is thoracic back pain between her shoulder blades along her bra line. She reports this began about 1 month ago, but has been worsening over the past  week. She reports increased frequency of spasms that interrupt her sleep. She has been taking tizanidine, using a heating pad, and began physical therapy, with limited relief. History of left shoulder total reversal 05/2022. She reports some pain in the left shoulder and some stiffness and limitations to ROM. Her thoracic pain is worsened with moving her upper extremities. She also reports low back pain in a band like distribution across the lumbosacral region moreso on the left side that began 1 month ago. She reports at first it stopped above the knee, but is now intermittently radiating into her left leg into her foot. This pain is worsened by laying on her left side. She reports this has somewhat improved with massage, heat, and physical therapy. She was recently prescribed Baclofen by Dr. Leonard for muscle spasms to replace the Tizanidine, but she has not yet started this medication. Patient reports pain as 6/10 today.      Interval HPI 04/15/2021  Leticia Grimes presents to tele-medicine appointment for chief complaint bilateral hand pain.  This is a new complaint as we previously treated her left shoulder pain with glenohumeral joint injection out was successful.  She locates this neuropathic pain to the bilateral hands, right worse than left.  She also reports having some neck pain.  She has had previous epidurals that did provide relief of these symptoms.  Since the last visit, Leticia Grimes states the pain has been improving. Current pain intensity is 6/10.    Patient denies night fever/night sweats, urinary incontinence, bowel incontinence, significant weight loss, significant motor weakness and loss of sensations.    Interval HPI 04/09/2021:  Leticia Grimes presents to tele-medicine appointment for a follow-up appointment for left shoulder pain.  She was last seen for procedure on 03/02/2021 and underwent left-sided glenohumeral joint injection.  She reports that this resulted in 95% relief.  Since  the last visit, Leticia Grimes states the pain has been improving. Current pain intensity is 0/10.    Initial HPI 02/01/2021:  Leticia Grimes is a 75 y.o. female who presents to the clinic for the evaluation of left shoulder pain.  She was referred by the Orthopedics Department for further evaluation and management of this pain.  She has past medical history of anxiety, hypertension, status post ICD, hyperlipidemia, GERD, osteopenia, multiple other medical comorbidities as listed in her chart.  The pain started several years ago without any inciting event or trauma and symptoms have been worsening.The pain is located in the left cervical myofascial area and radiates to the left upper extremity.  She reports that the left shoulder source of the pain however.  The pain is described as Aching, numbness, tingling, squeezing, tightness and is rated as 5/10. The pain is rated with a score of  4/10 on the BEST day and a score of 9/10 on the WORST day.  Symptoms interfere with daily activity. The pain is exacerbated by sleeping on her left side, movement of the left shoulder.  The pain is mitigated by sitting, medications, heat. The patient reports spending 2-4 hours per day reclining. The patient reports 5-7 hours of uninterrupted sleep per night.       Non-Pharmacologic Treatments:  Physical Therapy/Home Exercise: yes  Ice/Heat:yes  TENS: no  Acupuncture: no  Massage: no  Chiropractic: no    Other: no        Pain Medications:  - Opioids: None  - Adjuvant Medications: Lorazepam, Lyrica, Diazepam, Baclofen  - Anti-Coagulants: Aspirin     Pain Procedures:   -previous cervical epidural steroid injection, moderate relief  - 03/02/2021:  Left glenohumeral joint injection, 95% relief   -11/19/2024:  Cervical C6/7 IL OMAR with 50% relief     Imaging (Reviewed on 8/1/2025):      CT thoracic / cervical Spine  12/30/2024          CT Lumbar spine 11/16/2023  EXAMINATION:  CT LUMBAR SPINE WITHOUT CONTRAST     CLINICAL HISTORY:  Low  back pain, symptoms persist with > 6wks conservative treatment;  Dorsalgia, unspecified     TECHNIQUE:  Low-dose axial, sagittal and coronal reformations are obtained through the lumbar spine.  Contrast was not administered.     COMPARISON:  CT abdomen and pelvis dated 09/06/2023     FINDINGS:  Alignment: There is grade 1 anterolisthesis of L4 on L5.     Vertebrae: Normal heights.  No fracture.     Posterior elements: No fractures. Facet articulations appear within normal limits.     Discs: Mild disc height loss at L4-5.     Degenerative findings:     T11-12: Minimal generalized disc bulge.  No definite spinal canal or neuroforaminal stenosis.     T12-L1:  No definite spinal canal or neuroforaminal stenosis.     L1-L2:  No definite spinal canal or neuroforaminal stenosis.     L2-L3:  No definite spinal canal or neuroforaminal stenosis.     L3-L4:  No definite spinal canal or neuroforaminal stenosis.     L4-L5: Severe bilateral facet arthropathy.  Uncovering of the intervertebral disc and mild thickening of the ligamentum flavum.  Suspect at least mild spinal canal stenosis with mild-to-moderate bilateral neuroforaminal narrowing.     L5-S1: Moderate bilateral facet arthropathy.  Small posterior disc bulge. No definite spinal canal or neuroforaminal stenosis.     Paraspinal muscles & soft tissues: There multiple bilateral renal cysts present, better characterized on previous CT.     Miscellaneous: There is suggestion of subarticular erosive changes present at the bilateral SI joints, raising concern for sacroiliitis.     Impression:     1. Grade 1 anterolisthesis of L4 on L5 secondary to facet arthropathy.  There is at least mild suspected associated spinal canal stenosis with mild-to-moderate bilateral neural foraminal narrowing at this level.  2. Other mild degenerative findings as above  3. Findings suggesting bilateral sacroiliitis.       X-ray left shoulder 10/12/2020:  No fracture or dislocation.  Prominent  glenohumeral degenerative findings present including complete joint space loss and large inferior osteophyte formation.  Mild AC joint degenerative changes.  Lung parenchyma clear.        X-ray cervical spine 12/16/2015:  No fracture or dislocation is demonstrated.  At the C5-6 level there   is some mild disc space narrowing with associated spurring.  Uncovertebral   joint spurring results in some moderate encroachment upon the neuroforamina   bilaterally at this level The odontoid appears intact and in good alignment.         PMHx,PSHx, Social history, and Family history:  I have reviewed the patient's medical, surgical, social, and family history in detail and updated the computerized patient record.        Review of patient's allergies indicates:   Allergen Reactions    Codeine     Morphine Other (See Comments)       Current Outpatient Medications   Medication Sig    acetaminophen (TYLENOL) 500 MG tablet Take 2 tablets (1,000 mg total) by mouth every 8 (eight) hours as needed for Pain.    albuterol (PROVENTIL/VENTOLIN HFA) 90 mcg/actuation inhaler Inhale into the lungs.    carvediloL (COREG) 12.5 MG tablet Take 1 tablet (12.5 mg total) by mouth 2 (two) times daily with meals.    cloNIDine (CATAPRES) 0.1 MG tablet Take 1 tablet (0.1 mg total) by mouth every 6 (six) hours as needed (if SBP > 150 mmHG).    ergocalciferol, vitamin D2, (VITAMIN D ORAL) Take by mouth.    hydroCHLOROthiazide 12.5 MG Tab Take 1 tablet (12.5 mg total) by mouth once daily.    lactulose (CHRONULAC) 20 gram/30 mL Soln Take 15 mLs (10 g total) by mouth 2 (two) times daily as needed (constipation).    LORazepam (ATIVAN) 0.5 MG tablet Take 1 tablet (0.5 mg total) by mouth nightly as needed.    mv-mn/folic/lutein/herbal 293 (ALIVE WOMEN'S 50 PLUS ORAL) Take 1 tablet by mouth once daily.    olmesartan (BENICAR) 40 MG tablet Take 1 tablet (40 mg total) by mouth once daily.    pantoprazole (PROTONIX) 40 MG tablet 1 tablet Orally Once a day for 90  "days    rosuvastatin (CRESTOR) 10 MG tablet Take 1 tablet (10 mg total) by mouth once daily.    traMADoL (ULTRAM) 50 mg tablet     traZODone (DESYREL) 50 MG tablet 1 to 2 tabs po qhs prn sleep    gabapentin (NEURONTIN) 300 MG capsule Take 1 capsule (300 mg total) by mouth 3 (three) times daily.     No current facility-administered medications for this visit.     REVIEW OF SYSTEMS:    GENERAL:  No weight loss, malaise or fevers.  HEENT:   No recent changes in vision or hearing  NECK:  Negative for lumps, no difficulty with swallowing.  RESPIRATORY:  Negative for cough, wheezing or shortness of breath, patient denies any recent URI.  CARDIOVASCULAR:  Negative for chest pain, leg swelling or palpitations.  GI:  Negative for abdominal discomfort, blood in stools or black stools or change in bowel habits.  MUSCULOSKELETAL:  See HPI.  SKIN:  Negative for lesions, rash, and itching.  PSYCH:  No mood disorder or recent psychosocial stressors.  Patients sleep is not disturbed secondary to pain.  HEMATOLOGY/LYMPHOLOGY:  Negative for prolonged bleeding, bruising easily or swollen nodes.  Patient is currently taking anti-coagulants - ASA  NEURO:   No history of headaches, syncope, paralysis, seizures or tremors.  All other reviewed and negative other than HPI.    OBJECTIVE:    Vitals:    08/01/25 0845   BP: 127/77   Pulse: 66   Resp: 17   Weight: 73.2 kg (161 lb 6 oz)   Height: 5' 1.5" (1.562 m)     Body mass index is 30 kg/m².   (reviewed on 8/1/2025)       GENERAL: Well appearing, in no acute distress, alert and oriented x3.    PSYCH:  Mood and affect appropriate.  SKIN: Skin color, texture, turgor normal, no rashes or lesions.  HEAD/FACE:  Normocephalic, atraumatic. Cranial nerves grossly intact.  PULM:  No difficulty breathing     Neuro/MSK:  NECK:   Musculoskeletal - Cervical Spine:  - Pain on flexion of cervical spine: Present   - Pain on extension of cervical spine: Present   - Cervical facet loading: Present   -TTP " Paraspinal cervical cheng: right    Neuro - Upper Extremities:  - BUE Strength:R/L: D: 5/5; B: 5/5; T: 5/5; WF: 5/5; WE: 5/5; IO: 5/5  - Extremity Reflexes: Brisk and symmetric throughout  - Sensory: Sensation to light touch intact bilaterally    EXTREMITIES: No deformities, edema, or skin discoloration.       MUSCULOSKELETAL:    Shoulder:  - Pain on abduction: Present  - ROM:  Decreased secondary to pain  - TTP over the AC and GH joint: Present  - Neer's: Positive   - Hawkin's: Positive     Patient had a left shoulder replacement in 2021.     SIJ testing:  - TTP over SI joint: Present  - Zac's/ Genaro's: Positive    - Sacroiliac Distraction Test (anterior pressure): Positive  - Sacroiliac Compression Test (lateral pressure): Positive         ASSESSMENT: 79 y.o. year old female with left shoulder pain, consistent with     1. Sacroiliitis  ketorolac injection 30 mg    Case Request-RAD/Other Procedure Area: Bilateral SACROILIAC JOINT Injections      2. Establishing care with new doctor, encounter for  Ambulatory referral/consult to Internal Medicine      3. Lumbar spondylosis  ketorolac injection 30 mg    Case Request-RAD/Other Procedure Area: Bilateral SACROILIAC JOINT Injections      4. Cervicalgia  Case Request-RAD/Other Procedure Area: C7/T1 IL C-OMAR      5. DDD (degenerative disc disease), cervical  Case Request-RAD/Other Procedure Area: C7/T1 IL C-OMAR      6. DDD (degenerative disc disease), thoracic  Case Request-RAD/Other Procedure Area: C7/T1 IL C-OMAR      7. Radiculopathy, cervicothoracic region  Case Request-RAD/Other Procedure Area: C7/T1 IL C-OMAR              PLAN:   - Interventions: Schedule patient for bilateral SI Joint injections for therapeutic relief and then a few weeks later, Cervical C7/T1 IL Epidural Steroid Injection.     Explained the risks and benefits of the procedure in detail with the patient today in clinic along with alternative treatment options, and the patient elected to pursue the  intervention.            - S/p Cervical C6/7 Interlaminar OMAR on 11/19/2024 with 50% relief        - Anticoagulation use: no, not currently taking Aspirin.    - Medications: I have stressed the importance of physical activity and a home exercise plan to help with pain and improve health.  Patient can continue with medications for now since they are providing benefits, using them appropriately, and without side effects.      -- Previously tried Zanaflex, Baclofen and Robaxin.     -- Continue Gabapentin 300 mg TID.        -- Previously tried Lyrica (dc'd due to side effects).    - Can continue Meloxicam 15 mg daily per Dr. Capellan    - Procedure note: An IM injection of (ketolorac 30mg/1mL) was administered during clinic visit by our medical assistant.  This was well tolerated.         report:  Reviewed and consistent with medication use as prescribed.      - Therapy:  Advised patient continue with activities and exercises as tolerated.    - Imaging: Previous imaging studies reviewed.      - Consults/Referrals:  Patient is interested in changing PCP due to location. Referral placed for [facility] location.      - Follow up visit:  return to clinic 4 weeks after all intervention, extended visit.     - Counseled patient regarding the importance of activity modification and physical therapy    - This condition does not require this patient to take time off of work, and the primary goal of our Pain Management services is to improve the patient's functional capacity.     - Patient Questions: Answered all of the patient's questions regarding diagnosis, therapy, and treatment    The above plan and management options were discussed at length with patient. Patient is in agreement with the above and verbalized understanding.    Visit today included increased complexity associated with the care of the episodic problem of chronic pain which was addressed and continue to manage the longitudinal care of the patient due to the  serious and/or complex managed problem(s) listed above.     Maria Alejandra Steve PA-C  Interventional Pain Management  [facility]

## 2025-08-01 NOTE — TELEPHONE ENCOUNTER
Copied from CRM #[id number]. Topic: Appointments - Appointment Access  >> Aug 1, 2025  1:45 PM Donavan wrote:  Patient needs first available appointment due to surgery scheduling. Please call patient at [phone] ([phone]  . Thanks KB

## 2025-08-02 ENCOUNTER — NURSE TRIAGE (OUTPATIENT)
Dept: ADMINISTRATIVE | Facility: CLINIC | Age: 80
End: 2025-08-02
Payer: MEDICARE

## 2025-08-02 RX ORDER — OLMESARTAN MEDOXOMIL 40 MG/1
40 TABLET ORAL DAILY
Qty: 30 TABLET | Refills: 0 | Status: SHIPPED | OUTPATIENT
Start: 2025-08-02 | End: 2025-08-04

## 2025-08-02 NOTE — TELEPHONE ENCOUNTER
Refill Routing Note   Medication(s) are not appropriate for processing by [facility] for the following reason(s):        Non-participating provider    [facility] action(s):  Route               Appointments  past 12m or future 3m with PCP    Date Provider   Last Visit   6/3/2025 Nikki Marie MD   Next Visit   8/1/2025 Nikki Marie MD   ED visits in past 90 days: 0        Note composed:12:33 PM 08/02/2025

## 2025-08-02 NOTE — TELEPHONE ENCOUNTER
Speaking with pt who is requesting refill for olmesartan 40 mg. States that she ran out of medication 2 days ago. Has been attempting to reach office but with no success.    Spoke with Dr. Alvarado who approved 30 days supply of olmesartan 40mg 0 refills    Spoke with  Heavenly(pharmacist) at [facility] pharmacy to call in prescription     Reason for Disposition   [1] Prescription refill request for ESSENTIAL medicine (i.e., likelihood of harm to patient if not taken) AND [2] triager unable to refill per department policy    Protocols used: Medication Refill and Renewal Call-A-AH

## 2025-08-04 ENCOUNTER — TELEPHONE (OUTPATIENT)
Dept: INTERNAL MEDICINE | Facility: CLINIC | Age: 80
End: 2025-08-04
Payer: MEDICARE

## 2025-08-04 RX ORDER — OLMESARTAN MEDOXOMIL 40 MG/1
40 TABLET ORAL DAILY
Qty: 30 TABLET | Refills: 0 | Status: SHIPPED | OUTPATIENT
Start: 2025-08-04 | End: 2026-08-04

## 2025-08-04 NOTE — TELEPHONE ENCOUNTER
Spoke with pt and she stated that she would call back to make an appointment. PCP did not want to send in any medications until pt had some new labs done.

## 2025-08-04 NOTE — TELEPHONE ENCOUNTER
----- Message from Nikki Marie MD sent at 8/4/2025  5:13 AM CDT -----  Regarding: Medication Refill  She needs new labs before she can get additional refills on her blood pressure medications.

## 2025-08-06 ENCOUNTER — OFFICE VISIT (OUTPATIENT)
Dept: GASTROENTEROLOGY | Facility: CLINIC | Age: 80
End: 2025-08-06
Payer: MEDICARE

## 2025-08-06 ENCOUNTER — HOSPITAL ENCOUNTER (OUTPATIENT)
Dept: RADIOLOGY | Facility: HOSPITAL | Age: 80
Discharge: HOME | End: 2025-08-06
Attending: PHYSICIAN ASSISTANT
Payer: MEDICARE

## 2025-08-06 VITALS
DIASTOLIC BLOOD PRESSURE: 62 MMHG | SYSTOLIC BLOOD PRESSURE: 140 MMHG | HEIGHT: 62 IN | HEART RATE: 64 BPM | BODY MASS INDEX: 29.49 KG/M2 | WEIGHT: 160.25 LBS

## 2025-08-06 DIAGNOSIS — R10.12 LEFT UPPER QUADRANT PAIN: Primary | ICD-10-CM

## 2025-08-06 DIAGNOSIS — K59.00 CONSTIPATION, UNSPECIFIED CONSTIPATION TYPE: ICD-10-CM

## 2025-08-06 DIAGNOSIS — R19.8 IRREGULAR BOWEL HABITS: ICD-10-CM

## 2025-08-06 DIAGNOSIS — E78.2 MIXED HYPERLIPIDEMIA: Chronic | ICD-10-CM

## 2025-08-06 DIAGNOSIS — R10.9 ABDOMINAL PAIN, UNSPECIFIED ABDOMINAL LOCATION: ICD-10-CM

## 2025-08-06 DIAGNOSIS — R12 HEARTBURN: ICD-10-CM

## 2025-08-06 DIAGNOSIS — R10.10 PAIN OF UPPER ABDOMEN: ICD-10-CM

## 2025-08-06 PROCEDURE — 74019 RADEX ABDOMEN 2 VIEWS: CPT | Mod: 26,HCNC,, | Performed by: RADIOLOGY

## 2025-08-06 PROCEDURE — 74019 RADEX ABDOMEN 2 VIEWS: CPT | Mod: TC,HCNC

## 2025-08-06 PROCEDURE — 1101F PT FALLS ASSESS-DOCD LE1/YR: CPT | Mod: CPTII,S$GLB,, | Performed by: PHYSICIAN ASSISTANT

## 2025-08-06 PROCEDURE — 1125F AMNT PAIN NOTED PAIN PRSNT: CPT | Mod: CPTII,S$GLB,, | Performed by: PHYSICIAN ASSISTANT

## 2025-08-06 PROCEDURE — 3288F FALL RISK ASSESSMENT DOCD: CPT | Mod: CPTII,S$GLB,, | Performed by: PHYSICIAN ASSISTANT

## 2025-08-06 PROCEDURE — 3077F SYST BP >= 140 MM HG: CPT | Mod: CPTII,S$GLB,, | Performed by: PHYSICIAN ASSISTANT

## 2025-08-06 PROCEDURE — 99214 OFFICE O/P EST MOD 30 MIN: CPT | Mod: S$GLB,,, | Performed by: PHYSICIAN ASSISTANT

## 2025-08-06 PROCEDURE — 3078F DIAST BP <80 MM HG: CPT | Mod: CPTII,S$GLB,, | Performed by: PHYSICIAN ASSISTANT

## 2025-08-06 PROCEDURE — 99999 PR PBB SHADOW E&M-EST. PATIENT-LVL V: CPT | Mod: PBBFAC,,, | Performed by: PHYSICIAN ASSISTANT

## 2025-08-06 RX ORDER — ROSUVASTATIN CALCIUM 10 MG/1
10 TABLET, COATED ORAL DAILY
Qty: 30 TABLET | Refills: 0 | Status: SHIPPED | OUTPATIENT
Start: 2025-08-06

## 2025-08-07 ENCOUNTER — PATIENT MESSAGE (OUTPATIENT)
Dept: GASTROENTEROLOGY | Facility: CLINIC | Age: 80
End: 2025-08-07
Payer: MEDICARE

## 2025-08-13 ENCOUNTER — PATIENT MESSAGE (OUTPATIENT)
Dept: INTERNAL MEDICINE | Facility: CLINIC | Age: 80
End: 2025-08-13
Payer: MEDICARE

## 2025-08-14 ENCOUNTER — OFFICE VISIT (OUTPATIENT)
Dept: INTERNAL MEDICINE | Facility: CLINIC | Age: 80
End: 2025-08-14
Payer: MEDICARE

## 2025-08-14 VITALS
RESPIRATION RATE: 20 BRPM | HEART RATE: 56 BPM | BODY MASS INDEX: 29.56 KG/M2 | SYSTOLIC BLOOD PRESSURE: 118 MMHG | OXYGEN SATURATION: 98 % | DIASTOLIC BLOOD PRESSURE: 76 MMHG | WEIGHT: 159 LBS | TEMPERATURE: 97 F

## 2025-08-14 DIAGNOSIS — R10.9 ABDOMINAL PAIN, UNSPECIFIED ABDOMINAL LOCATION: ICD-10-CM

## 2025-08-14 DIAGNOSIS — I10 ESSENTIAL HYPERTENSION: ICD-10-CM

## 2025-08-14 DIAGNOSIS — Z79.899 HIGH RISK MEDICATION USE: ICD-10-CM

## 2025-08-14 DIAGNOSIS — Z79.899 ENCOUNTER FOR LONG-TERM (CURRENT) USE OF MEDICATIONS: ICD-10-CM

## 2025-08-14 DIAGNOSIS — K21.9 GASTROESOPHAGEAL REFLUX DISEASE, UNSPECIFIED WHETHER ESOPHAGITIS PRESENT: ICD-10-CM

## 2025-08-14 DIAGNOSIS — F41.9 ANXIETY: Chronic | ICD-10-CM

## 2025-08-14 DIAGNOSIS — R94.4 DECREASED GFR: Primary | ICD-10-CM

## 2025-08-14 DIAGNOSIS — R42 ORTHOSTATIC DIZZINESS: ICD-10-CM

## 2025-08-14 DIAGNOSIS — E78.2 MIXED HYPERLIPIDEMIA: Chronic | ICD-10-CM

## 2025-08-14 DIAGNOSIS — Z76.89 ESTABLISHING CARE WITH NEW DOCTOR, ENCOUNTER FOR: ICD-10-CM

## 2025-08-14 DIAGNOSIS — E78.5 HYPERLIPIDEMIA, UNSPECIFIED HYPERLIPIDEMIA TYPE: ICD-10-CM

## 2025-08-14 DIAGNOSIS — M46.1 SACROILIITIS: ICD-10-CM

## 2025-08-14 DIAGNOSIS — G47.00 INSOMNIA, UNSPECIFIED TYPE: ICD-10-CM

## 2025-08-14 PROCEDURE — 99999 PR PBB SHADOW E&M-EST. PATIENT-LVL III: CPT | Mod: PBBFAC,,, | Performed by: FAMILY MEDICINE

## 2025-08-14 RX ORDER — ROSUVASTATIN CALCIUM 10 MG/1
10 TABLET, COATED ORAL DAILY
Qty: 90 TABLET | Refills: 1 | Status: SHIPPED | OUTPATIENT
Start: 2025-08-14

## 2025-08-14 RX ORDER — TRAZODONE HYDROCHLORIDE 50 MG/1
TABLET ORAL
Qty: 180 TABLET | Refills: 1 | Status: SHIPPED | OUTPATIENT
Start: 2025-08-14

## 2025-08-14 RX ORDER — OLMESARTAN MEDOXOMIL 40 MG/1
40 TABLET ORAL DAILY
Qty: 90 TABLET | Refills: 1 | Status: SHIPPED | OUTPATIENT
Start: 2025-08-14 | End: 2026-08-14

## 2025-08-19 ENCOUNTER — TELEPHONE (OUTPATIENT)
Dept: SURGERY | Facility: CLINIC | Age: 80
End: 2025-08-19
Payer: MEDICARE

## 2025-08-19 ENCOUNTER — OFFICE VISIT (OUTPATIENT)
Dept: GASTROENTEROLOGY | Facility: CLINIC | Age: 80
End: 2025-08-19
Payer: MEDICARE

## 2025-08-19 VITALS
HEIGHT: 62 IN | DIASTOLIC BLOOD PRESSURE: 61 MMHG | HEART RATE: 60 BPM | BODY MASS INDEX: 29.98 KG/M2 | WEIGHT: 162.94 LBS | SYSTOLIC BLOOD PRESSURE: 160 MMHG

## 2025-08-19 DIAGNOSIS — R15.9 FULL INCONTINENCE OF FECES: ICD-10-CM

## 2025-08-19 DIAGNOSIS — R15.9 INCONTINENCE OF FECES WITH FECAL URGENCY: Primary | ICD-10-CM

## 2025-08-19 DIAGNOSIS — R15.2 INCONTINENCE OF FECES WITH FECAL URGENCY: Primary | ICD-10-CM

## 2025-08-19 DIAGNOSIS — K64.8 INTERNAL HEMORRHOIDS: ICD-10-CM

## 2025-08-19 DIAGNOSIS — K64.8 INTERNAL HEMORRHOID: ICD-10-CM

## 2025-08-19 PROCEDURE — 3078F DIAST BP <80 MM HG: CPT | Mod: CPTII,S$GLB,, | Performed by: INTERNAL MEDICINE

## 2025-08-19 PROCEDURE — 3288F FALL RISK ASSESSMENT DOCD: CPT | Mod: CPTII,S$GLB,, | Performed by: INTERNAL MEDICINE

## 2025-08-19 PROCEDURE — 1160F RVW MEDS BY RX/DR IN RCRD: CPT | Mod: CPTII,S$GLB,, | Performed by: INTERNAL MEDICINE

## 2025-08-19 PROCEDURE — 99214 OFFICE O/P EST MOD 30 MIN: CPT | Mod: S$GLB,,, | Performed by: INTERNAL MEDICINE

## 2025-08-19 PROCEDURE — 1126F AMNT PAIN NOTED NONE PRSNT: CPT | Mod: CPTII,S$GLB,, | Performed by: INTERNAL MEDICINE

## 2025-08-19 PROCEDURE — 1101F PT FALLS ASSESS-DOCD LE1/YR: CPT | Mod: CPTII,S$GLB,, | Performed by: INTERNAL MEDICINE

## 2025-08-19 PROCEDURE — 99999 PR PBB SHADOW E&M-EST. PATIENT-LVL IV: CPT | Mod: PBBFAC,,, | Performed by: INTERNAL MEDICINE

## 2025-08-19 PROCEDURE — 1159F MED LIST DOCD IN RCRD: CPT | Mod: CPTII,S$GLB,, | Performed by: INTERNAL MEDICINE

## 2025-08-19 PROCEDURE — 3077F SYST BP >= 140 MM HG: CPT | Mod: CPTII,S$GLB,, | Performed by: INTERNAL MEDICINE

## 2025-08-21 ENCOUNTER — PATIENT MESSAGE (OUTPATIENT)
Dept: PAIN MEDICINE | Facility: CLINIC | Age: 80
End: 2025-08-21
Payer: MEDICARE

## 2025-08-27 ENCOUNTER — TELEPHONE (OUTPATIENT)
Dept: INTERNAL MEDICINE | Facility: CLINIC | Age: 80
End: 2025-08-27
Payer: MEDICARE

## 2025-08-27 ENCOUNTER — PATIENT MESSAGE (OUTPATIENT)
Dept: ENDOSCOPY | Facility: HOSPITAL | Age: 80
End: 2025-08-27
Payer: MEDICARE

## 2025-08-27 ENCOUNTER — TELEPHONE (OUTPATIENT)
Dept: PAIN MEDICINE | Facility: CLINIC | Age: 80
End: 2025-08-27
Payer: MEDICARE

## 2025-08-27 DIAGNOSIS — I10 ESSENTIAL HYPERTENSION: ICD-10-CM

## 2025-08-28 RX ORDER — OLMESARTAN MEDOXOMIL 40 MG/1
40 TABLET ORAL DAILY
Qty: 90 TABLET | Refills: 1 | Status: SHIPPED | OUTPATIENT
Start: 2025-08-28 | End: 2026-08-28

## 2025-08-29 PROBLEM — R15.9 FULL INCONTINENCE OF FECES: Status: ACTIVE | Noted: 2025-08-29

## 2025-08-29 PROBLEM — K64.8 INTERNAL HEMORRHOIDS: Status: ACTIVE | Noted: 2025-08-29

## 2025-08-30 DIAGNOSIS — E78.2 MIXED HYPERLIPIDEMIA: Chronic | ICD-10-CM

## 2025-09-03 RX ORDER — ROSUVASTATIN CALCIUM 10 MG/1
10 TABLET, COATED ORAL DAILY
Qty: 90 TABLET | Refills: 0 | Status: SHIPPED | OUTPATIENT
Start: 2025-09-03

## (undated) DEVICE — GAUZE SPONGE 4X4 12PLY

## (undated) DEVICE — DRAPE STERI U-SHAPED 47X51IN

## (undated) DEVICE — SEE MEDLINE ITEM 157216

## (undated) DEVICE — SEE MEDLINE ITEM 157131

## (undated) DEVICE — SUT TI-CRON BLU 2 30 HOS-10

## (undated) DEVICE — GLOVE BIOGEL PI ORTHO PRO 7.5

## (undated) DEVICE — TIP SUCTION YANKAUER

## (undated) DEVICE — STOCKINET TUBULAR 1 PLY 6X60IN

## (undated) DEVICE — GOWN SURG 2XL DISP TIE BACK

## (undated) DEVICE — DRESSING WND HELIX ADH 5X6IN

## (undated) DEVICE — BNDG COFLEX FOAM LF2 ST 4X5YD

## (undated) DEVICE — SUT BLU BR 2 TAPERD NDL 1/2

## (undated) DEVICE — TOWEL OR DISP STRL BLUE 4/PK

## (undated) DEVICE — SUT PROLENE 4-0 PS2 18 BLUE

## (undated) DEVICE — MANIFOLD 4 PORT

## (undated) DEVICE — SYR 50ML CATH TIP

## (undated) DEVICE — CLOSURE SKIN STERI STRIP 1/2X4

## (undated) DEVICE — SEE MEDLINE ITEM 157125

## (undated) DEVICE — SOL IRR NACL .9% 3000ML

## (undated) DEVICE — DRAPE HIP TIBURON 87X115X134

## (undated) DEVICE — SLING ARM ULTRA III PAD MED

## (undated) DEVICE — SUCTION FRAZIER TIP SURG 12FR

## (undated) DEVICE — SUPPORT ULNA NERVE PROTECTOR

## (undated) DEVICE — SEE MEDLINE ITEM 146292

## (undated) DEVICE — APPLICATOR CHLORAPREP ORN 26ML

## (undated) DEVICE — SUT PROLENE 3-0 PS-1 BL 18

## (undated) DEVICE — SPONGE LAP 18X18 PREWASHED

## (undated) DEVICE — SEE MEDLINE ITEM 157027

## (undated) DEVICE — PAD ABD 8X10 STERILE

## (undated) DEVICE — SUT VICRYL 2-0 CT-2 VCP269H

## (undated) DEVICE — DRAPE INCISE IOBAN 2 23X17IN

## (undated) DEVICE — SUT VICRYL PLUS 0 CT1 36IN

## (undated) DEVICE — HOOD FLYTE PEELWY STERISHIELD

## (undated) DEVICE — SEE MEDLINE ITEM 157117

## (undated) DEVICE — DRAPE PLASTIC U 60X72

## (undated) DEVICE — KIT TRIMANO CHIN

## (undated) DEVICE — SUT FIBERWIRE 2 CLLGN COAT

## (undated) DEVICE — ELECTRODE REM PLYHSV RETURN 9

## (undated) DEVICE — SHEET XLGE DRAPE

## (undated) DEVICE — TAPE SURGICAL MICROFOAM 4IN

## (undated) DEVICE — UNDERGLOVES BIOGEL PI SIZE 8

## (undated) DEVICE — BLADE SAG 18.0X1.27X100

## (undated) DEVICE — COVER LIGHT HANDLE 80/CA

## (undated) DEVICE — DRAPE STERI INSTRUMENT 1018

## (undated) DEVICE — KIT TRIMANO

## (undated) DEVICE — SOL 9P NACL IRR PIC IL

## (undated) DEVICE — SUT X425H ETHIBOND 1-0